# Patient Record
Sex: FEMALE | Race: OTHER | Employment: FULL TIME | URBAN - METROPOLITAN AREA
[De-identification: names, ages, dates, MRNs, and addresses within clinical notes are randomized per-mention and may not be internally consistent; named-entity substitution may affect disease eponyms.]

---

## 2017-01-10 ENCOUNTER — OFFICE VISIT (OUTPATIENT)
Dept: OBGYN CLINIC | Facility: CLINIC | Age: 42
End: 2017-01-10

## 2017-01-10 VITALS — WEIGHT: 146 LBS | DIASTOLIC BLOOD PRESSURE: 72 MMHG | BODY MASS INDEX: 25 KG/M2 | SYSTOLIC BLOOD PRESSURE: 126 MMHG

## 2017-01-10 DIAGNOSIS — R10.2 PELVIC PAIN IN FEMALE: Primary | ICD-10-CM

## 2017-01-10 DIAGNOSIS — N93.8 DUB (DYSFUNCTIONAL UTERINE BLEEDING): ICD-10-CM

## 2017-01-10 PROCEDURE — 99213 OFFICE O/P EST LOW 20 MIN: CPT | Performed by: OBSTETRICS & GYNECOLOGY

## 2017-01-10 NOTE — PROGRESS NOTES
HPI:    Patient ID: Jasbir Box is a 42year old female. HPI  Patient reports having two menses a month since October. Also left pelvic pain. H/O ovarian cysts. S/P Bilateral ovarian cystectomies in 2014.   Patient is allergic to narcotics and ex and thought content normal.   Nursing note and vitals reviewed. Vagina:  No lesions. Cervix:  No CMT. No lesions. Adnexa:  No adnexal masses.   NT.           ASSESSMENT/PLAN:   Pelvic pain in female  (primary encounter diagnosis)  Dub (dysfunctional

## 2017-01-13 ENCOUNTER — TELEPHONE (OUTPATIENT)
Dept: GASTROENTEROLOGY | Facility: CLINIC | Age: 42
End: 2017-01-13

## 2017-01-13 NOTE — TELEPHONE ENCOUNTER
Pt is made aware that she can submit stool specimen for H pylori any time now since she confirmed completion of triple therapy 2 months ago; she is agreeable with plan.

## 2017-01-22 ENCOUNTER — APPOINTMENT (OUTPATIENT)
Dept: LAB | Facility: HOSPITAL | Age: 42
End: 2017-01-22
Attending: INTERNAL MEDICINE
Payer: COMMERCIAL

## 2017-01-22 DIAGNOSIS — Z86.19 HISTORY OF HELICOBACTER PYLORI INFECTION: ICD-10-CM

## 2017-01-22 PROCEDURE — 87338 HPYLORI STOOL AG IA: CPT

## 2017-01-25 LAB — HELICOBACTER PYLORI AG, FECAL: NEGATIVE

## 2017-01-30 ENCOUNTER — TELEPHONE (OUTPATIENT)
Dept: OBGYN CLINIC | Facility: CLINIC | Age: 42
End: 2017-01-30

## 2017-01-30 ENCOUNTER — TELEPHONE (OUTPATIENT)
Dept: GASTROENTEROLOGY | Facility: CLINIC | Age: 42
End: 2017-01-30

## 2017-01-30 NOTE — TELEPHONE ENCOUNTER
GI RNs -  Very good news that the H pylori follow up is negative. These throat symptoms were discussed in August 2016 and then evaluated as below 9/15/2016. She has had recent thyroid US.   The only thing I can offer for her symptoms is PPI medication (pa

## 2017-01-30 NOTE — TELEPHONE ENCOUNTER
Dr. Oscar Canas- please see below communication from pt re: ongoing symptoms and advise; EGD was done on 9/15/16; thanks!

## 2017-01-30 NOTE — TELEPHONE ENCOUNTER
Pt is contacted and she is made aware of (-) stool for H pylori result; pt verbalized understanding of this; she mentioned having \"throat issues\" for past month as if something is stuck there; she was going to book appt with PCP re: this; she is made noemí

## 2017-01-30 NOTE — TELEPHONE ENCOUNTER
Dr. Karime Jameson- please see below communication with pt re: new rx for PPI to be sent to [facility] [place]; thanks!

## 2017-01-30 NOTE — TELEPHONE ENCOUNTER
Pt is contacted and she is made aware of below communication from Dr. Yoseph Michaels; pt verbalized understanding of this and is agreeable with trying PPI; pharmacy is confirmed as [facility] in [place]; if this does not relieve symptoms, then she will seek ENT r

## 2017-01-31 RX ORDER — PANTOPRAZOLE SODIUM 40 MG/1
40 TABLET, DELAYED RELEASE ORAL
Qty: 60 TABLET | Refills: 11 | Status: SHIPPED | OUTPATIENT
Start: 2017-01-31 | End: 2017-03-02

## 2017-03-29 ENCOUNTER — TELEPHONE (OUTPATIENT)
Dept: FAMILY MEDICINE CLINIC | Facility: CLINIC | Age: 42
End: 2017-03-29

## 2017-03-29 NOTE — TELEPHONE ENCOUNTER
Dr. Kyle Baeza for Dr. [facility], please see message below. Pt will be going out of town tomorrow 3/30/17 and is requesting refill on Rizatriptan. Rx not on pts current medication list. Please review. Thank you.     Refill Protocol Appointment Criteria  · Appointme

## 2017-03-29 NOTE — TELEPHONE ENCOUNTER
Pt will be going out of town tomorrow and was told by pharmacy that office still hasn't replied to the refill request. Pt will need a refill on rx rizatriptan.  (Not listed below)    Current Outpatient Prescriptions:  Verapamil HCl  MG Oral Capsule SR

## 2017-03-29 NOTE — TELEPHONE ENCOUNTER
when I spoke to pt she stated to me that she has been calling since yesterday for her rizatriptan 10 mg she is leaving out of town tomorrow. I advised her that [address] [place] [address] has not been prescribing this medication for her.  She then stated that she was so u

## 2017-03-30 NOTE — TELEPHONE ENCOUNTER
I called pharmacy and was inform that it was already  today given refill by her neurologist and has 11 refills. Thanks

## 2017-05-31 ENCOUNTER — TELEPHONE (OUTPATIENT)
Dept: OBGYN CLINIC | Facility: CLINIC | Age: 42
End: 2017-05-31

## 2017-05-31 NOTE — TELEPHONE ENCOUNTER
Would like to spkw  Nurse about a gyne prob pt is having she is having ingrown hair .  pls call her @ [phone]

## 2017-05-31 NOTE — TELEPHONE ENCOUNTER
Pt states that she shaved her vagina and not sure if she having ingrown hair. Pt also states that yesterday she had stabbing, right breast pain that was a 11 on a scale of 10. States right breast pain today is a 7 out of 10 on pain scale.  States she had br

## 2017-06-01 ENCOUNTER — OFFICE VISIT (OUTPATIENT)
Dept: OBGYN CLINIC | Facility: CLINIC | Age: 42
End: 2017-06-01

## 2017-06-01 VITALS
HEART RATE: 93 BPM | DIASTOLIC BLOOD PRESSURE: 113 MMHG | WEIGHT: 145 LBS | BODY MASS INDEX: 25 KG/M2 | SYSTOLIC BLOOD PRESSURE: 164 MMHG

## 2017-06-01 DIAGNOSIS — Z01.419 WELL WOMAN EXAM WITH ROUTINE GYNECOLOGICAL EXAM: Primary | ICD-10-CM

## 2017-06-01 DIAGNOSIS — Z12.31 ENCOUNTER FOR SCREENING MAMMOGRAM FOR BREAST CANCER: ICD-10-CM

## 2017-06-01 DIAGNOSIS — L73.9 FOLLICULITIS: ICD-10-CM

## 2017-06-01 DIAGNOSIS — N64.4 MASTALGIA IN FEMALE: ICD-10-CM

## 2017-06-01 PROCEDURE — 99396 PREV VISIT EST AGE 40-64: CPT | Performed by: OBSTETRICS & GYNECOLOGY

## 2017-06-01 NOTE — PROGRESS NOTES
HPI:   Danita Calvert is a 42year old female who presents for an annual/pap and for eval of bump, possible ingrown hair.  Pt also with      Wt Readings from Last 6 Encounters:  06/01/17 : 145 lb (65.772 kg)  01/10/17 : 146 lb (66.225 kg)  12/28/16 : 142 biopsies    REMOVAL OF OVARIAN CYST(S) Left     Comment laparoscopic    [address]    COLONOSCOPY  2012      2008      2010    Comment APGAR: 9 (1min) 9 (5 min)  DR:  Keiko Mccullough    REMOVAL OF back pain  NEURO: denies headaches  PSYCHE: denies depression or anxiety  HEMATOLOGIC: denies hx of anemia  ENDOCRINE: denies thyroid history  ALL/ASTHMA: denies hx of allergy or asthma    EXAM:   /103 mmHg  Pulse 93  Wt 145 lb (65.772 kg)  LMP 05/10

## 2017-06-01 NOTE — PROGRESS NOTES
Patient had a high blood pressure of 168/103 at beginning of visit. I rechecked her BP at the end of visit and it was 164/113.  I notified patient per Dr. Jeremy Fregoso patient had to go to urgent care or PCP right away, patient decline and stated she had to go

## 2017-06-02 ENCOUNTER — APPOINTMENT (OUTPATIENT)
Dept: LAB | Facility: HOSPITAL | Age: 42
End: 2017-06-02
Attending: OBSTETRICS & GYNECOLOGY
Payer: COMMERCIAL

## 2017-06-02 DIAGNOSIS — Z01.419 WELL WOMAN EXAM WITH ROUTINE GYNECOLOGICAL EXAM: ICD-10-CM

## 2017-06-02 PROCEDURE — 36415 COLL VENOUS BLD VENIPUNCTURE: CPT

## 2017-06-02 PROCEDURE — 84702 CHORIONIC GONADOTROPIN TEST: CPT

## 2017-06-02 PROCEDURE — 84443 ASSAY THYROID STIM HORMONE: CPT

## 2017-06-10 ENCOUNTER — TELEPHONE (OUTPATIENT)
Dept: OBGYN CLINIC | Facility: CLINIC | Age: 42
End: 2017-06-10

## 2017-06-10 NOTE — TELEPHONE ENCOUNTER
Pt appraised of normal pap, positive high risk hpv, neg high risk hpv 16/18/45,  Counseled, pt agreed to make appt for pap 12 months from jun 2017

## 2017-08-08 ENCOUNTER — TELEPHONE (OUTPATIENT)
Dept: SURGERY | Facility: CLINIC | Age: 42
End: 2017-08-08

## 2017-08-08 NOTE — TELEPHONE ENCOUNTER
The patient called this afternoon and expressed frustration. She requested that Dr. Carrol Bamberger be paged to speak with her urgently. I explained that he is in clinic seeing patients today and asked if I can be of assistance to.  She stated that it was regarding

## 2017-08-09 ENCOUNTER — TELEPHONE (OUTPATIENT)
Dept: SURGERY | Facility: CLINIC | Age: 42
End: 2017-08-09

## 2017-08-09 NOTE — TELEPHONE ENCOUNTER
Spoke to patient. She relates that she has been having right breast pain think she may have pulled a muscle. I instructed the patient to call for an appointment to be seen. She realizes understanding.

## 2017-08-21 ENCOUNTER — HOSPITAL ENCOUNTER (OUTPATIENT)
Dept: ULTRASOUND IMAGING | Facility: HOSPITAL | Age: 42
Discharge: HOME | End: 2017-08-21
Attending: OBSTETRICS & GYNECOLOGY
Payer: COMMERCIAL

## 2017-08-21 ENCOUNTER — HOSPITAL ENCOUNTER (OUTPATIENT)
Dept: MAMMOGRAPHY | Facility: HOSPITAL | Age: 42
Discharge: HOME | End: 2017-08-21
Attending: OBSTETRICS & GYNECOLOGY
Payer: COMMERCIAL

## 2017-08-21 DIAGNOSIS — N64.4 MASTALGIA IN FEMALE: ICD-10-CM

## 2017-08-21 PROCEDURE — 76642 ULTRASOUND BREAST LIMITED: CPT | Performed by: OBSTETRICS & GYNECOLOGY

## 2017-10-27 ENCOUNTER — TELEPHONE (OUTPATIENT)
Dept: OBGYN CLINIC | Facility: CLINIC | Age: 42
End: 2017-10-27

## 2017-10-27 NOTE — TELEPHONE ENCOUNTER
Per the pt she had a positive pregnancy test, and she would like to set up an appt. The pt states that her LMP was on 9/25/17. Please advise.

## 2017-10-30 ENCOUNTER — OFFICE VISIT (OUTPATIENT)
Dept: OBGYN CLINIC | Facility: CLINIC | Age: 42
End: 2017-10-30

## 2017-10-30 VITALS
HEART RATE: 98 BPM | WEIGHT: 143.38 LBS | HEIGHT: 63 IN | DIASTOLIC BLOOD PRESSURE: 86 MMHG | BODY MASS INDEX: 25.41 KG/M2 | SYSTOLIC BLOOD PRESSURE: 151 MMHG

## 2017-10-30 DIAGNOSIS — Z34.90 UNPLANNED PREGNANCY: ICD-10-CM

## 2017-10-30 DIAGNOSIS — Z85.3 HISTORY OF BREAST CANCER: ICD-10-CM

## 2017-10-30 DIAGNOSIS — N92.6 MISSED MENSES: Primary | ICD-10-CM

## 2017-10-30 PROCEDURE — 81025 URINE PREGNANCY TEST: CPT | Performed by: ADVANCED PRACTICE MIDWIFE

## 2017-10-30 PROCEDURE — 99213 OFFICE O/P EST LOW 20 MIN: CPT | Performed by: ADVANCED PRACTICE MIDWIFE

## 2017-10-30 RX ORDER — RIZATRIPTAN BENZOATE 10 MG/1
TABLET ORAL
Refills: 6 | COMMUNITY
Start: 2017-09-23 | End: 2017-12-18 | Stop reason: ALTCHOICE

## 2017-10-30 NOTE — PROGRESS NOTES
HPI:    Patient ID: Braulio Mccall is a 42year old female. LMP 9/25/17. Was on tamoxifen but stopped. Has been having regular periods. Was treated for breast CA 2 years ago and has been cancer free since.   States she was told by her MD that she co

## 2017-12-18 ENCOUNTER — OFFICE VISIT (OUTPATIENT)
Dept: FAMILY MEDICINE CLINIC | Facility: CLINIC | Age: 42
End: 2017-12-18

## 2017-12-18 VITALS
SYSTOLIC BLOOD PRESSURE: 138 MMHG | BODY MASS INDEX: 26 KG/M2 | TEMPERATURE: 98 F | WEIGHT: 149 LBS | DIASTOLIC BLOOD PRESSURE: 94 MMHG

## 2017-12-18 DIAGNOSIS — I10 ESSENTIAL HYPERTENSION: Primary | ICD-10-CM

## 2017-12-18 DIAGNOSIS — F41.1 GENERALIZED ANXIETY DISORDER: ICD-10-CM

## 2017-12-18 DIAGNOSIS — K21.9 GASTROESOPHAGEAL REFLUX DISEASE, ESOPHAGITIS PRESENCE NOT SPECIFIED: ICD-10-CM

## 2017-12-18 PROCEDURE — 99213 OFFICE O/P EST LOW 20 MIN: CPT | Performed by: PHYSICIAN ASSISTANT

## 2017-12-18 PROCEDURE — 99212 OFFICE O/P EST SF 10 MIN: CPT | Performed by: PHYSICIAN ASSISTANT

## 2017-12-18 RX ORDER — CITALOPRAM 20 MG/1
20 TABLET ORAL DAILY
Qty: 30 TABLET | Refills: 2 | Status: SHIPPED | OUTPATIENT
Start: 2017-12-18 | End: 2018-04-02 | Stop reason: ALTCHOICE

## 2017-12-18 RX ORDER — PANTOPRAZOLE SODIUM 40 MG/1
40 TABLET, DELAYED RELEASE ORAL
Qty: 30 TABLET | Refills: 2 | Status: SHIPPED | OUTPATIENT
Start: 2017-12-18 | End: 2018-01-04

## 2017-12-18 RX ORDER — VERAPAMIL HYDROCHLORIDE 180 MG/1
180 CAPSULE, EXTENDED RELEASE ORAL NIGHTLY
Qty: 90 CAPSULE | Refills: 3 | Status: SHIPPED | OUTPATIENT
Start: 2017-12-18 | End: 2018-04-02 | Stop reason: ALTCHOICE

## 2017-12-18 NOTE — PROGRESS NOTES
HPI:    Patient ID: Enzo Isaac is a 36year old female. Patient presents for heartburn and acid reflux worsening over past month. She has tried omeprazole and Zantac over the counter without any improvement.   She states has to sleep sitting up at breakfast. Disp: 30 tablet Rfl: 2   Citalopram Hydrobromide 20 MG Oral Tab Take 1 tablet (20 mg total) by mouth daily. Disp: 30 tablet Rfl: 2   Verapamil HCl  MG Oral Capsule SR 24 Hr Take 1 capsule (180 mg total) by mouth nightly.  Disp: 90 capsule R in this encounter.       Meds This Visit:  Signed Prescriptions Disp Refills    Pantoprazole Sodium 40 MG Oral Tab EC 30 tablet 2      Sig: Take 1 tablet (40 mg total) by mouth every morning before breakfast.      Citalopram Hydrobromide 20 MG Oral Tab 30 t

## 2017-12-22 ENCOUNTER — TELEPHONE (OUTPATIENT)
Dept: GASTROENTEROLOGY | Facility: CLINIC | Age: 42
End: 2017-12-22

## 2017-12-22 NOTE — TELEPHONE ENCOUNTER
Future Appointments  Date Time Provider Jacki Rodegrs   12/29/2017 9:30 AM NASEEM Leonardo     Tried to call the pt to ask her symptoms. Her phone went to busy.  I will try to call again later

## 2017-12-22 NOTE — TELEPHONE ENCOUNTER
Please do! I would be more than happy to speak with her over the phone today. Please call me or page me when she is available to speak, if you are on the phone with her and I have completed office.

## 2018-01-02 ENCOUNTER — NURSE TRIAGE (OUTPATIENT)
Dept: OTHER | Age: 43
End: 2018-01-02

## 2018-01-02 NOTE — TELEPHONE ENCOUNTER
Spoke with patient who refused the appointment for today, and requested to schedule the appointment for tomorrow. This nurse spoke with the phone room who will schedule the patient for tomorrow at 8:30am. Patient voiced understanding.

## 2018-01-02 NOTE — TELEPHONE ENCOUNTER
Action Requested: Summary for Provider     []  Critical Lab, Recommendations Needed  [x] Need Additional Advice  []   FYI    []   Need Orders  [x] Need Medications Sent to Pharmacy  []  Other     SUMMARY: Pt is requesting antibiotic to pharmacy.      States

## 2018-01-02 NOTE — PROGRESS NOTES
[facility] Follow-up Visit    Cora of CP or SOB.      Last OV with Dr. Santhosh Adamson:  - Since the surgery and radiation therapy, she states that she has been suffering neck and throat pain.  She points to the area of the thyroid gland and cartilage.  She describes pain and the sensation of a lump Pylori    History, Medications, Allergies, ROS:      Past Medical History:   Diagnosis Date   • Breast CA ([facility] [address]    • Breast pain in female    • Chronic pain    • DCIS (ductal carcinoma in situ) of breast 2013    Right breast. radiotherapy and mastectomy • Cancer Other 49     breast cancer and leukemia   • Dementia Other      Alzheimers, Grandmother [SIDE NOT STATED]   • Cancer Cousin 27     ovarian cancer / maternal cousin   • Diabetes Paternal Grandmother    • Cancer Paternal Aunt      leukemia   • Can swelling  BEHAVIOR/PSYCH:  negative for depressed mood    PHYSICAL EXAM:   Blood pressure 142/85, pulse 96, height 5' 3\" (1.6 m), weight 145 lb 3.2 oz (65.9 kg), last menstrual period 12/11/2017, not currently breastfeeding.     Gen: patient appears comfor recent upper respiratory complaints, or true acid reflux. I have advised her re: PPI/Carafate and will likely see the patient promptly after 1 month. We discussed the low yield of repeat upper endoscopy, will reserve this for next visit if necessary.  Anti-

## 2018-01-03 ENCOUNTER — HOSPITAL ENCOUNTER (OUTPATIENT)
Dept: GENERAL RADIOLOGY | Age: 43
Discharge: HOME | End: 2018-01-03
Attending: FAMILY MEDICINE
Payer: COMMERCIAL

## 2018-01-03 ENCOUNTER — OFFICE VISIT (OUTPATIENT)
Dept: FAMILY MEDICINE CLINIC | Facility: CLINIC | Age: 43
End: 2018-01-03

## 2018-01-03 VITALS
DIASTOLIC BLOOD PRESSURE: 82 MMHG | TEMPERATURE: 99 F | HEART RATE: 93 BPM | BODY MASS INDEX: 25.52 KG/M2 | WEIGHT: 144 LBS | SYSTOLIC BLOOD PRESSURE: 150 MMHG | HEIGHT: 63 IN

## 2018-01-03 DIAGNOSIS — R05.9 COUGH: ICD-10-CM

## 2018-01-03 DIAGNOSIS — R05.9 COUGH: Primary | ICD-10-CM

## 2018-01-03 PROCEDURE — 99214 OFFICE O/P EST MOD 30 MIN: CPT | Performed by: FAMILY MEDICINE

## 2018-01-03 PROCEDURE — 99212 OFFICE O/P EST SF 10 MIN: CPT | Performed by: FAMILY MEDICINE

## 2018-01-03 PROCEDURE — 71046 X-RAY EXAM CHEST 2 VIEWS: CPT | Performed by: FAMILY MEDICINE

## 2018-01-03 RX ORDER — RIZATRIPTAN BENZOATE 10 MG/1
TABLET ORAL
Refills: 3 | COMMUNITY
Start: 2017-12-29 | End: 2018-04-02

## 2018-01-03 RX ORDER — BENZONATATE 100 MG/1
100 CAPSULE ORAL 3 TIMES DAILY PRN
Qty: 45 CAPSULE | Refills: 0 | Status: SHIPPED | OUTPATIENT
Start: 2018-01-03 | End: 2018-04-02 | Stop reason: ALTCHOICE

## 2018-01-03 NOTE — PROGRESS NOTES
Blood pressure 150/82, pulse 93, temperature 98.8 °F (37.1 °C), temperature source Oral, height 5' 3\" (1.6 m), weight 144 lb (65.3 kg), last menstrual period 12/11/2017, not currently breastfeeding.     Patient presents today complaining of a two-week hist

## 2018-01-04 ENCOUNTER — OFFICE VISIT (OUTPATIENT)
Dept: GASTROENTEROLOGY | Facility: CLINIC | Age: 43
End: 2018-01-04

## 2018-01-04 VITALS
HEART RATE: 96 BPM | WEIGHT: 145.19 LBS | SYSTOLIC BLOOD PRESSURE: 142 MMHG | BODY MASS INDEX: 25.73 KG/M2 | DIASTOLIC BLOOD PRESSURE: 85 MMHG | HEIGHT: 63 IN

## 2018-01-04 DIAGNOSIS — K21.9 GASTROESOPHAGEAL REFLUX DISEASE, ESOPHAGITIS PRESENCE NOT SPECIFIED: Primary | ICD-10-CM

## 2018-01-04 DIAGNOSIS — R49.0 HOARSENESS: ICD-10-CM

## 2018-01-04 DIAGNOSIS — R10.31 RLQ ABDOMINAL PAIN: ICD-10-CM

## 2018-01-04 DIAGNOSIS — R10.13 EPIGASTRIC ABDOMINAL PAIN: ICD-10-CM

## 2018-01-04 DIAGNOSIS — Z86.19 HISTORY OF HELICOBACTER PYLORI INFECTION: ICD-10-CM

## 2018-01-04 PROCEDURE — 99213 OFFICE O/P EST LOW 20 MIN: CPT | Performed by: PHYSICIAN ASSISTANT

## 2018-01-04 PROCEDURE — 99212 OFFICE O/P EST SF 10 MIN: CPT | Performed by: PHYSICIAN ASSISTANT

## 2018-01-04 RX ORDER — OMEPRAZOLE 40 MG/1
40 CAPSULE, DELAYED RELEASE ORAL DAILY
Qty: 90 CAPSULE | Refills: 0 | Status: SHIPPED | OUTPATIENT
Start: 2018-01-04 | End: 2018-03-31

## 2018-01-04 RX ORDER — SUCRALFATE 1 G/1
1 TABLET ORAL
Qty: 120 TABLET | Refills: 0 | Status: SHIPPED | OUTPATIENT
Start: 2018-01-04 | End: 2019-04-22

## 2018-01-04 NOTE — PATIENT INSTRUCTIONS
1. Start on Omeprazole 40 mg each day - thirty minutes before breakfast.    2. Start Carafate - this medication is to be taken 4 times a day as prescribed.     3. Call me in 1 week - if you are still not doing better, we may consider repeating upper endosco

## 2018-01-05 ENCOUNTER — TELEPHONE (OUTPATIENT)
Dept: GASTROENTEROLOGY | Facility: CLINIC | Age: 43
End: 2018-01-05

## 2018-01-06 NOTE — TELEPHONE ENCOUNTER
Recommendations after d/w Dr. Teo Padilla:    1. Agree with aggressive PPI use for now. 2. In 1 month, could transition to ranitidine.  Sometimes patients with these atypical symptoms do better on H2 blocker than the PPI medications.    3. CT scan reasonable

## 2018-01-16 NOTE — TELEPHONE ENCOUNTER
Transferred from phone room. Forwarded to [facility] PA--I reviewed all below with pt. She does not want CT scan, and I explained about the egd. States she had been doing much better, then ate a hot dog and fries yesterday and \"feels terrible\".  Wanted to kno

## 2018-01-31 ENCOUNTER — PATIENT MESSAGE (OUTPATIENT)
Dept: GASTROENTEROLOGY | Facility: CLINIC | Age: 43
End: 2018-01-31

## 2018-01-31 DIAGNOSIS — R12 HEARTBURN: ICD-10-CM

## 2018-01-31 DIAGNOSIS — R63.0 LOSS OF APPETITE: ICD-10-CM

## 2018-01-31 DIAGNOSIS — R63.4 WEIGHT LOSS: Primary | ICD-10-CM

## 2018-01-31 DIAGNOSIS — Z85.3 HISTORY OF BREAST CANCER: ICD-10-CM

## 2018-02-05 NOTE — TELEPHONE ENCOUNTER
Connie Diss,     Having the flu can definitely irritate your symptoms. Are you taking Ibuprofen/Motrin/Aleve/Asprin products? Are you having black stools? Discontinue the Omeprazole medication and Carafate I prescribed in office.  Instead you can take Ranit

## 2018-02-05 NOTE — TELEPHONE ENCOUNTER
From: Nhan Seymour  To: Juancarlos [place]  Sent: 1/31/2018 1:54 PM CST  Subject: Prescription Question    HI MS. YAMILA VERA BEEN TAKING THE MEDICATION YOU PRESCRIBED ME FOR ABOUT 3 WEEKS NOW AND ITS HONESTLY NOT WORKING.  I HAVE NO APPETITE, CHUY LOST 10LB

## 2018-02-07 ENCOUNTER — TELEPHONE (OUTPATIENT)
Dept: GASTROENTEROLOGY | Facility: CLINIC | Age: 43
End: 2018-02-07

## 2018-02-07 NOTE — TELEPHONE ENCOUNTER
Kathryn--pt contacted and has her cell and can be reached any time on it. Forwarded also to GI schedulers. Thanks.

## 2018-02-07 NOTE — TELEPHONE ENCOUNTER
Spoke with pt. Has been taking sucralfate and omeprazole as prescribed. Symptoms described below are constant and unimproved with medications. Decreased appetite due to symptoms. Denies fever, chills, body aches, nausea, vomiting or diarrhea. PB please advise.

## 2018-02-07 NOTE — TELEPHONE ENCOUNTER
Pt states that medication prescribed (she did not know name of med) is not working at all for symptoms. Pt still has lump in throat and stomach pain and also acid taste in mouth. Please call.

## 2018-02-08 ENCOUNTER — TELEPHONE (OUTPATIENT)
Dept: GASTROENTEROLOGY | Facility: CLINIC | Age: 43
End: 2018-02-08

## 2018-02-08 NOTE — TELEPHONE ENCOUNTER
Sometimes medicine works, sometimes does not. She has not scheduled CT scan, EGD nor changed medication regimen. Wondering if she should be strict about her diet which I did recommend to her. She states yesterday was told I would call her back, and was awaiting call. States was told by staff not to schedule CT, et Nena Bodily. Reviewed all notes and recommendations with her. She will read [employer] message, await call to schedule, and schedule CT herself. Understands to call back or [employer] message once she has read the 1/31 message and then we will continue to make recommendations. Patient at work - was appreciative of call. GI RNs, let us make sure we get this to  today if possible.  This was mostly an Dominic Paul

## 2018-02-08 NOTE — TELEPHONE ENCOUNTER
Scheduled for:  EGD 92959  Provider Name: Dr Davide Brambila  Date:  2/22/18  Location:  [facility]  Sedation:  MAC  Time:  1503 (pt is aware that [address] will call the day before to confirm arrival time)   Prep:  NPO after midnight, sent via [employer]  Meds/Allergies Rec

## 2018-02-13 NOTE — TELEPHONE ENCOUNTER
I called patient. She is very frustrated that she started [facility] and put my name on. I told her I was unaware she was even trying to file FMLA, especially given she has not responded to my calls re: changing her PPI to H2 and CT.  She wanted me to run FMLA by

## 2018-02-13 NOTE — TELEPHONE ENCOUNTER
Please let patient know:    I received her [employer] message. She needs to schedule her CT of the Abdomen. PCP's office covers FMLA.

## 2018-02-15 ENCOUNTER — TELEPHONE (OUTPATIENT)
Dept: ADMINISTRATIVE | Age: 43
End: 2018-02-15

## 2018-02-17 ENCOUNTER — HOSPITAL ENCOUNTER (OUTPATIENT)
Dept: CT IMAGING | Facility: HOSPITAL | Age: 43
Discharge: HOME | End: 2018-02-17
Attending: PHYSICIAN ASSISTANT
Payer: COMMERCIAL

## 2018-02-17 DIAGNOSIS — Z85.3 HISTORY OF BREAST CANCER: ICD-10-CM

## 2018-02-17 DIAGNOSIS — R63.0 LOSS OF APPETITE: ICD-10-CM

## 2018-02-17 DIAGNOSIS — R12 HEARTBURN: ICD-10-CM

## 2018-02-17 DIAGNOSIS — R63.4 WEIGHT LOSS: ICD-10-CM

## 2018-02-17 LAB — CREAT BLD-MCNC: 0.5 MG/DL (ref 0.5–1.5)

## 2018-02-17 PROCEDURE — 82565 ASSAY OF CREATININE: CPT

## 2018-02-17 PROCEDURE — 74177 CT ABD & PELVIS W/CONTRAST: CPT | Performed by: PHYSICIAN ASSISTANT

## 2018-02-20 ENCOUNTER — TELEPHONE (OUTPATIENT)
Dept: PEDIATRICS CLINIC | Facility: CLINIC | Age: 43
End: 2018-02-20

## 2018-02-20 ENCOUNTER — TELEPHONE (OUTPATIENT)
Dept: GASTROENTEROLOGY | Facility: CLINIC | Age: 43
End: 2018-02-20

## 2018-02-20 NOTE — TELEPHONE ENCOUNTER
Kathryn GUSMAN, pt requesting CT Scan of Abdomen, noted your note on CT result (copied and pasted below). Please call pt when appropriate. Thank you.       Notes Recorded by Giovanni Hawkins PA-C on 2/19/2018 at 11:35 AM CST  Dr. Satya Montejo, please see:    \"GI/SUDHIRENTE

## 2018-02-20 NOTE — TELEPHONE ENCOUNTER
Pt states she may have either a cyst or endometriosis per CT Scan of abdomen that she recently had. Would like to schedule appt to see what steps are next.

## 2018-02-20 NOTE — TELEPHONE ENCOUNTER
Discussed with Dr. Satya Montejo in office. Possible congenital versus ?endometriosis etiology of the small loops of bowel in the LUQ.     Discussed with patient over the phone - she is very frustrated stating \"I almost hope something is wrong so that we can f

## 2018-02-20 NOTE — TELEPHONE ENCOUNTER
Pls note pt message below. Apt made for next Monday with [employer] per pt request. Pt asked if [facility] can review the CT scan prior to apt.

## 2018-02-20 NOTE — TELEPHONE ENCOUNTER
Noted.  CT scan discussed today with Tran Whitt PA-C. No findings on CT scan to explain abdominal pain. Incidental finding of \"clumping\" of small bowel in the left upper quadrant discussed. I do not think that is relevant to this patient's symptoms.

## 2018-02-21 NOTE — TELEPHONE ENCOUNTER
Dr. Mandy Jimenez pending in [facility]. Pt is requesting intermittent time off of 1-4 days per month, 1-24 hrs per episode starting from 1/4/18 for the year due to GERD. She also has an EGD scheduled for rukhsana. Do you approve? Please advise.     Thank you,  Min

## 2018-02-22 ENCOUNTER — LAB REQUISITION (OUTPATIENT)
Dept: LAB | Facility: HOSPITAL | Age: 43
End: 2018-02-22
Payer: COMMERCIAL

## 2018-02-22 DIAGNOSIS — Z01.89 ENCOUNTER FOR OTHER SPECIFIED SPECIAL EXAMINATIONS: ICD-10-CM

## 2018-02-22 PROCEDURE — 88312 SPECIAL STAINS GROUP 1: CPT | Performed by: INTERNAL MEDICINE

## 2018-02-22 PROCEDURE — 88305 TISSUE EXAM BY PATHOLOGIST: CPT | Performed by: INTERNAL MEDICINE

## 2018-02-23 NOTE — TELEPHONE ENCOUNTER
Dr. Yuliya Arguelles,    Please sign off on form:  -Highlight the patient and hit \"Chart\" button. -In Chart Review, w/in the Encounter tab - click 1 time on the Telephone call encounter for 2/15/18.  Scroll down the telephone encounter.  -Click \"scan on\" blue

## 2018-02-26 ENCOUNTER — TELEPHONE (OUTPATIENT)
Dept: GASTROENTEROLOGY | Facility: CLINIC | Age: 43
End: 2018-02-26

## 2018-02-26 NOTE — TELEPHONE ENCOUNTER
Copy of result note attached to her biopsies of 2/22/2018:    \"GI RNs-  Please call Isabella Minaya to advise that the EGD examination of 2/22/2018 showed a healthy appearing esophagus. I took biopsies of the lining of the esophagus to evaluate her symptoms.   I

## 2018-02-26 NOTE — TELEPHONE ENCOUNTER
Pt paid over the phone. Sent Email auth on 2/22/18. Faxed FMLA to [employer]. Will mail copy and cc receipt with blank HIPAA to pt. Pt aware.

## 2018-02-27 NOTE — TELEPHONE ENCOUNTER
Janet Roberts, this is great. Thank you so much for speaking to Consuelo Meyers and clearly explaining all of this to her. In the end, I think it is only diet and lifestyle that will make a difference for her.

## 2018-02-27 NOTE — TELEPHONE ENCOUNTER
Dr Cathrine Cranker,    I went over your recommendations and f/u instructions with [facility]. She verbalizes understanding but is concerned because she is still experiencing reflux symptoms. She is wondering if she needs a medication adjustment.     I asked he

## 2018-03-20 ENCOUNTER — TELEPHONE (OUTPATIENT)
Dept: FAMILY MEDICINE CLINIC | Facility: CLINIC | Age: 43
End: 2018-03-20

## 2018-03-20 NOTE — TELEPHONE ENCOUNTER
Pt requesting to come in on Monday anytime   Pt states she keeps getting bloody noses all day and that her nose feels clogged   Schedule is on hold

## 2018-03-31 ENCOUNTER — PATIENT MESSAGE (OUTPATIENT)
Dept: FAMILY MEDICINE CLINIC | Facility: CLINIC | Age: 43
End: 2018-03-31

## 2018-04-02 ENCOUNTER — OFFICE VISIT (OUTPATIENT)
Dept: FAMILY MEDICINE CLINIC | Facility: CLINIC | Age: 43
End: 2018-04-02

## 2018-04-02 VITALS
DIASTOLIC BLOOD PRESSURE: 88 MMHG | SYSTOLIC BLOOD PRESSURE: 144 MMHG | HEART RATE: 120 BPM | TEMPERATURE: 98 F | WEIGHT: 133 LBS | BODY MASS INDEX: 24 KG/M2

## 2018-04-02 DIAGNOSIS — J04.0 ACUTE LARYNGITIS: ICD-10-CM

## 2018-04-02 DIAGNOSIS — J01.00 ACUTE NON-RECURRENT MAXILLARY SINUSITIS: Primary | ICD-10-CM

## 2018-04-02 PROCEDURE — 99212 OFFICE O/P EST SF 10 MIN: CPT | Performed by: PHYSICIAN ASSISTANT

## 2018-04-02 PROCEDURE — 99213 OFFICE O/P EST LOW 20 MIN: CPT | Performed by: PHYSICIAN ASSISTANT

## 2018-04-02 RX ORDER — RIZATRIPTAN BENZOATE 10 MG/1
TABLET ORAL
Qty: 6 TABLET | Refills: 3 | Status: SHIPPED | OUTPATIENT
Start: 2018-04-02 | End: 2018-11-09

## 2018-04-02 RX ORDER — OMEPRAZOLE 40 MG/1
40 CAPSULE, DELAYED RELEASE ORAL DAILY
Qty: 90 CAPSULE | Refills: 0 | Status: SHIPPED
Start: 2018-04-02 | End: 2018-04-02

## 2018-04-02 RX ORDER — AMOXICILLIN AND CLAVULANATE POTASSIUM 875; 125 MG/1; MG/1
1 TABLET, FILM COATED ORAL 2 TIMES DAILY
Qty: 20 TABLET | Refills: 0 | Status: SHIPPED | OUTPATIENT
Start: 2018-04-02 | End: 2018-04-12

## 2018-04-02 RX ORDER — OMEPRAZOLE 40 MG/1
40 CAPSULE, DELAYED RELEASE ORAL DAILY
Qty: 90 CAPSULE | Refills: 1 | Status: SHIPPED | OUTPATIENT
Start: 2018-04-02 | End: 2018-11-20

## 2018-04-02 RX ORDER — METHYLPREDNISOLONE 4 MG/1
TABLET ORAL
Qty: 1 KIT | Refills: 0 | Status: SHIPPED | OUTPATIENT
Start: 2018-04-02 | End: 2018-06-15

## 2018-04-02 NOTE — TELEPHONE ENCOUNTER
Pending Prescriptions Disp Refills    Omeprazole 40 MG Oral Capsule Delayed Release 90 capsule 0     Sig: Take 1 capsule (40 mg total) by mouth daily. Take 1 capsule by mouth daily before breakfast.         See refill request  Patient last seen 1-4-18.

## 2018-04-02 NOTE — TELEPHONE ENCOUNTER
From: Mamie Lopez  Sent: 3/31/2018 9:52 AM CDT  Subject: Medication Renewal Request    Sis Mann.  Nikole Worthington would like a refill of the following medications:     Omeprazole 40 MG Oral Capsule Delayed Release Huang Dennis PA-C]    Preferred pharmacy: [facility]

## 2018-04-02 NOTE — PROGRESS NOTES
HPI:    Patient ID: Dimple Smith is a 36year old female. Patient presents for follow up from immediate care visit 3/31/18.   She developed severe sore throat and pain with swallowing, hoarse voice, cough, sinus pain and congestion and bilateral ear LATEX  Norco [Hydrocodone-*    Hives, Nausea and vomiting  Tramadol                Hives, Nausea only  Hydrocodone             Dizziness    Comment:NAUSEA   PHYSICAL EXAM:   Physical Exam   Constitutional: She is oriented to person, place, and time.  She ap and rest.  Patient instructed to follow up if symptoms persist or worsen. No orders of the defined types were placed in this encounter.       Meds This Visit:  Signed Prescriptions Disp Refills    Amoxicillin-Pot Clavulanate 875-125 MG Oral Tab 20 tablet

## 2018-04-09 ENCOUNTER — NURSE TRIAGE (OUTPATIENT)
Dept: OTHER | Age: 43
End: 2018-04-09

## 2018-04-09 NOTE — TELEPHONE ENCOUNTER
Patient should schedule an appointment for evaluation. If pain becomes severe then she should go to ER.

## 2018-04-09 NOTE — TELEPHONE ENCOUNTER
Called pt, verified name and . Pt scheduled appt at 9am on 18. Adised her to go to ER if pain becomes more severe. She voices understanding and agrees with plan.

## 2018-04-09 NOTE — TELEPHONE ENCOUNTER
Action Requested: Summary for Provider     []  Critical Lab, Recommendations Needed  [x] Need Additional Advice  []   FYI    []   Need Orders  [] Need Medications Sent to Pharmacy  []  Other     SUMMARY: pt called reports she was ill with flu and did a lot

## 2018-06-15 ENCOUNTER — OFFICE VISIT (OUTPATIENT)
Dept: FAMILY MEDICINE CLINIC | Facility: CLINIC | Age: 43
End: 2018-06-15

## 2018-06-15 VITALS
SYSTOLIC BLOOD PRESSURE: 170 MMHG | RESPIRATION RATE: 18 BRPM | BODY MASS INDEX: 23.57 KG/M2 | HEART RATE: 101 BPM | TEMPERATURE: 98 F | WEIGHT: 133 LBS | DIASTOLIC BLOOD PRESSURE: 97 MMHG | HEIGHT: 63 IN

## 2018-06-15 DIAGNOSIS — L72.0 EPIDERMAL CYST OF FACE: ICD-10-CM

## 2018-06-15 DIAGNOSIS — F41.1 GENERALIZED ANXIETY DISORDER: ICD-10-CM

## 2018-06-15 DIAGNOSIS — I10 ESSENTIAL HYPERTENSION: Primary | ICD-10-CM

## 2018-06-15 PROCEDURE — 99212 OFFICE O/P EST SF 10 MIN: CPT | Performed by: FAMILY MEDICINE

## 2018-06-15 PROCEDURE — 99213 OFFICE O/P EST LOW 20 MIN: CPT | Performed by: FAMILY MEDICINE

## 2018-06-15 RX ORDER — AMOXICILLIN 875 MG/1
875 TABLET, COATED ORAL 2 TIMES DAILY
Qty: 20 TABLET | Refills: 0 | Status: SHIPPED | OUTPATIENT
Start: 2018-06-15 | End: 2018-08-07 | Stop reason: ALTCHOICE

## 2018-06-15 RX ORDER — TRANDOLAPRIL AND VERAPAMIL HYDROCHLORIDE 1; 240 MG/1; MG/1
1 TABLET, FILM COATED, EXTENDED RELEASE ORAL DAILY
Qty: 90 TABLET | Refills: 3 | Status: SHIPPED | OUTPATIENT
Start: 2018-06-15 | End: 2018-08-22

## 2018-06-15 RX ORDER — PREDNISONE 20 MG/1
20 TABLET ORAL 2 TIMES DAILY
Qty: 10 TABLET | Refills: 0 | Status: SHIPPED | OUTPATIENT
Start: 2018-06-15 | End: 2018-06-15

## 2018-06-15 RX ORDER — AMOXICILLIN 875 MG/1
875 TABLET, COATED ORAL 2 TIMES DAILY
Qty: 20 TABLET | Refills: 0 | Status: SHIPPED | OUTPATIENT
Start: 2018-06-15 | End: 2018-06-15

## 2018-06-15 RX ORDER — PREDNISONE 20 MG/1
20 TABLET ORAL 2 TIMES DAILY
Qty: 10 TABLET | Refills: 0 | Status: SHIPPED | OUTPATIENT
Start: 2018-06-15 | End: 2018-06-20

## 2018-06-15 NOTE — PROGRESS NOTES
Small cyst on forehead  Movable not tender. No pus or drainage. bp elevated  No headaches no problems with the medication. Patient's past medical surgical family social history was reviewed.     Review of Systems  Allergic: no environmental allergies

## 2018-08-06 ENCOUNTER — OFFICE VISIT (OUTPATIENT)
Dept: DERMATOLOGY CLINIC | Facility: CLINIC | Age: 43
End: 2018-08-06
Payer: COMMERCIAL

## 2018-08-06 DIAGNOSIS — Z85.3 HISTORY OF BREAST CANCER: ICD-10-CM

## 2018-08-06 DIAGNOSIS — D36.9 DERMOID CYST: Primary | ICD-10-CM

## 2018-08-06 PROCEDURE — 99212 OFFICE O/P EST SF 10 MIN: CPT | Performed by: DERMATOLOGY

## 2018-08-06 PROCEDURE — 99201 OFFICE/OUTPT VISIT,NEW,LEVL I: CPT | Performed by: DERMATOLOGY

## 2018-08-06 RX ORDER — PREDNISONE 20 MG/1
TABLET ORAL
Refills: 0 | COMMUNITY
Start: 2018-06-15 | End: 2018-08-07 | Stop reason: ALTCHOICE

## 2018-08-06 NOTE — PROGRESS NOTES
Yash Butcher is a 36year old female. Patient presents with:  Lesion: New pt presenting with cyst-like lesion to forehead. Previously took amoxicillin and prednisone. Latex;  Norco [Hydrocodone-Acetaminophen]; Tramadol; Hydrocodone    C tablet Rfl: 0   Rizatriptan Benzoate 10 MG Oral Tab TK 1 T PO QD PRF HA Disp: 6 tablet Rfl: 3   Omeprazole 40 MG Oral Capsule Delayed Release Take 1 capsule (40 mg total) by mouth daily.  Take 1 capsule by mouth daily before breakfast. Disp: 90 capsule Rfl: lymph node biopsy   09/30/2015: MASTECTOMY RIGHT  2005: REMOVAL OF OVARIAN CYST(S) Left      Comment: laparoscopic  1/2014: REMOVAL OF OVARIAN CYST(S) Bilateral      Comment: laparotomy  7/22/2016: SKIN SURGERY      Comment: bilateral areaola tattoing   19 medications, allergies as noted. Physical examination: Patient  well-developed well-nourished, alert oriented in no acute distress.   Exam of involved, appropriate areas of skin performed, including scalp, head, neck, face,nails, hair, external eyes, inc return as noted in follow-up /as noted above

## 2018-08-07 ENCOUNTER — OFFICE VISIT (OUTPATIENT)
Dept: OTOLARYNGOLOGY | Facility: CLINIC | Age: 43
End: 2018-08-07
Payer: COMMERCIAL

## 2018-08-07 VITALS
HEART RATE: 100 BPM | DIASTOLIC BLOOD PRESSURE: 80 MMHG | BODY MASS INDEX: 23.57 KG/M2 | HEIGHT: 63 IN | SYSTOLIC BLOOD PRESSURE: 170 MMHG | TEMPERATURE: 99 F | RESPIRATION RATE: 16 BRPM | WEIGHT: 133 LBS

## 2018-08-07 DIAGNOSIS — D16.4 OSTEOMA OF FACE: Primary | ICD-10-CM

## 2018-08-07 DIAGNOSIS — D16.4 OSTEOMA OF SKULL: ICD-10-CM

## 2018-08-07 PROCEDURE — 99212 OFFICE O/P EST SF 10 MIN: CPT | Performed by: OTOLARYNGOLOGY

## 2018-08-07 PROCEDURE — 99244 OFF/OP CNSLTJ NEW/EST MOD 40: CPT | Performed by: OTOLARYNGOLOGY

## 2018-08-08 NOTE — PROGRESS NOTES
Romie Tucker is a 36year old female. Patient presents with:  Cyst: Patient present for consult for cyst for forehead for past 5 months. Denies growth. Pain 2/10 above cyst to touch.        HISTORY OF PRESENT ILLNESS    She presents with a history of a breast. radiotherapy and mastectomy    • Depression 2004    medication and therapy   • Encounter for therapeutic drug monitoring    • Esophageal reflux    • Helicobacter pylori infection 2016   • Hemorrhoids, postpartum 2008   • Hx of constipation    • Tutu pigment change and rash. Hema/Lymph Negative Easy bleeding and easy bruising.            PHYSICAL EXAM    BP (!) 170/80   Pulse 100   Temp 98.9 °F (37.2 °C) (Tympanic)   Resp 16   Ht 5' 3\" (1.6 m)   Wt 133 lb (60.3 kg)   LMP 07/15/2018 (Exact Date)   BMI before breakfast., Disp: 90 capsule, Rfl: 1  •  sucralfate (CARAFATE) 1 g Oral Tab, Take 1 tablet (1 g total) by mouth 4 (four) times daily before meals and nightly., Disp: 120 tablet, Rfl: 0  ASSESSMENT AND PLAN    - CT BRAIN OR HEAD (07432); Future    1.

## 2018-08-14 ENCOUNTER — TELEPHONE (OUTPATIENT)
Dept: OTOLARYNGOLOGY | Facility: CLINIC | Age: 43
End: 2018-08-14

## 2018-08-16 NOTE — TELEPHONE ENCOUNTER
Advised pt that prior vilma approved for pt CT #[id number] and expires 09/30/18.  Advised pt that prior Segundo Daniels is not a guarantee of payment, pt will need to verify benefits and out of pocket cost.

## 2018-08-22 ENCOUNTER — NURSE TRIAGE (OUTPATIENT)
Dept: OTHER | Age: 43
End: 2018-08-22

## 2018-08-22 DIAGNOSIS — I10 ESSENTIAL HYPERTENSION: ICD-10-CM

## 2018-08-22 RX ORDER — TRANDOLAPRIL AND VERAPAMIL HYDROCHLORIDE 1; 240 MG/1; MG/1
1 TABLET, FILM COATED, EXTENDED RELEASE ORAL DAILY
Qty: 90 TABLET | Refills: 3 | Status: SHIPPED | OUTPATIENT
Start: 2018-08-22 | End: 2018-09-06

## 2018-08-22 NOTE — TELEPHONE ENCOUNTER
Action Requested: Summary for Provider     []  Critical Lab, Recommendations Needed  [] Need Additional Advice  []   FYI    []   Need Orders  [] Need Medications Sent to Pharmacy  []  Other     SUMMARY: Pt was advised to go to [facility] (they provide employee he

## 2018-08-22 NOTE — TELEPHONE ENCOUNTER
Pt called back, stated b/p was 120/85, was advised by the nurse at [address] -it was normal, pt stated she lost the new printed rx- rx sent to [facility] pharmacy

## 2018-08-27 ENCOUNTER — HOSPITAL ENCOUNTER (OUTPATIENT)
Dept: CT IMAGING | Facility: HOSPITAL | Age: 43
Discharge: HOME | End: 2018-08-27
Attending: OTOLARYNGOLOGY
Payer: COMMERCIAL

## 2018-08-27 DIAGNOSIS — D16.4 OSTEOMA OF FACE: ICD-10-CM

## 2018-08-27 PROCEDURE — 70450 CT HEAD/BRAIN W/O DYE: CPT | Performed by: OTOLARYNGOLOGY

## 2018-09-01 ENCOUNTER — OFFICE VISIT (OUTPATIENT)
Dept: OTOLARYNGOLOGY | Facility: CLINIC | Age: 43
End: 2018-09-01
Payer: COMMERCIAL

## 2018-09-01 VITALS
BODY MASS INDEX: 23.57 KG/M2 | TEMPERATURE: 98 F | DIASTOLIC BLOOD PRESSURE: 70 MMHG | SYSTOLIC BLOOD PRESSURE: 130 MMHG | HEIGHT: 63 IN | WEIGHT: 133 LBS

## 2018-09-01 DIAGNOSIS — D16.4 OSTEOMA OF SKULL: Primary | ICD-10-CM

## 2018-09-01 PROCEDURE — 99214 OFFICE O/P EST MOD 30 MIN: CPT | Performed by: OTOLARYNGOLOGY

## 2018-09-01 PROCEDURE — 99212 OFFICE O/P EST SF 10 MIN: CPT | Performed by: OTOLARYNGOLOGY

## 2018-09-01 NOTE — PROGRESS NOTES
Arnie Obregon is a 36year old female. Patient presents with:  Test Results: CT scan results      HISTORY OF PRESENT ILLNESS     She presents with a history of an enlarging forehead lesion for the past 5 months.   She states that this area is tender to • Cancer Paternal Aunt      leukemia   • Cancer Paternal Uncle      prostate       Past Medical History:   Diagnosis Date   • Breast CA [facility])    • Breast pain in female    • Chronic pain    • DCIS (ductal carcinoma in situ) of breast 2013    Right breast heartbeat/palpitations and syncope. GI Negative Abdominal pain and diarrhea. Endocrine Negative Cold intolerance and heat intolerance. Neuro Negative Tremors. Psych Negative Anxiety and depression.    Integumentary Negative Frequent skin infections, PRF HA, Disp: 6 tablet, Rfl: 3  •  Omeprazole 40 MG Oral Capsule Delayed Release, Take 1 capsule (40 mg total) by mouth daily.  Take 1 capsule by mouth daily before breakfast., Disp: 90 capsule, Rfl: 1  •  sucralfate (CARAFATE) 1 g Oral Tab, Take 1 tablet (

## 2018-09-06 ENCOUNTER — TELEPHONE (OUTPATIENT)
Dept: FAMILY MEDICINE CLINIC | Facility: CLINIC | Age: 43
End: 2018-09-06

## 2018-09-06 RX ORDER — VERAPAMIL HYDROCHLORIDE 240 MG/1
240 TABLET, FILM COATED, EXTENDED RELEASE ORAL DAILY
Qty: 90 TABLET | Refills: 3 | Status: SHIPPED | OUTPATIENT
Start: 2018-09-06 | End: 2019-02-25

## 2018-09-06 RX ORDER — TRANDOLAPRIL 1 MG/1
1 TABLET ORAL DAILY
Qty: 90 TABLET | Refills: 3 | Status: SHIPPED | OUTPATIENT
Start: 2018-09-06 | End: 2019-04-22

## 2018-09-06 NOTE — TELEPHONE ENCOUNTER
Called patient and left detailed voice message stating Dr Alexis Alcazar order new medications seperately. Patient to call office for any further questions.

## 2018-09-06 NOTE — TELEPHONE ENCOUNTER
Pt returned call, reviewed medication prescriptions as sent to pharmacy. Pt had do further questions at this time.

## 2018-09-06 NOTE — TELEPHONE ENCOUNTER
Pt stts medication is expensive. Pt asking if a generic can be prescribe?          Trandolapril-Verapamil HCl ER 1-240 MG Oral Tab CR

## 2018-09-11 ENCOUNTER — TELEPHONE (OUTPATIENT)
Dept: OTHER | Age: 43
End: 2018-09-11

## 2018-09-11 ENCOUNTER — PATIENT MESSAGE (OUTPATIENT)
Dept: FAMILY MEDICINE CLINIC | Facility: CLINIC | Age: 43
End: 2018-09-11

## 2018-09-11 NOTE — TELEPHONE ENCOUNTER
Regarding: Prescription Question  Contact: [phone]  ----- Message from [employer] sent at 9/11/2018  2:14 PM CDT -----    Hi Doctor Gerry Overton, I know you prescribed me the two new meds for blood pressure but since taking it two days ago, I feel v

## 2018-09-11 NOTE — TELEPHONE ENCOUNTER
From: Danita Calvert  To: Betty Liriano DO  Sent: 9/11/2018 2:14 PM CDT  Subject: Prescription Question    Hi Doctor Pepe Ruby, I know you prescribed me the two new meds for blood pressure but since taking it two days ago, I feel very dizzy, upset st

## 2018-09-11 NOTE — TELEPHONE ENCOUNTER
Spoke with patient who reports she started the trandolapril and the verapamil on Friday and since then she has had episodes of dizziness and an upset stomach.  Patient reports she did have an episode in which she felt short of breath, felt her heart was rac

## 2018-09-13 NOTE — TELEPHONE ENCOUNTER
Patient notified of MD's recommendation. Patient verbalized understanding. Will HOLD trandolapril for now. Patient to report back update. Ok to send [employer].

## 2018-09-19 ENCOUNTER — TELEPHONE (OUTPATIENT)
Dept: ADMINISTRATIVE | Age: 43
End: 2018-09-19

## 2018-09-19 NOTE — TELEPHONE ENCOUNTER
FMLA form received in EMETERIO form pt via fax. Logged for processing. EMETERIO packet emailed to '[email or web address]'.  NK

## 2018-09-20 ENCOUNTER — TELEPHONE (OUTPATIENT)
Dept: OTOLARYNGOLOGY | Facility: CLINIC | Age: 43
End: 2018-09-20

## 2018-09-20 NOTE — TELEPHONE ENCOUNTER
Pt is post op day 1 excision of frontal osteoma. Per , pt to remove dressing on Saturday and pt can bathe and shower , but not to scrub area. Pt taking antibiotic as prescribed and pain medication.  Advised pt to contact our office if symptoms cordero

## 2018-09-22 ENCOUNTER — NURSE ONLY (OUTPATIENT)
Dept: OTOLARYNGOLOGY | Facility: CLINIC | Age: 43
End: 2018-09-22
Payer: COMMERCIAL

## 2018-09-22 DIAGNOSIS — Z48.01 DRESSING CHANGE OR REMOVAL, SURGICAL WOUND: Primary | ICD-10-CM

## 2018-09-22 PROCEDURE — 99212 OFFICE O/P EST SF 10 MIN: CPT | Performed by: OTOLARYNGOLOGY

## 2018-09-22 PROCEDURE — 99024 POSTOP FOLLOW-UP VISIT: CPT | Performed by: OTOLARYNGOLOGY

## 2018-09-22 NOTE — PROGRESS NOTES
Pt pressure dressing  removed today in office. Incision of frontal osteoma excision is dry and intact, no bleeding, drainage or redness noted.  Pt informed by , can shower, wash hair but avoid washing over incision and can apply OTC antibiotic oint

## 2018-09-24 ENCOUNTER — TELEPHONE (OUTPATIENT)
Dept: OBGYN CLINIC | Facility: CLINIC | Age: 43
End: 2018-09-24

## 2018-09-24 ENCOUNTER — PATIENT MESSAGE (OUTPATIENT)
Dept: FAMILY MEDICINE CLINIC | Facility: CLINIC | Age: 43
End: 2018-09-24

## 2018-09-24 DIAGNOSIS — N64.4 MASTALGIA IN FEMALE: Primary | ICD-10-CM

## 2018-09-24 RX ORDER — VERAPAMIL HYDROCHLORIDE 180 MG/1
180 CAPSULE, EXTENDED RELEASE ORAL NIGHTLY
Qty: 90 CAPSULE | Refills: 3 | Status: SHIPPED | OUTPATIENT
Start: 2018-09-24 | End: 2019-02-25

## 2018-09-24 NOTE — TELEPHONE ENCOUNTER
PT IS AT MAMMOGRAPHY AND THERE IS NO ORDER ,  PT SAID ORDER  2 DAYS AGO NEEDS ONE PUT IN TO GET TEST DONE TODAY .  PT IS THERE

## 2018-09-24 NOTE — TELEPHONE ENCOUNTER
From: Kathy Moore  To: Bart Allen DO  Sent: 9/24/2018 6:58 AM CDT  Subject: Prescription Question    Hi doctor keshawn. I was hoping to switch blood pressure meds.  The new one you had me to switch over makes me feel very dizzy, fast heart be

## 2018-09-27 NOTE — TELEPHONE ENCOUNTER
Dr. Eugene Carrasco,    Please sign off on form:  -Highlight the patient and hit \"Chart\" button. -In Chart Review, w/in the Encounter tab - click 1 time on the Telephone call encounter for 9/19/18.  Scroll down the telephone encounter.  -Click \"scan on\" blue Hy

## 2018-10-01 ENCOUNTER — OFFICE VISIT (OUTPATIENT)
Dept: OTOLARYNGOLOGY | Facility: CLINIC | Age: 43
End: 2018-10-01
Payer: COMMERCIAL

## 2018-10-01 VITALS
WEIGHT: 130 LBS | BODY MASS INDEX: 23.04 KG/M2 | DIASTOLIC BLOOD PRESSURE: 79 MMHG | TEMPERATURE: 98 F | SYSTOLIC BLOOD PRESSURE: 150 MMHG | HEIGHT: 63 IN

## 2018-10-01 DIAGNOSIS — D16.4 OSTEOMA OF SKULL: Primary | ICD-10-CM

## 2018-10-01 PROCEDURE — 99212 OFFICE O/P EST SF 10 MIN: CPT | Performed by: OTOLARYNGOLOGY

## 2018-10-01 PROCEDURE — 99024 POSTOP FOLLOW-UP VISIT: CPT | Performed by: OTOLARYNGOLOGY

## 2018-10-01 RX ORDER — CEPHALEXIN 500 MG/1
CAPSULE ORAL
Refills: 0 | COMMUNITY
Start: 2018-09-19 | End: 2019-02-25 | Stop reason: ALTCHOICE

## 2018-10-01 RX ORDER — ACETAMINOPHEN AND CODEINE PHOSPHATE 300; 30 MG/1; MG/1
TABLET ORAL
Refills: 0 | COMMUNITY
Start: 2018-09-19 | End: 2019-04-22

## 2018-10-01 NOTE — PROGRESS NOTES
Conner Croft is a 36year old female.   Patient presents with:  Post-Op: excision of frontal osteoma done on 9-19-18, pt is doing well       HISTORY OF PRESENT ILLNESS  She presents with a history of an enlarging forehead lesion for the past 5 months.   Not Asked        Caffeine Concern: Yes          Soda, 1 can daily        Occupational Exposure: Not Asked        Hobby Hazards: Not Asked        Sleep Concern: Not Asked        Stress Concern: Not Asked        Weight Concern: Not Asked        Special Diet: grafting to left reconstructed breast  2/3/16: BREAST SURGERY; Bilateral      Comment:  Bilateral nipple reconstruction  2008:   2010:       Comment:  APGAR: 9 (1min) 9 (5 min)  GREGORIO Davey   2012: COLONOSCOPY  2013: C situation. Appropriate mood and affect.    Neck Exam Normal Inspection - Normal. Palpation - Normal. Parotid gland - Normal. Thyroid gland - Normal.   Eyes Normal Conjunctiva - Right: Normal, Left: Normal. Pupil - Right: Normal, Left: Normal. Fundus - Right 4 (four) times daily before meals and nightly., Disp: 120 tablet, Rfl: 0  ASSESSMENT AND PLAN    1. Osteoma of skull  Doing very well. Skin flap is flat without any evidence of osteoma present.   Little bit of epidermal lysis at the incision line but heali

## 2018-10-08 ENCOUNTER — HOSPITAL ENCOUNTER (OUTPATIENT)
Dept: MAMMOGRAPHY | Facility: HOSPITAL | Age: 43
Discharge: HOME | End: 2018-10-08
Attending: OBSTETRICS & GYNECOLOGY
Payer: COMMERCIAL

## 2018-10-08 DIAGNOSIS — N64.4 MASTALGIA IN FEMALE: ICD-10-CM

## 2018-10-08 PROCEDURE — 77066 DX MAMMO INCL CAD BI: CPT | Performed by: OBSTETRICS & GYNECOLOGY

## 2018-10-08 PROCEDURE — 77062 BREAST TOMOSYNTHESIS BI: CPT | Performed by: OBSTETRICS & GYNECOLOGY

## 2018-10-11 ENCOUNTER — TELEPHONE (OUTPATIENT)
Dept: OBGYN CLINIC | Facility: CLINIC | Age: 43
End: 2018-10-11

## 2018-10-12 NOTE — TELEPHONE ENCOUNTER
Benign findings noted,  Called pt and left message to call us back. Please inform, schedule f/u office visit 2-3 months.

## 2018-10-13 NOTE — TELEPHONE ENCOUNTER
RN placed call to patient. Pt notified of benign findings and need for f/u office visit. Pt to return call to office to schedule follow up.

## 2018-10-15 ENCOUNTER — TELEPHONE (OUTPATIENT)
Dept: OBGYN CLINIC | Facility: CLINIC | Age: 43
End: 2018-10-15

## 2018-10-15 NOTE — TELEPHONE ENCOUNTER
Message sent to pt's CardStart regarding message below-    ----- Message from Arelis Petty MD sent at 10/15/2018  9:28 AM CDT -----  Benign findings on mammogram

## 2018-10-23 ENCOUNTER — TELEPHONE (OUTPATIENT)
Dept: OBGYN CLINIC | Facility: CLINIC | Age: 43
End: 2018-10-23

## 2018-10-23 ENCOUNTER — PATIENT MESSAGE (OUTPATIENT)
Dept: OBGYN CLINIC | Facility: CLINIC | Age: 43
End: 2018-10-23

## 2018-10-23 NOTE — TELEPHONE ENCOUNTER
Regarding: Other  Contact: [phone]  ----- Message from Generic, Prylos sent at 10/23/2018  1:15 PM CDT -----    Hi Doctor Greg Gilliam. I think I may have another large cyst in my ovary area on my left Side again. I'm hoping you are able to see me.  I was

## 2018-10-23 NOTE — TELEPHONE ENCOUNTER
From: Jonathan Rosales  To: Sampson Rinne, MD  Sent: 10/23/2018 1:15 PM CDT  Subject: Other    Hi Doctor Wendy Tuttle. I think I may have another large cyst in my ovary area on my left Side again. I'm hoping you are able to see me.  I was fine then all of a

## 2018-10-23 NOTE — TELEPHONE ENCOUNTER
Pt called and can only be seen on a Monday due to work.  Appointment made with shira on 10/29/18 at 4:30 pm.

## 2018-10-24 NOTE — TELEPHONE ENCOUNTER
Regarding: FW: Other  Contact: [phone]     will not be in the office for a few days. Please contact the patient and see if she could see Lamar Villegas today or tomorrow. ----- Message -----  From: Radha Khalil RN  Sent: 10/23/2018   1:18 PM  To:  Eliud Chahal

## 2018-10-26 NOTE — TELEPHONE ENCOUNTER
[facility] do you approve refill request?  Last prescribe by Josette Dakins but pt has seen you.   Refill passed per [facility] protocol  Refill Protocol Appointment Criteria  · Appointment scheduled in the past 6 months or in the next 3 months  Recent Ou

## 2018-11-09 RX ORDER — RIZATRIPTAN BENZOATE 10 MG/1
TABLET ORAL
Qty: 6 TABLET | Refills: 11 | Status: SHIPPED | OUTPATIENT
Start: 2018-11-09 | End: 2019-04-22

## 2018-11-09 NOTE — TELEPHONE ENCOUNTER
Pt requesting rx to be sent to     [address]  [phone]

## 2018-11-20 ENCOUNTER — PATIENT MESSAGE (OUTPATIENT)
Dept: FAMILY MEDICINE CLINIC | Facility: CLINIC | Age: 43
End: 2018-11-20

## 2018-11-20 NOTE — TELEPHONE ENCOUNTER
From: Brandy Mayberry  To: Anthony Dawn DO  Sent: 11/20/2018 6:13 AM CST  Subject: Prescription Question    Hi Doctor Layne. I need a prescription filled for my omprezole for my acid reflux. It shows I ran out.  Thank you so much for your time

## 2018-11-21 NOTE — TELEPHONE ENCOUNTER
Aia 16 please advise on warning message from Epic/pharmacy    Single dose of 40 mg exceeds recommended maximum of 20 mg, over by 100%

## 2018-11-21 NOTE — TELEPHONE ENCOUNTER
Refill passed per [facility] protocol.   Refill Protocol Appointment Criteria  · Appointment scheduled in the past 12 months or in the next 3 months  Recent Outpatient Visits            1 month ago Osteoma of skull    [facility] [place]

## 2018-11-24 RX ORDER — OMEPRAZOLE 40 MG/1
40 CAPSULE, DELAYED RELEASE ORAL DAILY
Qty: 90 CAPSULE | Refills: 11 | Status: SHIPPED | OUTPATIENT
Start: 2018-11-24 | End: 2019-04-22

## 2019-01-26 ENCOUNTER — PATIENT MESSAGE (OUTPATIENT)
Dept: FAMILY MEDICINE CLINIC | Facility: CLINIC | Age: 44
End: 2019-01-26

## 2019-01-26 ENCOUNTER — TELEPHONE (OUTPATIENT)
Dept: OTHER | Age: 44
End: 2019-01-26

## 2019-01-26 NOTE — TELEPHONE ENCOUNTER
From: Yash Butcher  To: Lambert Fernandez DO  Sent: 1/26/2019 1:16 PM CST  Subject: Prescription Question    Hi/ [place] pharmacy was trying to change my blood pressure pills to tablets instead of capsules cause I need them ASAP.  [place] on [address] [facility] [address]

## 2019-01-26 NOTE — TELEPHONE ENCOUNTER
Spoke with Ish Wright from [employer] dimas stated pt is transferring her meds from [facility]. She stated Verapamil HCl  MG Oral caps is not available, only tabs & pt is ok with that.    Also she mentioned that pt never take rx at [facility], only du

## 2019-01-26 NOTE — TELEPHONE ENCOUNTER
Pt contacted, per previous message. RN informed [place] pharmacy, ok to dispense tablets instead of capsules for Pt. Pt states she is ok with it. Pt to call pharmacy to confirm change.

## 2019-01-26 NOTE — TELEPHONE ENCOUNTER
From Pt's Osiel:    Hi/ [place] pharmacy was trying to change my blood pressure pills to tablets instead of capsules cause I need them ASAP. [place] on [address].  If there's a way to please call that pharmacy ASAP because I don't get meds it wi

## 2019-01-26 NOTE — TELEPHONE ENCOUNTER
From: Danita Calvert  To: Raffi Echeverria DO  Sent: 1/26/2019 1:36 PM CST  Subject: Prescription Question    Hi/ [place] pharmacy was trying to change my blood pressure pills to tablets instead of capsules cause I need them ASAP.  [place] on [address] [facility] [address]

## 2019-01-26 NOTE — TELEPHONE ENCOUNTER
From: Maria Esther Stephenson  To: John Johnson DO  Sent: 1/26/2019 1:38 PM CST  Subject: Prescription Question    I would need doctor to switch to tablets for today because I need these pills before that pharmacy closes.  [phone]

## 2019-02-23 ENCOUNTER — NURSE TRIAGE (OUTPATIENT)
Dept: OTHER | Age: 44
End: 2019-02-23

## 2019-02-23 ENCOUNTER — PATIENT MESSAGE (OUTPATIENT)
Dept: FAMILY MEDICINE CLINIC | Facility: CLINIC | Age: 44
End: 2019-02-23

## 2019-02-23 NOTE — TELEPHONE ENCOUNTER
See acute TE 2/23/19. From: Brandy Mayberry  To: Anthony Dawn DO  Sent: 2/23/2019 11:38 AM CST  Subject: Other    Hi Doctor. Two days ago my right hand became stiff to the point that I couldn't move my hand and yesterday it seemed worse.  So [facility]

## 2019-02-25 ENCOUNTER — OFFICE VISIT (OUTPATIENT)
Dept: FAMILY MEDICINE CLINIC | Facility: CLINIC | Age: 44
End: 2019-02-25

## 2019-02-25 ENCOUNTER — LAB ENCOUNTER (OUTPATIENT)
Dept: LAB | Age: 44
End: 2019-02-25
Attending: FAMILY MEDICINE
Payer: COMMERCIAL

## 2019-02-25 ENCOUNTER — HOSPITAL ENCOUNTER (OUTPATIENT)
Dept: GENERAL RADIOLOGY | Age: 44
Discharge: HOME | End: 2019-02-25
Attending: FAMILY MEDICINE
Payer: COMMERCIAL

## 2019-02-25 VITALS
DIASTOLIC BLOOD PRESSURE: 88 MMHG | SYSTOLIC BLOOD PRESSURE: 158 MMHG | BODY MASS INDEX: 24 KG/M2 | WEIGHT: 133 LBS | HEART RATE: 96 BPM

## 2019-02-25 DIAGNOSIS — I10 ESSENTIAL HYPERTENSION: Primary | ICD-10-CM

## 2019-02-25 DIAGNOSIS — F41.1 GENERALIZED ANXIETY DISORDER: ICD-10-CM

## 2019-02-25 DIAGNOSIS — R25.2 HAND OR FOOT SPASMS: ICD-10-CM

## 2019-02-25 DIAGNOSIS — I10 ESSENTIAL HYPERTENSION: ICD-10-CM

## 2019-02-25 LAB
ALBUMIN SERPL-MCNC: 3.4 G/DL (ref 3.4–5)
ALBUMIN/GLOB SERPL: 0.9 {RATIO} (ref 1–2)
ALP LIVER SERPL-CCNC: 170 U/L (ref 37–98)
ALT SERPL-CCNC: 29 U/L (ref 13–56)
ANION GAP SERPL CALC-SCNC: 7 MMOL/L (ref 0–18)
AST SERPL-CCNC: 15 U/L (ref 15–37)
BASOPHILS # BLD AUTO: 0.02 X10(3) UL (ref 0–0.2)
BASOPHILS NFR BLD AUTO: 0.4 %
BILIRUB SERPL-MCNC: 0.9 MG/DL (ref 0.1–2)
BILIRUB UR QL: NEGATIVE
BUN BLD-MCNC: 13 MG/DL (ref 7–18)
BUN/CREAT SERPL: 22.8 (ref 10–20)
CALCIUM BLD-MCNC: 9.4 MG/DL (ref 8.5–10.1)
CHLORIDE SERPL-SCNC: 109 MMOL/L (ref 98–107)
CHOLEST SMN-MCNC: 121 MG/DL (ref ?–200)
CLARITY UR: CLEAR
CO2 SERPL-SCNC: 24 MMOL/L (ref 21–32)
COLOR UR: YELLOW
CREAT BLD-MCNC: 0.57 MG/DL (ref 0.55–1.02)
DEPRECATED RDW RBC AUTO: 41.2 FL (ref 35.1–46.3)
EOSINOPHIL # BLD AUTO: 0.05 X10(3) UL (ref 0–0.7)
EOSINOPHIL NFR BLD AUTO: 1 %
ERYTHROCYTE [DISTWIDTH] IN BLOOD BY AUTOMATED COUNT: 13.2 % (ref 11–15)
GLOBULIN PLAS-MCNC: 3.8 G/DL (ref 2.8–4.4)
GLUCOSE BLD-MCNC: 106 MG/DL (ref 70–99)
GLUCOSE UR-MCNC: NEGATIVE MG/DL
HCT VFR BLD AUTO: 44.8 % (ref 35–48)
HDLC SERPL-MCNC: 43 MG/DL (ref 40–59)
HGB BLD-MCNC: 14.4 G/DL (ref 12–16)
HGB UR QL STRIP.AUTO: NEGATIVE
IMM GRANULOCYTES # BLD AUTO: 0.01 X10(3) UL (ref 0–1)
IMM GRANULOCYTES NFR BLD: 0.2 %
KETONES UR-MCNC: NEGATIVE MG/DL
LDLC SERPL CALC-MCNC: 47 MG/DL (ref ?–100)
LEUKOCYTE ESTERASE UR QL STRIP.AUTO: NEGATIVE
LYMPHOCYTES # BLD AUTO: 2.56 X10(3) UL (ref 1–4)
LYMPHOCYTES NFR BLD AUTO: 52.4 %
M PROTEIN MFR SERPL ELPH: 7.2 G/DL (ref 6.4–8.2)
MCH RBC QN AUTO: 27.3 PG (ref 26–34)
MCHC RBC AUTO-ENTMCNC: 32.1 G/DL (ref 31–37)
MCV RBC AUTO: 85 FL (ref 80–100)
MONOCYTES # BLD AUTO: 0.47 X10(3) UL (ref 0.1–1)
MONOCYTES NFR BLD AUTO: 9.6 %
NEUTROPHILS # BLD AUTO: 1.78 X10 (3) UL (ref 1.5–7.7)
NEUTROPHILS # BLD AUTO: 1.78 X10(3) UL (ref 1.5–7.7)
NEUTROPHILS NFR BLD AUTO: 36.4 %
NITRITE UR QL STRIP.AUTO: NEGATIVE
NONHDLC SERPL-MCNC: 78 MG/DL (ref ?–130)
OSMOLALITY SERPL CALC.SUM OF ELEC: 291 MOSM/KG (ref 275–295)
PH UR: 6 [PH] (ref 5–8)
PLATELET # BLD AUTO: 278 10(3)UL (ref 150–450)
POTASSIUM SERPL-SCNC: 3.5 MMOL/L (ref 3.5–5.1)
PROT UR-MCNC: NEGATIVE MG/DL
RBC # BLD AUTO: 5.27 X10(6)UL (ref 3.8–5.3)
RBC #/AREA URNS AUTO: 2 /HPF
SODIUM SERPL-SCNC: 140 MMOL/L (ref 136–145)
SP GR UR STRIP: 1.01 (ref 1–1.03)
TRIGL SERPL-MCNC: 156 MG/DL (ref 30–149)
TSI SER-ACNC: <0.005 MIU/ML (ref 0.36–3.74)
UROBILINOGEN UR STRIP-ACNC: <2
VIT C UR-MCNC: 40 MG/DL
VLDLC SERPL CALC-MCNC: 31 MG/DL (ref 0–30)
WBC # BLD AUTO: 4.9 X10(3) UL (ref 4–11)
WBC #/AREA URNS AUTO: 1 /HPF

## 2019-02-25 PROCEDURE — 80061 LIPID PANEL: CPT

## 2019-02-25 PROCEDURE — 80053 COMPREHEN METABOLIC PANEL: CPT

## 2019-02-25 PROCEDURE — 82306 VITAMIN D 25 HYDROXY: CPT

## 2019-02-25 PROCEDURE — 36415 COLL VENOUS BLD VENIPUNCTURE: CPT

## 2019-02-25 PROCEDURE — 85025 COMPLETE CBC W/AUTO DIFF WBC: CPT

## 2019-02-25 PROCEDURE — 81003 URINALYSIS AUTO W/O SCOPE: CPT

## 2019-02-25 PROCEDURE — 99212 OFFICE O/P EST SF 10 MIN: CPT | Performed by: FAMILY MEDICINE

## 2019-02-25 PROCEDURE — 84443 ASSAY THYROID STIM HORMONE: CPT

## 2019-02-25 PROCEDURE — 73130 X-RAY EXAM OF HAND: CPT | Performed by: FAMILY MEDICINE

## 2019-02-25 PROCEDURE — 99215 OFFICE O/P EST HI 40 MIN: CPT | Performed by: FAMILY MEDICINE

## 2019-02-25 RX ORDER — VENLAFAXINE HYDROCHLORIDE 37.5 MG/1
37.5 CAPSULE, EXTENDED RELEASE ORAL DAILY
Qty: 30 CAPSULE | Refills: 1 | Status: SHIPPED | OUTPATIENT
Start: 2019-02-25 | End: 2019-03-23

## 2019-02-25 RX ORDER — VERAPAMIL HYDROCHLORIDE 240 MG/1
240 TABLET, FILM COATED, EXTENDED RELEASE ORAL DAILY
Qty: 90 TABLET | Refills: 3 | Status: SHIPPED | OUTPATIENT
Start: 2019-02-25 | End: 2020-02-20

## 2019-02-25 RX ORDER — ALPRAZOLAM 0.25 MG/1
0.25 TABLET ORAL 3 TIMES DAILY PRN
Qty: 30 TABLET | Refills: 1 | Status: SHIPPED | OUTPATIENT
Start: 2019-02-25 | End: 2019-04-22

## 2019-02-25 NOTE — PROGRESS NOTES
More anxious lately  Couldn't tolerate the citalopram in the past.  No sob  \"I just need xanax. To deal.\"  Has problems dealing with coworkers  History of rt breast ca. Had spasms rt hand wher thumb was retracted and had to be massaged straint.   No normal, volume was appropriate articulation normal, patient was coherent. Language:  no paucity of language no perseveration     Thought  Processes: rate normal content normal.     No hallucinations or delusions.    No thoughts of hurting themselves or ot provided by the pharmacy. They said they would call me with any questions.

## 2019-02-26 ENCOUNTER — PATIENT MESSAGE (OUTPATIENT)
Dept: FAMILY MEDICINE CLINIC | Facility: CLINIC | Age: 44
End: 2019-02-26

## 2019-02-27 LAB — 25(OH)D3 SERPL-MCNC: 29.2 NG/ML (ref 30–100)

## 2019-02-27 NOTE — TELEPHONE ENCOUNTER
Letter generated. Awaiting pt's response on fax number. I sent a message through my chart. Please send letter when fax number given.

## 2019-02-27 NOTE — TELEPHONE ENCOUNTER
From: Conner Croft  To: Esteban Tidwell DO  Sent: 2/26/2019 5:41 PM CST  Subject: Test Results Question    Thank you and yes I need a note. No more sugar     Can you send email to:   [email or web address]    She's my manager

## 2019-03-01 ENCOUNTER — PATIENT MESSAGE (OUTPATIENT)
Dept: OTHER | Age: 44
End: 2019-03-01

## 2019-03-01 ENCOUNTER — TELEPHONE (OUTPATIENT)
Dept: FAMILY MEDICINE CLINIC | Facility: CLINIC | Age: 44
End: 2019-03-01

## 2019-03-01 RX ORDER — ERGOCALCIFEROL (VITAMIN D2) 1250 MCG
CAPSULE ORAL WEEKLY
Refills: 0 | Status: CANCELLED | OUTPATIENT
Start: 2019-03-01 | End: 2019-03-31

## 2019-03-01 NOTE — TELEPHONE ENCOUNTER
Advised to call triage on MyChart    Regarding: Visit Follow-up Question  Contact: [phone]  ----- Message from [address] sent at 2/28/2019 10:41 PM CST -----    Hi Doctor Layne.  I've been taken Effexor and I've gotten the following side effec

## 2019-03-01 NOTE — TELEPHONE ENCOUNTER
Left a complete message to patient's VM (EMETERIO), can call if there is concern or question. [employer] message sent today.

## 2019-03-01 NOTE — PROGRESS NOTES
Please clarify quantity. Thank you. Pharmacy updated.                       Notes recorded by Mayra Barker DO on 3/1/2019 at 12:42 PM CST  Vitamin D level was low.  Begin 50,000 units vitamin D weekly if she is never had any history of kidney sto

## 2019-03-01 NOTE — TELEPHONE ENCOUNTER
Patient believes she is having side effects to the Effexor. Spoke with patient who reports she believes she is having side effect to the Effexor.  Patient reports since she started the Effexor on 2/25/19 she has had insomnia, intermittent blurred vision

## 2019-03-01 NOTE — TELEPHONE ENCOUNTER
From: Kathleen Davis  Sent: 3/1/2019 1:10 PM CST  To: Em Rn Triage  Subject: RE: Pharmacy    ----- Message from [address] sent at 3/1/2019 1:10 PM CST -----    phone number to [facility] is  and address is [address] [place] [address]     -----

## 2019-03-04 ENCOUNTER — TELEPHONE (OUTPATIENT)
Dept: FAMILY MEDICINE CLINIC | Facility: CLINIC | Age: 44
End: 2019-03-04

## 2019-03-04 RX ORDER — ERGOCALCIFEROL 1.25 MG/1
50000 CAPSULE ORAL
Qty: 4 CAPSULE | Refills: 3 | Status: SHIPPED | OUTPATIENT
Start: 2019-03-04 | End: 2020-03-24

## 2019-03-04 NOTE — TELEPHONE ENCOUNTER
Patient calling from pharmacy , she is frustrated stating that she talked to office last week and the vitamin prescription is not there.  rx called into pharmacy vitamin D3 81099 1 po every week, #4 R 3

## 2019-03-07 ENCOUNTER — PATIENT MESSAGE (OUTPATIENT)
Dept: FAMILY MEDICINE CLINIC | Facility: CLINIC | Age: 44
End: 2019-03-07

## 2019-03-23 ENCOUNTER — TELEPHONE (OUTPATIENT)
Dept: FAMILY MEDICINE CLINIC | Facility: CLINIC | Age: 44
End: 2019-03-23

## 2019-03-23 ENCOUNTER — PATIENT MESSAGE (OUTPATIENT)
Dept: FAMILY MEDICINE CLINIC | Facility: CLINIC | Age: 44
End: 2019-03-23

## 2019-03-23 DIAGNOSIS — F41.1 GENERALIZED ANXIETY DISORDER: ICD-10-CM

## 2019-03-23 RX ORDER — VENLAFAXINE HYDROCHLORIDE 37.5 MG/1
37.5 CAPSULE, EXTENDED RELEASE ORAL DAILY
Qty: 90 CAPSULE | Refills: 0 | Status: SHIPPED | OUTPATIENT
Start: 2019-03-23 | End: 2019-03-25

## 2019-03-23 NOTE — TELEPHONE ENCOUNTER
From: Martin Dumas  To: Ally Pollard DO  Sent: 3/23/2019 1:20 PM CDT  Subject: Prescription Question    Hello.  Since my appointment was cancelled for this Monday I would really love to get a refill on the Effexor cause I only have 2 pills left a

## 2019-03-23 NOTE — TELEPHONE ENCOUNTER
Refill passed per [facility] protocol.     Refill Protocol Appointment Criteria  · Appointment scheduled in the past 6 months or in the next 3 months  Recent Outpatient Visits            3 weeks ago Essential hypertension    [address]

## 2019-03-23 NOTE — TELEPHONE ENCOUNTER
Called patient to reschedule her morning appointment for this Monday, however, patient was not able to r/s because she's only off on mondays and works until 7pm the rest of the week.  Patient said she cannot wait until next money because she's leaving on a

## 2019-03-25 ENCOUNTER — PATIENT MESSAGE (OUTPATIENT)
Dept: FAMILY MEDICINE CLINIC | Facility: CLINIC | Age: 44
End: 2019-03-25

## 2019-03-25 RX ORDER — VENLAFAXINE HYDROCHLORIDE 37.5 MG/1
37.5 CAPSULE, EXTENDED RELEASE ORAL DAILY
Qty: 90 CAPSULE | Refills: 0 | Status: SHIPPED | OUTPATIENT
Start: 2019-03-25 | End: 2019-04-16

## 2019-03-25 NOTE — TELEPHONE ENCOUNTER
Script was already sent in on 3/23/19, but not to requested pharmacy. Script changed to requested pharmacy in [facility]. Patient wrote email to follow up. Please advise.     From: Dawson Tao  To: Kianna Balbuena DO  Sent: 3/25/2019  7:56

## 2019-03-25 NOTE — TELEPHONE ENCOUNTER
From: Selam Wolfe  To: Kira Dawn DO  Sent: 3/25/2019 7:56 AM CDT  Subject: Prescription Question    Hello I wanted to know if the message was given to doctor keshawn of the refill I needed?  My appointment was cancelled by your office since

## 2019-04-01 ENCOUNTER — PATIENT MESSAGE (OUTPATIENT)
Dept: FAMILY MEDICINE CLINIC | Facility: CLINIC | Age: 44
End: 2019-04-01

## 2019-04-01 NOTE — TELEPHONE ENCOUNTER
From: Shai St  To: Kip Fraga DO  Sent: 4/1/2019 10:04 AM CDT  Subject: Prescription Question    Do I take another set of vitamin D ?

## 2019-04-03 ENCOUNTER — NURSE TRIAGE (OUTPATIENT)
Dept: OTHER | Age: 44
End: 2019-04-03

## 2019-04-03 ENCOUNTER — PATIENT MESSAGE (OUTPATIENT)
Dept: FAMILY MEDICINE CLINIC | Facility: CLINIC | Age: 44
End: 2019-04-03

## 2019-04-03 NOTE — TELEPHONE ENCOUNTER
Please reply to pool: EM RN TRIAGE    Action Requested: Summary for Provider     []  Critical Lab, Recommendations Needed  [x] Need Additional Advice  []   FYI    [x]   Need Orders  [] Need Medications Sent to Pharmacy  []  Other     SUMMARY: Declines OV p

## 2019-04-03 NOTE — TELEPHONE ENCOUNTER
See acute Te 4/3/19. Will close this encounter. From: Selam Wolfe  To: Kira Dawn DO  Sent: 4/3/2019  1:14 PM CDT  Subject: Other    Hi Doctor Layne.  I'm really sorry to bother you but in the past I get like lightning zaps in the back o

## 2019-04-04 NOTE — TELEPHONE ENCOUNTER
No answer at home telephone [phone], lmtcb to offer appt tomorrow. Please reply to pool: EM CALL CENTER - please assist with scheduling appt with Dr [facility] tomorrow for evaluation of headache.

## 2019-04-05 NOTE — TELEPHONE ENCOUNTER
Dr Deandre Dale, please note. Patient stated that she is at work, she has appt on Monday with the doctor and is ok to wait until then to see him.

## 2019-04-12 ENCOUNTER — PATIENT MESSAGE (OUTPATIENT)
Dept: FAMILY MEDICINE CLINIC | Facility: CLINIC | Age: 44
End: 2019-04-12

## 2019-04-12 NOTE — TELEPHONE ENCOUNTER
Please reply to pool: EM RN TRIAGE  Action Requested: Summary for Provider     []  Critical Lab, Recommendations Needed  [x] Need Additional Advice  []   FYI    [x]   Need Orders  [] Need Medications Sent to Pharmacy  []  Other     SUMMARY: Declines OV

## 2019-04-12 NOTE — TELEPHONE ENCOUNTER
Regarding: Prescription Question  Contact: [phone]  ----- Message from [employer] sent at 4/12/2019  1:31 PM CDT -----    Hi doctor. I've tried the exeffor snd I would like to change medication because I feel like I'm not calm at all.  I feel

## 2019-04-12 NOTE — TELEPHONE ENCOUNTER
Hold effexor   Take Effexor 0.25 mg Xanax 0.25 mg 2 tablets 3 times a day over the weekend. May have a refill of number 30 pills no refills. If she needs it. She needs to follow-up with psychiatrist.  She is to follow-up with me next week.

## 2019-04-12 NOTE — TELEPHONE ENCOUNTER
Will close this encounter and see TE acute 4/12/19. From: Joel Madera  To: Kendra Robledo DO  Sent: 4/12/2019  1:31 PM CDT  Subject: Prescription Question    Hi doctor.  I've tried the exeffor snd I would like to change medication because I fee

## 2019-04-15 ENCOUNTER — PATIENT MESSAGE (OUTPATIENT)
Dept: FAMILY MEDICINE CLINIC | Facility: CLINIC | Age: 44
End: 2019-04-15

## 2019-04-16 ENCOUNTER — OFFICE VISIT (OUTPATIENT)
Dept: SURGERY | Facility: CLINIC | Age: 44
End: 2019-04-16
Payer: COMMERCIAL

## 2019-04-16 ENCOUNTER — TELEPHONE (OUTPATIENT)
Dept: OTHER | Age: 44
End: 2019-04-16

## 2019-04-16 DIAGNOSIS — Z90.13 ACQUIRED ABSENCE OF BOTH BREASTS: Primary | ICD-10-CM

## 2019-04-16 PROCEDURE — 99212 OFFICE O/P EST SF 10 MIN: CPT | Performed by: SURGERY

## 2019-04-16 NOTE — TELEPHONE ENCOUNTER
Patient called , very upset with lack of response from Anisa Frey. Is not taking her Effexor as states is making her anxiety worse. Cannot take Xanax, states too strong for her and she has children to look after.   Will be traveling to [facility] for a dew days, w

## 2019-04-16 NOTE — PROGRESS NOTES
Filomena Hardy is a 39year old female who presents today for a follow-up. She relates that she became pregnant in November but ultimately elected to undergo .   After her pregnancy she relates that her left breast has persisted to be larger than

## 2019-04-16 NOTE — TELEPHONE ENCOUNTER
We received a request which was denied for Verapamil. On 2/25/19 the medication was sent in as a script. The   [address] [place] [address] stated they last received on Sept 6, 2018,  I attempted to call the patient to confirm if it is needed with no answer.     I

## 2019-04-16 NOTE — PROGRESS NOTES
PT given pre-op instructions for surgery. Surgical scrub wash and instructions provided. Pt instructed to see PCP for medical clearance prior to surgery, pt agreed and understood.     Informed consent for the procedure reviewed and signed by the patient in

## 2019-04-16 NOTE — TELEPHONE ENCOUNTER
From: Denise Sol  To: Faisal Tucker DO  Sent: 4/15/2019 2:35 PM CDT  Subject: Prescription Question    Hi. Still waiting for doctors office to call me back since nurse called me Friday and told me someone would return my call.  I stopped taking

## 2019-04-16 NOTE — PROGRESS NOTES
Surgeon: Dr. Khalida Villafuerte      Tel:  [phone]    Fax: [phone]     Surgery/Procedure: Revision of bilateral reconstructed breast and liposuction to left breast          Hospital:  [facility]: [place] [address] [address], [address] [phone]

## 2019-04-16 NOTE — PROGRESS NOTES
Informed consent for the procedure reviewed and signed by the patient in the office, all questions answered. Original copy witnessed and signed by this CMA and sent to be scanned into media. Consent Signed:   Botulina Toxins - Botox, Dysport, Xeomin Enrique

## 2019-04-16 NOTE — TELEPHONE ENCOUNTER
See 4/12 triage encounter--message sent to [facility] and appt scheduled

## 2019-04-16 NOTE — PROCEDURES
Patient was static vertical glabellar frown lines worse with animation. We discussed Botox treatment to the glabella. The risk, benefits, and alternatives were reviewed with the patient. She expressed understanding wished to proceed.   The glabellar guerline

## 2019-04-17 NOTE — TELEPHONE ENCOUNTER
Noted pt has seen SMARTt message below and has a appt to see Nancy Liang on Monday.     Roseanne Lee received a refill request for Verapamil.   Did you receive a paper copy on 2/25/19? Madelyn Quiles our office so we may address at  option 1 than

## 2019-04-19 ENCOUNTER — TELEPHONE (OUTPATIENT)
Dept: SURGERY | Facility: CLINIC | Age: 44
End: 2019-04-19

## 2019-04-19 NOTE — TELEPHONE ENCOUNTER
Patient returning my call to schedule surgery. Scheduling surgery at [facility] on 5/30. Patient has questions about the procedure. Routing message to [employer].

## 2019-04-22 ENCOUNTER — OFFICE VISIT (OUTPATIENT)
Dept: FAMILY MEDICINE CLINIC | Facility: CLINIC | Age: 44
End: 2019-04-22
Payer: COMMERCIAL

## 2019-04-22 VITALS
HEIGHT: 63 IN | SYSTOLIC BLOOD PRESSURE: 145 MMHG | HEART RATE: 97 BPM | BODY MASS INDEX: 24.42 KG/M2 | RESPIRATION RATE: 16 BRPM | DIASTOLIC BLOOD PRESSURE: 72 MMHG | WEIGHT: 137.81 LBS

## 2019-04-22 DIAGNOSIS — Z90.13 ABSENCE OF BREAST, ACQUIRED, BILATERAL: ICD-10-CM

## 2019-04-22 DIAGNOSIS — I10 ESSENTIAL HYPERTENSION: Primary | ICD-10-CM

## 2019-04-22 DIAGNOSIS — F41.9 ANXIETY: ICD-10-CM

## 2019-04-22 PROCEDURE — 99212 OFFICE O/P EST SF 10 MIN: CPT | Performed by: FAMILY MEDICINE

## 2019-04-22 PROCEDURE — 99215 OFFICE O/P EST HI 40 MIN: CPT | Performed by: FAMILY MEDICINE

## 2019-04-22 RX ORDER — HYDROCHLOROTHIAZIDE 12.5 MG/1
12.5 TABLET ORAL DAILY
Qty: 30 TABLET | Refills: 3 | Status: SHIPPED | OUTPATIENT
Start: 2019-04-22 | End: 2019-05-07 | Stop reason: DRUGHIGH

## 2019-04-22 RX ORDER — VENLAFAXINE 37.5 MG/1
37.5 TABLET ORAL DAILY
COMMUNITY
End: 2019-04-22

## 2019-04-22 RX ORDER — HYDROCHLOROTHIAZIDE 12.5 MG/1
12.5 TABLET ORAL DAILY
Qty: 30 TABLET | Refills: 3 | Status: SHIPPED | OUTPATIENT
Start: 2019-04-22 | End: 2019-04-22

## 2019-04-22 RX ORDER — CLONAZEPAM 0.25 MG/1
0.25 TABLET, ORALLY DISINTEGRATING ORAL 2 TIMES DAILY PRN
Qty: 30 TABLET | Refills: 0 | Status: SHIPPED | OUTPATIENT
Start: 2019-04-22 | End: 2019-05-03

## 2019-04-22 RX ORDER — RIZATRIPTAN BENZOATE 10 MG/1
TABLET ORAL
Qty: 6 TABLET | Refills: 11 | Status: SHIPPED | OUTPATIENT
Start: 2019-04-22 | End: 2020-04-23

## 2019-04-22 NOTE — PROGRESS NOTES
The new medication makes me feel angry and aggressive. Like I have too much energy and I had to leave work about a week ago. Feels like I had more anxiety. In the beginning I was feeling fine with it. Was sleeping ok.   All the sudden I was really agg Future  - XR CHEST PA + LAT CHEST (CPT=71046); Future  - MRSA CULTURE ONLY; Future  - HCG, BETA SUBUNIT, QUAL;  Future      Discussed counseling  Discussed medications  Major side effects and benefits of the medication were discussed in detail including katja

## 2019-04-26 DIAGNOSIS — Z90.13 ACQUIRED ABSENCE OF BOTH BREASTS: Primary | ICD-10-CM

## 2019-04-26 RX ORDER — RIZATRIPTAN BENZOATE 10 MG/1
TABLET ORAL
Qty: 6 TABLET | Refills: 0 | OUTPATIENT
Start: 2019-04-26

## 2019-04-29 ENCOUNTER — LAB ENCOUNTER (OUTPATIENT)
Dept: LAB | Facility: HOSPITAL | Age: 44
End: 2019-04-29
Attending: FAMILY MEDICINE
Payer: COMMERCIAL

## 2019-04-29 ENCOUNTER — HOSPITAL ENCOUNTER (OUTPATIENT)
Dept: GENERAL RADIOLOGY | Facility: HOSPITAL | Age: 44
Discharge: HOME | End: 2019-04-29
Attending: FAMILY MEDICINE
Payer: COMMERCIAL

## 2019-04-29 DIAGNOSIS — I10 HYPERTENSION: Primary | ICD-10-CM

## 2019-04-29 DIAGNOSIS — F41.9 ANXIETY: ICD-10-CM

## 2019-04-29 DIAGNOSIS — I10 ESSENTIAL HYPERTENSION: ICD-10-CM

## 2019-04-29 DIAGNOSIS — Z90.13 ABSENCE OF BREAST, ACQUIRED, BILATERAL: ICD-10-CM

## 2019-04-29 PROCEDURE — 93005 ELECTROCARDIOGRAM TRACING: CPT

## 2019-04-29 PROCEDURE — 80053 COMPREHEN METABOLIC PANEL: CPT

## 2019-04-29 PROCEDURE — 93010 ELECTROCARDIOGRAM REPORT: CPT | Performed by: FAMILY MEDICINE

## 2019-04-29 PROCEDURE — 85025 COMPLETE CBC W/AUTO DIFF WBC: CPT

## 2019-04-29 PROCEDURE — 87081 CULTURE SCREEN ONLY: CPT

## 2019-04-29 PROCEDURE — 71046 X-RAY EXAM CHEST 2 VIEWS: CPT | Performed by: FAMILY MEDICINE

## 2019-04-29 PROCEDURE — 80061 LIPID PANEL: CPT

## 2019-04-29 PROCEDURE — 36415 COLL VENOUS BLD VENIPUNCTURE: CPT

## 2019-04-29 PROCEDURE — 84703 CHORIONIC GONADOTROPIN ASSAY: CPT

## 2019-04-30 ENCOUNTER — SOCIAL WORK SERVICES (OUTPATIENT)
Dept: HEMATOLOGY/ONCOLOGY | Facility: HOSPITAL | Age: 44
End: 2019-04-30

## 2019-04-30 DIAGNOSIS — E87.6 HYPOKALEMIA: Primary | ICD-10-CM

## 2019-04-30 DIAGNOSIS — D72.9 NEUTROPHILIA: ICD-10-CM

## 2019-04-30 RX ORDER — POTASSIUM CHLORIDE 750 MG/1
10 TABLET, EXTENDED RELEASE ORAL 2 TIMES DAILY
Qty: 60 TABLET | Refills: 3 | Status: SHIPPED | OUTPATIENT
Start: 2019-04-30 | End: 2019-05-06 | Stop reason: ALTCHOICE

## 2019-04-30 NOTE — PROGRESS NOTES
ORLIN was unable to fax FMLA to the number on the form. Called [employer] Coordinator Elzbieta Crawford who provided another fax number, which was successful - [phone].

## 2019-04-30 NOTE — TELEPHONE ENCOUNTER
----- Message from Isael Andino DO sent at 4/29/2019  8:10 PM CDT -----  Potassium is low. Begin K. Dur 10 mEq 1 p.o. twice daily #60 with 3 refills recheck BMP 2 weeks diagnosis hypokalemia. Blood sugar is mildly elevated.   Cholesterol is normal.

## 2019-04-30 NOTE — TELEPHONE ENCOUNTER
Spoke with patient informed test results. Please verify medication was ordered correctly and sign off? Lab orders generated. Pt also inquiring if she is cleared for her procedure on 05/30/19?

## 2019-05-01 ENCOUNTER — TELEPHONE (OUTPATIENT)
Dept: SURGERY | Facility: CLINIC | Age: 44
End: 2019-05-01

## 2019-05-01 ENCOUNTER — NURSE TRIAGE (OUTPATIENT)
Dept: OTHER | Age: 44
End: 2019-05-01

## 2019-05-01 DIAGNOSIS — R30.0 DYSURIA: Primary | ICD-10-CM

## 2019-05-01 NOTE — TELEPHONE ENCOUNTER
Please drink large amount of cranberry juice. Order at lab patient to come to submit urine specimen today.

## 2019-05-01 NOTE — TELEPHONE ENCOUNTER
----- Message from Ebony. Ernesto Kris sent at 5/1/2019  7:14 AM CDT -----  Regarding: Other  Contact: [phone]  Hi Doctor Layne. I think from all the meds I've been prescribed I've developed a bladder infection.  Can I take a over the counter medicine

## 2019-05-01 NOTE — TELEPHONE ENCOUNTER
LM informing patient that 5/3 is not available for surgery. She is scheduled on 5/30. Offered to look at other dates if needed.

## 2019-05-01 NOTE — TELEPHONE ENCOUNTER
Action Requested: Summary for Provider     []  Critical Lab, Recommendations Needed  [] Need Additional Advice  []   FYI    []   Need Orders  [] Need Medications Sent to Pharmacy  []  Other     SUMMARY: Pt seeking recommendations for urinary symptoms    Sp

## 2019-05-01 NOTE — TELEPHONE ENCOUNTER
Spoke with patient ( verified) and relayed [facility] [place] message below--patient verbalizes understanding and agreement. \"I went to the store and bought AZO--it helped--I feel better, but it turned my urine orange. I will go to the lab today. \"

## 2019-05-03 ENCOUNTER — LAB ENCOUNTER (OUTPATIENT)
Dept: LAB | Age: 44
End: 2019-05-03
Attending: PHYSICIAN ASSISTANT
Payer: COMMERCIAL

## 2019-05-03 ENCOUNTER — OFFICE VISIT (OUTPATIENT)
Dept: FAMILY MEDICINE CLINIC | Facility: CLINIC | Age: 44
End: 2019-05-03
Payer: COMMERCIAL

## 2019-05-03 ENCOUNTER — OFFICE VISIT (OUTPATIENT)
Dept: SURGERY | Facility: CLINIC | Age: 44
End: 2019-05-03
Payer: COMMERCIAL

## 2019-05-03 VITALS
HEIGHT: 64 IN | BODY MASS INDEX: 23.39 KG/M2 | TEMPERATURE: 98 F | DIASTOLIC BLOOD PRESSURE: 82 MMHG | SYSTOLIC BLOOD PRESSURE: 136 MMHG | HEART RATE: 92 BPM | WEIGHT: 137 LBS | RESPIRATION RATE: 24 BRPM

## 2019-05-03 DIAGNOSIS — Z01.818 PRE-OP EXAMINATION: ICD-10-CM

## 2019-05-03 DIAGNOSIS — N30.00 ACUTE CYSTITIS WITHOUT HEMATURIA: ICD-10-CM

## 2019-05-03 DIAGNOSIS — D05.10 DUCTAL CARCINOMA IN SITU (DCIS) OF BREAST, UNSPECIFIED LATERALITY: Primary | ICD-10-CM

## 2019-05-03 DIAGNOSIS — Z90.13 ACQUIRED ABSENCE OF BOTH BREASTS: ICD-10-CM

## 2019-05-03 DIAGNOSIS — H65.192 OTHER ACUTE NONSUPPURATIVE OTITIS MEDIA OF LEFT EAR, RECURRENCE NOT SPECIFIED: Primary | ICD-10-CM

## 2019-05-03 DIAGNOSIS — R30.0 DYSURIA: ICD-10-CM

## 2019-05-03 DIAGNOSIS — N61.0 CELLULITIS OF FEMALE BREAST: ICD-10-CM

## 2019-05-03 DIAGNOSIS — Z85.3 HISTORY OF BREAST CANCER: ICD-10-CM

## 2019-05-03 DIAGNOSIS — D72.9 NEUTROPHILIA: ICD-10-CM

## 2019-05-03 DIAGNOSIS — E87.6 HYPOKALEMIA: ICD-10-CM

## 2019-05-03 PROBLEM — H66.90 ACUTE OTITIS MEDIA: Status: ACTIVE | Noted: 2019-05-03

## 2019-05-03 PROCEDURE — 36415 COLL VENOUS BLD VENIPUNCTURE: CPT

## 2019-05-03 PROCEDURE — 85025 COMPLETE CBC W/AUTO DIFF WBC: CPT

## 2019-05-03 PROCEDURE — 87077 CULTURE AEROBIC IDENTIFY: CPT

## 2019-05-03 PROCEDURE — 80048 BASIC METABOLIC PNL TOTAL CA: CPT

## 2019-05-03 PROCEDURE — 81001 URINALYSIS AUTO W/SCOPE: CPT

## 2019-05-03 PROCEDURE — 99212 OFFICE O/P EST SF 10 MIN: CPT | Performed by: PHYSICIAN ASSISTANT

## 2019-05-03 PROCEDURE — 87186 SC STD MICRODIL/AGAR DIL: CPT

## 2019-05-03 PROCEDURE — 99212 OFFICE O/P EST SF 10 MIN: CPT | Performed by: SURGERY

## 2019-05-03 PROCEDURE — 99213 OFFICE O/P EST LOW 20 MIN: CPT | Performed by: PHYSICIAN ASSISTANT

## 2019-05-03 PROCEDURE — 87086 URINE CULTURE/COLONY COUNT: CPT

## 2019-05-03 RX ORDER — AMOXICILLIN AND CLAVULANATE POTASSIUM 875; 125 MG/1; MG/1
1 TABLET, FILM COATED ORAL 2 TIMES DAILY
Qty: 20 TABLET | Refills: 0 | Status: SHIPPED | OUTPATIENT
Start: 2019-05-03 | End: 2019-05-06

## 2019-05-03 RX ORDER — VENLAFAXINE HYDROCHLORIDE 37.5 MG/1
CAPSULE, EXTENDED RELEASE ORAL
COMMUNITY
Start: 2019-04-20 | End: 2019-05-03

## 2019-05-03 RX ORDER — CLONAZEPAM 0.25 MG/1
0.25 TABLET, ORALLY DISINTEGRATING ORAL 2 TIMES DAILY PRN
COMMUNITY
End: 2019-05-06 | Stop reason: ALTCHOICE

## 2019-05-03 NOTE — PROGRESS NOTES
Enzo Isaac is a 39year old female who presents today for a follow-up. She has further questions regarding her upcoming revision surgery.   Specifically, she relates that she would like the left breast to be reduced to the size of the right breast.

## 2019-05-03 NOTE — PROGRESS NOTES
HPI:     HPI  39year-old female is here in the office complaining of left ear pain, frequency, dysuria and burning sensation for 4 days. Patient is currently taking Azo and unable to check her urine.  Patient denies of chest pain, SOB, N/V/C/D, fever, dizz 2013    Right breast. radiotherapy and mastectomy    • Depression 2004    medication and therapy   • Encounter for therapeutic drug monitoring    • Esophageal reflux    • Helicobacter pylori infection 2016   • Hemorrhoids, postpartum 2008   • Hx of constip 26        ovarian cancer / maternal cousin   • Diabetes Paternal Grandmother    • Cancer Paternal Aunt         leukemia   • Cancer Paternal Uncle         prostate   • Breast Cancer Self 27       Social History:   Social History    Socioeconomic History Special Diet: Not Asked        Back Care: Not Asked        Exercise: Not Asked        Bike Helmet: Not Asked        Seat Belt: Not Asked        Self-Exams: Not Asked        Grew up on a farm: Not Asked        History of tanning: Not Asked        Kim Hernandez breath sounds normal. She has no wheezes. She has no rales. She exhibits no tenderness. Abdominal: Soft. Bowel sounds are normal. She exhibits no distension. There is no tenderness.  There is no rigidity, no guarding, no CVA tenderness, no tenderness at [facility]

## 2019-05-03 NOTE — ASSESSMENT & PLAN NOTE
Abx ordered, advised to take full course. Increase fluids and rest. Drink cranberry juice may also help prevent urinary tract infection. Don't hold urine and wipe from front to back. Urine culture done today. Unable to perform urine dip.

## 2019-05-06 ENCOUNTER — TELEPHONE (OUTPATIENT)
Dept: FAMILY MEDICINE CLINIC | Facility: CLINIC | Age: 44
End: 2019-05-06

## 2019-05-06 ENCOUNTER — PATIENT MESSAGE (OUTPATIENT)
Dept: FAMILY MEDICINE CLINIC | Facility: CLINIC | Age: 44
End: 2019-05-06

## 2019-05-06 RX ORDER — CEFDINIR 300 MG/1
300 CAPSULE ORAL 2 TIMES DAILY
Qty: 10 CAPSULE | Refills: 0 | Status: SHIPPED | OUTPATIENT
Start: 2019-05-06 | End: 2019-05-20

## 2019-05-06 NOTE — TELEPHONE ENCOUNTER
From: Denise Sol  To: Faisal Tucker DO  Sent: 5/6/2019 12:23 PM CDT  Subject: Test Results Question    I have a question about URINE CULTURE, ROUTINE resulted on 5/5/19, 6:59 AM.    What is echixera coli?

## 2019-05-06 NOTE — TELEPHONE ENCOUNTER
Contacted pt regarding urine culture results as per [facility]'s result note copied below. Please verify that based on results pt is being switched from Augmentin prescribed on 5/3/19 by FRANCY DUCKWORTH to the Cephalexin?  Pt mentions would like to be switched as has had l

## 2019-05-07 ENCOUNTER — PATIENT MESSAGE (OUTPATIENT)
Dept: FAMILY MEDICINE CLINIC | Facility: CLINIC | Age: 44
End: 2019-05-07

## 2019-05-07 RX ORDER — HYDROCHLOROTHIAZIDE 25 MG/1
25 TABLET ORAL DAILY
Qty: 90 TABLET | Refills: 3 | Status: SHIPPED | OUTPATIENT
Start: 2019-05-07 | End: 2019-05-07

## 2019-05-07 RX ORDER — HYDROCHLOROTHIAZIDE 25 MG/1
25 TABLET ORAL DAILY
Qty: 90 TABLET | Refills: 3 | Status: SHIPPED | OUTPATIENT
Start: 2019-05-07 | End: 2019-11-11

## 2019-05-07 NOTE — TELEPHONE ENCOUNTER
From: Yash Butcher  To: Lambert Fernandez DO  Sent: 5/7/2019 1:03 PM CDT  Subject: Prescription Question    Hi it's me again. Hopefully that prescription was sent to the [facility] in [facility], with the previous pill you sent.  So is my blood pressur

## 2019-05-07 NOTE — TELEPHONE ENCOUNTER
From: Martin uDmas  To: Ally Pollard DO  Sent: 5/7/2019 2:50 PM CDT  Subject: Prescription Question    Can you please call me ?  In reference to the medication you just prescribed

## 2019-05-07 NOTE — TELEPHONE ENCOUNTER
From: Braulio Mccall  To: Kasi Berger DO  Sent: 5/7/2019 5:02 PM CDT  Subject: Prescription Question    So the question is: did doctor increase dosage of water pills cause my blood pressure is higher?

## 2019-05-07 NOTE — TELEPHONE ENCOUNTER
Email response sent to patient.  Re-sent her hydrochlorothiazide 25 MG Oral Tab to the pharmacy in [facility]

## 2019-05-07 NOTE — TELEPHONE ENCOUNTER
Spoke with patient and relayed [facility] message below--patient verbalizes understanding and agreement and will  Cefdinir today after 9 a.m. When [place] opens.     Patient states she is still having diarrhea--relayed to patient it is a side effect of antibio

## 2019-05-10 ENCOUNTER — PATIENT MESSAGE (OUTPATIENT)
Dept: FAMILY MEDICINE CLINIC | Facility: CLINIC | Age: 44
End: 2019-05-10

## 2019-05-10 NOTE — TELEPHONE ENCOUNTER
From: Jasbir Box  To: Alonzo Ulrich, DO  Sent: 5/10/2019 11:29 AM CDT  Subject: Other    Hello. I would like to know if I can fix a tooth that I'm having discomfort with or do I have to do it after surgery?  I think I need a filling replaced

## 2019-05-14 ENCOUNTER — TELEPHONE (OUTPATIENT)
Dept: OTHER | Age: 44
End: 2019-05-14

## 2019-05-14 ENCOUNTER — PATIENT MESSAGE (OUTPATIENT)
Dept: FAMILY MEDICINE CLINIC | Facility: CLINIC | Age: 44
End: 2019-05-14

## 2019-05-14 NOTE — TELEPHONE ENCOUNTER
Spoke with pt regarding my chart message below. Per pt still has 4-5 episodes of diarrhea since oral antibiotics have started.   Pt denies dark/tarry stools, hugo blood in stool, nausea    Pt also states saw PA 5/3/19 for left ear discomfort and that sy

## 2019-05-14 NOTE — TELEPHONE ENCOUNTER
From: Tisha Sanchez  To: Chase Dumont DO  Sent: 5/14/2019 1:31 PM CDT  Subject: Prescription Question    The last antibiotic you gave me I'm still having diarrhea. Not sure if that's a side effect as well?  Can you also send a prescription if poss

## 2019-05-15 ENCOUNTER — PATIENT MESSAGE (OUTPATIENT)
Dept: FAMILY MEDICINE CLINIC | Facility: CLINIC | Age: 44
End: 2019-05-15

## 2019-05-15 NOTE — TELEPHONE ENCOUNTER
Advised patient of Dr Shira Burrows  note. Patient verbalized understanding and appointment made for Monday 4/20/19 10:30 am in [place].

## 2019-05-16 NOTE — TELEPHONE ENCOUNTER
Patient informed of [facility] message, patient has appt booked for 05/20/2019 with [facility] for ear check.

## 2019-05-20 ENCOUNTER — OFFICE VISIT (OUTPATIENT)
Dept: FAMILY MEDICINE CLINIC | Facility: CLINIC | Age: 44
End: 2019-05-20

## 2019-05-20 VITALS
SYSTOLIC BLOOD PRESSURE: 140 MMHG | HEART RATE: 65 BPM | DIASTOLIC BLOOD PRESSURE: 82 MMHG | HEIGHT: 64 IN | BODY MASS INDEX: 23.56 KG/M2 | WEIGHT: 138 LBS

## 2019-05-20 DIAGNOSIS — F41.1 GENERALIZED ANXIETY DISORDER: ICD-10-CM

## 2019-05-20 DIAGNOSIS — Z90.13 ACQUIRED ABSENCE OF BOTH BREASTS: ICD-10-CM

## 2019-05-20 DIAGNOSIS — I10 ESSENTIAL HYPERTENSION: Primary | ICD-10-CM

## 2019-05-20 PROCEDURE — 99242 OFF/OP CONSLTJ NEW/EST SF 20: CPT | Performed by: FAMILY MEDICINE

## 2019-05-20 PROCEDURE — 99212 OFFICE O/P EST SF 10 MIN: CPT | Performed by: FAMILY MEDICINE

## 2019-05-20 NOTE — PROGRESS NOTES
Didn't take her blood pressure medication this am.    Has Dr. Filomena Hutchinson 5/30/2019    Preop for lipo breast redu and possible redu abdominoplasty. Has swelling lateral edges of her abdominoplasty. Breast ca 2014      No loose teeth.     Hx of htn and gen anxi disorder  Recommended referral to psychiatry. Upon leaving she took her bp med and returned for bp check  bp was controlled on medication.

## 2019-05-23 ENCOUNTER — TELEPHONE (OUTPATIENT)
Dept: FAMILY MEDICINE CLINIC | Facility: CLINIC | Age: 44
End: 2019-05-23

## 2019-05-23 NOTE — TELEPHONE ENCOUNTER
Spoke to pt and she stated that she needs a letter stating that she missed 4/30/19, 5/1/19, 5/3/19/ & 5/4/19 due to appts and labs. Once completed, I will fax out.     Thank you,  [facility]

## 2019-05-23 NOTE — TELEPHONE ENCOUNTER
Letter generated, signed by [facility] and given to [facility].

## 2019-05-23 NOTE — TELEPHONE ENCOUNTER
Faxed letter to [facility] [phone] with Case #[id number]. Sent [employer] message to pt.

## 2019-05-28 ENCOUNTER — PATIENT MESSAGE (OUTPATIENT)
Dept: FAMILY MEDICINE CLINIC | Facility: CLINIC | Age: 44
End: 2019-05-28

## 2019-05-29 ENCOUNTER — SOCIAL WORK SERVICES (OUTPATIENT)
Dept: HEMATOLOGY/ONCOLOGY | Facility: HOSPITAL | Age: 44
End: 2019-05-29

## 2019-05-29 NOTE — TELEPHONE ENCOUNTER
From: Maria Esther Stephenson  To: John Johnson DO  Sent: 5/28/2019 7:01 PM CDT  Subject: Other    Hi! So sorry to bother you but Moon Ruelas said if you still have the original Medical certification forms filled out in the intermittent section.  Moon Ruelas said

## 2019-05-29 NOTE — PROGRESS NOTES
Violetta received call from Lyubov Smith, [phone] with request for updated FMLA forms with start date of 4/30/19. SW completed paperwork, spoke with patient and faxed to [phone].

## 2019-05-30 ENCOUNTER — TELEPHONE (OUTPATIENT)
Dept: ADMINISTRATIVE | Age: 44
End: 2019-05-30

## 2019-05-30 ENCOUNTER — ANESTHESIA EVENT (OUTPATIENT)
Dept: SURGERY | Facility: HOSPITAL | Age: 44
End: 2019-05-30
Payer: COMMERCIAL

## 2019-05-30 ENCOUNTER — ANESTHESIA (OUTPATIENT)
Dept: SURGERY | Facility: HOSPITAL | Age: 44
End: 2019-05-30
Payer: COMMERCIAL

## 2019-05-30 ENCOUNTER — HOSPITAL ENCOUNTER (OUTPATIENT)
Facility: HOSPITAL | Age: 44
Setting detail: HOSPITAL OUTPATIENT SURGERY
Discharge: HOME | End: 2019-05-30
Attending: SURGERY | Admitting: SURGERY
Payer: COMMERCIAL

## 2019-05-30 VITALS
OXYGEN SATURATION: 97 % | TEMPERATURE: 98 F | RESPIRATION RATE: 18 BRPM | HEART RATE: 93 BPM | HEIGHT: 64 IN | BODY MASS INDEX: 23.39 KG/M2 | WEIGHT: 137 LBS | SYSTOLIC BLOOD PRESSURE: 138 MMHG | DIASTOLIC BLOOD PRESSURE: 66 MMHG

## 2019-05-30 DIAGNOSIS — Z90.13 ACQUIRED ABSENCE OF BOTH BREASTS: ICD-10-CM

## 2019-05-30 PROCEDURE — 0HRV37Z REPLACEMENT OF BILATERAL BREAST WITH AUTOLOGOUS TISSUE SUBSTITUTE, PERCUTANEOUS APPROACH: ICD-10-PCS | Performed by: SURGERY

## 2019-05-30 PROCEDURE — 81025 URINE PREGNANCY TEST: CPT | Performed by: SURGERY

## 2019-05-30 PROCEDURE — 88305 TISSUE EXAM BY PATHOLOGIST: CPT | Performed by: SURGERY

## 2019-05-30 RX ORDER — DEXAMETHASONE SODIUM PHOSPHATE 4 MG/ML
4 VIAL (ML) INJECTION AS NEEDED
Status: ACTIVE | OUTPATIENT
Start: 2019-05-30 | End: 2019-05-30

## 2019-05-30 RX ORDER — ONDANSETRON 2 MG/ML
4 INJECTION INTRAMUSCULAR; INTRAVENOUS AS NEEDED
Status: DISPENSED | OUTPATIENT
Start: 2019-05-30 | End: 2019-05-30

## 2019-05-30 RX ORDER — LIDOCAINE HYDROCHLORIDE AND EPINEPHRINE 10; 10 MG/ML; UG/ML
INJECTION, SOLUTION INFILTRATION; PERINEURAL AS NEEDED
Status: DISCONTINUED | OUTPATIENT
Start: 2019-05-30 | End: 2019-05-30 | Stop reason: HOSPADM

## 2019-05-30 RX ORDER — ONDANSETRON 2 MG/ML
4 INJECTION INTRAMUSCULAR; INTRAVENOUS AS NEEDED
Status: DISCONTINUED | OUTPATIENT
Start: 2019-05-30 | End: 2019-05-30

## 2019-05-30 RX ORDER — ACETAMINOPHEN 500 MG
1000 TABLET ORAL ONCE
COMMUNITY
End: 2019-06-10 | Stop reason: ALTCHOICE

## 2019-05-30 RX ORDER — SODIUM CHLORIDE, SODIUM LACTATE, POTASSIUM CHLORIDE, CALCIUM CHLORIDE 600; 310; 30; 20 MG/100ML; MG/100ML; MG/100ML; MG/100ML
INJECTION, SOLUTION INTRAVENOUS CONTINUOUS
Status: DISCONTINUED | OUTPATIENT
Start: 2019-05-30 | End: 2019-05-30

## 2019-05-30 RX ORDER — NALOXONE HYDROCHLORIDE 0.4 MG/ML
80 INJECTION, SOLUTION INTRAMUSCULAR; INTRAVENOUS; SUBCUTANEOUS AS NEEDED
Status: DISCONTINUED | OUTPATIENT
Start: 2019-05-30 | End: 2019-05-30

## 2019-05-30 RX ORDER — ACETAMINOPHEN AND CODEINE PHOSPHATE 300; 30 MG/1; MG/1
2 TABLET ORAL EVERY 4 HOURS PRN
Status: DISCONTINUED | OUTPATIENT
Start: 2019-05-30 | End: 2019-05-30

## 2019-05-30 RX ORDER — ACETAMINOPHEN AND CODEINE PHOSPHATE 300; 30 MG/1; MG/1
1 TABLET ORAL EVERY 4 HOURS PRN
Status: DISCONTINUED | OUTPATIENT
Start: 2019-05-30 | End: 2019-05-30

## 2019-05-30 RX ORDER — DOCUSATE SODIUM 100 MG/1
100 CAPSULE, LIQUID FILLED ORAL 2 TIMES DAILY
Qty: 60 CAPSULE | Refills: 0 | Status: SHIPPED | OUTPATIENT
Start: 2019-05-30 | End: 2019-06-10 | Stop reason: ALTCHOICE

## 2019-05-30 RX ORDER — ACETAMINOPHEN AND CODEINE PHOSPHATE 300; 30 MG/1; MG/1
TABLET ORAL
Status: DISCONTINUED
Start: 2019-05-30 | End: 2019-05-30

## 2019-05-30 RX ORDER — MIDAZOLAM HYDROCHLORIDE 1 MG/ML
1 INJECTION INTRAMUSCULAR; INTRAVENOUS EVERY 5 MIN PRN
Status: DISCONTINUED | OUTPATIENT
Start: 2019-05-30 | End: 2019-05-30

## 2019-05-30 RX ORDER — MEPERIDINE HYDROCHLORIDE 25 MG/ML
12.5 INJECTION INTRAMUSCULAR; INTRAVENOUS; SUBCUTANEOUS AS NEEDED
Status: DISCONTINUED | OUTPATIENT
Start: 2019-05-30 | End: 2019-05-30

## 2019-05-30 RX ORDER — LABETALOL HYDROCHLORIDE 5 MG/ML
5 INJECTION, SOLUTION INTRAVENOUS EVERY 5 MIN PRN
Status: DISCONTINUED | OUTPATIENT
Start: 2019-05-30 | End: 2019-05-30

## 2019-05-30 RX ORDER — NALOXONE HYDROCHLORIDE 0.4 MG/ML
80 INJECTION, SOLUTION INTRAMUSCULAR; INTRAVENOUS; SUBCUTANEOUS AS NEEDED
Status: ACTIVE | OUTPATIENT
Start: 2019-05-30 | End: 2019-05-30

## 2019-05-30 RX ORDER — HYDROMORPHONE HYDROCHLORIDE 1 MG/ML
0.4 INJECTION, SOLUTION INTRAMUSCULAR; INTRAVENOUS; SUBCUTANEOUS EVERY 5 MIN PRN
Status: DISCONTINUED | OUTPATIENT
Start: 2019-05-30 | End: 2019-05-30

## 2019-05-30 RX ORDER — METOCLOPRAMIDE HYDROCHLORIDE 5 MG/ML
10 INJECTION INTRAMUSCULAR; INTRAVENOUS AS NEEDED
Status: ACTIVE | OUTPATIENT
Start: 2019-05-30 | End: 2019-05-30

## 2019-05-30 RX ORDER — ACETAMINOPHEN 500 MG
1000 TABLET ORAL ONCE
Status: DISCONTINUED | OUTPATIENT
Start: 2019-05-30 | End: 2019-05-30 | Stop reason: HOSPADM

## 2019-05-30 RX ORDER — HYDROMORPHONE HYDROCHLORIDE 1 MG/ML
0.4 INJECTION, SOLUTION INTRAMUSCULAR; INTRAVENOUS; SUBCUTANEOUS EVERY 5 MIN PRN
Status: ACTIVE | OUTPATIENT
Start: 2019-05-30 | End: 2019-05-30

## 2019-05-30 RX ORDER — ONDANSETRON 4 MG/1
4 TABLET, FILM COATED ORAL EVERY 8 HOURS PRN
Qty: 20 TABLET | Refills: 0 | Status: SHIPPED | OUTPATIENT
Start: 2019-05-30 | End: 2019-06-25

## 2019-05-30 RX ORDER — CEFAZOLIN SODIUM/WATER 2 G/20 ML
2 SYRINGE (ML) INTRAVENOUS ONCE
Status: COMPLETED | OUTPATIENT
Start: 2019-05-30 | End: 2019-05-30

## 2019-05-30 RX ORDER — ACETAMINOPHEN 500 MG
1000 TABLET ORAL ONCE AS NEEDED
Status: DISCONTINUED | OUTPATIENT
Start: 2019-05-30 | End: 2019-05-30

## 2019-05-30 RX ORDER — CEFAZOLIN SODIUM/WATER 2 G/20 ML
SYRINGE (ML) INTRAVENOUS
Status: DISCONTINUED
Start: 2019-05-30 | End: 2019-05-30

## 2019-05-30 RX ORDER — METOCLOPRAMIDE HYDROCHLORIDE 5 MG/ML
10 INJECTION INTRAMUSCULAR; INTRAVENOUS AS NEEDED
Status: DISCONTINUED | OUTPATIENT
Start: 2019-05-30 | End: 2019-05-30

## 2019-05-30 RX ORDER — ACETAMINOPHEN AND CODEINE PHOSPHATE 300; 30 MG/1; MG/1
1-2 TABLET ORAL EVERY 4 HOURS PRN
Qty: 40 TABLET | Refills: 0 | Status: SHIPPED | OUTPATIENT
Start: 2019-05-30 | End: 2019-11-11 | Stop reason: ALTCHOICE

## 2019-05-30 NOTE — TELEPHONE ENCOUNTER
Sent Mychart message to pt to have new FMLA forms faxed to the Forms Dept. A New HIPAA release and fee will be needed.

## 2019-05-30 NOTE — ANESTHESIA PREPROCEDURE EVALUATION
PRE-OP EVALUATION    Patient Name: Dimple Smith    Pre-op Diagnosis: Acquired absence of both breasts [Z90.13]    Procedure(s):  Revision of bilateral reconstructed breasts and liposuction to left breast and fat grafting to right breast      Surgeon(s) History:   Procedure Laterality Date   • APPENDECTOMY  1979   • BREAST SURGERY  12/9/2015    Revision of bilateral reconstructed breasts   • BREAST SURGERY  12/9/2015    Fat grafting to left reconstructed breast   • BREAST SURGERY Bilateral 2/3/16    Bilat ROM: full Cardiovascular    Cardiovascular exam normal.         Dental    No notable dental history.          Pulmonary    Pulmonary exam normal.                 Other findings            ASA: 2   Plan: general  NPO status verified and     Post-procedure pa

## 2019-05-30 NOTE — H&P
Dr. Antionette Sosa H&P/surgical clearance from 5/20/19 reviewed. No interval changes. Informed consent obtained. Patient wishes to proceed as planned.

## 2019-05-30 NOTE — ANESTHESIA POSTPROCEDURE EVALUATION
[facility] Patient Status:  Hospital Outpatient Surgery   Age/Gender 39year old female MRN [id number]   Location [address] [facility] Attending Peter Alicea MD   Hosp Day # 0 PCP Love Lu.  DO Layne

## 2019-05-31 NOTE — TELEPHONE ENCOUNTER
Disability form for Dr. Eloise Gonzalez received in [facility]. Logged for processing.  NK

## 2019-05-31 NOTE — TELEPHONE ENCOUNTER
Spoke with pt and wrong type of forms were sent. She will have [employer] forms for intermittent leave.

## 2019-05-31 NOTE — OPERATIVE REPORT
[facility]    PATIENT'S NAME: Candy Rizvi S   ATTENDING PHYSICIAN: Hawk Mcelroy M.D. OPERATING PHYSICIAN: Hawk Mcelroy M.D.    PATIENT ACCOUNT#:   [de-identified]    LOCATION:  [address] [place] [address] EDW 10  MEDICAL RECORD #:   [id number] padded arm boards and loosely secured with Kerlix. The chest and abdomen were prepped and draped sterilely. The lateral aspects of the abdominal scar were infiltrated with 1% lidocaine with epinephrine.   Stab incisions were then created and approximately tailor tacked and marked. The excised tissue was all sent for permanent pathologic analysis as the left breast tissue. The patient was then placed in the upright position. Shape and symmetry appeared adequate. I viewed the existing incisions.   There we

## 2019-06-04 NOTE — TELEPHONE ENCOUNTER
Pt paid over the phone and emailed Tatianna Gomez today. Faxed LA to [place] - [phone]. Mailed copy to pt.

## 2019-06-04 NOTE — TELEPHONE ENCOUNTER
Dr. Moulton Plan,    Intermittent FMLA just from 4/30/19 - 5/31/19 - 1-4 days per month due to breast pain. Pt knows to ask her surgeon if she needs additional coverage after surgery/post -op.     Please sign off on form if you approve:  -Highlight the patient

## 2019-06-10 ENCOUNTER — OFFICE VISIT (OUTPATIENT)
Dept: SURGERY | Facility: CLINIC | Age: 44
End: 2019-06-10
Payer: COMMERCIAL

## 2019-06-10 ENCOUNTER — SOCIAL WORK SERVICES (OUTPATIENT)
Dept: HEMATOLOGY/ONCOLOGY | Facility: HOSPITAL | Age: 44
End: 2019-06-10

## 2019-06-10 VITALS — WEIGHT: 138 LBS | BODY MASS INDEX: 23.56 KG/M2 | HEIGHT: 64 IN

## 2019-06-10 DIAGNOSIS — Z90.13 ACQUIRED ABSENCE OF BOTH BREASTS: Primary | ICD-10-CM

## 2019-06-10 PROCEDURE — 99024 POSTOP FOLLOW-UP VISIT: CPT | Performed by: PHYSICIAN ASSISTANT

## 2019-06-10 RX ORDER — TRAMADOL HYDROCHLORIDE 50 MG/1
TABLET ORAL EVERY 4 HOURS PRN
Qty: 30 TABLET | Refills: 0 | Status: SHIPPED | OUTPATIENT
Start: 2019-06-10 | End: 2019-06-25

## 2019-06-10 NOTE — PROGRESS NOTES
This is a 79-year-old female that is 11 days status post a revision of bilateral reconstructed breast with autologous fat grafting.   10 cc fat transfer to the left breast upper pole and 30 cc fat transfer to the right breast.  She denies any fevers or sign to be off work due to continued pain and inability to do normal daily activities because of pain with certain motions such as bending forward.   If she still does not feel comfortable with these activities by the end of the week I recommended that she call

## 2019-06-10 NOTE — PROGRESS NOTES
SW received call from patient requesting that [facility] paperwork be amended to a back to work date of 7/11/19. Corrections were made and emailed to patient and [employer].

## 2019-06-13 ENCOUNTER — SOCIAL WORK SERVICES (OUTPATIENT)
Dept: HEMATOLOGY/ONCOLOGY | Facility: HOSPITAL | Age: 44
End: 2019-06-13

## 2019-06-13 NOTE — PROGRESS NOTES
Sw received a request from patient to update paperwork. Updates completed and emailed to [address] [place] [address] and patient.

## 2019-06-17 ENCOUNTER — TELEPHONE (OUTPATIENT)
Dept: SURGERY | Facility: CLINIC | Age: 44
End: 2019-06-17

## 2019-06-25 ENCOUNTER — OFFICE VISIT (OUTPATIENT)
Dept: SURGERY | Facility: CLINIC | Age: 44
End: 2019-06-25
Payer: COMMERCIAL

## 2019-06-25 DIAGNOSIS — Z90.13 ACQUIRED ABSENCE OF BOTH BREASTS: Primary | ICD-10-CM

## 2019-06-25 PROCEDURE — 99024 POSTOP FOLLOW-UP VISIT: CPT | Performed by: SURGERY

## 2019-06-25 NOTE — PROGRESS NOTES
Shai St is a 39year old female who presents today for a follow-up. He came in complains of discomfort in her liposuction sites as well as her breast which is worse and bending over.   She reports that she is having difficulty cleaning her feet d

## 2019-07-01 ENCOUNTER — SOCIAL WORK SERVICES (OUTPATIENT)
Dept: HEMATOLOGY/ONCOLOGY | Facility: HOSPITAL | Age: 44
End: 2019-07-01

## 2019-07-01 NOTE — PROGRESS NOTES
SW left voice message for patient requesting a return call to discuss FMLA paperwork.  Sw received a return call and patient reported that her employer was requesting the medical certification and recent Dr. Kay Chowdhury which was emailed to patient and [facility]

## 2019-07-11 ENCOUNTER — SOCIAL WORK SERVICES (OUTPATIENT)
Dept: HEMATOLOGY/ONCOLOGY | Facility: HOSPITAL | Age: 44
End: 2019-07-11

## 2019-07-11 NOTE — PROGRESS NOTES
Sw completed and faxed and emailed updated disability paperwork to [facility] along with supporting documentation.

## 2019-07-19 ENCOUNTER — OFFICE VISIT (OUTPATIENT)
Dept: SURGERY | Facility: CLINIC | Age: 44
End: 2019-07-19
Payer: COMMERCIAL

## 2019-07-19 ENCOUNTER — PATIENT MESSAGE (OUTPATIENT)
Dept: FAMILY MEDICINE CLINIC | Facility: CLINIC | Age: 44
End: 2019-07-19

## 2019-07-19 DIAGNOSIS — Z90.13 ACQUIRED ABSENCE OF BOTH BREASTS: Primary | ICD-10-CM

## 2019-07-19 DIAGNOSIS — Z92.3 STATUS POST RADIATION THERAPY: Primary | ICD-10-CM

## 2019-07-19 DIAGNOSIS — R07.89 OTHER CHEST PAIN: ICD-10-CM

## 2019-07-19 PROCEDURE — 99024 POSTOP FOLLOW-UP VISIT: CPT | Performed by: SURGERY

## 2019-07-19 NOTE — PROGRESS NOTES
Conner Croft is a 39year old female who presents today for a follow-up. She denies fevers or chills. She complains of intermittent bilateral breast discomfort as well as radiation of the pain to her left arm.       Physical Examination:  Breasts: Antonio

## 2019-07-19 NOTE — PROGRESS NOTES
Here for post op c/o left breast sharp pain that radiates down left arm; intermittent, lasts only a few seconds. States she has been lifting heavy groceries >20lbs. States pain started approximately 1 week after surgery.

## 2019-07-19 NOTE — TELEPHONE ENCOUNTER
From: Danita Calvert  To: Raffi Echeverria DO  Sent: 7/19/2019 4:10 PM CDT  Subject: Referral Request    Can you give me a referral to see pain specialist? Doctor Ross Rodriguez thinks that would help me pain I'm experiencing with nerve damage I had from radi

## 2019-07-19 NOTE — PROGRESS NOTES
Completed paperwork faxed back to employer [phone]; confirmation received.   Paperwork given to Theodore Méndez

## 2019-07-20 ENCOUNTER — TELEPHONE (OUTPATIENT)
Dept: OTHER | Age: 44
End: 2019-07-20

## 2019-07-20 NOTE — TELEPHONE ENCOUNTER
----- Message from Greene. Lieutenant Segal sent at 7/19/2019 10:46 PM CDT -----  Regarding: Other  Contact: [phone]  I was hoping to see doctor Layne this week. Online it shows he has no appts available until after August 19th.  Ever since I got breast re

## 2019-07-20 NOTE — TELEPHONE ENCOUNTER
Called number on file and received busy tone. [employer] message sent yesterday informing patient to call our office regarding her symptoms and patient has read message.      Lukasz Shin,     Thank you for reaching out to our office regarding your symptoms

## 2019-07-22 ENCOUNTER — TELEPHONE (OUTPATIENT)
Dept: OTHER | Age: 44
End: 2019-07-22

## 2019-07-22 NOTE — TELEPHONE ENCOUNTER
Called patient's number--busy signal.    Sent [employer] message to patient to return call to triage

## 2019-07-22 NOTE — TELEPHONE ENCOUNTER
Eloy for Dr. Johanna Wolfe. Please see her mychart message below. Pt called us back and stated that she is requesting a referral for the pain specialist.   Pt stated that she is experiencing numbness and pain on her whole left side of her body.  Pt explained that she

## 2019-07-22 NOTE — TELEPHONE ENCOUNTER
Called the patient. Informed her referral is in place. Pt states she already has contact information for pain clinic. She will call to make an appt.

## 2019-08-12 ENCOUNTER — OFFICE VISIT (OUTPATIENT)
Dept: PAIN CLINIC | Facility: HOSPITAL | Age: 44
End: 2019-08-12
Attending: PHYSICIAN ASSISTANT
Payer: COMMERCIAL

## 2019-08-12 VITALS
DIASTOLIC BLOOD PRESSURE: 76 MMHG | SYSTOLIC BLOOD PRESSURE: 154 MMHG | RESPIRATION RATE: 18 BRPM | HEIGHT: 64 IN | HEART RATE: 83 BPM | BODY MASS INDEX: 23.56 KG/M2 | WEIGHT: 138 LBS

## 2019-08-12 DIAGNOSIS — M48.02 SPINAL STENOSIS OF CERVICAL REGION: Chronic | ICD-10-CM

## 2019-08-12 DIAGNOSIS — M50.30 DEGENERATIVE DISC DISEASE, CERVICAL: Primary | Chronic | ICD-10-CM

## 2019-08-12 DIAGNOSIS — G89.3 CHRONIC PAIN DUE TO NEOPLASM: ICD-10-CM

## 2019-08-12 PROCEDURE — 99201 HC OUTPT EVAL AND MGNT NEW PT LEVEL 1: CPT

## 2019-08-12 NOTE — CHRONIC PAIN
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    • Ovarian cyst 2005   • Personal history of antineoplastic chemotherapy    • Visual impairment     glasses     SURGICAL HISTORY:  Past Surgical History:   Procedure Laterality Date   • APPENDECTOMY  1 breast cancer and leukemia   • Dementia Other         Alzheimers, Grandmother [SIDE NOT STATED]   • Cancer Cousin 30        ovarian cancer / maternal cousin   • Diabetes Paternal Grandmother    • Cancer Paternal Aunt         leukemia   • Cancer Estle Hodgkins Neuro: no headache or LOC   Psych: no change in personality, affect, or depression   PHYSICAL EXAMINATION:  /76   Pulse 83   Resp 18   Ht 5' 4\" (1.626 m)   Wt 138 lb (62.6 kg)   BMI 23.69 kg/m²   General: Alert and oriented x3, NAD, appears stated but no significant central stenosis. No significant facet         arthropathy or foraminal narrowing. C7-T1: No significant disc/facet abnormality, spinal stenosis, or foraminal  stenosis.      CONCLUSION:     Multilevel degenerative disc disease with

## 2019-08-16 ENCOUNTER — DOCUMENTATION ONLY (OUTPATIENT)
Dept: PAIN CLINIC | Facility: HOSPITAL | Age: 44
End: 2019-08-16

## 2019-08-16 NOTE — PROGRESS NOTES
Procedure code --77207-- PENDING [address] [facility] @ # [phone] @ 11:19am    Spoke with Kristi Lee, case was sent to review by clinical staff. Once a determination was been made they will reach out to us via phone at [phone].      Pending auth # A

## 2019-09-05 ENCOUNTER — TELEPHONE (OUTPATIENT)
Dept: SURGERY | Facility: CLINIC | Age: 44
End: 2019-09-05

## 2019-09-05 NOTE — TELEPHONE ENCOUNTER
I contacted the patient to follow up on her [employer] message regarding pain in her right breast.   She reports that yesterday she had pretty constant stabbing sharp in by her nipple right breast. Today there is no pain.    She did not take any OTC medicine,

## 2019-09-10 DIAGNOSIS — I10 ESSENTIAL HYPERTENSION: ICD-10-CM

## 2019-09-10 RX ORDER — VERAPAMIL HYDROCHLORIDE 240 MG/1
240 TABLET, FILM COATED, EXTENDED RELEASE ORAL DAILY
Qty: 90 TABLET | Refills: 1 | OUTPATIENT
Start: 2019-09-10 | End: 2020-09-04

## 2019-09-10 RX ORDER — RIZATRIPTAN BENZOATE 10 MG/1
TABLET ORAL
Qty: 6 TABLET | Refills: 11 | OUTPATIENT
Start: 2019-09-10

## 2019-09-10 RX ORDER — HYDROCHLOROTHIAZIDE 25 MG/1
25 TABLET ORAL DAILY
Qty: 90 TABLET | Refills: 3 | OUTPATIENT
Start: 2019-09-10

## 2019-09-13 ENCOUNTER — PATIENT MESSAGE (OUTPATIENT)
Dept: OBGYN CLINIC | Facility: CLINIC | Age: 44
End: 2019-09-13

## 2019-09-13 DIAGNOSIS — N92.0 MENORRHAGIA WITH REGULAR CYCLE: Primary | ICD-10-CM

## 2019-09-13 DIAGNOSIS — R30.0 DYSURIA: ICD-10-CM

## 2019-09-13 RX ORDER — NITROFURANTOIN 25; 75 MG/1; MG/1
100 CAPSULE ORAL 2 TIMES DAILY
Qty: 10 CAPSULE | Refills: 0 | Status: SHIPPED | OUTPATIENT
Start: 2019-09-13 | End: 2019-09-18

## 2019-09-13 NOTE — PROGRESS NOTES
RN returned call to patient. Pt reports burning with urination, urinary urgency and frequency x two days. Pt denies fever or low back pain. Per patient has trialed Azo with no relief is requesting Rx. RN advises will route to MD for consideration.  Pt ind

## 2019-09-13 NOTE — PROGRESS NOTES
RN returned call to patient to advise of recommendations as noted below by [facility]. Pt verbalizes understanding and agrees with plan.

## 2019-09-30 ENCOUNTER — TELEPHONE (OUTPATIENT)
Dept: OBGYN CLINIC | Facility: CLINIC | Age: 44
End: 2019-09-30

## 2019-09-30 ENCOUNTER — HOSPITAL ENCOUNTER (OUTPATIENT)
Dept: ULTRASOUND IMAGING | Facility: HOSPITAL | Age: 44
Discharge: HOME | End: 2019-09-30
Attending: OBSTETRICS & GYNECOLOGY
Payer: COMMERCIAL

## 2019-09-30 DIAGNOSIS — N92.0 MENORRHAGIA WITH REGULAR CYCLE: ICD-10-CM

## 2019-09-30 PROCEDURE — 76830 TRANSVAGINAL US NON-OB: CPT | Performed by: OBSTETRICS & GYNECOLOGY

## 2019-09-30 PROCEDURE — 76856 US EXAM PELVIC COMPLETE: CPT | Performed by: OBSTETRICS & GYNECOLOGY

## 2019-10-09 ENCOUNTER — TELEPHONE (OUTPATIENT)
Dept: FAMILY MEDICINE CLINIC | Facility: CLINIC | Age: 44
End: 2019-10-09

## 2019-10-09 DIAGNOSIS — Z12.39 BREAST CANCER SCREENING: Primary | ICD-10-CM

## 2019-10-09 NOTE — TELEPHONE ENCOUNTER
Dr. Abimbola Black, patient is requesting a mammogram order. Last mammogram was completed 10/08/2018.       CONCLUSION:       BI-RADS CATEGORY:     DIAGNOSTIC CATEGORY 2--BENIGN FINDING:       RECOMMENDATIONS:   ROUTINE SCREENING MAMMOGRAM AND CLINICAL EVALUATION

## 2019-10-18 ENCOUNTER — DOCUMENTATION ONLY (OUTPATIENT)
Dept: PAIN CLINIC | Facility: HOSPITAL | Age: 44
End: 2019-10-18

## 2019-10-18 NOTE — PROGRESS NOTES
Procedure code 62321---APPROVED    Authorization changed to reflect the correct provider doing the procedure    America Sacks # [id number]    Valid from 10/21/19--10/21/19      Order form faxed to the surgery center, confirmation rcv'd

## 2019-10-30 ENCOUNTER — TELEPHONE (OUTPATIENT)
Dept: PAIN CLINIC | Facility: HOSPITAL | Age: 44
End: 2019-10-30

## 2019-10-30 NOTE — TELEPHONE ENCOUNTER
Patient called stating that she noticed a rash 2 days ago that begins under her left breast and goes under her left arm. There is pain when she raises her arm up.   She has hx of neck pain radiating to the left arm and she had an epidural steroid injection

## 2019-11-11 ENCOUNTER — LAB ENCOUNTER (OUTPATIENT)
Dept: LAB | Age: 44
End: 2019-11-11
Attending: FAMILY MEDICINE
Payer: COMMERCIAL

## 2019-11-11 ENCOUNTER — OFFICE VISIT (OUTPATIENT)
Dept: FAMILY MEDICINE CLINIC | Facility: CLINIC | Age: 44
End: 2019-11-11

## 2019-11-11 ENCOUNTER — APPOINTMENT (OUTPATIENT)
Dept: LAB | Age: 44
End: 2019-11-11
Attending: FAMILY MEDICINE
Payer: COMMERCIAL

## 2019-11-11 ENCOUNTER — TELEPHONE (OUTPATIENT)
Dept: INTERNAL MEDICINE CLINIC | Facility: CLINIC | Age: 44
End: 2019-11-11

## 2019-11-11 VITALS
DIASTOLIC BLOOD PRESSURE: 82 MMHG | RESPIRATION RATE: 20 BRPM | SYSTOLIC BLOOD PRESSURE: 150 MMHG | HEART RATE: 94 BPM | WEIGHT: 127 LBS | HEIGHT: 64 IN | BODY MASS INDEX: 21.68 KG/M2

## 2019-11-11 DIAGNOSIS — I10 ESSENTIAL HYPERTENSION: ICD-10-CM

## 2019-11-11 DIAGNOSIS — G89.29 CHRONIC NECK PAIN: ICD-10-CM

## 2019-11-11 DIAGNOSIS — M54.2 CHRONIC NECK PAIN: ICD-10-CM

## 2019-11-11 DIAGNOSIS — E87.6 HYPOKALEMIA: Primary | ICD-10-CM

## 2019-11-11 DIAGNOSIS — E05.90 HYPERTHYROIDISM: Primary | ICD-10-CM

## 2019-11-11 DIAGNOSIS — E05.90 HYPERTHYROIDISM: ICD-10-CM

## 2019-11-11 DIAGNOSIS — N95.1 PERIMENOPAUSAL: Primary | ICD-10-CM

## 2019-11-11 DIAGNOSIS — I10 ESSENTIAL HYPERTENSION, MALIGNANT: Primary | ICD-10-CM

## 2019-11-11 PROCEDURE — 86376 MICROSOMAL ANTIBODY EACH: CPT | Performed by: FAMILY MEDICINE

## 2019-11-11 PROCEDURE — 99214 OFFICE O/P EST MOD 30 MIN: CPT | Performed by: FAMILY MEDICINE

## 2019-11-11 PROCEDURE — 82306 VITAMIN D 25 HYDROXY: CPT | Performed by: FAMILY MEDICINE

## 2019-11-11 PROCEDURE — 84439 ASSAY OF FREE THYROXINE: CPT | Performed by: FAMILY MEDICINE

## 2019-11-11 PROCEDURE — 80053 COMPREHEN METABOLIC PANEL: CPT | Performed by: FAMILY MEDICINE

## 2019-11-11 PROCEDURE — 36415 COLL VENOUS BLD VENIPUNCTURE: CPT | Performed by: FAMILY MEDICINE

## 2019-11-11 PROCEDURE — 84443 ASSAY THYROID STIM HORMONE: CPT | Performed by: FAMILY MEDICINE

## 2019-11-11 PROCEDURE — 93005 ELECTROCARDIOGRAM TRACING: CPT

## 2019-11-11 PROCEDURE — 93010 ELECTROCARDIOGRAM REPORT: CPT | Performed by: FAMILY MEDICINE

## 2019-11-11 PROCEDURE — G0463 HOSPITAL OUTPT CLINIC VISIT: HCPCS | Performed by: FAMILY MEDICINE

## 2019-11-11 PROCEDURE — 85025 COMPLETE CBC W/AUTO DIFF WBC: CPT | Performed by: FAMILY MEDICINE

## 2019-11-11 PROCEDURE — 80061 LIPID PANEL: CPT | Performed by: FAMILY MEDICINE

## 2019-11-11 PROCEDURE — 84480 ASSAY TRIIODOTHYRONINE (T3): CPT | Performed by: FAMILY MEDICINE

## 2019-11-11 RX ORDER — METOPROLOL TARTRATE AND HYDROCHLOROTHIAZIDE 50; 25 MG/1; MG/1
1 TABLET ORAL DAILY
Qty: 90 TABLET | Refills: 3 | Status: SHIPPED | OUTPATIENT
Start: 2019-11-11 | End: 2019-11-30

## 2019-11-11 RX ORDER — POTASSIUM CHLORIDE 20 MEQ/1
20 TABLET, EXTENDED RELEASE ORAL 2 TIMES DAILY
Qty: 60 TABLET | Refills: 0 | Status: SHIPPED | OUTPATIENT
Start: 2019-11-11 | End: 2019-12-07

## 2019-11-11 NOTE — TELEPHONE ENCOUNTER
K-Dur 20 mEq 1 tablet po bid #60, 0 refills ordered per MD advice. Repeat BMP in one week. Patient made aware.

## 2019-11-11 NOTE — PROGRESS NOTES
Hot flasshes  Pos hyerpthyoid testing  Fast heart rate  Neck still hurts after injeciton  Pain aonlg a number of fibromyalgia points    exa  Ht rrr no m  Lungs clear  Ext favian edma  Thyroid not enlarged. A/p  1.  Perimenopausal  Or syngs of hyerperthyroid

## 2019-11-12 ENCOUNTER — TELEPHONE (OUTPATIENT)
Dept: ENDOCRINOLOGY CLINIC | Facility: CLINIC | Age: 44
End: 2019-11-12

## 2019-11-12 ENCOUNTER — PATIENT MESSAGE (OUTPATIENT)
Dept: FAMILY MEDICINE CLINIC | Facility: CLINIC | Age: 44
End: 2019-11-12

## 2019-11-12 ENCOUNTER — TELEPHONE (OUTPATIENT)
Dept: OTHER | Age: 44
End: 2019-11-12

## 2019-11-12 NOTE — TELEPHONE ENCOUNTER
Pt asking for sooner than first available consult.      Component      Latest Ref Rng & Units 11/11/2019   TSH      0.358 - 3.740 mIU/mL <0.005 (L)   T3 TOTAL      60 - 181 ng/dL 402 (H)   T4,Free (Direct)      0.8 - 1.7 ng/dL 4.2 (H)   Thyroxine (T4)

## 2019-11-12 NOTE — TELEPHONE ENCOUNTER
Patient requesting to be seen sooner than next available for Consult regarding Hyperthyroidism. Please call. Thank you.

## 2019-11-12 NOTE — PROGRESS NOTES
Patient returning the call, verified name and , advised Dr Uday Byrnes notes and verbalized understanding. Requesting to send Specialist Endocrinologist information through  [phone]       Notes recorded by Mayra Barker DO on 2019 at 5:50 PM CS

## 2019-11-13 NOTE — TELEPHONE ENCOUNTER
From: Joel Madera  To: Henrik Tillman DO  Sent: 11/12/2019 11:43 AM CST  Subject: Test Results Question    Doctor Jesus Baum there are no available appointments for endocrinology department till after January 2020.  Can you call their office to say

## 2019-11-14 ENCOUNTER — TELEPHONE (OUTPATIENT)
Dept: FAMILY MEDICINE CLINIC | Facility: CLINIC | Age: 44
End: 2019-11-14

## 2019-11-14 RX ORDER — ERGOCALCIFEROL 1.25 MG/1
50000 CAPSULE ORAL WEEKLY
Qty: 12 CAPSULE | Refills: 3 | Status: SHIPPED | OUTPATIENT
Start: 2019-11-14 | End: 2019-11-14

## 2019-11-14 RX ORDER — ERGOCALCIFEROL 1.25 MG/1
50000 CAPSULE ORAL WEEKLY
Qty: 12 CAPSULE | Refills: 3 | Status: SHIPPED | OUTPATIENT
Start: 2019-11-14 | End: 2020-01-31

## 2019-11-14 NOTE — TELEPHONE ENCOUNTER
Spoke with patient. Informed patient of test results regarding Vitamin D level per Dr. Natty Crane and recommendations for medication. Patient verbalizes understanding and is requesting medication be sent to [facility] in [place].   Per written

## 2019-11-14 NOTE — TELEPHONE ENCOUNTER
----- Message from Santo Bradshaw DO sent at 11/14/2019  8:54 AM CST -----  Vitamin D level remains low continue on 50,000 units vitamin D 1 weekly #12 with 3 refills

## 2019-11-15 ENCOUNTER — OFFICE VISIT (OUTPATIENT)
Dept: ENDOCRINOLOGY CLINIC | Facility: CLINIC | Age: 44
End: 2019-11-15
Payer: COMMERCIAL

## 2019-11-15 VITALS
BODY MASS INDEX: 22 KG/M2 | WEIGHT: 130.81 LBS | DIASTOLIC BLOOD PRESSURE: 78 MMHG | SYSTOLIC BLOOD PRESSURE: 131 MMHG | HEART RATE: 105 BPM

## 2019-11-15 DIAGNOSIS — E05.90 HYPERTHYROIDISM: Primary | ICD-10-CM

## 2019-11-15 PROCEDURE — 99203 OFFICE O/P NEW LOW 30 MIN: CPT | Performed by: INTERNAL MEDICINE

## 2019-11-15 NOTE — H&P
New Patient Evaluation - History and Physical    CONSULT - Reason for Visit: Hyperthyroidism  Requesting Physician: Dr. Paz Lorenzana:  Patient presents with:  Consult: Hyperthyroidism, referred by Dr. 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      CONSTITUTIONAL:  awake, alert, cooperative  PSYCH: no

## 2019-11-19 ENCOUNTER — TELEPHONE (OUTPATIENT)
Dept: ENDOCRINOLOGY CLINIC | Facility: CLINIC | Age: 44
End: 2019-11-19

## 2019-11-19 NOTE — TELEPHONE ENCOUNTER
Nery/Insurance verification states PA is needed for THYROID UPTAKE. CPT# [id number],  Diagnosis code: E05.90,  OHT#[id number] [facility] [phone]

## 2019-11-20 NOTE — TELEPHONE ENCOUNTER
Pt procedure/Testing: CPT 98812  Pt insurance/number to contact: [facility], see below  Insurance ID# and group: see Epic  Dx: E05.90  CPT: 19176  Indication for test: Hyperthyroidism  Procedure scheduled date: 11/21/19  Procedure scheduled where: Rosamaria Avilez

## 2019-11-21 ENCOUNTER — HOSPITAL ENCOUNTER (OUTPATIENT)
Dept: NUCLEAR MEDICINE | Facility: HOSPITAL | Age: 44
Discharge: HOME | End: 2019-11-21
Attending: INTERNAL MEDICINE
Payer: COMMERCIAL

## 2019-11-21 ENCOUNTER — APPOINTMENT (OUTPATIENT)
Dept: NUCLEAR MEDICINE | Facility: HOSPITAL | Age: 44
End: 2019-11-21
Attending: INTERNAL MEDICINE
Payer: COMMERCIAL

## 2019-11-21 DIAGNOSIS — E05.90 HYPERTHYROIDISM: ICD-10-CM

## 2019-11-26 ENCOUNTER — HOSPITAL ENCOUNTER (OUTPATIENT)
Dept: NUCLEAR MEDICINE | Facility: HOSPITAL | Age: 44
Discharge: HOME | End: 2019-11-26
Attending: INTERNAL MEDICINE
Payer: COMMERCIAL

## 2019-11-26 PROCEDURE — 78014 THYROID IMAGING W/BLOOD FLOW: CPT | Performed by: INTERNAL MEDICINE

## 2019-11-30 ENCOUNTER — APPOINTMENT (OUTPATIENT)
Dept: GENERAL RADIOLOGY | Facility: HOSPITAL | Age: 44
End: 2019-11-30
Attending: EMERGENCY MEDICINE
Payer: COMMERCIAL

## 2019-11-30 ENCOUNTER — HOSPITAL ENCOUNTER (EMERGENCY)
Facility: HOSPITAL | Age: 44
Discharge: HOME | End: 2019-11-30
Attending: EMERGENCY MEDICINE
Payer: COMMERCIAL

## 2019-11-30 ENCOUNTER — APPOINTMENT (OUTPATIENT)
Dept: CT IMAGING | Facility: HOSPITAL | Age: 44
End: 2019-11-30
Attending: EMERGENCY MEDICINE
Payer: COMMERCIAL

## 2019-11-30 VITALS
OXYGEN SATURATION: 98 % | BODY MASS INDEX: 24 KG/M2 | RESPIRATION RATE: 33 BRPM | HEART RATE: 98 BPM | DIASTOLIC BLOOD PRESSURE: 57 MMHG | TEMPERATURE: 98 F | WEIGHT: 137 LBS | SYSTOLIC BLOOD PRESSURE: 143 MMHG

## 2019-11-30 DIAGNOSIS — R00.0 TACHYCARDIA: ICD-10-CM

## 2019-11-30 DIAGNOSIS — R07.9 CHEST PAIN OF UNCERTAIN ETIOLOGY: Primary | ICD-10-CM

## 2019-11-30 DIAGNOSIS — E05.90 HYPERTHYROIDISM: ICD-10-CM

## 2019-11-30 PROCEDURE — 93010 ELECTROCARDIOGRAM REPORT: CPT | Performed by: EMERGENCY MEDICINE

## 2019-11-30 PROCEDURE — 84439 ASSAY OF FREE THYROXINE: CPT | Performed by: EMERGENCY MEDICINE

## 2019-11-30 PROCEDURE — 96375 TX/PRO/DX INJ NEW DRUG ADDON: CPT

## 2019-11-30 PROCEDURE — 71045 X-RAY EXAM CHEST 1 VIEW: CPT | Performed by: EMERGENCY MEDICINE

## 2019-11-30 PROCEDURE — 84484 ASSAY OF TROPONIN QUANT: CPT

## 2019-11-30 PROCEDURE — 84481 FREE ASSAY (FT-3): CPT | Performed by: EMERGENCY MEDICINE

## 2019-11-30 PROCEDURE — 84484 ASSAY OF TROPONIN QUANT: CPT | Performed by: EMERGENCY MEDICINE

## 2019-11-30 PROCEDURE — 99285 EMERGENCY DEPT VISIT HI MDM: CPT

## 2019-11-30 PROCEDURE — 84443 ASSAY THYROID STIM HORMONE: CPT | Performed by: EMERGENCY MEDICINE

## 2019-11-30 PROCEDURE — 80048 BASIC METABOLIC PNL TOTAL CA: CPT | Performed by: EMERGENCY MEDICINE

## 2019-11-30 PROCEDURE — 80048 BASIC METABOLIC PNL TOTAL CA: CPT

## 2019-11-30 PROCEDURE — 96374 THER/PROPH/DIAG INJ IV PUSH: CPT

## 2019-11-30 PROCEDURE — 71260 CT THORAX DX C+: CPT | Performed by: EMERGENCY MEDICINE

## 2019-11-30 PROCEDURE — 93005 ELECTROCARDIOGRAM TRACING: CPT

## 2019-11-30 PROCEDURE — 96376 TX/PRO/DX INJ SAME DRUG ADON: CPT

## 2019-11-30 PROCEDURE — 85025 COMPLETE CBC W/AUTO DIFF WBC: CPT

## 2019-11-30 PROCEDURE — 85025 COMPLETE CBC W/AUTO DIFF WBC: CPT | Performed by: EMERGENCY MEDICINE

## 2019-11-30 PROCEDURE — 81025 URINE PREGNANCY TEST: CPT

## 2019-11-30 PROCEDURE — 96361 HYDRATE IV INFUSION ADD-ON: CPT

## 2019-11-30 RX ORDER — KETOROLAC TROMETHAMINE 15 MG/ML
15 INJECTION, SOLUTION INTRAMUSCULAR; INTRAVENOUS ONCE
Status: COMPLETED | OUTPATIENT
Start: 2019-11-30 | End: 2019-11-30

## 2019-11-30 RX ORDER — METOPROLOL TARTRATE AND HYDROCHLOROTHIAZIDE 50; 25 MG/1; MG/1
1 TABLET ORAL 2 TIMES DAILY
Qty: 60 TABLET | Refills: 0 | Status: SHIPPED | OUTPATIENT
Start: 2019-11-30 | End: 2020-03-20

## 2019-11-30 RX ORDER — LORAZEPAM 2 MG/ML
0.5 INJECTION INTRAMUSCULAR ONCE
Status: COMPLETED | OUTPATIENT
Start: 2019-11-30 | End: 2019-11-30

## 2019-11-30 RX ORDER — ONDANSETRON 2 MG/ML
4 INJECTION INTRAMUSCULAR; INTRAVENOUS ONCE
Status: COMPLETED | OUTPATIENT
Start: 2019-11-30 | End: 2019-11-30

## 2019-11-30 RX ORDER — ONDANSETRON 4 MG/1
4 TABLET, ORALLY DISINTEGRATING ORAL ONCE
Status: DISCONTINUED | OUTPATIENT
Start: 2019-11-30 | End: 2019-11-30

## 2019-11-30 RX ORDER — METOPROLOL TARTRATE 5 MG/5ML
5 INJECTION INTRAVENOUS ONCE
Status: COMPLETED | OUTPATIENT
Start: 2019-11-30 | End: 2019-11-30

## 2019-11-30 NOTE — ED NOTES
Patient has been to and from ct via stretcher. Patient resting comfortably. Chest pain improved  brp offered  No additional needs at this time  Boyfriend at bedside  Call light within reach.  Will continue to monitor

## 2019-11-30 NOTE — ED NOTES
Patient aox3 arrived to ed via private vehicle co of throat pain radiating to midsternal area +sob. Patient hx of graves disease and CA states will be starting radiation therapy  On Monday. Patient states feels extremely anxious. Sx started x yesterday.  st

## 2019-11-30 NOTE — ED NOTES
ROUNDING COMPLETED    CHEST PRESSURE  BRP OFFERED  NO ADDITIONAL NEEDS  AWAITING LAB RESULTS. CALL LIGHT WITHIN REACH. BOYFRIEND AT BEDSIDE.  WILL UPDATE PATIENT WITH NEW INFORMATION

## 2019-12-01 ENCOUNTER — PATIENT MESSAGE (OUTPATIENT)
Dept: ENDOCRINOLOGY CLINIC | Facility: CLINIC | Age: 44
End: 2019-12-01

## 2019-12-01 ENCOUNTER — TELEPHONE (OUTPATIENT)
Dept: ENDOCRINOLOGY CLINIC | Facility: CLINIC | Age: 44
End: 2019-12-01

## 2019-12-01 DIAGNOSIS — E05.90 HYPERTHYROIDISM: Primary | ICD-10-CM

## 2019-12-01 NOTE — ED PROVIDER NOTES
Patient Seen in: [facility] AND [facility] Emergency Department      History   Patient presents with:  Chest Pain Angina (cardiovascular)    Stated Complaint: chest pain since yesterday    HPI    38year old female with multiple medical problems including breast • BREAST SURGERY Bilateral 2/3/16    Bilateral nipple reconstruction   •   2008   •   2010    APGAR: 9 (1min) 9 (5 min)  DR:  Reda Rinne   • CHEMOTHERAPY     • COLONOSCOPY  2012   • CORE BIOPSY Right 2013    Right breast: Appearance: She is well-developed. She is not toxic-appearing or diaphoretic. HENT:      Head: Normocephalic and atraumatic. Eyes:      Conjunctiva/sclera: Conjunctivae normal.      Pupils: Pupils are equal, round, and reactive to light.    Neck: CBC WITH DIFFERENTIAL WITH PLATELET    Narrative: The following orders were created for panel order CBC WITH DIFFERENTIAL WITH PLATELET.   Procedure                               Abnormality         Status                     --------- Critical Care:  I spent a total of 30 minutes of critical care time in obtaining history, performing a physical exam, bedside monitoring of interventions, collecting and interpreting tests and discussion with consultants but not including time spent perform

## 2019-12-02 ENCOUNTER — PATIENT MESSAGE (OUTPATIENT)
Dept: ENDOCRINOLOGY CLINIC | Facility: CLINIC | Age: 44
End: 2019-12-02

## 2019-12-02 ENCOUNTER — TELEPHONE (OUTPATIENT)
Dept: ENDOCRINOLOGY CLINIC | Facility: CLINIC | Age: 44
End: 2019-12-02

## 2019-12-02 ENCOUNTER — HOSPITAL ENCOUNTER (OUTPATIENT)
Dept: NUCLEAR MEDICINE | Facility: HOSPITAL | Age: 44
Discharge: HOME | End: 2019-12-02
Attending: INTERNAL MEDICINE
Payer: COMMERCIAL

## 2019-12-02 RX ORDER — METHIMAZOLE 10 MG/1
40 TABLET ORAL DAILY
Qty: 120 TABLET | Refills: 2 | Status: SHIPPED | OUTPATIENT
Start: 2019-12-02 | End: 2019-12-10

## 2019-12-02 NOTE — TELEPHONE ENCOUNTER
From: Kathleen Davis  To: Mary Herrera MD  Sent: 12/1/2019 5:42 PM CST  Subject: Other    Hi/ I went to ER last night because I felt like my throat was closing and my chest was so tight and felt like I was having a heart attack.  So ER doctor suggeste

## 2019-12-02 NOTE — TELEPHONE ENCOUNTER
Pt requesting to speak with RN. No further details given. Pt states \"it's a long story\" and would rather wait to speak with a RN.  Please call [phone]

## 2019-12-02 NOTE — TELEPHONE ENCOUNTER
Patient has hyperthyroidism. She was to get URIAS  However, she was in the ER and got a CT with contrast  Hence, she can not imani URIAS till Jan 6th since contrast will interfere with URIAS.   Patient is very symptomatic and was also in the ER for the same  Hen

## 2019-12-02 NOTE — TELEPHONE ENCOUNTER
From: Selam Wolfe  To: Bev Davis MD  Sent: 12/2/2019 10:28 AM CST  Subject: Other    Hi.  Still waiting on nurse or doctor to call me please

## 2019-12-02 NOTE — TELEPHONE ENCOUNTER
Spoke with Adelene Feeling. She is very frustrated that getting the CT scan has set her back 6 weeks. Says she \"would have said no had I known it meant delaying my Graves treatment. \" Discussed that in the ED the CT scan was appropriate to rule out acute cardio/

## 2019-12-02 NOTE — TELEPHONE ENCOUNTER
----- Message from Jaquelin Staley MD sent at 11/30/2019  2:55 PM CST -----  Hi,    This is the patient we spoke about in the ER so you can f/u with her. Thanks!     Sandra

## 2019-12-02 NOTE — TELEPHONE ENCOUNTER
Called in methimazole prescription to [facility]. Pharmacist states they are having issues with e-prescribe today.

## 2019-12-02 NOTE — TELEPHONE ENCOUNTER
From: Karen Palacio  To: Destiny Sánchez MD  Sent: 12/2/2019 12:56 PM CST  Subject: Other    Hi. Pharmacy is saying they never received prescription.  Can you please make sure it's sent to correct pharmacy please

## 2019-12-07 ENCOUNTER — PATIENT MESSAGE (OUTPATIENT)
Dept: ENDOCRINOLOGY CLINIC | Facility: CLINIC | Age: 44
End: 2019-12-07

## 2019-12-07 DIAGNOSIS — E05.90 HYPERTHYROIDISM: Primary | ICD-10-CM

## 2019-12-07 RX ORDER — POTASSIUM CHLORIDE 20 MEQ/1
TABLET, EXTENDED RELEASE ORAL
Qty: 60 TABLET | Refills: 0 | Status: SHIPPED | OUTPATIENT
Start: 2019-12-07 | End: 2019-12-18

## 2019-12-07 NOTE — TELEPHONE ENCOUNTER
Review pended refill request as it does not fall under a protocol.     Last Rx: 11/11/19  LOV: 11/11/19      Last K- was 3.4 was 11/30/19

## 2019-12-09 RX ORDER — POTASSIUM CHLORIDE 20 MEQ/1
TABLET, EXTENDED RELEASE ORAL
Qty: 60 TABLET | Refills: 0 | OUTPATIENT
Start: 2019-12-09

## 2019-12-09 NOTE — TELEPHONE ENCOUNTER
From: Dawson Tao  To: Sylvia Jordan MD  Sent: 12/7/2019 1:53 PM CST  Subject: Prescription Question    Hi: rash is all gone so what's the next step?

## 2019-12-09 NOTE — TELEPHONE ENCOUNTER
Discussed message from AM with Joao Reyes. She does not want to take PTU due to risk of side effects. Asking what happens if none of these options will wok for her? RN advised that we'll likely monitor her blood work.  Also asked patient to monitor DC daily a

## 2019-12-09 NOTE — TELEPHONE ENCOUNTER
Please call patient and discuss message from 12/5    These are the options to treat hyperthyroidism:   Surgery: not an option unless labs are normal  URIAS: can not do till Jan since she got the CT with contrast  MMI: can not do due to SE     Two remaining o

## 2019-12-10 RX ORDER — PROPYLTHIOURACIL 50 MG/1
50 TABLET ORAL 3 TIMES DAILY
Qty: 90 TABLET | Refills: 2 | Status: SHIPPED | OUTPATIENT
Start: 2019-12-10 | End: 2020-05-08 | Stop reason: ALTCHOICE

## 2019-12-10 NOTE — TELEPHONE ENCOUNTER
I strongly recommend taking PTU or at least steroids  Her thyroid hormone levels are elevated and not taking medication can be very dangerous and can cause thyroid storm that can be life threatening  I am happy to see her tmrw ( Tuesday) to discuss.  Can ju

## 2019-12-10 NOTE — TELEPHONE ENCOUNTER
Spoke with Sabi Bailey. She is unable to come in today due to her work schedule. Discussed options again - she would like to try PTU 50 mg PO TID. Discussed side effects. Does not want to take prednisone. She will repeat labs in 1 week.     Medication and labs

## 2019-12-16 ENCOUNTER — PATIENT MESSAGE (OUTPATIENT)
Dept: ENDOCRINOLOGY CLINIC | Facility: CLINIC | Age: 44
End: 2019-12-16

## 2019-12-16 NOTE — TELEPHONE ENCOUNTER
Please talk to the patient  It could be that the over active thyroid is causing diarrhea  I recommend that she atleast take the medication for another 3-4 days and repeat thyroid labs.    Her thyroid labs are very high and hence it is important we do some m

## 2019-12-16 NOTE — TELEPHONE ENCOUNTER
Otis Pappas understanding of message below. She is going out of town on Friday so RN advised she do blood work on Thursday. Also asked that she call us sooner if symptoms worsen.

## 2019-12-16 NOTE — TELEPHONE ENCOUNTER
From: Dimple Smith  To: Fidel Smith MD  Sent: 12/16/2019 2:09 PM CST  Subject: Prescription Question    Hi doctor.  I've been taking that latest medication you gave me and it seems that I have had like really bad diarrhea and backed up and my stoma

## 2019-12-19 RX ORDER — POTASSIUM CHLORIDE 20 MEQ/1
20 TABLET, EXTENDED RELEASE ORAL 2 TIMES DAILY
Qty: 60 TABLET | Refills: 0 | Status: SHIPPED | OUTPATIENT
Start: 2019-12-19 | End: 2020-01-15

## 2019-12-19 NOTE — TELEPHONE ENCOUNTER
Review pended refill request as it does not fall under a protocol.     Last Rx: 12/6/19 #60#0 should have enough until 1/5/20, please advise on further refills or lab recheck, noted labs placed by endocrinology, unclear when pt was advised to complete    Re

## 2019-12-28 ENCOUNTER — APPOINTMENT (OUTPATIENT)
Dept: LAB | Facility: HOSPITAL | Age: 44
End: 2019-12-28
Attending: INTERNAL MEDICINE
Payer: COMMERCIAL

## 2019-12-28 DIAGNOSIS — E05.90 HYPERTHYROIDISM: ICD-10-CM

## 2019-12-28 LAB
ALBUMIN SERPL-MCNC: 3.3 G/DL (ref 3.4–5)
ALBUMIN/GLOB SERPL: 0.9 {RATIO} (ref 1–2)
ALP LIVER SERPL-CCNC: 134 U/L (ref 37–98)
ALT SERPL-CCNC: 26 U/L (ref 13–56)
ANION GAP SERPL CALC-SCNC: 6 MMOL/L (ref 0–18)
AST SERPL-CCNC: 20 U/L (ref 15–37)
BILIRUB SERPL-MCNC: 1 MG/DL (ref 0.1–2)
BUN BLD-MCNC: 17 MG/DL (ref 7–18)
BUN/CREAT SERPL: 41.5 (ref 10–20)
CALCIUM BLD-MCNC: 9.5 MG/DL (ref 8.5–10.1)
CHLORIDE SERPL-SCNC: 108 MMOL/L (ref 98–112)
CO2 SERPL-SCNC: 28 MMOL/L (ref 21–32)
CREAT BLD-MCNC: 0.41 MG/DL (ref 0.55–1.02)
GLOBULIN PLAS-MCNC: 3.8 G/DL (ref 2.8–4.4)
GLUCOSE BLD-MCNC: 108 MG/DL (ref 70–99)
M PROTEIN MFR SERPL ELPH: 7.1 G/DL (ref 6.4–8.2)
OSMOLALITY SERPL CALC.SUM OF ELEC: 296 MOSM/KG (ref 275–295)
PATIENT FASTING Y/N/NP: YES
POTASSIUM SERPL-SCNC: 3.7 MMOL/L (ref 3.5–5.1)
SODIUM SERPL-SCNC: 142 MMOL/L (ref 136–145)

## 2019-12-28 PROCEDURE — 84443 ASSAY THYROID STIM HORMONE: CPT | Performed by: INTERNAL MEDICINE

## 2019-12-28 PROCEDURE — 36415 COLL VENOUS BLD VENIPUNCTURE: CPT

## 2019-12-28 PROCEDURE — 84481 FREE ASSAY (FT-3): CPT | Performed by: INTERNAL MEDICINE

## 2019-12-28 PROCEDURE — 84439 ASSAY OF FREE THYROXINE: CPT | Performed by: INTERNAL MEDICINE

## 2019-12-28 PROCEDURE — 80053 COMPREHEN METABOLIC PANEL: CPT

## 2019-12-29 ENCOUNTER — TELEPHONE (OUTPATIENT)
Dept: ENDOCRINOLOGY CLINIC | Facility: CLINIC | Age: 44
End: 2019-12-29

## 2019-12-30 ENCOUNTER — PATIENT MESSAGE (OUTPATIENT)
Dept: ENDOCRINOLOGY CLINIC | Facility: CLINIC | Age: 44
End: 2019-12-30

## 2019-12-30 NOTE — TELEPHONE ENCOUNTER
From: Brian Diaz  To: Jordana Rivera MD  Sent: 12/30/2019 2:39 PM CST  Subject: Test Results Question    Any results yet from blood test?

## 2020-01-01 ENCOUNTER — PATIENT MESSAGE (OUTPATIENT)
Dept: ENDOCRINOLOGY CLINIC | Facility: CLINIC | Age: 45
End: 2020-01-01

## 2020-01-02 NOTE — TELEPHONE ENCOUNTER
Please direct the patient to Dr. Aide Franks, usually he recommends the number of days off needed  Thanks

## 2020-01-02 NOTE — TELEPHONE ENCOUNTER
From: Dawson Tao  To: Sylvia Jordan MD  Sent: 1/1/2020 10:24 AM CST  Subject: Other    Hi. After I take radioactive iodine pill how many days off do I need to be off from work?

## 2020-01-03 DIAGNOSIS — E05.90 HYPERTHYROIDISM: ICD-10-CM

## 2020-01-03 DIAGNOSIS — Z01.812 PRE-PROCEDURE LAB EXAM: Primary | ICD-10-CM

## 2020-01-06 ENCOUNTER — HOSPITAL ENCOUNTER (OUTPATIENT)
Dept: NUCLEAR MEDICINE | Facility: HOSPITAL | Age: 45
Discharge: HOME | End: 2020-01-06
Attending: INTERNAL MEDICINE
Payer: COMMERCIAL

## 2020-01-06 DIAGNOSIS — E05.90 HYPERTHYROIDISM: ICD-10-CM

## 2020-01-06 PROCEDURE — 79005 NUCLEAR RX ORAL ADMIN: CPT | Performed by: INTERNAL MEDICINE

## 2020-01-06 RX ORDER — ONDANSETRON HYDROCHLORIDE 8 MG/1
8 TABLET, FILM COATED ORAL EVERY 8 HOURS PRN
Qty: 8 TABLET | Refills: 1 | Status: SHIPPED | OUTPATIENT
Start: 2020-01-06 | End: 2020-05-08 | Stop reason: ALTCHOICE

## 2020-01-07 ENCOUNTER — TELEPHONE (OUTPATIENT)
Dept: ENDOCRINOLOGY CLINIC | Facility: CLINIC | Age: 45
End: 2020-01-07

## 2020-01-07 DIAGNOSIS — E05.90 HYPERTHYROIDISM: Primary | ICD-10-CM

## 2020-01-07 DIAGNOSIS — E89.0 POSTABLATIVE HYPOTHYROIDISM: ICD-10-CM

## 2020-01-07 NOTE — TELEPHONE ENCOUNTER
S/p URIAS  TSH and Free T4 in 4 weeks  Do not resume PTU  If she on a beta blocker, c/w it x one week, then stop  Monitor [place] , should be between   Call if over 100/ under 60  Thanks

## 2020-01-07 NOTE — TELEPHONE ENCOUNTER
Spoke with Arie Hart. She verbalizes understanding of message from AM. She states she currently takes metropolol for blood pressure. She is not currently monitoring a daily MN. RN discussed that she should check MN daily and call if <60 or >100.  FRANCISCO Olson

## 2020-01-09 ENCOUNTER — PATIENT MESSAGE (OUTPATIENT)
Dept: ENDOCRINOLOGY CLINIC | Facility: CLINIC | Age: 45
End: 2020-01-09

## 2020-01-10 ENCOUNTER — TELEPHONE (OUTPATIENT)
Dept: OTHER | Age: 45
End: 2020-01-10

## 2020-01-10 NOTE — TELEPHONE ENCOUNTER
Patient states she feels somewhat better today as she is currently at work and unable to talk long. States she'd like to see \"only Dr. Althea Olmos. \"  Scheduled appointment for Monday 1/13 at 9:30 am.

## 2020-01-10 NOTE — TELEPHONE ENCOUNTER
Please call  I am sorry that she is not feeling well  These are not typical symptoms following the pill  She has a h/o breast cancer, if she is having throbbing pain in her leg Right> left, please call PCP ASAP.   Thanks

## 2020-01-10 NOTE — TELEPHONE ENCOUNTER
----- Message from [place]. Sharyle Bruns sent at 1/10/2020  9:33 AM CST -----  Regarding: Other  Contact: [phone]  Hi Doctor Layne.  Yesterday I left work an hour early cause both of my legs I felt such a burning sensation especially my right leg that i

## 2020-01-10 NOTE — TELEPHONE ENCOUNTER
From: Tiki Henry  To: Gilma Howell MD  Sent: 1/9/2020 7:25 PM CST  Subject: Other    Hi. I took Radioactive iodine pill Monday and I felt great until today. My legs have both been throbbing and I left work early cause I couldn't stand.  Not sure i

## 2020-01-10 NOTE — TELEPHONE ENCOUNTER
Pt states she is feeling much better today after resting yesterday. She denies swelling, warmth, or redness of legs, just some burning. Advised pt she should contact PCP so he is aware of her symptoms. Pt states she will contact his office now.

## 2020-01-15 NOTE — TELEPHONE ENCOUNTER
Review pended refill request as it does not fall under a protocol.   Requested Prescriptions     Pending Prescriptions Disp Refills   • POTASSIUM CHLORIDE ER 20 MEQ Oral Tab CR [Pharmacy Med Name: Potassium Chloride Er 20 Meq Tab Ups] 60 tablet 0     Sig:

## 2020-01-16 RX ORDER — POTASSIUM CHLORIDE 20 MEQ/1
TABLET, EXTENDED RELEASE ORAL
Qty: 60 TABLET | Refills: 1 | Status: SHIPPED | OUTPATIENT
Start: 2020-01-16 | End: 2020-06-18

## 2020-02-20 ENCOUNTER — PATIENT MESSAGE (OUTPATIENT)
Dept: ENDOCRINOLOGY CLINIC | Facility: CLINIC | Age: 45
End: 2020-02-20

## 2020-02-21 ENCOUNTER — TELEPHONE (OUTPATIENT)
Dept: FAMILY MEDICINE CLINIC | Facility: CLINIC | Age: 45
End: 2020-02-21

## 2020-02-21 ENCOUNTER — TELEPHONE (OUTPATIENT)
Dept: ENDOCRINOLOGY CLINIC | Facility: CLINIC | Age: 45
End: 2020-02-21

## 2020-02-21 NOTE — TELEPHONE ENCOUNTER
pt. requesting to be seen sooner then 1st avail for f/up to complete forms for regarding past surgeries.

## 2020-02-21 NOTE — TELEPHONE ENCOUNTER
Spoke with [facility]. She verbalizes understanding of message from Dr. Nica Vásquez. Also discussed that all [facility] paperwork is processed through Forms Department. Sent message to patient with phone and fax of Forms Dept. [facility] will repeat labs in Monday.  Maria Rojas

## 2020-02-21 NOTE — TELEPHONE ENCOUNTER
I can write about the dates since afterI saw her in the clinic  Prior to that will have to go with Dr. Grant Heredia since he was taking care of her before that for her thyroid  Also, she needs repeat labs  She is s/p URIAS and might become hypothyroid.    Hence, labs ar

## 2020-02-24 ENCOUNTER — APPOINTMENT (OUTPATIENT)
Dept: LAB | Age: 45
End: 2020-02-24
Attending: FAMILY MEDICINE
Payer: COMMERCIAL

## 2020-02-24 ENCOUNTER — TELEPHONE (OUTPATIENT)
Dept: ENDOCRINOLOGY CLINIC | Facility: CLINIC | Age: 45
End: 2020-02-24

## 2020-02-24 ENCOUNTER — HOSPITAL ENCOUNTER (OUTPATIENT)
Dept: MAMMOGRAPHY | Age: 45
Discharge: HOME | End: 2020-02-24
Attending: FAMILY MEDICINE
Payer: COMMERCIAL

## 2020-02-24 DIAGNOSIS — Z12.39 BREAST CANCER SCREENING: ICD-10-CM

## 2020-02-24 DIAGNOSIS — E89.0 POSTABLATIVE HYPOTHYROIDISM: ICD-10-CM

## 2020-02-24 DIAGNOSIS — E87.6 HYPOKALEMIA: Primary | ICD-10-CM

## 2020-02-24 DIAGNOSIS — E89.0 POSTABLATIVE HYPOTHYROIDISM: Primary | ICD-10-CM

## 2020-02-24 LAB
ANION GAP SERPL CALC-SCNC: 5 MMOL/L (ref 0–18)
BUN BLD-MCNC: 16 MG/DL (ref 7–18)
BUN/CREAT SERPL: 21.6 (ref 10–20)
CALCIUM BLD-MCNC: 9.4 MG/DL (ref 8.5–10.1)
CHLORIDE SERPL-SCNC: 105 MMOL/L (ref 98–112)
CO2 SERPL-SCNC: 30 MMOL/L (ref 21–32)
CREAT BLD-MCNC: 0.74 MG/DL (ref 0.55–1.02)
GLUCOSE BLD-MCNC: 101 MG/DL (ref 70–99)
OSMOLALITY SERPL CALC.SUM OF ELEC: 291 MOSM/KG (ref 275–295)
PATIENT FASTING Y/N/NP: YES
POTASSIUM SERPL-SCNC: 3.6 MMOL/L (ref 3.5–5.1)
SODIUM SERPL-SCNC: 140 MMOL/L (ref 136–145)
T4 FREE SERPL-MCNC: 0.3 NG/DL (ref 0.8–1.7)
TSI SER-ACNC: 0.03 MIU/ML (ref 0.36–3.74)

## 2020-02-24 PROCEDURE — 77063 BREAST TOMOSYNTHESIS BI: CPT | Performed by: FAMILY MEDICINE

## 2020-02-24 PROCEDURE — 36415 COLL VENOUS BLD VENIPUNCTURE: CPT | Performed by: FAMILY MEDICINE

## 2020-02-24 PROCEDURE — 84443 ASSAY THYROID STIM HORMONE: CPT

## 2020-02-24 PROCEDURE — 84439 ASSAY OF FREE THYROXINE: CPT

## 2020-02-24 PROCEDURE — 77067 SCR MAMMO BI INCL CAD: CPT | Performed by: FAMILY MEDICINE

## 2020-02-24 PROCEDURE — 80048 BASIC METABOLIC PNL TOTAL CA: CPT | Performed by: FAMILY MEDICINE

## 2020-02-24 NOTE — TELEPHONE ENCOUNTER
Thyroid labs indicate need for start Lt 4  Please start LT 4 75 mcg daily  TSH and Free T4 before next apt  Please review administration and symptoms of over replacement  Thanks

## 2020-02-25 ENCOUNTER — TELEPHONE (OUTPATIENT)
Dept: SURGERY | Facility: CLINIC | Age: 45
End: 2020-02-25

## 2020-02-25 ENCOUNTER — PATIENT MESSAGE (OUTPATIENT)
Dept: ENDOCRINOLOGY CLINIC | Facility: CLINIC | Age: 45
End: 2020-02-25

## 2020-02-25 RX ORDER — LEVOTHYROXINE SODIUM 0.07 MG/1
TABLET ORAL
Qty: 30 TABLET | Refills: 2 | Status: SHIPPED | OUTPATIENT
Start: 2020-02-25 | End: 2020-03-12

## 2020-02-25 NOTE — TELEPHONE ENCOUNTER
From: Karen Palacio  To: Destiny Sánchez MD  Sent: 2/25/2020 10:05 AM CST  Subject: Test Results Question    Do I have hypokalemia? And if I do should be worried?

## 2020-02-25 NOTE — TELEPHONE ENCOUNTER
Mendoza Cassette left a message regarding paperwork that needs to be completed and submitted to our , Suraj Ruby-  Message routed to Dr. Fritz Crespo RN for call back.

## 2020-02-25 NOTE — TELEPHONE ENCOUNTER
K is normal on recent BMP  However, that blood test was ordered by Dr. Dmitriy Guardado hence she should please contact his ofice thanks    Please see my message about her thyrud labs and contact her    Thyroid labs indicate need for start Lt 4  Please start LT 4 75 mcg

## 2020-02-25 NOTE — TELEPHONE ENCOUNTER
Patient called requesting I write her letters excusing her from work for dates in May 2019 she was not seen in office. The only date she was seen by Dr. Josh Dean was 5/3/19, and they had given her a note for work.  Patient stated her [facility] paperwork instructed

## 2020-02-25 NOTE — TELEPHONE ENCOUNTER
Pt was contacted and discussed results and recommendations as outlined below.     RN discussed the need for the hormone replacement post URIAS and the side effects of over replacement such as tachycardia, irritability, weight loss, diarrhea, tremors and to ca

## 2020-02-26 ENCOUNTER — NURSE TRIAGE (OUTPATIENT)
Dept: OTHER | Age: 45
End: 2020-02-26

## 2020-02-27 ENCOUNTER — TELEPHONE (OUTPATIENT)
Dept: SURGERY | Facility: CLINIC | Age: 45
End: 2020-02-27

## 2020-02-27 NOTE — TELEPHONE ENCOUNTER
Revised FMLA and faxed to [facility] - [phone]. Mailed copy to pt. Pt aware.

## 2020-02-27 NOTE — TELEPHONE ENCOUNTER
Camila Schulz's PCP, called to clarify patients request for FMLA dates prior to her surgery due to the patient contacting him requesting multiple days covered for work.  I informed him that our office cannot write these letters since they are dates

## 2020-03-03 ENCOUNTER — OFFICE VISIT (OUTPATIENT)
Dept: FAMILY MEDICINE CLINIC | Facility: CLINIC | Age: 45
End: 2020-03-03
Payer: COMMERCIAL

## 2020-03-03 VITALS
OXYGEN SATURATION: 97 % | HEIGHT: 64 IN | WEIGHT: 136.38 LBS | BODY MASS INDEX: 23.28 KG/M2 | TEMPERATURE: 100 F | HEART RATE: 80 BPM | DIASTOLIC BLOOD PRESSURE: 90 MMHG | SYSTOLIC BLOOD PRESSURE: 152 MMHG

## 2020-03-03 DIAGNOSIS — J01.00 ACUTE NON-RECURRENT MAXILLARY SINUSITIS: Primary | ICD-10-CM

## 2020-03-03 PROCEDURE — 99212 OFFICE O/P EST SF 10 MIN: CPT | Performed by: PHYSICIAN ASSISTANT

## 2020-03-03 PROCEDURE — 99213 OFFICE O/P EST LOW 20 MIN: CPT | Performed by: PHYSICIAN ASSISTANT

## 2020-03-03 RX ORDER — AZITHROMYCIN 250 MG/1
TABLET, FILM COATED ORAL
Qty: 6 TABLET | Refills: 0 | Status: SHIPPED | OUTPATIENT
Start: 2020-03-03 | End: 2020-05-08 | Stop reason: ALTCHOICE

## 2020-03-03 NOTE — TELEPHONE ENCOUNTER
I can only approve from 11/15/2019 since I had not seen her before that   Patient was informed of this  Please reach out to see if Dr. Loco Haq can approve for the prior period since he was seeing her at that time  Thanks

## 2020-03-03 NOTE — TELEPHONE ENCOUNTER
Can not give FMLA for 12 months  She is now treated for hyperthyroidism  Only from Nov 15, 2019 to mid of Jan 2020

## 2020-03-03 NOTE — TELEPHONE ENCOUNTER
Late entry due to some confusion on FMLA - On 2/21/20, pt dropped off FMLA form for Dr. Graham Chairez to fill out. 3/3/20 - Spoke to pt and she stated that she needs dates covered starting from 8/2019.  Told pt we don't generally back date but will send mes

## 2020-03-03 NOTE — TELEPHONE ENCOUNTER
Dr. Sullivan Friend,    Please see my original request to Dr. Valeria Rogers and then Dr. Kiran Alford response. I don't see any appts that pt had with you around 8/22/19 for Graves' disease?  Pt is asking to be covered for days she's missed starting from 8/22/2019 and

## 2020-03-03 NOTE — PROGRESS NOTES
HPI:    Patient ID: Selam Wolfe is a 38year old female. Pt presents with cold symptoms for the past 3 days. Pt has had cough. No sore throat. Some ear pain on right side. No appetite. Has fatigued. Has headaches as well. Has fevers.  Pt has tried o total) by mouth every 7 days.  4 capsule 3     Allergies:  Latex                   RASH    Comment:Other reaction(s): LATEX  Norco [Hydrocodone-*    HIVES, NAUSEA AND VOMITING  Tramadol                HIVES, NAUSEA ONLY  Hydrocodone             DIZZINESS discussed.   -To call if worse or not better; Follow up in one week if not resolved or as needed if worse. -Pt was agreeable to plan and will comply with discussion above. No orders of the defined types were placed in this encounter.       Meds Th

## 2020-03-03 NOTE — TELEPHONE ENCOUNTER
Dr. Dinah Leong,    [facility] intermittent leave pending. Pt states due to Graves' disease(?), she has missed a few days each month starting from 8/22/2019. She is requesting intermittent leave of 1-4 days per month starting from 8/22/2019 for 12 months.  I told

## 2020-03-04 NOTE — TELEPHONE ENCOUNTER
Dr. Burgess Frost,    [facility] intermittent leave only from 11/15/19 - 1/17/20 - 1-4 days per month. Please sign off on form:  -Highlight the patient and hit \"Chart\" button.   -In Chart Review, w/in the Encounter tab - click 1 time on the Telephone call encoun

## 2020-03-04 NOTE — TELEPHONE ENCOUNTER
Faxed LA to Joann Goodell - [phone]. Mailed copy to pt. Notified via [address] [place] [address] [address].

## 2020-03-06 ENCOUNTER — TELEPHONE (OUTPATIENT)
Dept: ENDOCRINOLOGY CLINIC | Facility: CLINIC | Age: 45
End: 2020-03-06

## 2020-03-06 ENCOUNTER — PATIENT MESSAGE (OUTPATIENT)
Dept: ENDOCRINOLOGY CLINIC | Facility: CLINIC | Age: 45
End: 2020-03-06

## 2020-03-06 NOTE — TELEPHONE ENCOUNTER
Left message to call back with the patient. Also emailed treatment plan. Will await response prior to prescribing.

## 2020-03-06 NOTE — TELEPHONE ENCOUNTER
Ok to start Levoxyl 25mcg PO daily for one week but if she tolerates then please start taking two tablets daily. Thanks.

## 2020-03-06 NOTE — TELEPHONE ENCOUNTER
Emailed [facility] the treatment option. Will await response  If she agrees, then will send Rx as written below for brand Levoxyl.

## 2020-03-06 NOTE — TELEPHONE ENCOUNTER
From: Jonathan Rosales  To: Jose Mackenzie MD  Sent: 3/6/2020 9:56 AM CST  Subject: Prescription Question    Good morning.  I stopped taking the hormone medication this morning cause I realize this medication is what's giving me a massive migraine for ove

## 2020-03-06 NOTE — TELEPHONE ENCOUNTER
Patient experiencing daily migraines, insomnia, and decreased appetite since starting levothyroxine 2 weeks ago. She is refusing to take it anymore, requesting alternative prescription.      Patient is s/p URIAS ablation treatment for graves disease on 1/6/20

## 2020-03-06 NOTE — TELEPHONE ENCOUNTER
FMLA Clarification form Faxed back to [employer] Giles - [phone] stating that Dr will only cover the orig dates from 11/15/19  - 1/17/20. Notified pt via [facility] [address].

## 2020-03-10 ENCOUNTER — PATIENT MESSAGE (OUTPATIENT)
Dept: ENDOCRINOLOGY CLINIC | Facility: CLINIC | Age: 45
End: 2020-03-10

## 2020-03-11 RX ORDER — LEVOTHYROXINE SODIUM 25 UG/1
TABLET ORAL
Qty: 30 TABLET | Refills: 0 | Status: SHIPPED | OUTPATIENT
Start: 2020-03-11 | End: 2020-03-20

## 2020-03-17 ENCOUNTER — TELEPHONE (OUTPATIENT)
Dept: FAMILY MEDICINE CLINIC | Facility: CLINIC | Age: 45
End: 2020-03-17

## 2020-03-17 NOTE — TELEPHONE ENCOUNTER
Called pt and let her know that she does not qualify for paid leave or FMLA for next 14 days w/o symptoms for COVID.

## 2020-03-20 RX ORDER — LEVOTHYROXINE SODIUM 25 UG/1
TABLET ORAL
Qty: 60 TABLET | Refills: 0 | Status: SHIPPED | OUTPATIENT
Start: 2020-03-20 | End: 2020-04-01

## 2020-03-24 ENCOUNTER — MOBILE ENCOUNTER (OUTPATIENT)
Dept: FAMILY MEDICINE CLINIC | Facility: CLINIC | Age: 45
End: 2020-03-24

## 2020-03-24 DIAGNOSIS — R53.82 CHRONIC FATIGUE: ICD-10-CM

## 2020-03-24 DIAGNOSIS — R63.5 WEIGHT GAIN: ICD-10-CM

## 2020-03-24 DIAGNOSIS — I10 ESSENTIAL HYPERTENSION: Primary | ICD-10-CM

## 2020-03-24 DIAGNOSIS — E89.0 HYPOTHYROIDISM FOLLOWING RADIOIODINE THERAPY: ICD-10-CM

## 2020-03-24 DIAGNOSIS — Z86.79 HISTORY OF CARDIAC ARRHYTHMIA: ICD-10-CM

## 2020-03-24 DIAGNOSIS — E55.9 VITAMIN D DEFICIENCY: ICD-10-CM

## 2020-03-24 PROCEDURE — 99213 OFFICE O/P EST LOW 20 MIN: CPT | Performed by: FAMILY MEDICINE

## 2020-03-24 RX ORDER — ERGOCALCIFEROL 1.25 MG/1
50000 CAPSULE ORAL WEEKLY
Qty: 12 CAPSULE | Refills: 3 | Status: SHIPPED | OUTPATIENT
Start: 2020-03-24 | End: 2020-04-23

## 2020-03-24 NOTE — PROGRESS NOTES
8 40  In effort to keep you safe and out of the office but still have access to medical care from home, your physician can do a phone visit with you.  We will bill your insurance but there is a chance you might have a co-pay, as you would if you came into t

## 2020-03-30 ENCOUNTER — VIRTUAL PHONE E/M (OUTPATIENT)
Dept: ENDOCRINOLOGY CLINIC | Facility: CLINIC | Age: 45
End: 2020-03-30
Payer: COMMERCIAL

## 2020-03-30 ENCOUNTER — TELEPHONE (OUTPATIENT)
Dept: ENDOCRINOLOGY CLINIC | Facility: CLINIC | Age: 45
End: 2020-03-30

## 2020-03-30 DIAGNOSIS — E89.0 POSTABLATIVE HYPOTHYROIDISM: Primary | ICD-10-CM

## 2020-03-30 PROCEDURE — 99212 OFFICE O/P EST SF 10 MIN: CPT | Performed by: INTERNAL MEDICINE

## 2020-03-30 NOTE — TELEPHONE ENCOUNTER
Virtual/Telephone Check-In    Brandy Mayberry verbally consents to a Virtual/Telephone Check-In service on 03/30/20. Patient understands and accepts financial responsibility for any deductible, co-insurance and/or co-pays associated with this service.

## 2020-03-30 NOTE — TELEPHONE ENCOUNTER
Patient requires time off from 3/3 to 3/5   She is s/p radioactive iodine therapy for hyperthyroidism on January 6th 2020 and is now hypothyroid.    During that time, she had been having some symptoms that could be related to fluctuations in her thyroid hor

## 2020-03-30 NOTE — PROGRESS NOTES
Virtual/Telephone Check-In     Brian Emily verbally consents to a Virtual/Telephone Check-In service on 03/30/20. Patient understands and accepts financial responsibility for any deductible, co-insurance and/or co-pays associated with this service.

## 2020-03-31 NOTE — TELEPHONE ENCOUNTER
Current Outpatient Medications   Medication Sig Dispense Refill   • LEVOXYL 25 MCG Oral Tab Take 1 tablet daily before breakfast for 1 week. If tolerating well, please start taking 2 tablets daily. 60 tablet 0     Per pharmacy patient says sig is now TID.

## 2020-04-01 ENCOUNTER — PATIENT MESSAGE (OUTPATIENT)
Dept: ENDOCRINOLOGY CLINIC | Facility: CLINIC | Age: 45
End: 2020-04-01

## 2020-04-01 RX ORDER — LEVOTHYROXINE SODIUM 75 UG/1
75 TABLET ORAL
Qty: 30 TABLET | Refills: 1 | Status: SHIPPED | OUTPATIENT
Start: 2020-04-01 | End: 2020-05-26

## 2020-04-01 NOTE — TELEPHONE ENCOUNTER
Hello. Not sure if Levoxyl is causing me to have acid reflux. I feel a discomfort in my throat. My throat doesn't hurt at all. I feel like I'm having a sour stomach, I'm constipated and now experiencing uncomfortable acid reflux.  Are these side effects of

## 2020-04-01 NOTE — TELEPHONE ENCOUNTER
Per telehealth visit 03/30/20, dose was increased to 75mcg daily.   Pended script for levoxyl 75 mcg daily

## 2020-04-07 ENCOUNTER — PATIENT MESSAGE (OUTPATIENT)
Dept: FAMILY MEDICINE CLINIC | Facility: CLINIC | Age: 45
End: 2020-04-07

## 2020-04-08 NOTE — TELEPHONE ENCOUNTER
From: Jasbir Box  To: Nicci Tillman DO  Sent: 4/7/2020 11:38 PM CDT  Subject: Prescription Question    What I wrote to endocrinologist:      Brittney Frey.  I've been on 3 levoxyl pills for almost a week now and I'm still feeling very emotional, on edge

## 2020-04-08 NOTE — TELEPHONE ENCOUNTER
For some reason after patient sent this MyChart message, her mychart status is now \"pending\" so she will need to be called with the response. Pls advise.

## 2020-04-09 ENCOUNTER — TELEPHONE (OUTPATIENT)
Dept: FAMILY MEDICINE CLINIC | Facility: CLINIC | Age: 45
End: 2020-04-09

## 2020-04-09 NOTE — PROGRESS NOTES
Virtual Telephone Check-In    Joel Madera verbally consents to a Virtual/Telephone Check-In visit on 04/09/20. Patient understands and accepts financial responsibility for any deductible, co-insurance and/or co-pays associated with this service.

## 2020-04-09 NOTE — TELEPHONE ENCOUNTER
Please give the patient a phone call and go through referrals. If unable to get appt with psychiatrist /psychologist within next two weeks have her call me for another mobile visit. Thanks.

## 2020-04-09 NOTE — TELEPHONE ENCOUNTER
Spoke to patient and informed of message below. [facility] has mychart and will look on there. Informed her status is pending. [facility] will sign up and look at referrals. Text link for mychart sent to patient.

## 2020-04-10 ENCOUNTER — PATIENT MESSAGE (OUTPATIENT)
Dept: FAMILY MEDICINE CLINIC | Facility: CLINIC | Age: 45
End: 2020-04-10

## 2020-04-11 NOTE — TELEPHONE ENCOUNTER
From: Chichi Shelton  To: Jhonatan Tillman DO  Sent: 4/10/2020 6:08 PM CDT  Subject: Prescription Question    Hi. I realized I had taken citalpram in the past for depression and didn't like it. I was up all night, wide eyed and feel very hyper.  Is the

## 2020-04-13 ENCOUNTER — PATIENT MESSAGE (OUTPATIENT)
Dept: FAMILY MEDICINE CLINIC | Facility: CLINIC | Age: 45
End: 2020-04-13

## 2020-04-13 ENCOUNTER — TELEPHONE (OUTPATIENT)
Dept: FAMILY MEDICINE CLINIC | Facility: CLINIC | Age: 45
End: 2020-04-13

## 2020-04-13 RX ORDER — PAROXETINE 10 MG/1
10 TABLET, FILM COATED ORAL EVERY MORNING
Qty: 30 TABLET | Refills: 0 | Status: SHIPPED | OUTPATIENT
Start: 2020-04-13 | End: 2020-04-13

## 2020-04-13 RX ORDER — DESVENLAFAXINE 25 MG/1
25 TABLET, EXTENDED RELEASE ORAL DAILY
Qty: 30 TABLET | Refills: 0 | Status: SHIPPED | OUTPATIENT
Start: 2020-04-13 | End: 2020-05-08 | Stop reason: ALTCHOICE

## 2020-04-13 NOTE — TELEPHONE ENCOUNTER
Unfortunately cant fill paxil   Switched to pristiq. No serious interactions and she hasn't used before. Should work nicely.

## 2020-04-13 NOTE — TELEPHONE ENCOUNTER
Pharmacy called stated that they received a Rx for Paroxetine but its coming up as a drug interaction with medication Metoprolol HCT and with citalopram which she just p/u. Dr SEPULVEDA please advice.    Please call back pharmacy [phone]

## 2020-04-13 NOTE — TELEPHONE ENCOUNTER
Pt has since read [employer] message sent by Catheryn Husbands today informing her of medication prescribed (as copied here from other [address] encounter from today):    Arnaldo Ram, KELVIN   to Maame Hinton            4/13/20 10:26 Khadar Press,     Dr. Sara Richey

## 2020-04-13 NOTE — TELEPHONE ENCOUNTER
Pt has since read [employer] message sent by [facility] today informing her of medication prescribed (as copied here from other [address] encounter from today):    Terrell Rod CMA   to Meggan Abrams            4/13/20 10:26 Steve Nelson,     Dr. Leatha Worthington

## 2020-04-17 ENCOUNTER — NURSE TRIAGE (OUTPATIENT)
Dept: FAMILY MEDICINE CLINIC | Facility: CLINIC | Age: 45
End: 2020-04-17

## 2020-04-17 ENCOUNTER — PATIENT MESSAGE (OUTPATIENT)
Dept: ENDOCRINOLOGY CLINIC | Facility: CLINIC | Age: 45
End: 2020-04-17

## 2020-04-17 DIAGNOSIS — K21.9 GERD WITHOUT ESOPHAGITIS: Primary | ICD-10-CM

## 2020-04-17 PROCEDURE — 99213 OFFICE O/P EST LOW 20 MIN: CPT | Performed by: PHYSICIAN ASSISTANT

## 2020-04-17 RX ORDER — OMEPRAZOLE 20 MG/1
20 CAPSULE, DELAYED RELEASE ORAL
Qty: 90 CAPSULE | Refills: 1 | Status: SHIPPED | OUTPATIENT
Start: 2020-04-17 | End: 2020-05-08 | Stop reason: ALTCHOICE

## 2020-04-17 NOTE — TELEPHONE ENCOUNTER
Virtual Telephone Check-In    Shai St verbally consents to a Virtual/Telephone Check-In visit on 04/17/20. Patient understands and accepts financial responsibility for any deductible, co-insurance and/or co-pays associated with this service.

## 2020-04-17 NOTE — TELEPHONE ENCOUNTER
Action Requested: Summary for Provider     []  Critical Lab, Recommendations Needed  [] Need Additional Advice  []   FYI    []   Need Orders  [] Need Medications Sent to Pharmacy  []  Other     SUMMARY: Eloy ALLEN For Irina Donald:   Please review.    Malik

## 2020-04-17 NOTE — TELEPHONE ENCOUNTER
Contact patient via [employer], relayed MD recommendations about following up with Dr. Saige Hartman as well as addressing her symptoms.

## 2020-04-17 NOTE — TELEPHONE ENCOUNTER
From: Yash Butcher  To: Sandy Hawkins MD  Sent: 4/17/2020 12:03 PM CDT  Subject: Other    I just need assurance that maybe it's just acid reflux

## 2020-04-17 NOTE — TELEPHONE ENCOUNTER
From: Juana Ly  To: Jesusita Homans, MD  Sent: 4/17/2020 1:22 PM CDT  Subject: Other    Doctor Jaqui Pina is no longer on list for me to contact.

## 2020-04-17 NOTE — TELEPHONE ENCOUNTER
It is hard to say, acid reflux could cause this, but it is a non specific symptom  This does not seem to be related to her thyroid though  I recommend that she please call Dr. Rupesh Barfield for the same  Thanks

## 2020-04-17 NOTE — TELEPHONE ENCOUNTER
----- Message from [place]. Marie Angeles sent at 4/17/2020  1:26 PM CDT -----  Regarding: Other  Contact: [phone]  Hi doctor. I woke up this morning with a feeling like something is stuck in my throat. I don't feel sick at all. Maybe I irritated throat.

## 2020-04-23 RX ORDER — RIZATRIPTAN BENZOATE 10 MG/1
TABLET ORAL
Qty: 6 TABLET | Refills: 0 | Status: SHIPPED | OUTPATIENT
Start: 2020-04-23 | End: 2020-05-07

## 2020-04-23 NOTE — TELEPHONE ENCOUNTER
Pt calling [facility] pharmacy told her she needed to contact her provider for refill below. Informed Rx was just sent by Dr Jaqui Pina; denies further questions/concers at this time.      Rizatriptan Benzoate 10 MG Oral Tab 6 tablet 0 4/23/2020     Sig: TAKE

## 2020-04-28 ENCOUNTER — PATIENT MESSAGE (OUTPATIENT)
Dept: ENDOCRINOLOGY CLINIC | Facility: CLINIC | Age: 45
End: 2020-04-28

## 2020-04-29 NOTE — TELEPHONE ENCOUNTER
Patient was recently seen/TeleHealth on 3/30/20. Has another appt scheduled on 5/6/20. Keep appt or reschedule? Also see [facility] msg below.

## 2020-04-29 NOTE — TELEPHONE ENCOUNTER
I am sorry that she is not feeling good, but it can take a few weeks before symptoms imani better  Can she repeat het labs to see if there has been an improvement  TSH and Free t4  Thanks

## 2020-04-29 NOTE — TELEPHONE ENCOUNTER
From: Brigid Lopez  To: Torres Medley MD  Sent: 4/28/2020 5:27 PM CDT  Subject: Prescription Question    Hi. Been taking lexoyl for weeks now and don't see any improvements and my mood swings are the same and sometimes worse.  I've NEVER EVER been th

## 2020-05-03 ENCOUNTER — TELEPHONE (OUTPATIENT)
Dept: FAMILY MEDICINE CLINIC | Facility: CLINIC | Age: 45
End: 2020-05-03

## 2020-05-03 NOTE — TELEPHONE ENCOUNTER
Per [facility]:   I was able to connect with your patient today.  I scheduled a psychiatry appointment for her with [facility] with Danish Valenzuela for 5-7-20, 11am.

## 2020-05-07 RX ORDER — RIZATRIPTAN BENZOATE 10 MG/1
TABLET ORAL
Qty: 6 TABLET | Refills: 0 | Status: SHIPPED | OUTPATIENT
Start: 2020-05-07 | End: 2020-05-08

## 2020-05-07 NOTE — TELEPHONE ENCOUNTER
Patient at [facility] said they have sent refill request on this medication and not heard from office.   Patient out of medication and with headache asking please to refill at [facility]   Thank you

## 2020-05-08 RX ORDER — RIZATRIPTAN BENZOATE 10 MG/1
TABLET ORAL
Qty: 9 TABLET | Refills: 3 | Status: SHIPPED | OUTPATIENT
Start: 2020-05-08 | End: 2020-08-03

## 2020-05-13 ENCOUNTER — PATIENT MESSAGE (OUTPATIENT)
Dept: ENDOCRINOLOGY CLINIC | Facility: CLINIC | Age: 45
End: 2020-05-13

## 2020-05-14 ENCOUNTER — LAB ENCOUNTER (OUTPATIENT)
Dept: LAB | Facility: HOSPITAL | Age: 45
End: 2020-05-14
Attending: FAMILY MEDICINE
Payer: COMMERCIAL

## 2020-05-14 DIAGNOSIS — I10 ESSENTIAL HYPERTENSION: ICD-10-CM

## 2020-05-14 DIAGNOSIS — E89.0 HYPOTHYROIDISM FOLLOWING RADIOIODINE THERAPY: ICD-10-CM

## 2020-05-14 DIAGNOSIS — I10 HTN (HYPERTENSION): Primary | ICD-10-CM

## 2020-05-14 DIAGNOSIS — R63.5 WEIGHT GAIN: ICD-10-CM

## 2020-05-14 DIAGNOSIS — Z86.79 PERSONAL HISTORY OF UNSPECIFIED CIRCULATORY DISEASE: ICD-10-CM

## 2020-05-14 DIAGNOSIS — R53.82 CHRONIC FATIGUE: ICD-10-CM

## 2020-05-14 DIAGNOSIS — Z86.79 HISTORY OF CARDIAC ARRHYTHMIA: ICD-10-CM

## 2020-05-14 LAB
ALBUMIN SERPL-MCNC: 3.8 G/DL
ALBUMIN/GLOB SERPL: 0.9 {RATIO}
ALP SERPL-CCNC: 134 U/L
ALT SERPL-CCNC: 22 U/L
ANION GAP SERPL CALC-SCNC: 5 MMOL/L
AST SERPL-CCNC: 17 U/L
BILIRUB SERPL-MCNC: 1.1 MG/DL
BUN SERPL-MCNC: 16 MG/DL
BUN/CREAT SERPL: 20.3
CALCIUM SERPL-MCNC: 9.7 MG/DL
CHLORIDE SERPL-SCNC: 99 MMOL/L
CHOLEST SERPL-MCNC: 218 MG/DL
CO2 SERPL-SCNC: 32 MMOL/L
CREAT SERPL-MCNC: 0.79 MG/DL
GLOBULIN SER-MCNC: 4.3 G/DL
GLUCOSE SERPL-MCNC: 84 MG/DL
HCT VFR BLD CALC: 39.5 %
HDLC SERPL-MCNC: 45 MG/DL
HGB BLD-MCNC: 12.9 G/DL
LDLC SERPL CALC-MCNC: 139 MG/DL
NONHDLC SERPL-MCNC: 173 MG/DL
PLATELET # BLD: 315 10*3/UL
POTASSIUM SERPL-SCNC: 3.1 MMOL/L
PROT SERPL-MCNC: 8.1 G/DL
RBC # BLD: 4.55 10*6/UL
SODIUM SERPL-SCNC: 136 MMOL/L
TRIGL SERPL-MCNC: 170 MG/DL
TSH SERPL-ACNC: 12.6 M[IU]/L
VLDLC SERPL CALC-MCNC: 34 MG/DL
WBC # BLD: 3.3 10*3/UL

## 2020-05-14 PROCEDURE — 82306 VITAMIN D 25 HYDROXY: CPT | Performed by: FAMILY MEDICINE

## 2020-05-14 PROCEDURE — 84443 ASSAY THYROID STIM HORMONE: CPT | Performed by: INTERNAL MEDICINE

## 2020-05-14 PROCEDURE — 93010 ELECTROCARDIOGRAM REPORT: CPT | Performed by: FAMILY MEDICINE

## 2020-05-14 PROCEDURE — 80053 COMPREHEN METABOLIC PANEL: CPT | Performed by: INTERNAL MEDICINE

## 2020-05-14 PROCEDURE — 85025 COMPLETE CBC W/AUTO DIFF WBC: CPT | Performed by: FAMILY MEDICINE

## 2020-05-14 PROCEDURE — 36415 COLL VENOUS BLD VENIPUNCTURE: CPT | Performed by: FAMILY MEDICINE

## 2020-05-14 PROCEDURE — 80061 LIPID PANEL: CPT | Performed by: INTERNAL MEDICINE

## 2020-05-14 PROCEDURE — 93005 ELECTROCARDIOGRAM TRACING: CPT

## 2020-05-14 PROCEDURE — 84439 ASSAY OF FREE THYROXINE: CPT | Performed by: INTERNAL MEDICINE

## 2020-05-15 ENCOUNTER — TELEPHONE (OUTPATIENT)
Dept: ENDOCRINOLOGY CLINIC | Facility: CLINIC | Age: 45
End: 2020-05-15

## 2020-05-15 DIAGNOSIS — E89.0 POSTABLATIVE HYPOTHYROIDISM: Primary | ICD-10-CM

## 2020-05-15 RX ORDER — LEVOTHYROXINE SODIUM 88 UG/1
88 TABLET ORAL
Qty: 30 TABLET | Refills: 1 | Status: SHIPPED | OUTPATIENT
Start: 2020-05-15 | End: 2020-05-26

## 2020-05-15 NOTE — TELEPHONE ENCOUNTER
Prescription sent per MD request.     Pt called regarding increased dosage. Pt agreeable to getting labs drawn in 6 weeks. Labs ordered. LOV 3/30/20.

## 2020-05-15 NOTE — TELEPHONE ENCOUNTER
TSH is 12.6 still high  This could be causing her symptoms  She will need a dose increase  Please confirm what does she is on at this time and how long she has been taking this dose  Please let me know and I can suggest changes  Thanks

## 2020-05-15 NOTE — TELEPHONE ENCOUNTER
Called pt, she is currently taking Levoxyl 75mcg daily. She is still experiencing hair loss, although states new symptom. Pt completed menstruation 3 days ago, although states she is experiencing menstruation again.  No spotting, she is having same amou

## 2020-05-24 ENCOUNTER — PATIENT MESSAGE (OUTPATIENT)
Dept: ENDOCRINOLOGY CLINIC | Facility: CLINIC | Age: 45
End: 2020-05-24

## 2020-05-24 ENCOUNTER — PATIENT MESSAGE (OUTPATIENT)
Dept: GASTROENTEROLOGY | Facility: CLINIC | Age: 45
End: 2020-05-24

## 2020-05-25 ENCOUNTER — TELEPHONE (OUTPATIENT)
Dept: FAMILY MEDICINE CLINIC | Facility: CLINIC | Age: 45
End: 2020-05-25

## 2020-05-26 ENCOUNTER — TELEPHONE (OUTPATIENT)
Dept: FAMILY MEDICINE CLINIC | Facility: CLINIC | Age: 45
End: 2020-05-26

## 2020-05-26 ENCOUNTER — TELEMEDICINE (OUTPATIENT)
Dept: FAMILY MEDICINE CLINIC | Facility: CLINIC | Age: 45
End: 2020-05-26
Payer: COMMERCIAL

## 2020-05-26 DIAGNOSIS — R94.31 ABNORMAL EKG: ICD-10-CM

## 2020-05-26 DIAGNOSIS — E03.9 HYPOTHYROIDISM, UNSPECIFIED TYPE: ICD-10-CM

## 2020-05-26 DIAGNOSIS — L65.9 HAIR LOSS: Primary | ICD-10-CM

## 2020-05-26 PROCEDURE — 99213 OFFICE O/P EST LOW 20 MIN: CPT | Performed by: PHYSICIAN ASSISTANT

## 2020-05-26 RX ORDER — LEVOTHYROXINE SODIUM 88 MCG
88 TABLET ORAL
Qty: 90 TABLET | Refills: 0 | Status: SHIPPED | OUTPATIENT
Start: 2020-05-26 | End: 2020-07-07

## 2020-05-26 NOTE — TELEPHONE ENCOUNTER
From: Kathleen Davis  To: Mary Herrera MD  Sent: 5/24/2020 11:00 PM CDT  Subject: Prescription Question    Hi Doctor. I'm now EXTREMELY worried cause my hair is falling off even more. Why is this happening?!!!!? Is the medication too strong?  Is the m

## 2020-05-26 NOTE — TELEPHONE ENCOUNTER
From: Gary Bullard  To: Liz Daniel PA-C  Sent: 5/24/2020  9:27 AM CDT  Subject: Prescription Question    Hi doctor.  I've been given Levothyroxine sodium 88mcg tab for my thyroid and my question is: does this pill contribute to the hair loss I'm

## 2020-05-26 NOTE — TELEPHONE ENCOUNTER
Patient calling and wants to get the EKG results that was done on 5/14/20. Patient requesting to talk to a provider. Dr Heidy Edwards is no longer working with us, appointment offered to different provider and agrees.   Patient states that she started atiya

## 2020-05-26 NOTE — PROGRESS NOTES
HPI: HPI    I spoke Tiki Elaine by telephone , verified date of birth, and discussed current concerns. Patient would like to discuss EKG result and complaints of hair loss.  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    • Ovarian cyst 2005   • Personal history of antineoplastic chemotherapy    • Th Cancer Paternal Aunt         leukemia   • Cancer Paternal Uncle         prostate   • Breast Cancer Self 27       Social History:   Social History    Socioeconomic History      Marital status:       Spouse name: Not on file      Number of children: Exercise: Not Asked        Bike Helmet: Not Asked        Seat Belt: Not Asked        Self-Exams: Not Asked        Grew up on a farm: Not Asked        History of tanning: Not Asked        Outdoor occupation: Not Asked        Breast feeding: Not Asked face-to-face examination, which may limit and/or reduce diagnostic accuracy. Ozzie Delong was informed and agrees that this telemedicine visit will charged.  Ozzie Delong expresses understanding, accepts these limitations, and agrees to phone Uvaldo Quijano

## 2020-05-26 NOTE — TELEPHONE ENCOUNTER
From: Montserrat Hall  To: Ronald Dean PA-C  Sent: 5/24/2020 11:02 PM CDT  Subject: Prescription Question    Hi Doctor. Im emailing you to get a 2nd opinion. I emailed my endocrinologist    I'm now EXTREMELY worried cause my hair is falling off even more.

## 2020-05-28 NOTE — TELEPHONE ENCOUNTER
Edictive message sent to patient to call to schedule appt for week of March 23.
From: Selam Wolfe  To: Bev Davis MD  Sent: 3/10/2020 1:10 PM CDT  Subject: Other    Hello. It seems that the forms department filled out [facility] medical certification form but didn't do on-going intermittent time off for my Graves' disease.  I know
No forms attached in Media - see [employer] message 3/4/2020.
Patient did not call us when she was sick, typically that has to be filled by the doctor who attended to her sickness  Agree that post URIAS low thyroid hormone levels can cause symptoms, but typically we do not give a year long intermittent FMLA for hypothy
Spoke with patient and booked appt with Dr. Rubi Spencer 3/30. Patient was upset that she's been feeling so badly and has felt that our office isn't willing to help. Reassured patient that we want to help her.   Patient stopped taking levothyroxine Sunday or M
Yes...

## 2020-05-29 RX ORDER — RIZATRIPTAN BENZOATE 10 MG/1
TABLET ORAL
COMMUNITY
Start: 2020-05-08

## 2020-05-29 RX ORDER — LEVOTHYROXINE SODIUM 88 UG/1
88 TABLET ORAL DAILY
COMMUNITY
Start: 2020-05-26

## 2020-05-29 RX ORDER — POTASSIUM CHLORIDE 20 MEQ/1
1 TABLET, EXTENDED RELEASE ORAL 2 TIMES DAILY
COMMUNITY
Start: 2020-01-16

## 2020-06-04 ENCOUNTER — OFFICE VISIT (OUTPATIENT)
Dept: CARDIOLOGY | Age: 45
End: 2020-06-04

## 2020-06-04 ENCOUNTER — TELEPHONE (OUTPATIENT)
Dept: CARDIOLOGY | Age: 45
End: 2020-06-04

## 2020-06-04 VITALS
OXYGEN SATURATION: 94 % | HEART RATE: 86 BPM | WEIGHT: 136 LBS | HEIGHT: 63 IN | SYSTOLIC BLOOD PRESSURE: 138 MMHG | BODY MASS INDEX: 24.1 KG/M2 | DIASTOLIC BLOOD PRESSURE: 90 MMHG

## 2020-06-04 DIAGNOSIS — E03.8 OTHER SPECIFIED HYPOTHYROIDISM: ICD-10-CM

## 2020-06-04 DIAGNOSIS — R07.9 CHEST PAIN, UNSPECIFIED TYPE: ICD-10-CM

## 2020-06-04 DIAGNOSIS — I10 ESSENTIAL HYPERTENSION: Primary | ICD-10-CM

## 2020-06-04 PROCEDURE — 99203 OFFICE O/P NEW LOW 30 MIN: CPT | Performed by: INTERNAL MEDICINE

## 2020-06-04 PROCEDURE — 93000 ELECTROCARDIOGRAM COMPLETE: CPT | Performed by: INTERNAL MEDICINE

## 2020-06-04 PROCEDURE — 3075F SYST BP GE 130 - 139MM HG: CPT | Performed by: INTERNAL MEDICINE

## 2020-06-04 ASSESSMENT — PATIENT HEALTH QUESTIONNAIRE - PHQ9
1. LITTLE INTEREST OR PLEASURE IN DOING THINGS: NOT AT ALL
SUM OF ALL RESPONSES TO PHQ9 QUESTIONS 1 AND 2: 0
CLINICAL INTERPRETATION OF PHQ9 SCORE: NO FURTHER SCREENING NEEDED
2. FEELING DOWN, DEPRESSED OR HOPELESS: NOT AT ALL
SUM OF ALL RESPONSES TO PHQ9 QUESTIONS 1 AND 2: 0
CLINICAL INTERPRETATION OF PHQ2 SCORE: NO FURTHER SCREENING NEEDED

## 2020-06-05 ENCOUNTER — ORDER TRANSCRIPTION (OUTPATIENT)
Dept: ADMINISTRATIVE | Facility: HOSPITAL | Age: 45
End: 2020-06-05

## 2020-06-05 DIAGNOSIS — R07.9 CHEST PAIN, UNSPECIFIED: Primary | ICD-10-CM

## 2020-06-18 ENCOUNTER — TELEPHONE (OUTPATIENT)
Dept: FAMILY MEDICINE CLINIC | Facility: CLINIC | Age: 45
End: 2020-06-18

## 2020-06-18 DIAGNOSIS — E87.6 HYPOKALEMIA: Primary | ICD-10-CM

## 2020-06-18 RX ORDER — POTASSIUM CHLORIDE 20 MEQ/1
20 TABLET, EXTENDED RELEASE ORAL 2 TIMES DAILY
Qty: 60 TABLET | Refills: 1 | Status: SHIPPED | OUTPATIENT
Start: 2020-06-18 | End: 2020-07-07

## 2020-06-18 NOTE — TELEPHONE ENCOUNTER
Ref req KCL   Last K 3.1 on 5/15/2020  Refill sent to pharmacy-needs labs for BMP to check K levels    Order was placed for [facility] and printed-please fax to [facility] and notifiy pt that lab needs to be done.

## 2020-06-19 NOTE — TELEPHONE ENCOUNTER
Informed patient of note below.  Patient states she will complete labs within [place]. Please change order for [place] lab

## 2020-06-25 ENCOUNTER — ORDER TRANSCRIPTION (OUTPATIENT)
Dept: ADMINISTRATIVE | Facility: HOSPITAL | Age: 45
End: 2020-06-25

## 2020-06-25 DIAGNOSIS — R07.9 CHEST PAIN: Primary | ICD-10-CM

## 2020-06-27 ENCOUNTER — PATIENT MESSAGE (OUTPATIENT)
Dept: ENDOCRINOLOGY CLINIC | Facility: CLINIC | Age: 45
End: 2020-06-27

## 2020-06-29 NOTE — TELEPHONE ENCOUNTER
Please call her  I will still need to see her labs to be able to adjust further, to know how much to adjust.   I really do want to help her feel better, but will need labs to adjust the dose.    Also, I am happy to talk to Dr. Agnes Berry to see if she can get t

## 2020-06-29 NOTE — TELEPHONE ENCOUNTER
Patient was contacted to relay below message from Dr. Bety Shelton.   Patient states she apologizes for interrupting and started to say that she's \"tired of hearing up the dose up the dose\" and did not mean to be rude but she's tired emotionally, physically

## 2020-06-29 NOTE — TELEPHONE ENCOUNTER
From: Ozzie Pipes  To: Vernadine MD Shabbir  Sent: 6/27/2020 11:07 PM CDT  Subject: Prescription Question    Tonight I washed my hair and it literally felt like my whole head of hair was going to fall off.  Is there another thyroid pill I can try becrobyn

## 2020-07-01 ENCOUNTER — TELEPHONE (OUTPATIENT)
Dept: CARDIOLOGY | Age: 45
End: 2020-07-01

## 2020-07-02 ENCOUNTER — HOSPITAL ENCOUNTER (OUTPATIENT)
Dept: CT IMAGING | Facility: HOSPITAL | Age: 45
Discharge: HOME | End: 2020-07-02
Attending: INTERNAL MEDICINE
Payer: COMMERCIAL

## 2020-07-02 VITALS
DIASTOLIC BLOOD PRESSURE: 74 MMHG | HEART RATE: 51 BPM | BODY MASS INDEX: 23.92 KG/M2 | SYSTOLIC BLOOD PRESSURE: 128 MMHG | WEIGHT: 135 LBS | HEIGHT: 63 IN

## 2020-07-02 DIAGNOSIS — R07.9 CHEST PAIN: ICD-10-CM

## 2020-07-02 DIAGNOSIS — R07.9 CHEST PAIN, UNSPECIFIED: ICD-10-CM

## 2020-07-02 LAB — CREAT BLD-MCNC: 0.7 MG/DL (ref 0.55–1.02)

## 2020-07-02 PROCEDURE — 75574 CT ANGIO HRT W/3D IMAGE: CPT | Performed by: INTERNAL MEDICINE

## 2020-07-02 PROCEDURE — 82565 ASSAY OF CREATININE: CPT

## 2020-07-02 RX ORDER — NITROGLYCERIN 0.4 MG/1
0.4 TABLET SUBLINGUAL ONCE
Status: COMPLETED | OUTPATIENT
Start: 2020-07-02 | End: 2020-07-02

## 2020-07-02 RX ORDER — DILTIAZEM HYDROCHLORIDE 5 MG/ML
5 INJECTION INTRAVENOUS SEE ADMIN INSTRUCTIONS
Status: DISCONTINUED | OUTPATIENT
Start: 2020-07-02 | End: 2020-07-04

## 2020-07-02 RX ORDER — NITROGLYCERIN 0.4 MG/1
TABLET SUBLINGUAL
Status: DISCONTINUED
Start: 2020-07-02 | End: 2020-07-02

## 2020-07-02 RX ORDER — METOPROLOL TARTRATE 5 MG/5ML
INJECTION INTRAVENOUS
Status: DISCONTINUED
Start: 2020-07-02 | End: 2020-07-02 | Stop reason: WASHOUT

## 2020-07-02 RX ORDER — METOPROLOL TARTRATE 5 MG/5ML
5 INJECTION INTRAVENOUS SEE ADMIN INSTRUCTIONS
Status: DISCONTINUED | OUTPATIENT
Start: 2020-07-02 | End: 2020-07-04

## 2020-07-02 RX ADMIN — Medication 50 MG: at 10:32:00

## 2020-07-02 RX ADMIN — NITROGLYCERIN 0.4 MG: 0.4 TABLET SUBLINGUAL at 12:01:00

## 2020-07-02 NOTE — IMAGING NOTE
1011:  Mrs. Brody Solis brought to [facility]. Identified with spelling of name and date of birth. Medications and allergies reviewed.    Reported the following daily medications:  Metoprolol 50mg and levothyroxine  Known allergies as follows:   LatexRAS Cheri Noel brought back to the [facility] area   VS:  Heart rate 55,  Blood pressure 118/72 Q 15 MIN X2 see flow sheet. Instructed to drink plenty of fluid X 24 hours. MsMarilyn Cheri Noel verbalized understanding and agreement.    Water provided and adriano madrid

## 2020-07-06 ENCOUNTER — TELEPHONE (OUTPATIENT)
Dept: CARDIOLOGY | Age: 45
End: 2020-07-06

## 2020-07-07 ENCOUNTER — OFFICE VISIT (OUTPATIENT)
Dept: INTEGRATIVE MEDICINE | Facility: CLINIC | Age: 45
End: 2020-07-07
Payer: COMMERCIAL

## 2020-07-07 VITALS
OXYGEN SATURATION: 98 % | WEIGHT: 140 LBS | BODY MASS INDEX: 23.9 KG/M2 | SYSTOLIC BLOOD PRESSURE: 114 MMHG | DIASTOLIC BLOOD PRESSURE: 92 MMHG | HEART RATE: 78 BPM | HEIGHT: 64 IN

## 2020-07-07 DIAGNOSIS — G47.00 INSOMNIA, UNSPECIFIED TYPE: ICD-10-CM

## 2020-07-07 DIAGNOSIS — F41.1 GENERALIZED ANXIETY DISORDER: ICD-10-CM

## 2020-07-07 DIAGNOSIS — N64.4 MASTODYNIA: Primary | ICD-10-CM

## 2020-07-07 DIAGNOSIS — R53.82 CHRONIC FATIGUE: ICD-10-CM

## 2020-07-07 DIAGNOSIS — E89.0 HYPOTHYROIDISM FOLLOWING RADIOIODINE THERAPY: ICD-10-CM

## 2020-07-07 PROBLEM — Z34.90 UNPLANNED PREGNANCY: Status: RESOLVED | Noted: 2017-10-30 | Resolved: 2020-07-07

## 2020-07-07 PROBLEM — N30.00 ACUTE CYSTITIS WITHOUT HEMATURIA: Status: RESOLVED | Noted: 2019-05-03 | Resolved: 2020-07-07

## 2020-07-07 PROBLEM — H66.90 ACUTE OTITIS MEDIA: Status: RESOLVED | Noted: 2019-05-03 | Resolved: 2020-07-07

## 2020-07-07 PROBLEM — K21.9 GASTROESOPHAGEAL REFLUX DISEASE: Status: RESOLVED | Noted: 2017-12-18 | Resolved: 2020-07-07

## 2020-07-07 PROBLEM — Z34.90 UNPLANNED PREGNANCY (HCC): Status: RESOLVED | Noted: 2017-10-30 | Resolved: 2020-07-07

## 2020-07-07 PROCEDURE — 99204 OFFICE O/P NEW MOD 45 MIN: CPT | Performed by: FAMILY MEDICINE

## 2020-07-07 RX ORDER — METOPROLOL TARTRATE AND HYDROCHLOROTHIAZIDE 50; 25 MG/1; MG/1
1 TABLET ORAL 2 TIMES DAILY
Qty: 60 TABLET | Refills: 1 | COMMUNITY
Start: 2020-07-07 | End: 2020-08-03

## 2020-07-07 RX ORDER — LEVOTHYROXINE SODIUM 88 UG/1
88 TABLET ORAL
COMMUNITY
End: 2020-08-03

## 2020-07-07 RX ORDER — LEVOTHYROXINE AND LIOTHYRONINE 38; 9 UG/1; UG/1
60 TABLET ORAL DAILY
Qty: 30 TABLET | Refills: 1 | Status: SHIPPED | OUTPATIENT
Start: 2020-07-07 | End: 2020-08-12

## 2020-07-07 NOTE — PROGRESS NOTES
Kathy Moore is a 38year old female. Patient presents with: Integrative Medicine Consult: Referred by Dr. Meliton Castillo - Thyroid      HPI:     Feeling unhappy with Dr. Meliton Castillo. Took chemo and radiation for breast cancer 7 yrs.    Within the past y • Breast pain in female    • Chronic pain    • DCIS (ductal carcinoma in situ) of breast 2013    Right breast. radiotherapy and mastectomy    • Depression 2004    medication and therapy   • Encounter for therapeutic drug monitoring    • Esophageal reflux Physical activity:        Days per week: Not on file        Minutes per session: Not on file      Stress: Not on file    Relationships      Social connections:        Talks on phone: Not on file        Gets together: Not on file        Attends [employer] • Explor front sinus,transorbit,uni  09/19/2018   • Forearm/wrist surgery unlisted Left 2009    DeQuervain's release   • Lumpectomy right Right 6/28/2013    Right segmental mastectomy with wire localization, advancement   • Mastectomy left     • Mastectomy Evaluate for micronutrient deficiency and marker for dysbiosis. Pregnenolone level ordered to evaluate adrenal function. Evaluate DHEA sulfate as a marker for adrenal gland function  Ferritin level ordered.  Low ferritin can impact energy levels and over Directions: 1 capsule before bedtime  Where: [facility] [employer] or [employer]    Karyn Guerrero Rescue Sleep        An all natural homeopathic blend to help you get to sleep and stay asleep. Safe to use with any medications or supplements.    Can be found at [facility]

## 2020-07-07 NOTE — PATIENT INSTRUCTIONS
I have complete rebel in the body's ability to heal and transform. The products and items listed below (the “Products”)  and their claims have not been evaluated by the Food and Drug Administration.  Dietary products are not intended to treat, prevent, m

## 2020-07-10 ENCOUNTER — OFFICE VISIT (OUTPATIENT)
Dept: SURGERY | Facility: CLINIC | Age: 45
End: 2020-07-10
Payer: COMMERCIAL

## 2020-07-10 DIAGNOSIS — Z90.13 ACQUIRED ABSENCE OF BOTH BREASTS: Primary | ICD-10-CM

## 2020-07-10 PROCEDURE — 99212 OFFICE O/P EST SF 10 MIN: CPT | Performed by: SURGERY

## 2020-07-10 NOTE — PROGRESS NOTES
Kinga Spaulding is a 38year old female who presents today for a follow-up. She complains of discomfort of her left reconstructed breast.  Specifically, she reports a sensation of weight pulling on her pectoralis muscle.   She also complains of breast asy

## 2020-07-12 NOTE — TELEPHONE ENCOUNTER
Patient contacted and states to disregard this message. Patient thought Elsi Cheek was an endocrinologist.  No GI issues. Will call her endocrinologist to get a second opinion. Alert/Awake/Cooperative

## 2020-07-18 ENCOUNTER — APPOINTMENT (OUTPATIENT)
Dept: LAB | Facility: HOSPITAL | Age: 45
End: 2020-07-18
Attending: FAMILY MEDICINE
Payer: COMMERCIAL

## 2020-07-18 DIAGNOSIS — G47.00 INSOMNIA, UNSPECIFIED TYPE: ICD-10-CM

## 2020-07-18 DIAGNOSIS — R53.82 CHRONIC FATIGUE: ICD-10-CM

## 2020-07-18 DIAGNOSIS — F41.1 GENERALIZED ANXIETY DISORDER: ICD-10-CM

## 2020-07-18 LAB
DEPRECATED HBV CORE AB SER IA-ACNC: 22.9 NG/ML (ref 12–240)
DHEA-S SERPL-MCNC: 193.6 UG/DL
VIT B12 SERPL-MCNC: 1254 PG/ML (ref 193–986)

## 2020-07-18 PROCEDURE — 84140 ASSAY OF PREGNENOLONE: CPT

## 2020-07-18 PROCEDURE — 82627 DEHYDROEPIANDROSTERONE: CPT

## 2020-07-18 PROCEDURE — 36415 COLL VENOUS BLD VENIPUNCTURE: CPT

## 2020-07-18 PROCEDURE — 86628 CANDIDA ANTIBODY: CPT

## 2020-07-18 PROCEDURE — 82607 VITAMIN B-12: CPT

## 2020-07-18 PROCEDURE — 82306 VITAMIN D 25 HYDROXY: CPT

## 2020-07-18 PROCEDURE — 82728 ASSAY OF FERRITIN: CPT

## 2020-07-18 PROCEDURE — 84207 ASSAY OF VITAMIN B-6: CPT

## 2020-07-20 ENCOUNTER — PATIENT MESSAGE (OUTPATIENT)
Dept: FAMILY MEDICINE CLINIC | Facility: CLINIC | Age: 45
End: 2020-07-20

## 2020-07-20 NOTE — TELEPHONE ENCOUNTER
From: Destinee Marin  To: Addison Price PA-C  Sent: 7/20/2020 2:41 PM CDT  Subject: Other    Waiting for video chat with doctor Varsha Less at 200 for my daughter Deandra Quinonez.  Don't see link

## 2020-07-21 LAB — 25(OH)D3 SERPL-MCNC: 84.8 NG/ML (ref 30–100)

## 2020-07-22 ENCOUNTER — TELEPHONE (OUTPATIENT)
Dept: INTEGRATIVE MEDICINE | Facility: CLINIC | Age: 45
End: 2020-07-22

## 2020-07-22 DIAGNOSIS — E89.0 HYPOTHYROIDISM FOLLOWING RADIOIODINE THERAPY: Primary | ICD-10-CM

## 2020-07-22 LAB
CANDIDA ANTIBODY IGA: 0.8 EV
CANDIDA ANTIBODY IGG: 1.15 EV
CANDIDA ANTIBODY IGM: 0.99 EV

## 2020-07-23 LAB — PREGNENOLONE BY MS/MS, SERUM: 106 NG/DL

## 2020-07-24 LAB — VITAMIN B6: 485.9 NMOL/L

## 2020-07-27 ENCOUNTER — PATIENT MESSAGE (OUTPATIENT)
Dept: INTEGRATIVE MEDICINE | Facility: CLINIC | Age: 45
End: 2020-07-27

## 2020-07-27 DIAGNOSIS — E89.0 HYPOTHYROIDISM FOLLOWING RADIOIODINE THERAPY: ICD-10-CM

## 2020-07-27 PROCEDURE — 99421 OL DIG E/M SVC 5-10 MIN: CPT | Performed by: FAMILY MEDICINE

## 2020-08-03 ENCOUNTER — OFFICE VISIT (OUTPATIENT)
Dept: INTEGRATIVE MEDICINE | Facility: CLINIC | Age: 45
End: 2020-08-03
Payer: COMMERCIAL

## 2020-08-03 VITALS
BODY MASS INDEX: 24.69 KG/M2 | DIASTOLIC BLOOD PRESSURE: 98 MMHG | OXYGEN SATURATION: 97 % | SYSTOLIC BLOOD PRESSURE: 142 MMHG | WEIGHT: 144.63 LBS | HEIGHT: 64 IN | HEART RATE: 76 BPM

## 2020-08-03 DIAGNOSIS — F41.1 GENERALIZED ANXIETY DISORDER: ICD-10-CM

## 2020-08-03 DIAGNOSIS — B37.9 CANDIDIASIS: ICD-10-CM

## 2020-08-03 DIAGNOSIS — I10 ESSENTIAL HYPERTENSION: Primary | ICD-10-CM

## 2020-08-03 DIAGNOSIS — E03.9 HYPOTHYROIDISM, UNSPECIFIED TYPE: ICD-10-CM

## 2020-08-03 PROCEDURE — 99214 OFFICE O/P EST MOD 30 MIN: CPT | Performed by: PHYSICIAN ASSISTANT

## 2020-08-03 PROCEDURE — 3077F SYST BP >= 140 MM HG: CPT | Performed by: PHYSICIAN ASSISTANT

## 2020-08-03 PROCEDURE — 3080F DIAST BP >= 90 MM HG: CPT | Performed by: PHYSICIAN ASSISTANT

## 2020-08-03 PROCEDURE — 3008F BODY MASS INDEX DOCD: CPT | Performed by: PHYSICIAN ASSISTANT

## 2020-08-03 RX ORDER — METOPROLOL TARTRATE AND HYDROCHLOROTHIAZIDE 50; 25 MG/1; MG/1
1 TABLET ORAL 2 TIMES DAILY
Qty: 180 TABLET | Refills: 0 | Status: SHIPPED | OUTPATIENT
Start: 2020-08-03 | End: 2020-10-26 | Stop reason: ALTCHOICE

## 2020-08-03 RX ORDER — METOPROLOL TARTRATE AND HYDROCHLOROTHIAZIDE 50; 25 MG/1; MG/1
1 TABLET ORAL 2 TIMES DAILY
Qty: 60 TABLET | Refills: 0 | OUTPATIENT
Start: 2020-08-03

## 2020-08-03 NOTE — PATIENT INSTRUCTIONS
Marva Bolton    Directions: 1 capsule before bedtime  Where: [facility] [employer] or [employer]    Brain Heading Rescue Sleep        An all natural homeopathic blend to help you get to sleep and stay asleep. Safe to use with any medications or supplements. Swordfish  Tuna  SOME DAIRY PRODUCTS  Cheese  Milk  Cream  Whey isolate  MOLDY NUTS & SEEDS  Peanuts  Cashews  Pistachios  CONDIMENTS  Barbecue sauce  Horseradish  Ketchup  Mayonnaise  Soy sauce  White vinegar  REFINED/PROCESSED FATS & OILS  Canola oil  Fa

## 2020-08-03 NOTE — TELEPHONE ENCOUNTER
From: Brigid Lopez  To: Nicola Smallwood DO  Sent: 7/27/2020 12:13 PM CDT  Subject: Test Results Question    Now that you want me to start taking these supplements here are a list of what I take now: can I take hers and yours combined.  Don't want to overd

## 2020-08-03 NOTE — PROGRESS NOTES
Montserrat Hall is a 38year old female. Patient presents with: Follow - Up: f/u on thyroid concens   Menstrual Problem: period lasted 9 days       HPI:   Patients presents for follow up on labs and HTN. Needs refill on her HTN medication.  She has not • Breast CA [facility])    • Breast pain in female    • Chronic pain    • DCIS (ductal carcinoma in situ) of breast 2013    Right breast. radiotherapy and mastectomy    • Depression 2004    medication and therapy   • Encounter for therapeutic drug monitoring Gets together: Not on file        Attends [employer]ism service: Not on file        Active member of club or organization: Not on file        Attends meetings of clubs or organizations: Not on file        Relationship status: Not on file      Intimate pa • Mastectomy left     • Mastectomy right     • Radiation left  2013   • Radiation right  2013   • Removal of ovarian cyst(s) Left 2005    laparoscopic   • Removal of ovarian cyst(s) Bilateral 1/2014    laparotomy   • Skin surgery  7/22/2016    bilateral ar I spent 30 minutes with this patient with 75% of the time spend in consultation on diet and lifestyle 25% of the time spent on review of labs and supplements. Given further recommendations as below.     Orders Placed This Visit:  No orders of the define Venison  NUTS  Nut milks  Nut butters  Walnuts  Pecans  FERMENTED FOODS  Kombucha  Kvass  DRINKS  Decaf coffee  Green tea  Vegetable juice  Vegetables HIGH SUGAR FRUITS  Bananas  Dates  Fruit juices  Grapes  Remi  Raisins  GLUTINOUS GRAINS  Barley  Pl[place]itas  S

## 2020-08-07 ENCOUNTER — OFFICE VISIT (OUTPATIENT)
Dept: SURGERY | Facility: CLINIC | Age: 45
End: 2020-08-07
Payer: COMMERCIAL

## 2020-08-07 DIAGNOSIS — Z90.13 ACQUIRED ABSENCE OF BOTH BREASTS: Primary | ICD-10-CM

## 2020-08-07 PROCEDURE — 99212 OFFICE O/P EST SF 10 MIN: CPT | Performed by: SURGERY

## 2020-08-07 NOTE — PATIENT INSTRUCTIONS
Surgeon: Dr. Olamide De Souza      Tel:  [phone]    Fax: [phone]     Surgery/Procedure:  Revision of bilateral reconstructed breasts.  3 hours, general anesthesia, outpatient      Hospital:  [facility]: Kirstin [address], [address]

## 2020-08-08 ENCOUNTER — APPOINTMENT (OUTPATIENT)
Dept: LAB | Facility: HOSPITAL | Age: 45
End: 2020-08-08
Attending: FAMILY MEDICINE
Payer: COMMERCIAL

## 2020-08-08 DIAGNOSIS — E89.0 HYPOTHYROIDISM FOLLOWING RADIOIODINE THERAPY: ICD-10-CM

## 2020-08-08 LAB
T3FREE SERPL-MCNC: 2.79 PG/ML (ref 2.4–4.2)
T4 FREE SERPL-MCNC: 0.6 NG/DL (ref 0.8–1.7)
TSI SER-ACNC: 27 MIU/ML (ref 0.36–3.74)

## 2020-08-08 PROCEDURE — 84443 ASSAY THYROID STIM HORMONE: CPT

## 2020-08-08 PROCEDURE — 84481 FREE ASSAY (FT-3): CPT

## 2020-08-08 PROCEDURE — 36415 COLL VENOUS BLD VENIPUNCTURE: CPT

## 2020-08-08 PROCEDURE — 84439 ASSAY OF FREE THYROXINE: CPT

## 2020-08-12 ENCOUNTER — PATIENT MESSAGE (OUTPATIENT)
Dept: INTEGRATIVE MEDICINE | Facility: CLINIC | Age: 45
End: 2020-08-12

## 2020-08-12 RX ORDER — LEVOTHYROXINE AND LIOTHYRONINE 57; 13.5 UG/1; UG/1
90 TABLET ORAL DAILY
Qty: 60 TABLET | Refills: 0 | Status: SHIPPED | OUTPATIENT
Start: 2020-08-12 | End: 2020-10-14

## 2020-08-12 NOTE — TELEPHONE ENCOUNTER
From: Martin Dumas  To: Ulices Sprague DO  Sent: 8/12/2020 8:04 AM CDT  Subject: Prescription Question    Good morning.  You said you were going to increase my thyroid medication to 90mg snd send prescription to my pharmacy but they haven't received pres

## 2020-08-27 ENCOUNTER — TELEPHONE (OUTPATIENT)
Dept: SURGERY | Facility: CLINIC | Age: 45
End: 2020-08-27

## 2020-08-27 DIAGNOSIS — Z90.13 ACQUIRED ABSENCE OF BOTH BREASTS: Primary | ICD-10-CM

## 2020-08-27 NOTE — TELEPHONE ENCOUNTER
I called the patient and scheduled her procedure with Dr Harinder Mcelroy on 10- at [facility]. Confirmed location. Patient understands case could be moved for an emergent case.

## 2020-08-31 RX ORDER — LEVOTHYROXINE, LIOTHYRONINE 38; 9 UG/1; UG/1
TABLET ORAL
Qty: 90 TABLET | Refills: 0 | Status: SHIPPED | OUTPATIENT
Start: 2020-08-31 | End: 2020-10-11

## 2020-09-01 ENCOUNTER — TELEPHONE (OUTPATIENT)
Dept: SURGERY | Facility: CLINIC | Age: 45
End: 2020-09-01

## 2020-09-01 DIAGNOSIS — Z90.13 ACQUIRED ABSENCE OF BOTH BREASTS: Primary | ICD-10-CM

## 2020-09-01 NOTE — TELEPHONE ENCOUNTER
rec'd a message patient needed to push procedure date later. I called the patient and we rescheduled her procedure with Dr Kemal Lockhart to 11/12/2020 at [place] [address] in agreement with date change.

## 2020-09-03 ENCOUNTER — TELEPHONE (OUTPATIENT)
Dept: SURGERY | Facility: CLINIC | Age: 45
End: 2020-09-03

## 2020-09-04 ENCOUNTER — TELEPHONE (OUTPATIENT)
Dept: FAMILY MEDICINE CLINIC | Facility: CLINIC | Age: 45
End: 2020-09-04

## 2020-09-04 NOTE — TELEPHONE ENCOUNTER
Received a medical clearance request from Dr. Sofía Carlson  Patient needs appt for pre op visit with Angy Kumari PA-C    Called patient, scheduled for 9/10/2020 at 2:30

## 2020-09-10 ENCOUNTER — TELEPHONE (OUTPATIENT)
Dept: FAMILY MEDICINE CLINIC | Facility: CLINIC | Age: 45
End: 2020-09-10

## 2020-09-12 ENCOUNTER — PATIENT MESSAGE (OUTPATIENT)
Dept: FAMILY MEDICINE CLINIC | Facility: CLINIC | Age: 45
End: 2020-09-12

## 2020-09-13 NOTE — TELEPHONE ENCOUNTER
From: Selam Wolfe  To: Ligia Owens PA-C  Sent: 9/12/2020 4:43 PM CDT  Subject: Prescription Question    Hi doctor. I just called [facility] for my rizatriptan migraine medication and they told me I have no refills.  Is there anyway to send

## 2020-09-14 RX ORDER — RIZATRIPTAN BENZOATE 10 MG/1
TABLET ORAL
Qty: 9 TABLET | Refills: 3 | Status: SHIPPED | OUTPATIENT
Start: 2020-09-14 | End: 2020-12-15

## 2020-09-21 ENCOUNTER — OFFICE VISIT (OUTPATIENT)
Dept: FAMILY MEDICINE CLINIC | Facility: CLINIC | Age: 45
End: 2020-09-21
Payer: COMMERCIAL

## 2020-09-21 VITALS
DIASTOLIC BLOOD PRESSURE: 86 MMHG | TEMPERATURE: 99 F | RESPIRATION RATE: 16 BRPM | OXYGEN SATURATION: 98 % | HEART RATE: 73 BPM | HEIGHT: 64 IN | WEIGHT: 144 LBS | BODY MASS INDEX: 24.59 KG/M2 | SYSTOLIC BLOOD PRESSURE: 138 MMHG

## 2020-09-21 DIAGNOSIS — T63.301A SPIDER BITE WOUND, ACCIDENTAL OR UNINTENTIONAL, INITIAL ENCOUNTER: Primary | ICD-10-CM

## 2020-09-21 PROCEDURE — 99212 OFFICE O/P EST SF 10 MIN: CPT | Performed by: PHYSICIAN ASSISTANT

## 2020-09-21 PROCEDURE — 3075F SYST BP GE 130 - 139MM HG: CPT | Performed by: PHYSICIAN ASSISTANT

## 2020-09-21 PROCEDURE — 3008F BODY MASS INDEX DOCD: CPT | Performed by: PHYSICIAN ASSISTANT

## 2020-09-21 PROCEDURE — 99213 OFFICE O/P EST LOW 20 MIN: CPT | Performed by: PHYSICIAN ASSISTANT

## 2020-09-21 PROCEDURE — 3079F DIAST BP 80-89 MM HG: CPT | Performed by: PHYSICIAN ASSISTANT

## 2020-09-21 RX ORDER — ALPRAZOLAM 0.25 MG/1
0.25 TABLET ORAL 3 TIMES DAILY PRN
COMMUNITY
Start: 2020-08-31 | End: 2021-08-20

## 2020-09-21 RX ORDER — DOXYCYCLINE HYCLATE 100 MG
100 TABLET ORAL 2 TIMES DAILY
Qty: 14 TABLET | Refills: 0 | Status: SHIPPED | OUTPATIENT
Start: 2020-09-21 | End: 2020-09-28

## 2020-09-21 NOTE — PROGRESS NOTES
HPI:    Patient ID: Denise Sol is a 38year old female. Patient presents for spider bite on her left flank for the past days. No fever/chills. No draining. No pain. No erythema noted. Has not tried anything for the spider bite.  Hx of allergies to Cardiovascular: Normal rate, regular rhythm and normal heart sounds. Pulmonary/Chest: Effort normal and breath sounds normal. She has no wheezes. She has no rales. Neurological: She is alert and oriented to person, place, and time.    Skin: Skin is war

## 2020-09-22 ENCOUNTER — TELEMEDICINE (OUTPATIENT)
Dept: INTEGRATIVE MEDICINE | Facility: CLINIC | Age: 45
End: 2020-09-22

## 2020-09-22 DIAGNOSIS — Z85.3 HISTORY OF BREAST CANCER: ICD-10-CM

## 2020-09-22 DIAGNOSIS — E03.9 HYPOTHYROIDISM, UNSPECIFIED TYPE: Primary | ICD-10-CM

## 2020-09-22 DIAGNOSIS — G47.00 INSOMNIA, UNSPECIFIED TYPE: ICD-10-CM

## 2020-09-22 DIAGNOSIS — I10 ESSENTIAL HYPERTENSION: ICD-10-CM

## 2020-09-22 DIAGNOSIS — E89.0 HYPOTHYROIDISM FOLLOWING RADIOIODINE THERAPY: ICD-10-CM

## 2020-09-22 DIAGNOSIS — R53.82 CHRONIC FATIGUE: ICD-10-CM

## 2020-09-22 DIAGNOSIS — B37.9 CANDIDIASIS: ICD-10-CM

## 2020-09-22 DIAGNOSIS — F41.1 GENERALIZED ANXIETY DISORDER: ICD-10-CM

## 2020-09-22 PROCEDURE — 99214 OFFICE O/P EST MOD 30 MIN: CPT | Performed by: FAMILY MEDICINE

## 2020-09-22 NOTE — PROGRESS NOTES
Denise Sol is a 38year old female. Patient presents with: Follow - Up      HPI:     Still very tired. Only sleeping with xanax. Ashwagandha didn't work. Hasn't tried the rescue sleep. Hardly sleeping, unable to shut her brain off.    With NP jaylan Alzheimers, Grandmother [SIDE NOT STATED]   • Cancer Cousin 27        ovarian cancer / maternal cousin   • Diabetes Paternal Grandmother    • Cancer Paternal Aunt         leukemia   • Cancer Paternal Uncle         prostate   • Breast Cancer Self 27 Occupation:         Employer: [employer] [address] [place] [address] resource strain: Not on file      Food insecurity        Worry: Not on file        Inability: Not on file      Transportation needs        Medical: No Past Surgical History:   Procedure Laterality Date   • Appendectomy  1979   • Breast surgery  12/9/2015    Revision of bilateral reconstructed breasts   • Breast surgery  12/9/2015    Fat grafting to left reconstructed breast   • Breast surgery Bilateral 2 - MEDICAL NUTRITIONAL THERAPY ([place]) - INTERNAL REFERRAL    4. Insomnia, unspecified type  - MEDICAL NUTRITIONAL THERAPY ([place]) - INTERNAL REFERRAL    5. Chronic fatigue    6. Essential hypertension    7. Generalized anxiety disorder    8.  History Recommendations: Your thyroid labs are ordered. To schedule your testing please call Central Scheduling at [phone].     To help sleep (start one at a time; can use all together if needed):      UltraDream by Gilberto    Directions: 1 ml before bedtime

## 2020-09-26 ENCOUNTER — PATIENT MESSAGE (OUTPATIENT)
Dept: FAMILY MEDICINE CLINIC | Facility: CLINIC | Age: 45
End: 2020-09-26

## 2020-09-26 ENCOUNTER — NURSE TRIAGE (OUTPATIENT)
Dept: FAMILY MEDICINE CLINIC | Facility: CLINIC | Age: 45
End: 2020-09-26

## 2020-09-26 NOTE — TELEPHONE ENCOUNTER
Veda Yates PA-C 15 minutes ago (9:07 AM)        Good morning. I need your advice because yesterday I went to bathroom and my poop came out and felt like it scrapped as it came out and started bleeding.  This morning was the same t

## 2020-09-26 NOTE — TELEPHONE ENCOUNTER
Scheduled for Carmen Payne 10/1/2020 at 3:30 pm [place]. Advised at-door screening, wear mask, come alone. Advised if symptoms worse, rectal bleeding to turn toilet water red, clots passed to go to ER.  Patient offered appt sooner than 10/1/2020 but no si

## 2020-09-26 NOTE — TELEPHONE ENCOUNTER
From: Filomena Hardy  To: Sarah Ty PA-C  Sent: 9/26/2020 9:07 AM CDT  Subject: Other    Good morning. I need your advice because yesterday I went to bathroom and my poop came out and felt like it scrapped as it came out and started bleeding.  Tawanda Arce

## 2020-09-28 ENCOUNTER — TELEPHONE (OUTPATIENT)
Dept: FAMILY MEDICINE CLINIC | Facility: CLINIC | Age: 45
End: 2020-09-28

## 2020-09-28 ENCOUNTER — PATIENT MESSAGE (OUTPATIENT)
Dept: FAMILY MEDICINE CLINIC | Facility: CLINIC | Age: 45
End: 2020-09-28

## 2020-09-28 ENCOUNTER — TELEMEDICINE (OUTPATIENT)
Dept: FAMILY MEDICINE CLINIC | Facility: CLINIC | Age: 45
End: 2020-09-28
Payer: COMMERCIAL

## 2020-09-28 DIAGNOSIS — K62.5 RECTAL BLEEDING: Primary | ICD-10-CM

## 2020-09-28 PROCEDURE — 99213 OFFICE O/P EST LOW 20 MIN: CPT | Performed by: PHYSICIAN ASSISTANT

## 2020-09-28 NOTE — PROGRESS NOTES
Please note that the following visit was completed using two-way, real-time interactive audio and/or video communication.   This has been done in good rebel to provide continuity of care in the best interest of the provider-patient relationship, due to the resolved; do not repeat more than 2 tabs in 24 hours 9 tablet 3   • thyroid 90 MG Oral Tab Take 1 tablet (90 mg total) by mouth daily. 60 tablet 0   • Metoprolol-hydroCHLOROthiazide 50-25 MG Oral Tab Take 1 tablet by mouth 2 (two) times daily.  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

## 2020-09-28 NOTE — TELEPHONE ENCOUNTER
From: Jhonatan Rosen  To: Minor Garibay PA-C  Sent: 9/28/2020 8:55 AM CDT  Subject: Other    Hi doctor. It seems like I'm having a bad month. First I got bit by a spider and now I am bleeding from my rectum.  About 5 days ago I passed a stool and it was s

## 2020-09-28 NOTE — TELEPHONE ENCOUNTER
Spoke with patient ( verified) RE: MyChart message below:    Patient denies fever, nausea, vomiting, diarrhea, chest pain or shortness of breath at this time--denies rectal bleeding independent of bowel movements.      \"I am not passing clots, but the t

## 2020-09-28 NOTE — TELEPHONE ENCOUNTER
Msg sent to call office. ----- Message from [place]. Estephanie Keene sent at 9/28/2020  8:55 AM CDT -----  Regarding: Other  Contact: [phone]  Hi doctor. It seems like I'm having a bad month.  First I got bit by a spider and now I am bleeding from my rectu

## 2020-09-30 NOTE — TELEPHONE ENCOUNTER
HOLD OFF ON REFILL - AWAIT RESPONSE FROM PATIENT. Replied to patient via [employer] if she is taking 2 tablets daily of Levoxyl.     LOV 11/15/2019  F/U 3/30/2020  F/U 5/6/2020
Pt responded via [facility] - RN filled per AM protocol 50 mcg daily per TE dtd 3/6/20.
0

## 2020-10-09 ENCOUNTER — LAB ENCOUNTER (OUTPATIENT)
Dept: LAB | Facility: HOSPITAL | Age: 45
End: 2020-10-09
Attending: FAMILY MEDICINE
Payer: COMMERCIAL

## 2020-10-09 DIAGNOSIS — E03.9 HYPOTHYROIDISM, UNSPECIFIED TYPE: ICD-10-CM

## 2020-10-09 PROCEDURE — 84443 ASSAY THYROID STIM HORMONE: CPT

## 2020-10-09 PROCEDURE — 84481 FREE ASSAY (FT-3): CPT

## 2020-10-09 PROCEDURE — 84439 ASSAY OF FREE THYROXINE: CPT

## 2020-10-09 PROCEDURE — 36415 COLL VENOUS BLD VENIPUNCTURE: CPT

## 2020-10-11 RX ORDER — LEVOTHYROXINE AND LIOTHYRONINE 9.5; 2.25 UG/1; UG/1
15 TABLET ORAL DAILY
Qty: 60 TABLET | Refills: 0 | Status: SHIPPED | OUTPATIENT
Start: 2020-10-11 | End: 2020-10-14

## 2020-10-12 RX ORDER — LEVOTHYROXINE, LIOTHYRONINE 57; 13.5 UG/1; UG/1
TABLET ORAL
Qty: 60 TABLET | Refills: 0 | OUTPATIENT
Start: 2020-10-12

## 2020-10-14 ENCOUNTER — TELEPHONE (OUTPATIENT)
Dept: INTEGRATIVE MEDICINE | Facility: CLINIC | Age: 45
End: 2020-10-14

## 2020-10-14 DIAGNOSIS — E03.9 HYPOTHYROIDISM, UNSPECIFIED TYPE: Primary | ICD-10-CM

## 2020-10-14 DIAGNOSIS — E03.9 HYPOTHYROIDISM, UNSPECIFIED TYPE: ICD-10-CM

## 2020-10-14 RX ORDER — LEVOTHYROXINE AND LIOTHYRONINE 9.5; 2.25 UG/1; UG/1
15 TABLET ORAL DAILY
Qty: 60 TABLET | Refills: 0 | Status: SHIPPED | OUTPATIENT
Start: 2020-10-14 | End: 2020-10-14

## 2020-10-14 RX ORDER — LEVOTHYROXINE AND LIOTHYRONINE 57; 13.5 UG/1; UG/1
90 TABLET ORAL DAILY
Qty: 60 TABLET | Refills: 0 | Status: SHIPPED | OUTPATIENT
Start: 2020-10-14 | End: 2020-10-14

## 2020-10-14 RX ORDER — LEVOTHYROXINE AND LIOTHYRONINE 57; 13.5 UG/1; UG/1
90 TABLET ORAL DAILY
Qty: 60 TABLET | Refills: 0 | Status: SHIPPED | OUTPATIENT
Start: 2020-10-14 | End: 2021-02-10

## 2020-10-14 RX ORDER — LEVOTHYROXINE AND LIOTHYRONINE 9.5; 2.25 UG/1; UG/1
15 TABLET ORAL DAILY
Qty: 60 TABLET | Refills: 0 | Status: SHIPPED | OUTPATIENT
Start: 2020-10-14 | End: 2021-04-07

## 2020-10-14 NOTE — TELEPHONE ENCOUNTER
A refill request was received for:  Requested Prescriptions     Pending Prescriptions Disp Refills   • thyroid 90 MG Oral Tab 60 tablet 0     Sig: Take 1 tablet (90 mg total) by mouth daily.      Last refill date: 8.12.20  Qty: 60 tabs  Last office visit: 9

## 2020-10-14 NOTE — TELEPHONE ENCOUNTER
Pt called and stated that pharmacy would not fill the thyroid RX, Rn has re ordered . Pt is to take both a 90 mg as well as 15 mg QD of NP thyroid. RN has re sent scripts and authorized to fill.

## 2020-10-14 NOTE — TELEPHONE ENCOUNTER
Ray with [facility] called to verify 90 mg thyroid rx. The rx I listed as NP thyroid, but in the notes states armour thyroid. Pharmacy wanted to verify rx before filling.

## 2020-10-15 ENCOUNTER — APPOINTMENT (OUTPATIENT)
Dept: LAB | Age: 45
End: 2020-10-15
Attending: PHYSICIAN ASSISTANT
Payer: COMMERCIAL

## 2020-10-15 ENCOUNTER — OFFICE VISIT (OUTPATIENT)
Dept: FAMILY MEDICINE CLINIC | Facility: CLINIC | Age: 45
End: 2020-10-15
Payer: COMMERCIAL

## 2020-10-15 ENCOUNTER — TELEPHONE (OUTPATIENT)
Dept: FAMILY MEDICINE CLINIC | Facility: CLINIC | Age: 45
End: 2020-10-15

## 2020-10-15 ENCOUNTER — LAB ENCOUNTER (OUTPATIENT)
Dept: LAB | Age: 45
End: 2020-10-15
Attending: PHYSICIAN ASSISTANT
Payer: COMMERCIAL

## 2020-10-15 ENCOUNTER — HOSPITAL ENCOUNTER (EMERGENCY)
Facility: HOSPITAL | Age: 45
Discharge: HOME | End: 2020-10-16
Attending: EMERGENCY MEDICINE
Payer: COMMERCIAL

## 2020-10-15 VITALS
SYSTOLIC BLOOD PRESSURE: 154 MMHG | BODY MASS INDEX: 24.41 KG/M2 | HEIGHT: 64 IN | DIASTOLIC BLOOD PRESSURE: 87 MMHG | HEART RATE: 71 BPM | WEIGHT: 143 LBS

## 2020-10-15 DIAGNOSIS — Z01.818 PRE-OP EXAMINATION: ICD-10-CM

## 2020-10-15 DIAGNOSIS — Z01.818 PRE-OP EXAMINATION: Primary | ICD-10-CM

## 2020-10-15 DIAGNOSIS — E87.6 HYPOKALEMIA: Primary | ICD-10-CM

## 2020-10-15 PROCEDURE — 3008F BODY MASS INDEX DOCD: CPT | Performed by: PHYSICIAN ASSISTANT

## 2020-10-15 PROCEDURE — 93010 ELECTROCARDIOGRAM REPORT: CPT | Performed by: EMERGENCY MEDICINE

## 2020-10-15 PROCEDURE — 99284 EMERGENCY DEPT VISIT MOD MDM: CPT

## 2020-10-15 PROCEDURE — 93005 ELECTROCARDIOGRAM TRACING: CPT

## 2020-10-15 PROCEDURE — 93010 ELECTROCARDIOGRAM REPORT: CPT | Performed by: PHYSICIAN ASSISTANT

## 2020-10-15 PROCEDURE — 85025 COMPLETE CBC W/AUTO DIFF WBC: CPT | Performed by: EMERGENCY MEDICINE

## 2020-10-15 PROCEDURE — 99212 OFFICE O/P EST SF 10 MIN: CPT | Performed by: PHYSICIAN ASSISTANT

## 2020-10-15 PROCEDURE — 36415 COLL VENOUS BLD VENIPUNCTURE: CPT | Performed by: PHYSICIAN ASSISTANT

## 2020-10-15 PROCEDURE — 96366 THER/PROPH/DIAG IV INF ADDON: CPT

## 2020-10-15 PROCEDURE — 96365 THER/PROPH/DIAG IV INF INIT: CPT

## 2020-10-15 PROCEDURE — 80053 COMPREHEN METABOLIC PANEL: CPT | Performed by: PHYSICIAN ASSISTANT

## 2020-10-15 PROCEDURE — 3077F SYST BP >= 140 MM HG: CPT | Performed by: PHYSICIAN ASSISTANT

## 2020-10-15 PROCEDURE — 99213 OFFICE O/P EST LOW 20 MIN: CPT | Performed by: PHYSICIAN ASSISTANT

## 2020-10-15 PROCEDURE — 85025 COMPLETE CBC W/AUTO DIFF WBC: CPT | Performed by: PHYSICIAN ASSISTANT

## 2020-10-15 PROCEDURE — 3079F DIAST BP 80-89 MM HG: CPT | Performed by: PHYSICIAN ASSISTANT

## 2020-10-15 RX ORDER — POTASSIUM CHLORIDE 20 MEQ/1
20 TABLET, EXTENDED RELEASE ORAL 2 TIMES DAILY
Qty: 10 TABLET | Refills: 0 | Status: SHIPPED | OUTPATIENT
Start: 2020-10-15 | End: 2020-10-20

## 2020-10-15 RX ORDER — HYDROCHLOROTHIAZIDE 25 MG/1
25 TABLET ORAL ONCE
Status: COMPLETED | OUTPATIENT
Start: 2020-10-15 | End: 2020-10-15

## 2020-10-15 RX ORDER — METOPROLOL TARTRATE 50 MG/1
50 TABLET, FILM COATED ORAL ONCE
Status: COMPLETED | OUTPATIENT
Start: 2020-10-15 | End: 2020-10-15

## 2020-10-15 RX ORDER — POTASSIUM CHLORIDE 14.9 MG/ML
20 INJECTION INTRAVENOUS ONCE
Status: COMPLETED | OUTPATIENT
Start: 2020-10-15 | End: 2020-10-16

## 2020-10-15 NOTE — TELEPHONE ENCOUNTER
Patient scheduled a pre-op appointment on 10/15 with PA Min due to having surgery on 11/12 with Dr Brendan Estes at [place] or [facility] .

## 2020-10-15 NOTE — TELEPHONE ENCOUNTER
The 15 mg dose should be [place] b/c the NP thyroid 15 mg was recalled. The other dose can be NP thyroid.

## 2020-10-15 NOTE — PROGRESS NOTES
HPI:     HPI  38year-old female is here in the office for Pre-Op. Patient will have Revision of bilateral reconstructed breasts by Dr Constance Mead on 11/12/2020. Patient is doing fine at this time.  Patient denies of chest pain, SOB, N/V/C/D, fever, dizziness, breast. radiotherapy and mastectomy    • Depression 2004    medication and therapy   • Encounter for therapeutic drug monitoring    • Esophageal reflux    • Exposure to medical diagnostic radiation    • Helicobacter pylori infection 2016   • Hemorrhoids, p breast cancer and leukemia   • Dementia Other         Alzheimers, Grandmother [SIDE NOT STATED]   • Cancer Cousin 30        ovarian cancer / maternal cousin   • Diabetes Paternal Grandmother    • Cancer Paternal Aunt         leukemia   • Cancer Paternal Hobby Hazards: Not Asked        Sleep Concern: Not Asked        Stress Concern: Not Asked        Weight Concern: Not Asked        Special Diet: Not Asked        Back Care: Not Asked        Exercise: Not Asked        Bike Helmet: Not Asked        Seat Belt: normal and normal heart sounds. No murmur heard. Pulmonary/Chest: Effort normal and breath sounds normal. She has no wheezes. She has no rales. She exhibits no tenderness. Abdominal: Soft. Bowel sounds are normal. She exhibits no distension.  There is

## 2020-10-15 NOTE — TELEPHONE ENCOUNTER
Spoke w/pharmacist - she stated that the Pt's 15 mg NP thyroid Rx was not recalled and has already been picked up.

## 2020-10-16 ENCOUNTER — APPOINTMENT (OUTPATIENT)
Dept: LAB | Age: 45
End: 2020-10-16
Attending: PHYSICIAN ASSISTANT
Payer: COMMERCIAL

## 2020-10-16 VITALS
BODY MASS INDEX: 25 KG/M2 | HEART RATE: 85 BPM | OXYGEN SATURATION: 100 % | TEMPERATURE: 99 F | SYSTOLIC BLOOD PRESSURE: 155 MMHG | RESPIRATION RATE: 18 BRPM | WEIGHT: 144 LBS | DIASTOLIC BLOOD PRESSURE: 119 MMHG

## 2020-10-16 PROBLEM — Z01.818 PRE-OP EXAMINATION: Status: ACTIVE | Noted: 2020-10-16

## 2020-10-16 PROCEDURE — 81001 URINALYSIS AUTO W/SCOPE: CPT | Performed by: PHYSICIAN ASSISTANT

## 2020-10-16 PROCEDURE — 87086 URINE CULTURE/COLONY COUNT: CPT | Performed by: PHYSICIAN ASSISTANT

## 2020-10-16 PROCEDURE — 87186 SC STD MICRODIL/AGAR DIL: CPT | Performed by: PHYSICIAN ASSISTANT

## 2020-10-16 PROCEDURE — 87088 URINE BACTERIA CULTURE: CPT | Performed by: PHYSICIAN ASSISTANT

## 2020-10-16 NOTE — ASSESSMENT & PLAN NOTE
Patient will have Revision of bilateral reconstructed breasts by Dr Ehsan Ty on 11/12/2020. Lab work up and EKG ordered.

## 2020-10-16 NOTE — ED PROVIDER NOTES
Patient Seen in: [facility] AND [facility] Emergency Department      History   Patient presents with:  Abnormal Result  Chest Pain Angina    Stated Complaint: abnormal results    HPI    54-year-old female with history of hypertension, here with complaints of abn APGAR: 9 (1min) 9 (5 min)  DR:  Noreen Zurita   • CHEMOTHERAPY     • COLONOSCOPY  2012   • CORE BIOPSY Right 2013    Right breast:  MRI core biopsies   • EGD     • EXPLOR FRONT SINUS,TRANSORBIT,UNI  2018   • FAT GRAFTING Bilatera °F (37.1 °C)   Temp src 10/15/20 2055 Oral   SpO2 10/15/20 2055 99 %   O2 Device 10/15/20 2055 None (Room air)       Current:BP (!) 155/105   Pulse 91   Temp 98.7 °F (37.1 °C) (Oral)   Resp 20   Wt 65.3 kg   LMP 10/13/2020   SpO2 99%   BMI 24.72 kg/m² 21.0 - 32.0 mmol/L    Anion Gap 4 0 - 18 mmol/L    BUN 14 7 - 18 mg/dL    Creatinine 0.85 0.55 - 1.02 mg/dL    BUN/CREA Ratio 16.5 10.0 - 20.0    Calcium, Total 10.0 8.5 - 10.1 mg/dL    Calculated Osmolality 287 275 - 295 mOsm/kg    GFR, Non-African Americ Collection Time: 10/15/20  9:30 PM   Result Value Ref Range    Glucose 143 (H) 70 - 99 mg/dL    Sodium 138 136 - 145 mmol/L    Potassium 2.3 (LL) 3.5 - 5.1 mmol/L    Chloride 99 98 - 112 mmol/L    CO2 32.0 21.0 - 32.0 mmol/L    Anion Gap 7 0 - 18 mmol/L the Emergency Department. They were informed of the dangers of undiagnosed and untreated hypertension.   Education regarding lifestyle modifications and the need for appropriate follow-up with their PCP to have their blood pressure re-checked within 1 week

## 2020-10-16 NOTE — ED INITIAL ASSESSMENT (HPI)
Pt had labs drawn for presurgical testing and her PMD called her and told her potassium was 2.6. Pt reports having left sided chest pain today, denies sob. Pt states shes had left sided body weakness for a week.

## 2020-10-16 NOTE — ED NOTES
Potassium infusion decreased to 35mL/hr d/t 'burning' at IV site. IV patent and flushed with 0.9 % NaCl without incidence of pain.

## 2020-10-16 NOTE — ED NOTES
Pt states she can tolerate the infusion without it burning when she puts pressure on a specific area with her thumb. Upper bicep was wrapped with an ace wrap.   Potassium infusion increased to 40 ml/hr as patient couldn't tolerate the full 50 mL/hr with th

## 2020-10-16 NOTE — TELEPHONE ENCOUNTER
On call physician received page re: critical potassium level. Pt seen earlier today for pre-op exam, scheduled for revision of b/l reconstruction of breasts with Dr. Zita Yi on 11/12/2020. Potassium level 2.5. Called pt at 2015, left VM.  Pt was inst

## 2020-10-17 ENCOUNTER — NURSE TRIAGE (OUTPATIENT)
Dept: FAMILY MEDICINE CLINIC | Facility: CLINIC | Age: 45
End: 2020-10-17

## 2020-10-17 RX ORDER — CIPROFLOXACIN 500 MG/1
500 TABLET, FILM COATED ORAL 2 TIMES DAILY
Qty: 10 TABLET | Refills: 0 | Status: SHIPPED | OUTPATIENT
Start: 2020-10-17 | End: 2020-10-26 | Stop reason: ALTCHOICE

## 2020-10-17 NOTE — TELEPHONE ENCOUNTER
Action Requested: Summary for Provider     []  Critical Lab, Recommendations Needed  [x] Need Additional Advice  []   FYI    []   Need Orders  [x] Need Medications Sent to Pharmacy  []  Other     SUMMARY: Dr. Mahsa Jeager: patient asked if possible can you

## 2020-10-17 NOTE — TELEPHONE ENCOUNTER
----- Message from [place]. Jackie Pennington sent at 10/17/2020  8:34 AM CDT -----  Regarding: Other  Contact: [phone]  Sorry to bother you but this UTI is really bothering me and wondered if I do have it? Since I did Urine test yesterday?   Just need antibiot

## 2020-10-21 ENCOUNTER — OFFICE VISIT (OUTPATIENT)
Dept: INTEGRATIVE MEDICINE | Facility: CLINIC | Age: 45
End: 2020-10-21

## 2020-10-21 DIAGNOSIS — Z71.3 NUTRITIONAL COUNSELING: ICD-10-CM

## 2020-10-21 DIAGNOSIS — B37.9 CANDIDIASIS: ICD-10-CM

## 2020-10-21 DIAGNOSIS — Z85.3 HISTORY OF BREAST CANCER: ICD-10-CM

## 2020-10-21 DIAGNOSIS — F41.1 GENERALIZED ANXIETY DISORDER: ICD-10-CM

## 2020-10-21 DIAGNOSIS — E89.0 HYPOTHYROIDISM FOLLOWING RADIOIODINE THERAPY: ICD-10-CM

## 2020-10-21 PROCEDURE — 97802 MEDICAL NUTRITION INDIV IN: CPT | Performed by: NUTRITIONIST

## 2020-10-21 NOTE — PROGRESS NOTES
Patient referred by Dr. Orquidea Inman  Did patient complete Nutritional Therapy Questionnaire? Email: [email or web address]    Tiffanie Franco is a 38year old female presenting for medical nutrition therapy in regards to optimizing overall health.   Describes and benefits and consented to medical nutrition therapy. See patient instructions    Future considerations:      Follow up in 3 weeks    Time spent with patient: 60 minutes    Dietary Supplements:   none    SOSA Narvaez  10/21/2020  2:53 PM

## 2020-10-26 ENCOUNTER — LAB ENCOUNTER (OUTPATIENT)
Dept: LAB | Age: 45
End: 2020-10-26
Attending: PHYSICIAN ASSISTANT
Payer: COMMERCIAL

## 2020-10-26 ENCOUNTER — HOSPITAL ENCOUNTER (OUTPATIENT)
Dept: GENERAL RADIOLOGY | Age: 45
Discharge: HOME | End: 2020-10-26
Attending: PHYSICIAN ASSISTANT
Payer: COMMERCIAL

## 2020-10-26 ENCOUNTER — OFFICE VISIT (OUTPATIENT)
Dept: FAMILY MEDICINE CLINIC | Facility: CLINIC | Age: 45
End: 2020-10-26
Payer: COMMERCIAL

## 2020-10-26 VITALS
WEIGHT: 145 LBS | DIASTOLIC BLOOD PRESSURE: 86 MMHG | SYSTOLIC BLOOD PRESSURE: 132 MMHG | BODY MASS INDEX: 24.75 KG/M2 | HEIGHT: 64 IN | HEART RATE: 88 BPM

## 2020-10-26 DIAGNOSIS — M25.532 LEFT WRIST PAIN: ICD-10-CM

## 2020-10-26 DIAGNOSIS — E87.6 HYPOKALEMIA: ICD-10-CM

## 2020-10-26 DIAGNOSIS — I10 ESSENTIAL HYPERTENSION: Primary | ICD-10-CM

## 2020-10-26 DIAGNOSIS — R94.31 ABNORMAL EKG: ICD-10-CM

## 2020-10-26 PROCEDURE — 80048 BASIC METABOLIC PNL TOTAL CA: CPT

## 2020-10-26 PROCEDURE — 73110 X-RAY EXAM OF WRIST: CPT | Performed by: PHYSICIAN ASSISTANT

## 2020-10-26 PROCEDURE — 3075F SYST BP GE 130 - 139MM HG: CPT | Performed by: PHYSICIAN ASSISTANT

## 2020-10-26 PROCEDURE — 3079F DIAST BP 80-89 MM HG: CPT | Performed by: PHYSICIAN ASSISTANT

## 2020-10-26 PROCEDURE — 3008F BODY MASS INDEX DOCD: CPT | Performed by: PHYSICIAN ASSISTANT

## 2020-10-26 PROCEDURE — 99212 OFFICE O/P EST SF 10 MIN: CPT | Performed by: PHYSICIAN ASSISTANT

## 2020-10-26 PROCEDURE — 99213 OFFICE O/P EST LOW 20 MIN: CPT | Performed by: PHYSICIAN ASSISTANT

## 2020-10-26 PROCEDURE — 36415 COLL VENOUS BLD VENIPUNCTURE: CPT

## 2020-10-26 RX ORDER — METOPROLOL SUCCINATE 50 MG/1
50 TABLET, EXTENDED RELEASE ORAL 2 TIMES DAILY
Qty: 180 TABLET | Refills: 1 | Status: SHIPPED | OUTPATIENT
Start: 2020-10-26 | End: 2020-10-26

## 2020-10-26 RX ORDER — LISINOPRIL 20 MG/1
20 TABLET ORAL DAILY
Qty: 30 TABLET | Refills: 2 | Status: SHIPPED | OUTPATIENT
Start: 2020-10-26 | End: 2020-11-25

## 2020-10-26 RX ORDER — METOPROLOL TARTRATE 50 MG/1
50 TABLET, FILM COATED ORAL 2 TIMES DAILY
Qty: 180 TABLET | Refills: 3 | Status: SHIPPED | OUTPATIENT
Start: 2020-10-26 | End: 2021-01-24

## 2020-10-26 NOTE — PROGRESS NOTES
HPI:     HPI   38year-old female is here in the office for follow up post ER due to hypokalemia. Patient stopped taking KCL for the past 2 days. Patient complaints of left wrist pain for the past week.    Patient denies of chest pain, SOB, N/V/C/D, fever, breast. radiotherapy and mastectomy    • Depression 2004    medication and therapy   • Encounter for therapeutic drug monitoring    • Esophageal reflux    • Exposure to medical diagnostic radiation    • Helicobacter pylori infection 2016   • Hemorrhoids, p breast cancer and leukemia   • Dementia Other         Alzheimers, Grandmother [SIDE NOT STATED]   • Cancer Cousin 30        ovarian cancer / maternal cousin   • Diabetes Paternal Grandmother    • Cancer Paternal Aunt         leukemia   • Cancer Paternal Hobby Hazards: Not Asked        Sleep Concern: Not Asked        Stress Concern: Not Asked        Weight Concern: Not Asked        Special Diet: Not Asked        Back Care: Not Asked        Exercise: Not Asked        Bike Helmet: Not Asked        Seat Belt: breath sounds normal. She has no wheezes. She has no rales. She exhibits no tenderness. Lymphadenopathy:     She has no cervical adenopathy. Neurological: She is alert and oriented to person, place, and time. She has normal reflexes.    Skin: No rash no

## 2020-10-27 NOTE — ASSESSMENT & PLAN NOTE
Discontinued Metoprolol-HCT 50 -25 mg PO BID. Change to Metoprolol tartrate 50 mg PO BID and Lisinopril 20 mg PO QAM. Advise patient to monitor BP and limit salt intake.

## 2020-10-30 ENCOUNTER — APPOINTMENT (OUTPATIENT)
Dept: LAB | Age: 45
End: 2020-10-30
Attending: PHYSICIAN ASSISTANT
Payer: COMMERCIAL

## 2020-10-30 PROCEDURE — 36415 COLL VENOUS BLD VENIPUNCTURE: CPT | Performed by: PHYSICIAN ASSISTANT

## 2020-10-30 PROCEDURE — 80048 BASIC METABOLIC PNL TOTAL CA: CPT | Performed by: PHYSICIAN ASSISTANT

## 2020-11-06 ENCOUNTER — TELEPHONE (OUTPATIENT)
Dept: FAMILY MEDICINE CLINIC | Facility: CLINIC | Age: 45
End: 2020-11-06

## 2020-11-06 NOTE — TELEPHONE ENCOUNTER
All paperwork required for clearance faxed to Oncology group at [facility] at number provided on form.

## 2020-11-09 ENCOUNTER — TELEPHONE (OUTPATIENT)
Dept: SURGERY | Facility: CLINIC | Age: 45
End: 2020-11-09

## 2020-11-09 ENCOUNTER — LAB ENCOUNTER (OUTPATIENT)
Dept: LAB | Age: 45
End: 2020-11-09
Attending: PHYSICIAN ASSISTANT
Payer: COMMERCIAL

## 2020-11-09 ENCOUNTER — APPOINTMENT (OUTPATIENT)
Dept: LAB | Age: 45
End: 2020-11-09
Attending: SURGERY
Payer: COMMERCIAL

## 2020-11-09 DIAGNOSIS — Z01.818 PREOPERATIVE TESTING: ICD-10-CM

## 2020-11-09 DIAGNOSIS — Z01.818 PRE-OP EXAMINATION: ICD-10-CM

## 2020-11-09 PROCEDURE — 36415 COLL VENOUS BLD VENIPUNCTURE: CPT | Performed by: PHYSICIAN ASSISTANT

## 2020-11-09 PROCEDURE — 80048 BASIC METABOLIC PNL TOTAL CA: CPT | Performed by: PHYSICIAN ASSISTANT

## 2020-11-09 PROCEDURE — 81001 URINALYSIS AUTO W/SCOPE: CPT | Performed by: PHYSICIAN ASSISTANT

## 2020-11-09 NOTE — TELEPHONE ENCOUNTER
I called patient to see if she seen the Cardiologist. Patient did see her cardiologist. Medical clearance received and scan to media

## 2020-11-12 ENCOUNTER — ANESTHESIA EVENT (OUTPATIENT)
Dept: SURGERY | Facility: HOSPITAL | Age: 45
End: 2020-11-12
Payer: COMMERCIAL

## 2020-11-12 ENCOUNTER — HOSPITAL ENCOUNTER (OUTPATIENT)
Facility: HOSPITAL | Age: 45
Setting detail: HOSPITAL OUTPATIENT SURGERY
Discharge: HOME | End: 2020-11-12
Attending: SURGERY | Admitting: SURGERY
Payer: COMMERCIAL

## 2020-11-12 ENCOUNTER — ANESTHESIA (OUTPATIENT)
Dept: SURGERY | Facility: HOSPITAL | Age: 45
End: 2020-11-12
Payer: COMMERCIAL

## 2020-11-12 VITALS
HEIGHT: 64 IN | HEART RATE: 72 BPM | WEIGHT: 138.38 LBS | TEMPERATURE: 98 F | OXYGEN SATURATION: 95 % | SYSTOLIC BLOOD PRESSURE: 109 MMHG | BODY MASS INDEX: 23.63 KG/M2 | DIASTOLIC BLOOD PRESSURE: 52 MMHG | RESPIRATION RATE: 18 BRPM

## 2020-11-12 DIAGNOSIS — Z90.13 ACQUIRED ABSENCE OF BOTH BREASTS: ICD-10-CM

## 2020-11-12 DIAGNOSIS — Z01.818 PREOPERATIVE TESTING: Primary | ICD-10-CM

## 2020-11-12 PROCEDURE — 81025 URINE PREGNANCY TEST: CPT

## 2020-11-12 PROCEDURE — 0HBV0ZZ EXCISION OF BILATERAL BREAST, OPEN APPROACH: ICD-10-PCS | Performed by: SURGERY

## 2020-11-12 PROCEDURE — 88305 TISSUE EXAM BY PATHOLOGIST: CPT | Performed by: SURGERY

## 2020-11-12 RX ORDER — ACETAMINOPHEN AND CODEINE PHOSPHATE 300; 30 MG/1; MG/1
1-2 TABLET ORAL EVERY 4 HOURS PRN
Qty: 40 TABLET | Refills: 0 | Status: SHIPPED | OUTPATIENT
Start: 2020-11-12 | End: 2020-12-04 | Stop reason: ALTCHOICE

## 2020-11-12 RX ORDER — METOCLOPRAMIDE 10 MG/1
10 TABLET ORAL ONCE
Status: DISCONTINUED | OUTPATIENT
Start: 2020-11-12 | End: 2020-11-12 | Stop reason: HOSPADM

## 2020-11-12 RX ORDER — FAMOTIDINE 20 MG/1
20 TABLET ORAL ONCE
Status: DISCONTINUED | OUTPATIENT
Start: 2020-11-12 | End: 2020-11-12 | Stop reason: HOSPADM

## 2020-11-12 RX ORDER — ACETAMINOPHEN 500 MG
1000 TABLET ORAL ONCE
Status: COMPLETED | OUTPATIENT
Start: 2020-11-12 | End: 2020-11-12

## 2020-11-12 RX ORDER — HYDROMORPHONE HYDROCHLORIDE 1 MG/ML
0.6 INJECTION, SOLUTION INTRAMUSCULAR; INTRAVENOUS; SUBCUTANEOUS EVERY 5 MIN PRN
Status: DISCONTINUED | OUTPATIENT
Start: 2020-11-12 | End: 2020-11-12

## 2020-11-12 RX ORDER — ONDANSETRON 4 MG/1
4 TABLET, FILM COATED ORAL EVERY 8 HOURS PRN
Qty: 20 TABLET | Refills: 0 | Status: SHIPPED | OUTPATIENT
Start: 2020-11-12 | End: 2020-12-04 | Stop reason: ALTCHOICE

## 2020-11-12 RX ORDER — SODIUM CHLORIDE, SODIUM LACTATE, POTASSIUM CHLORIDE, CALCIUM CHLORIDE 600; 310; 30; 20 MG/100ML; MG/100ML; MG/100ML; MG/100ML
INJECTION, SOLUTION INTRAVENOUS CONTINUOUS
Status: DISCONTINUED | OUTPATIENT
Start: 2020-11-12 | End: 2020-11-12

## 2020-11-12 RX ORDER — EPHEDRINE SULFATE 50 MG/ML
INJECTION, SOLUTION INTRAVENOUS AS NEEDED
Status: DISCONTINUED | OUTPATIENT
Start: 2020-11-12 | End: 2020-11-12 | Stop reason: SURG

## 2020-11-12 RX ORDER — HYDROMORPHONE HYDROCHLORIDE 1 MG/ML
0.2 INJECTION, SOLUTION INTRAMUSCULAR; INTRAVENOUS; SUBCUTANEOUS EVERY 5 MIN PRN
Status: DISCONTINUED | OUTPATIENT
Start: 2020-11-12 | End: 2020-11-12

## 2020-11-12 RX ORDER — CEFAZOLIN SODIUM/WATER 2 G/20 ML
2 SYRINGE (ML) INTRAVENOUS ONCE
Status: COMPLETED | OUTPATIENT
Start: 2020-11-12 | End: 2020-11-12

## 2020-11-12 RX ORDER — MORPHINE SULFATE 10 MG/ML
6 INJECTION, SOLUTION INTRAMUSCULAR; INTRAVENOUS EVERY 10 MIN PRN
Status: DISCONTINUED | OUTPATIENT
Start: 2020-11-12 | End: 2020-11-12

## 2020-11-12 RX ORDER — HYDROMORPHONE HYDROCHLORIDE 1 MG/ML
0.4 INJECTION, SOLUTION INTRAMUSCULAR; INTRAVENOUS; SUBCUTANEOUS EVERY 5 MIN PRN
Status: DISCONTINUED | OUTPATIENT
Start: 2020-11-12 | End: 2020-11-12

## 2020-11-12 RX ORDER — HYDROCODONE BITARTRATE AND ACETAMINOPHEN 5; 325 MG/1; MG/1
2 TABLET ORAL AS NEEDED
Status: DISCONTINUED | OUTPATIENT
Start: 2020-11-12 | End: 2020-11-12

## 2020-11-12 RX ORDER — HALOPERIDOL 5 MG/ML
0.25 INJECTION INTRAMUSCULAR ONCE AS NEEDED
Status: DISCONTINUED | OUTPATIENT
Start: 2020-11-12 | End: 2020-11-12

## 2020-11-12 RX ORDER — ONDANSETRON 2 MG/ML
INJECTION INTRAMUSCULAR; INTRAVENOUS AS NEEDED
Status: DISCONTINUED | OUTPATIENT
Start: 2020-11-12 | End: 2020-11-12 | Stop reason: SURG

## 2020-11-12 RX ORDER — MORPHINE SULFATE 4 MG/ML
4 INJECTION, SOLUTION INTRAMUSCULAR; INTRAVENOUS EVERY 10 MIN PRN
Status: DISCONTINUED | OUTPATIENT
Start: 2020-11-12 | End: 2020-11-12

## 2020-11-12 RX ORDER — NALOXONE HYDROCHLORIDE 0.4 MG/ML
80 INJECTION, SOLUTION INTRAMUSCULAR; INTRAVENOUS; SUBCUTANEOUS AS NEEDED
Status: DISCONTINUED | OUTPATIENT
Start: 2020-11-12 | End: 2020-11-12

## 2020-11-12 RX ORDER — MORPHINE SULFATE 4 MG/ML
2 INJECTION, SOLUTION INTRAMUSCULAR; INTRAVENOUS EVERY 10 MIN PRN
Status: DISCONTINUED | OUTPATIENT
Start: 2020-11-12 | End: 2020-11-12

## 2020-11-12 RX ORDER — ONDANSETRON 2 MG/ML
4 INJECTION INTRAMUSCULAR; INTRAVENOUS ONCE AS NEEDED
Status: COMPLETED | OUTPATIENT
Start: 2020-11-12 | End: 2020-11-12

## 2020-11-12 RX ORDER — PROCHLORPERAZINE EDISYLATE 5 MG/ML
5 INJECTION INTRAMUSCULAR; INTRAVENOUS ONCE AS NEEDED
Status: DISCONTINUED | OUTPATIENT
Start: 2020-11-12 | End: 2020-11-12

## 2020-11-12 RX ORDER — ROCURONIUM BROMIDE 10 MG/ML
INJECTION, SOLUTION INTRAVENOUS AS NEEDED
Status: DISCONTINUED | OUTPATIENT
Start: 2020-11-12 | End: 2020-11-12 | Stop reason: SURG

## 2020-11-12 RX ORDER — LIDOCAINE HYDROCHLORIDE 10 MG/ML
INJECTION, SOLUTION EPIDURAL; INFILTRATION; INTRACAUDAL; PERINEURAL AS NEEDED
Status: DISCONTINUED | OUTPATIENT
Start: 2020-11-12 | End: 2020-11-12 | Stop reason: SURG

## 2020-11-12 RX ORDER — METOPROLOL TARTRATE 5 MG/5ML
2.5 INJECTION INTRAVENOUS ONCE
Status: DISCONTINUED | OUTPATIENT
Start: 2020-11-12 | End: 2020-11-12

## 2020-11-12 RX ORDER — HYDROCODONE BITARTRATE AND ACETAMINOPHEN 5; 325 MG/1; MG/1
1 TABLET ORAL AS NEEDED
Status: DISCONTINUED | OUTPATIENT
Start: 2020-11-12 | End: 2020-11-12

## 2020-11-12 RX ORDER — DEXAMETHASONE SODIUM PHOSPHATE 4 MG/ML
VIAL (ML) INJECTION AS NEEDED
Status: DISCONTINUED | OUTPATIENT
Start: 2020-11-12 | End: 2020-11-12 | Stop reason: SURG

## 2020-11-12 RX ORDER — DOCUSATE SODIUM 100 MG/1
100 CAPSULE, LIQUID FILLED ORAL 2 TIMES DAILY
Qty: 40 CAPSULE | Refills: 0 | Status: SHIPPED | OUTPATIENT
Start: 2020-11-12 | End: 2020-12-04 | Stop reason: ALTCHOICE

## 2020-11-12 RX ADMIN — CEFAZOLIN SODIUM/WATER 2 G: 2 G/20 ML SYRINGE (ML) INTRAVENOUS at 10:35:00

## 2020-11-12 RX ADMIN — DEXAMETHASONE SODIUM PHOSPHATE 4 MG: 4 MG/ML VIAL (ML) INJECTION at 10:38:00

## 2020-11-12 RX ADMIN — LIDOCAINE HYDROCHLORIDE 50 MG: 10 INJECTION, SOLUTION EPIDURAL; INFILTRATION; INTRACAUDAL; PERINEURAL at 10:27:00

## 2020-11-12 RX ADMIN — EPHEDRINE SULFATE 10 MG: 50 INJECTION, SOLUTION INTRAVENOUS at 11:26:00

## 2020-11-12 RX ADMIN — ROCURONIUM BROMIDE 20 MG: 10 INJECTION, SOLUTION INTRAVENOUS at 11:26:00

## 2020-11-12 RX ADMIN — ROCURONIUM BROMIDE 30 MG: 10 INJECTION, SOLUTION INTRAVENOUS at 11:38:00

## 2020-11-12 RX ADMIN — ONDANSETRON 4 MG: 2 INJECTION INTRAMUSCULAR; INTRAVENOUS at 10:38:00

## 2020-11-12 RX ADMIN — ROCURONIUM BROMIDE 50 MG: 10 INJECTION, SOLUTION INTRAVENOUS at 10:32:00

## 2020-11-12 NOTE — ANESTHESIA PREPROCEDURE EVALUATION
Anesthesia PreOp Note    HPI:     Joel Madera is a 38year old female who presents for preoperative consultation requested by: Rome Hankins MD    Date of Surgery: 11/12/2020    Procedure(s):  BREAST RECONSTRUCTION SECOND STAGE/ REVISION  Indication: breast. radiotherapy and mastectomy    • Depression 2004    medication and therapy   • Disorder of thyroid    • Encounter for therapeutic drug monitoring    • Esophageal reflux    • Exposure to medical diagnostic radiation    • Helicobacter pylori infectio UPPER GI ENDOSCOPY,EXAM           •  lisinopril 20 MG Oral Tab, Take 1 tablet (20 mg total) by mouth daily. , Disp: 30 tablet, Rfl: 2, 11/11/2020 at 1000    •  Metoprolol Tartrate 50 MG Oral Tab, Take 1 tablet (50 mg total) by mouth 2 (two) times daily. Hedy Davidson LATEX  Norco [Hydrocodone-*    HIVES, NAUSEA AND VOMITING  Other                   HIVES    Comment:tegaderm  Tramadol                HIVES, NAUSEA ONLY  Hydrocodone             DIZZINESS    Comment:NAUSEA  Augmentin [Amoxicil*    DIARRHEA  Cephalexin connections        Talks on phone: Not on file        Gets together: Not on file        Attends Uats[employer] service: Not on file        Active member of club or organization: Not on file        Attends meetings of clubs or organizations: Not on file        R and her pulse is 71. Her respiration is 16 and oxygen saturation is 99%.     11/10/20  1522 11/12/20  0853   BP:  132/76   Pulse:  71   Resp:  16   Temp:  97.8 °F (36.6 °C)   TempSrc:  Oral   SpO2:  99%   Weight: 63.5 kg (140 lb) 62.8 kg (138 lb 6.4 oz)   H

## 2020-11-12 NOTE — H&P
Dr. Cristin An (cardiology) surgical clearance H&P reviewed from 11/5/20. No interval changes. Informed consent obtained and she wishes to proceed as planned.

## 2020-11-12 NOTE — BRIEF OP NOTE
Pre-Operative Diagnosis: Acquired absence of both breasts [Z90.13]     Post-Operative Diagnosis: Acquired absence of both breasts [Z90.13]      Procedure Performed:   Procedure(s):   Revision of bilateral reconstructed breasts    Surgeon(s) and Role:     *

## 2020-11-12 NOTE — ANESTHESIA PROCEDURE NOTES
Airway  Date/Time: 11/12/2020 10:39 AM  Urgency: Elective    Airway not difficult    General Information and Staff    Patient location during procedure: OR  Anesthesiologist: Laurie Parada MD  Performed: anesthesiologist     Indications and Patient Condi

## 2020-11-12 NOTE — ANESTHESIA POSTPROCEDURE EVALUATION
Patient: Chichi Shelton    Procedure Summary     Date: 11/12/20 Room / Location: [facility] OR 03 / [facility] OR    Anesthesia Start: 5406 Anesthesia Stop: 9927    Procedure: BREAST RECONSTRUCTION SECOND STAGE/ REVISION (Bilateral ) Diagnosis:       Acquire

## 2020-11-13 NOTE — OPERATIVE REPORT
[facility]    PATIENT'S NAME: Amelie Phoenix S   ATTENDING PHYSICIAN: Hawk Lerma MD   OPERATING PHYSICIAN: Hawk Lerma MD   PATIENT ACCOUNT#:   [id number]    LOCATION:  [facility] 4 [facility] 10  MEDICAL RECORD #:   [id number]       DATE Max  Stab incisions were created in the lower portion of the previous vertical scars. Then, 350 mL of tumescent solution was then infiltrated into each breast.  After waiting for the tumescence to take effect, liposuction of bilateral breasts was performed.   A

## 2020-11-16 ENCOUNTER — TELEPHONE (OUTPATIENT)
Dept: SURGERY | Facility: CLINIC | Age: 45
End: 2020-11-16

## 2020-11-20 ENCOUNTER — OFFICE VISIT (OUTPATIENT)
Dept: SURGERY | Facility: CLINIC | Age: 45
End: 2020-11-20
Payer: COMMERCIAL

## 2020-11-20 DIAGNOSIS — Z90.13 ACQUIRED ABSENCE OF BOTH BREASTS: Primary | ICD-10-CM

## 2020-11-20 PROCEDURE — 99024 POSTOP FOLLOW-UP VISIT: CPT | Performed by: PHYSICIAN ASSISTANT

## 2020-11-20 NOTE — PROGRESS NOTES
Yash Butcher is a 54year old female who presents today for a follow-up after revision of bilateral reconstructed breasts on 11/12/2020. She has a history of bilateral mastectomy and flap based reconstruction. She denies fever and chills.  She denies n

## 2020-11-24 ENCOUNTER — TELEPHONE (OUTPATIENT)
Dept: SURGERY | Facility: CLINIC | Age: 45
End: 2020-11-24

## 2020-11-25 ENCOUNTER — SOCIAL WORK SERVICES (OUTPATIENT)
Dept: HEMATOLOGY/ONCOLOGY | Facility: HOSPITAL | Age: 45
End: 2020-11-25

## 2020-11-25 NOTE — PROGRESS NOTES
SW received paperwork for the pt's LA Leave of Absence from surgery date 11/12. Per Surgical team, period for recovery is 6 weeks, 12/24.  SW verified this date as pt had been hopeful for date in the new year, but this is beyond the recommended recovery t

## 2020-11-29 ENCOUNTER — PATIENT MESSAGE (OUTPATIENT)
Dept: INTEGRATIVE MEDICINE | Facility: CLINIC | Age: 45
End: 2020-11-29

## 2020-11-29 DIAGNOSIS — E89.0 HYPOTHYROIDISM FOLLOWING RADIOIODINE THERAPY: Primary | ICD-10-CM

## 2020-12-04 ENCOUNTER — LAB ENCOUNTER (OUTPATIENT)
Dept: LAB | Facility: HOSPITAL | Age: 45
End: 2020-12-04
Attending: FAMILY MEDICINE
Payer: COMMERCIAL

## 2020-12-04 ENCOUNTER — OFFICE VISIT (OUTPATIENT)
Dept: SURGERY | Facility: CLINIC | Age: 45
End: 2020-12-04
Payer: COMMERCIAL

## 2020-12-04 DIAGNOSIS — Z90.13 ACQUIRED ABSENCE OF BOTH BREASTS: Primary | ICD-10-CM

## 2020-12-04 DIAGNOSIS — E89.0 HYPOTHYROIDISM FOLLOWING RADIOIODINE THERAPY: ICD-10-CM

## 2020-12-04 PROCEDURE — 84439 ASSAY OF FREE THYROXINE: CPT

## 2020-12-04 PROCEDURE — 80048 BASIC METABOLIC PNL TOTAL CA: CPT | Performed by: PHYSICIAN ASSISTANT

## 2020-12-04 PROCEDURE — 36415 COLL VENOUS BLD VENIPUNCTURE: CPT | Performed by: PHYSICIAN ASSISTANT

## 2020-12-04 PROCEDURE — 99024 POSTOP FOLLOW-UP VISIT: CPT | Performed by: SURGERY

## 2020-12-04 PROCEDURE — 84481 FREE ASSAY (FT-3): CPT

## 2020-12-04 PROCEDURE — 84443 ASSAY THYROID STIM HORMONE: CPT

## 2020-12-04 NOTE — PROGRESS NOTES
Selam Wolfe is a 54year old female who presents today for a follow-up. She complains of discomfort of her left breast.  She is pleased with the size.     Physical Examination:  Breasts: Bilateral breast incisions are with intact Steri-Strips which we

## 2020-12-09 ENCOUNTER — PATIENT MESSAGE (OUTPATIENT)
Dept: FAMILY MEDICINE CLINIC | Facility: CLINIC | Age: 45
End: 2020-12-09

## 2020-12-10 NOTE — TELEPHONE ENCOUNTER
From: Tiki Henry  To: Andry Wilson PA-C  Sent: 12/9/2020 9:14 PM CST  Subject: Test Results Question    I've been on potassium pill for over a week and that's why blood work shows my potassium levels are great.  So I never gave it a chance to see

## 2020-12-15 RX ORDER — RIZATRIPTAN BENZOATE 10 MG/1
TABLET ORAL
Qty: 9 TABLET | Refills: 0 | Status: SHIPPED | OUTPATIENT
Start: 2020-12-15 | End: 2021-01-29

## 2020-12-17 ENCOUNTER — TELEMEDICINE (OUTPATIENT)
Dept: INTEGRATIVE MEDICINE | Facility: CLINIC | Age: 45
End: 2020-12-17
Payer: COMMERCIAL

## 2020-12-17 DIAGNOSIS — Z90.13 STATUS POST BILATERAL MASTECTOMY: ICD-10-CM

## 2020-12-17 DIAGNOSIS — E89.0 HYPOTHYROIDISM FOLLOWING RADIOIODINE THERAPY: Primary | ICD-10-CM

## 2020-12-17 DIAGNOSIS — Z85.3 HISTORY OF BREAST CANCER: ICD-10-CM

## 2020-12-17 DIAGNOSIS — G43.909 MIGRAINE WITHOUT STATUS MIGRAINOSUS, NOT INTRACTABLE, UNSPECIFIED MIGRAINE TYPE: ICD-10-CM

## 2020-12-17 PROCEDURE — 99214 OFFICE O/P EST MOD 30 MIN: CPT | Performed by: FAMILY MEDICINE

## 2020-12-17 NOTE — PROGRESS NOTES
Filomena Hardy is a 54year old female. Patient presents with: Follow - Up      HPI:     Got her period on Dec. 2nd, it was heavy, lasted until Dec. 10th. Then had a severe migraine. Then got one day of bleeding 2 days later.    Having more stress, spent for the visit. REVIEW OF SYSTEMS:   Review of Systems   Constitutional: Negative. HENT: Negative. Respiratory: Negative. Cardiovascular: Negative. Gastrointestinal: Negative.     Genitourinary:        Heavy period   Musculoskeletal: Neg Refill   • Rizatriptan Benzoate 10 MG Oral Tab take1 tablet by mouth as needed for headache-may repeat 1 tab in 2 hrs if headache not resolved; do not repeat more than 2 tabs in 24 hours 9 tablet 0   • Metoprolol Tartrate 50 MG Oral Tab Take 1 tablet (50 m abused: Not on file        Forced sexual activity: Not on file    Other Topics      Concerns:         Service: Not Asked        Blood Transfusions: Not Asked        Caffeine Concern: Yes          Soda, 1 can daily        Occupational Exposure: Not PHYSICAL EXAM:       Physical Exam   Constitutional: She is oriented to person, place, and time and well-developed, well-nourished, and in no distress. HENT:   Head: Normocephalic and atraumatic.    Eyes: Pupils are equal, round, and reactive to lig

## 2020-12-18 ENCOUNTER — APPOINTMENT (OUTPATIENT)
Dept: LAB | Age: 45
End: 2020-12-18
Attending: PHYSICIAN ASSISTANT
Payer: COMMERCIAL

## 2020-12-18 ENCOUNTER — LAB ENCOUNTER (OUTPATIENT)
Dept: LAB | Age: 45
End: 2020-12-18
Attending: PHYSICIAN ASSISTANT
Payer: COMMERCIAL

## 2020-12-18 PROCEDURE — 80048 BASIC METABOLIC PNL TOTAL CA: CPT | Performed by: PHYSICIAN ASSISTANT

## 2020-12-18 PROCEDURE — 36415 COLL VENOUS BLD VENIPUNCTURE: CPT | Performed by: PHYSICIAN ASSISTANT

## 2020-12-27 ENCOUNTER — PATIENT MESSAGE (OUTPATIENT)
Dept: FAMILY MEDICINE CLINIC | Facility: CLINIC | Age: 45
End: 2020-12-27

## 2020-12-28 ENCOUNTER — TELEPHONE (OUTPATIENT)
Dept: FAMILY MEDICINE CLINIC | Facility: CLINIC | Age: 45
End: 2020-12-28

## 2020-12-28 RX ORDER — LOSARTAN POTASSIUM 25 MG/1
25 TABLET ORAL DAILY
Qty: 30 TABLET | Refills: 3 | Status: SHIPPED | OUTPATIENT
Start: 2020-12-28 | End: 2021-01-23

## 2020-12-28 NOTE — TELEPHONE ENCOUNTER
----- Message from [place]. Gil Weaver sent at 12/27/2020 11:02 PM CST -----  Regarding: Prescription Question  Contact: [phone]  Hello.  I no longer want to take Lisinopril cause ever since I've been taking it I've developed a large rash on my forehead,

## 2020-12-28 NOTE — TELEPHONE ENCOUNTER
From: Shai St  To: Eugene Hill PA-C  Sent: 12/27/2020 11:05 PM CST  Subject: Prescription Question    Hello.  My family medicine practitioner has me on Lisinopril for over a month now and ever since I've been taking it I've developed a large rash

## 2020-12-28 NOTE — TELEPHONE ENCOUNTER
Please see medication question telephone encounter 12/28    Provider discontinued Lisinopril and started patient on Losartan

## 2020-12-28 NOTE — TELEPHONE ENCOUNTER
Spoke with pt,  verified  Pt was informed of Macel Roll PA  recommendation, pt stated understanding.

## 2021-01-06 ENCOUNTER — PATIENT MESSAGE (OUTPATIENT)
Dept: FAMILY MEDICINE CLINIC | Facility: CLINIC | Age: 46
End: 2021-01-06

## 2021-01-06 DIAGNOSIS — M79.641 RIGHT HAND PAIN: Primary | ICD-10-CM

## 2021-01-06 NOTE — TELEPHONE ENCOUNTER
From: Purvi Reynolds  To: Lawson Siemens, PA-C  Sent: 1/6/2021 10:32 AM CST  Subject: Other    Hello. Is there a way I can be referred to a hand specialist? Or to a doctor that specializes in nerve damage caused by radiation I had years ago?

## 2021-01-14 ENCOUNTER — TELEMEDICINE (OUTPATIENT)
Dept: INTEGRATIVE MEDICINE | Facility: CLINIC | Age: 46
End: 2021-01-14
Payer: COMMERCIAL

## 2021-01-14 DIAGNOSIS — B37.9 CANDIDIASIS: ICD-10-CM

## 2021-01-14 DIAGNOSIS — E89.0 HYPOTHYROIDISM FOLLOWING RADIOIODINE THERAPY: ICD-10-CM

## 2021-01-14 DIAGNOSIS — Z90.13 STATUS POST BILATERAL MASTECTOMY: Primary | ICD-10-CM

## 2021-01-14 DIAGNOSIS — L70.9 ACNE, UNSPECIFIED ACNE TYPE: ICD-10-CM

## 2021-01-14 PROCEDURE — 99214 OFFICE O/P EST MOD 30 MIN: CPT | Performed by: FAMILY MEDICINE

## 2021-01-14 RX ORDER — ADAPALENE 0.1 G/100G
1 CREAM TOPICAL NIGHTLY
Qty: 45 G | Refills: 0 | Status: SHIPPED | OUTPATIENT
Start: 2021-01-14 | End: 2021-03-12 | Stop reason: ALTCHOICE

## 2021-01-14 NOTE — PROGRESS NOTES
Martin Dumas is a 54year old female. Patient presents with: Follow - Up      HPI:     Started with pimples on her forehead 2 weeks ago. Started metoprolol 2.5 mos ago. Got bloating from lisinopril.    Advised to start losartan for kidney protecti Anxiety state    • Breast CA ([facility] [address]    • Breast pain in female    • Chronic pain    • DCIS (ductal carcinoma in situ) of breast 2013    Right breast. radiotherapy and mastectomy    • Depression 2004    medication and therapy   • Disorder of thyroid    • Enc Worry: Not on file        Inability: Not on file      Transportation needs        Medical: Not on file        Non-medical: Not on file    Tobacco Use      Smoking status: Never Smoker      Smokeless tobacco: Never Used    Substance and Sexual Activity 2015    Fat grafting to left reconstructed breast   • Breast surgery Bilateral 2/3/16    Bilateral nipple reconstruction   •   2008   •   2010    APGAR: 9 (1min) 9 (5 min)  DR:  Hayder Robertson   • Chemotherapy     • Colonoscop in this encounter. No orders of the defined types were placed in this encounter. Patient Instructions   I have complete rebel in the body's ability to heal and transform.     The products and items listed below (the “Products”)  and their claims hav

## 2021-01-19 ENCOUNTER — OFFICE VISIT (OUTPATIENT)
Dept: PAIN CLINIC | Facility: HOSPITAL | Age: 46
End: 2021-01-19
Attending: PHYSICIAN ASSISTANT
Payer: COMMERCIAL

## 2021-01-19 VITALS
BODY MASS INDEX: 24.75 KG/M2 | HEIGHT: 64 IN | RESPIRATION RATE: 18 BRPM | HEART RATE: 74 BPM | SYSTOLIC BLOOD PRESSURE: 140 MMHG | WEIGHT: 145 LBS | OXYGEN SATURATION: 99 % | DIASTOLIC BLOOD PRESSURE: 73 MMHG

## 2021-01-19 DIAGNOSIS — M17.0 PRIMARY OSTEOARTHRITIS OF BOTH KNEES: ICD-10-CM

## 2021-01-19 DIAGNOSIS — M50.30 DEGENERATIVE DISC DISEASE, CERVICAL: Primary | ICD-10-CM

## 2021-01-19 DIAGNOSIS — M48.02 SPINAL STENOSIS OF CERVICAL REGION: ICD-10-CM

## 2021-01-19 PROCEDURE — 99211 OFF/OP EST MAY X REQ PHY/QHP: CPT

## 2021-01-19 NOTE — PROGRESS NOTES
01/19/2021-presents ambulatory to [facility] to re establish care; Pt salvatore dominguez seen  8/12/2019; last injection 10/21/2019-ELPIDIO C6/7;  Pt reports that she had relief  1yr;  States now the pain is 10/10; And is interested in injection therapy again;   Examined by LILIA

## 2021-01-19 NOTE — CHRONIC PAIN
Initial Consultation Note  CC: neck pain, left arm radicular pain   HISTORY OF PRESENT ILLNESS:  Karen Palacio is a 54year old old female referred to the pain clinic by Dr. Robyn Herndon for neck pain with left arm radicular pain.  The pain originally started HIVES    Comment:tegaderm  Tramadol                HIVES, NAUSEA ONLY  Hydrocodone             DIZZINESS    Comment:NAUSEA  Augmentin [Amoxicil*    DIARRHEA  Cephalexin              DIARRHEA  Codeine                 NAUSEA ONLY    SURGICAL HISTORY: above  Weakness: as above  Weight Loss: Negative   Fever: Negative   Cardiovascular:  No current chest pain or palpitations   Respiratory:  No current shortness of breath   Gastrointestinal:  No active ulcer  Genitourinary:  Negative  Integumentary :  Lisa Ni hyperlipidemia   • Hypertension Mother    • Cancer Maternal Aunt 48        skin and breast cancer   • Skin cancer Maternal Grandfather    • Cancer Other 48        breast cancer and leukemia   • Dementia Other         Alzheimers, Grandmother [SIDE NOT STATE Not on file    Other Topics      Concerns:         Service: Not Asked        Blood Transfusions: Not Asked        Caffeine Concern: Yes          Soda, 1 can daily        Occupational Exposure: Not Asked        Hobby Hazards: Not Asked        Sleep Triceps 5/5 5/5   Brachioradialis 5/5 5/5   Wrist Flexors 5/5 5/5   Wrist Extensors 5/5 5/5   Intrinsic Hand 5/5 5/5    5/5 5/5     Hoffmans: negative  spurlings: negative  Empty Can: negative  Joint Exam: Normal  TPs:  Absent within cervical paraspi

## 2021-01-19 NOTE — CHRONIC PAIN
01/18/2021-presents ambulatory to [facility] to re establish care; pt las seen 8/12/2019; hx of ELPIDIO 10/21/19, but never followed up after injection; To day c/o  Examined by Dr. Tennille Price; refer to dictation for the POC.

## 2021-01-20 ENCOUNTER — DOCUMENTATION ONLY (OUTPATIENT)
Dept: PAIN CLINIC | Facility: HOSPITAL | Age: 46
End: 2021-01-20

## 2021-01-20 NOTE — PROGRESS NOTES
Procedure code 47915--FNYKYFA APPROVAL     [facility]--PA needed  [address] @ 9:23am at # [phone]  Spoke with Arnaldo Borja   All clinicals need to be faxed   Faxed to # [phone]  confirmation rc'vd   Once approved Dennie Bio will reach out to pt and schedule

## 2021-01-21 ENCOUNTER — TELEPHONE (OUTPATIENT)
Dept: FAMILY MEDICINE CLINIC | Facility: CLINIC | Age: 46
End: 2021-01-21

## 2021-01-21 NOTE — TELEPHONE ENCOUNTER
Patient states since starting on Losartan Potassium 25 mg she's been experiencing acne on her forehead, constipation and drowsiness.     Virtual appt scheduled with Samuel Salinas for 1/22 at 8:45 am.

## 2021-01-22 ENCOUNTER — TELEMEDICINE (OUTPATIENT)
Dept: FAMILY MEDICINE CLINIC | Facility: CLINIC | Age: 46
End: 2021-01-22
Payer: COMMERCIAL

## 2021-01-22 DIAGNOSIS — I10 ESSENTIAL HYPERTENSION: Primary | ICD-10-CM

## 2021-01-22 DIAGNOSIS — T78.40XA ALLERGY, INITIAL ENCOUNTER: ICD-10-CM

## 2021-01-22 DIAGNOSIS — R30.0 DYSURIA: ICD-10-CM

## 2021-01-22 PROCEDURE — 99213 OFFICE O/P EST LOW 20 MIN: CPT | Performed by: PHYSICIAN ASSISTANT

## 2021-01-22 RX ORDER — AMLODIPINE BESYLATE 5 MG/1
5 TABLET ORAL DAILY
Qty: 90 TABLET | Refills: 3 | Status: SHIPPED | OUTPATIENT
Start: 2021-01-22 | End: 2021-03-12 | Stop reason: ALTCHOICE

## 2021-01-22 NOTE — PROGRESS NOTES
HPI: HPI  Telehealth outside of [address] Verbal Consent   I conducted a telehealth visit with Dimple Smith today, 01/22/21, which was completed using two-way, real-time interactive audio and video communication.  This has been done in [place] There are no other concerns today. Medications:     Current Outpatient Medications   Medication Sig Dispense Refill   • amLODIPine Besylate 5 MG Oral Tab Take 1 tablet (5 mg total) by mouth daily.  90 tablet 3   • adapalene 0.1 % External Cream Apply 1 A therapeutic drug monitoring    • Esophageal reflux    • Exposure to medical diagnostic radiation    • Helicobacter pylori infection 2016   • Hemorrhoids, postpartum 2008   • High blood pressure    • Hx of constipation    • Migraines 1984   • Neuropathic pa breast cancer and leukemia   • Dementia Other         Alzheimers, Grandmother [SIDE NOT STATED]   • Cancer Cousin 30        ovarian cancer / maternal cousin   • Diabetes Paternal Grandmother    • Cancer Paternal Aunt         leukemia   • Cancer Paternal Un can daily        Occupational Exposure: Not Asked        Hobby Hazards: Not Asked        Sleep Concern: Not Asked        Stress Concern: Not Asked        Weight Concern: Not Asked        Special Diet: Not Asked        Back Care: Not Asked        Exercise: FOOD ALLERGY PROF    Dysuria        Relevant Orders    URINALYSIS WITH CULTURE REFLEX        Discussed plan of care with pt and pt is in agreement. All questions answered. Pt to call with questions or concerns. Video time: 7 minutes.

## 2021-01-29 RX ORDER — RIZATRIPTAN BENZOATE 10 MG/1
TABLET ORAL
Qty: 9 TABLET | Refills: 3 | Status: SHIPPED | OUTPATIENT
Start: 2021-01-29 | End: 2021-05-26

## 2021-02-04 ENCOUNTER — PATIENT MESSAGE (OUTPATIENT)
Dept: OBGYN CLINIC | Facility: CLINIC | Age: 46
End: 2021-02-04

## 2021-02-04 DIAGNOSIS — N92.6 IRREGULAR MENSES: Primary | ICD-10-CM

## 2021-02-04 NOTE — TELEPHONE ENCOUNTER
It could be normal given her age but we always want to check for abnormal reasons. I recommend that the patient get a pelvic ultrasound. Diagnosis would be Irregular menses. Call patient.

## 2021-02-05 ENCOUNTER — DOCUMENTATION ONLY (OUTPATIENT)
Dept: PAIN CLINIC | Facility: HOSPITAL | Age: 46
End: 2021-02-05

## 2021-02-05 NOTE — PROGRESS NOTES
Procedure code 43014--0606/00/4620    Meet Camargo at 8:56am at number [phone] with Rashida, no PA needed    Ref # [id number]  Order form faxed to the surgery center, confirmation rcv'd

## 2021-02-09 ENCOUNTER — DOCUMENTATION ONLY (OUTPATIENT)
Dept: PAIN CLINIC | Facility: HOSPITAL | Age: 46
End: 2021-02-09

## 2021-02-09 ENCOUNTER — LAB ENCOUNTER (OUTPATIENT)
Dept: LAB | Facility: HOSPITAL | Age: 46
End: 2021-02-09
Attending: PHYSICIAN ASSISTANT
Payer: COMMERCIAL

## 2021-02-09 DIAGNOSIS — T78.40XA ALLERGY, INITIAL ENCOUNTER: ICD-10-CM

## 2021-02-09 LAB
ANION GAP SERPL CALC-SCNC: 7 MMOL/L (ref 0–18)
BILIRUB UR QL: NEGATIVE
BUN BLD-MCNC: 17 MG/DL (ref 7–18)
BUN/CREAT SERPL: 26.2 (ref 10–20)
CALCIUM BLD-MCNC: 10 MG/DL (ref 8.5–10.1)
CHLORIDE SERPL-SCNC: 103 MMOL/L (ref 98–112)
CO2 SERPL-SCNC: 29 MMOL/L (ref 21–32)
COLOR UR: YELLOW
CREAT BLD-MCNC: 0.65 MG/DL
GLUCOSE BLD-MCNC: 87 MG/DL (ref 70–99)
GLUCOSE UR-MCNC: NEGATIVE MG/DL
HGB UR QL STRIP.AUTO: NEGATIVE
KETONES UR-MCNC: NEGATIVE MG/DL
LEUKOCYTE ESTERASE UR QL STRIP.AUTO: NEGATIVE
NITRITE UR QL STRIP.AUTO: NEGATIVE
OSMOLALITY SERPL CALC.SUM OF ELEC: 289 MOSM/KG (ref 275–295)
PATIENT FASTING Y/N/NP: NO
PH UR: 6 [PH] (ref 5–8)
POTASSIUM SERPL-SCNC: 3.4 MMOL/L (ref 3.5–5.1)
PROT UR-MCNC: NEGATIVE MG/DL
SODIUM SERPL-SCNC: 139 MMOL/L (ref 136–145)
SP GR UR STRIP: 1.02 (ref 1–1.03)
UROBILINOGEN UR STRIP-ACNC: <2

## 2021-02-09 PROCEDURE — 82785 ASSAY OF IGE: CPT

## 2021-02-09 PROCEDURE — 80048 BASIC METABOLIC PNL TOTAL CA: CPT | Performed by: PHYSICIAN ASSISTANT

## 2021-02-09 PROCEDURE — 81003 URINALYSIS AUTO W/O SCOPE: CPT | Performed by: PHYSICIAN ASSISTANT

## 2021-02-09 PROCEDURE — 86003 ALLG SPEC IGE CRUDE XTRC EA: CPT

## 2021-02-09 PROCEDURE — 36415 COLL VENOUS BLD VENIPUNCTURE: CPT | Performed by: PHYSICIAN ASSISTANT

## 2021-02-09 NOTE — PROGRESS NOTES
Procedure code 16491---06/9/21    [facility] ([facility]) and case has been approved       # [phone]  [facility] # [id number]  Valid from 02/9/21---05/10/21

## 2021-02-10 DIAGNOSIS — E87.6 HYPOKALEMIA: Primary | ICD-10-CM

## 2021-02-10 DIAGNOSIS — E03.9 HYPOTHYROIDISM, UNSPECIFIED TYPE: ICD-10-CM

## 2021-02-10 RX ORDER — LEVOTHYROXINE, LIOTHYRONINE 57; 13.5 UG/1; UG/1
TABLET ORAL
Qty: 90 TABLET | Refills: 1 | Status: SHIPPED | OUTPATIENT
Start: 2021-02-10 | End: 2021-08-20 | Stop reason: ALTCHOICE

## 2021-02-10 NOTE — TELEPHONE ENCOUNTER
A refill request was received for:  Requested Prescriptions     Pending Prescriptions Disp Refills   • NP THYROID 90 MG Oral Tab [Pharmacy Med Name: Np Thyroid 90 90 Mg Tab Acel] 90 tablet 1     Sig: TAKE ONE TABLET BY MOUTH ONE TIME DAILY     Last refill

## 2021-02-11 LAB
CLAM IGE QN: <0.1 KUA/L (ref ?–0.1)
CODFISH IGE QN: <0.1 KUA/L (ref ?–0.1)
CORN IGE QN: <0.1 KUA/L (ref ?–0.1)
COW MILK IGE QN: 0.22 KUA/L (ref ?–0.1)
EGG WHITE IGE QN: 0.61 KUA/L (ref ?–0.1)
IGE SERPL-ACNC: 194 KU/L (ref 2–214)
PEANUT IGE QN: <0.1 KUA/L (ref ?–0.1)
SCALLOP IGE QN: <0.1 KUA/L (ref ?–0.1)
SESAME SEED IGE QN: <0.1 KUA/L (ref ?–0.1)
SHRIMP IGE QN: <0.1 KUA/L (ref ?–0.1)
SOYBEAN IGE QN: <0.1 KUA/L (ref ?–0.1)
WALNUT IGE QN: <0.1 KUA/L (ref ?–0.1)
WHEAT IGE QN: <0.1 KUA/L (ref ?–0.1)

## 2021-03-08 ENCOUNTER — TELEPHONE (OUTPATIENT)
Dept: INTEGRATIVE MEDICINE | Facility: CLINIC | Age: 46
End: 2021-03-08

## 2021-03-08 NOTE — TELEPHONE ENCOUNTER
I spoke with pt, said x 2 mo has been getting her period 2-3 x per month. Period will last 6-9 days. Horrible migraines - onset with period onset. Pt does have an IUD placed, has not seen obgyne in over a year. Advised I would notify Dr. Marlene Salguero.  I also adv

## 2021-03-08 NOTE — TELEPHONE ENCOUNTER
Patient stated she is having a lot of severe cramping and menstrual cycle x3 month lasting a week or more.  Offered appt with Dr Riana Sloan for end of May, declined at this time / considering appt with FELICIA Golden but is requesting to first speak with

## 2021-03-09 NOTE — TELEPHONE ENCOUNTER
I recommend the following homeopathic supplement to help and I affirm that she should follow up with OB-gyne regarding the IUD.   Hormone Combination by [employer]    Directions: 3 drops under the tongue twice daily  Where to Find: ONLY online at w

## 2021-03-10 ENCOUNTER — NURSE TRIAGE (OUTPATIENT)
Dept: FAMILY MEDICINE CLINIC | Facility: CLINIC | Age: 46
End: 2021-03-10

## 2021-03-10 NOTE — TELEPHONE ENCOUNTER
Action Requested: Summary for Provider     []  Critical Lab, Recommendations Needed  [] Need Additional Advice  []   FYI    []   Need Orders  [] Need Medications Sent to Pharmacy  []  Other     SUMMARY: OV scheduled with PA for eval.    Pt states she has a

## 2021-03-12 ENCOUNTER — OFFICE VISIT (OUTPATIENT)
Dept: FAMILY MEDICINE CLINIC | Facility: CLINIC | Age: 46
End: 2021-03-12
Payer: COMMERCIAL

## 2021-03-12 ENCOUNTER — LAB ENCOUNTER (OUTPATIENT)
Dept: LAB | Age: 46
End: 2021-03-12
Attending: PHYSICIAN ASSISTANT
Payer: COMMERCIAL

## 2021-03-12 VITALS
WEIGHT: 145 LBS | DIASTOLIC BLOOD PRESSURE: 89 MMHG | HEART RATE: 73 BPM | HEIGHT: 64 IN | BODY MASS INDEX: 24.75 KG/M2 | SYSTOLIC BLOOD PRESSURE: 136 MMHG

## 2021-03-12 DIAGNOSIS — H01.002 BLEPHARITIS OF RIGHT LOWER EYELID, UNSPECIFIED TYPE: ICD-10-CM

## 2021-03-12 DIAGNOSIS — Z12.31 ENCOUNTER FOR SCREENING MAMMOGRAM FOR BREAST CANCER: Primary | ICD-10-CM

## 2021-03-12 LAB
ANION GAP SERPL CALC-SCNC: 4 MMOL/L (ref 0–18)
BUN BLD-MCNC: 14 MG/DL (ref 7–18)
BUN/CREAT SERPL: 22.2 (ref 10–20)
CALCIUM BLD-MCNC: 9.4 MG/DL (ref 8.5–10.1)
CHLORIDE SERPL-SCNC: 107 MMOL/L (ref 98–112)
CO2 SERPL-SCNC: 29 MMOL/L (ref 21–32)
CREAT BLD-MCNC: 0.63 MG/DL
GLUCOSE BLD-MCNC: 90 MG/DL (ref 70–99)
OSMOLALITY SERPL CALC.SUM OF ELEC: 290 MOSM/KG (ref 275–295)
PATIENT FASTING Y/N/NP: YES
POTASSIUM SERPL-SCNC: 4 MMOL/L (ref 3.5–5.1)
SODIUM SERPL-SCNC: 140 MMOL/L (ref 136–145)

## 2021-03-12 PROCEDURE — 99213 OFFICE O/P EST LOW 20 MIN: CPT | Performed by: PHYSICIAN ASSISTANT

## 2021-03-12 PROCEDURE — 3075F SYST BP GE 130 - 139MM HG: CPT | Performed by: PHYSICIAN ASSISTANT

## 2021-03-12 PROCEDURE — 80048 BASIC METABOLIC PNL TOTAL CA: CPT | Performed by: PHYSICIAN ASSISTANT

## 2021-03-12 PROCEDURE — 36415 COLL VENOUS BLD VENIPUNCTURE: CPT | Performed by: PHYSICIAN ASSISTANT

## 2021-03-12 PROCEDURE — 3079F DIAST BP 80-89 MM HG: CPT | Performed by: PHYSICIAN ASSISTANT

## 2021-03-12 PROCEDURE — 3008F BODY MASS INDEX DOCD: CPT | Performed by: PHYSICIAN ASSISTANT

## 2021-03-12 RX ORDER — TOBRAMYCIN 3 MG/ML
2 SOLUTION/ DROPS OPHTHALMIC EVERY 6 HOURS
Qty: 1 BOTTLE | Refills: 0 | Status: SHIPPED | OUTPATIENT
Start: 2021-03-12 | End: 2021-03-19

## 2021-03-12 RX ORDER — LOSARTAN POTASSIUM 25 MG/1
TABLET ORAL
COMMUNITY
Start: 2021-03-10 | End: 2021-05-07

## 2021-03-12 RX ORDER — METOPROLOL TARTRATE 50 MG/1
50 TABLET, FILM COATED ORAL 2 TIMES DAILY
COMMUNITY
Start: 2021-01-29 | End: 2022-01-31

## 2021-03-12 NOTE — PROGRESS NOTES
HPI:     HPI   54year-old female is here in the office complaining of bump inside the right lower eyelid for the past 3 days. It's redness and painful. Patient denies of fever, N/V, eye pain, eye discharge, visual changes.     Medications:     Current Outp radiotherapy and mastectomy    • Depression 2004    medication and therapy   • Encounter for therapeutic drug monitoring    • Exposure to medical diagnostic radiation    • Helicobacter pylori infection 2016   • Hemorrhoids, postpartum 2008   • Hx of chanelle Cancer Other 50        breast cancer and leukemia   • Dementia Other         Alzheimers, Grandmother [SIDE NOT STATED]   • Cancer Cousin 27        ovarian cancer / maternal cousin   • Diabetes Paternal Grandmother    • Cancer Paternal Aunt         leukemia Transportation Needs:       Lack of Transportation (Medical):       Lack of Transportation (Non-Medical):   Physical Activity:       Days of Exercise per Week:       Minutes of Exercise per Session:   Stress:       Feeling of Stress :   Social Connection General:         Right eye: No discharge. Left eye: No discharge. Extraocular Movements: Extraocular movements intact. Conjunctiva/sclera: Conjunctivae normal.   Cardiovascular:      Rate and Rhythm: Normal rate and regular rhythm.

## 2021-03-19 ENCOUNTER — HOSPITAL ENCOUNTER (OUTPATIENT)
Dept: MAMMOGRAPHY | Age: 46
Discharge: HOME | End: 2021-03-19
Attending: PHYSICIAN ASSISTANT
Payer: COMMERCIAL

## 2021-03-19 DIAGNOSIS — Z12.31 ENCOUNTER FOR SCREENING MAMMOGRAM FOR BREAST CANCER: ICD-10-CM

## 2021-03-19 PROCEDURE — 77067 SCR MAMMO BI INCL CAD: CPT | Performed by: PHYSICIAN ASSISTANT

## 2021-03-19 PROCEDURE — 77063 BREAST TOMOSYNTHESIS BI: CPT | Performed by: PHYSICIAN ASSISTANT

## 2021-03-22 ENCOUNTER — TELEPHONE (OUTPATIENT)
Dept: FAMILY MEDICINE CLINIC | Facility: CLINIC | Age: 46
End: 2021-03-22

## 2021-03-22 DIAGNOSIS — R92.8 ABNORMAL MAMMOGRAPHY: Primary | ICD-10-CM

## 2021-03-22 NOTE — TELEPHONE ENCOUNTER
Ari, order has been pended. CONCLUSION:  There is a focal asymmetry in the lower central left breast.  Dedicated left breast imaging is recommended for complete evaluation. BI-RADS CATEGORY:     DIAGNOSTIC CATEGORY 0--INCOMPLETE ASSESSMENT: NEED ADDITIONAL IMAGING EVALUATION. RECOMMENDATIONS:   ADDITIONAL MAMMOGRAPHIC VIEWS REQUIRED: LEFT BREAST  --We will call the patient back for additional views and issue an addendum report. ULTRASOUND: LEFT BREAST  --We will call the patient back for an ultrasound and provide an additional report.

## 2021-03-22 NOTE — TELEPHONE ENCOUNTER
Patient requesting to get her mammogram done today, states she has had cancer before and she is worried and stressed. Patient requesting order as soon as possible.

## 2021-03-22 NOTE — TELEPHONE ENCOUNTER
Radiology department requesting order for a Mammogram for additional views and possible ultrasound. Patient has been trying to schedule but no order available to schedule. Please advise.

## 2021-03-23 ENCOUNTER — PATIENT MESSAGE (OUTPATIENT)
Dept: FAMILY MEDICINE CLINIC | Facility: CLINIC | Age: 46
End: 2021-03-23

## 2021-03-23 NOTE — TELEPHONE ENCOUNTER
Spoke to patient, full name & D. O.B verified. Informed regarding mammogram being placed and scheduling department number was provided to patient.

## 2021-03-23 NOTE — TELEPHONE ENCOUNTER
It looks like Lawson Siemens did put an order in the system today for the additional views. Left message to call back-transfer to triage. [place] message sent to patient. Active order for COVID vaccine in the system.

## 2021-03-23 NOTE — TELEPHONE ENCOUNTER
From: Gary Bullard  To: Liz Daniel PA-C  Sent: 3/23/2021 9:29 AM CDT  Subject: Test Results Question    Good morning. Still waiting on your office to send orders for me to do additional mammo/ultrasound.  Radiology is saying they can't do anythin

## 2021-03-26 ENCOUNTER — APPOINTMENT (OUTPATIENT)
Dept: ULTRASOUND IMAGING | Age: 46
End: 2021-03-26
Attending: OBSTETRICS & GYNECOLOGY
Payer: COMMERCIAL

## 2021-03-26 ENCOUNTER — HOSPITAL ENCOUNTER (OUTPATIENT)
Dept: MAMMOGRAPHY | Facility: HOSPITAL | Age: 46
Discharge: HOME | End: 2021-03-26
Attending: PHYSICIAN ASSISTANT
Payer: COMMERCIAL

## 2021-03-26 DIAGNOSIS — R92.8 ABNORMAL MAMMOGRAM: ICD-10-CM

## 2021-03-26 PROCEDURE — 77061 BREAST TOMOSYNTHESIS UNI: CPT | Performed by: PHYSICIAN ASSISTANT

## 2021-03-26 PROCEDURE — 76641 ULTRASOUND BREAST COMPLETE: CPT | Performed by: PHYSICIAN ASSISTANT

## 2021-03-26 PROCEDURE — 77065 DX MAMMO INCL CAD UNI: CPT | Performed by: PHYSICIAN ASSISTANT

## 2021-04-07 DIAGNOSIS — E03.9 HYPOTHYROIDISM, UNSPECIFIED TYPE: ICD-10-CM

## 2021-04-07 RX ORDER — LEVOTHYROXINE AND LIOTHYRONINE 9.5; 2.25 UG/1; UG/1
15 TABLET ORAL DAILY
Qty: 90 TABLET | Refills: 1 | Status: SHIPPED | OUTPATIENT
Start: 2021-04-07 | End: 2021-08-20 | Stop reason: ALTCHOICE

## 2021-04-07 NOTE — TELEPHONE ENCOUNTER
A refill request was received for:  Requested Prescriptions     Pending Prescriptions Disp Refills   • NP THYROID 15 MG Oral Tab [Pharmacy Med Name: Np Thyroid 15 15 Mg Tab Acel] 60 tablet 0     Sig: Take 1 tablet by mouth daily.      Last refill date: 10.1

## 2021-04-09 ENCOUNTER — HOSPITAL ENCOUNTER (OUTPATIENT)
Dept: ULTRASOUND IMAGING | Facility: HOSPITAL | Age: 46
Discharge: HOME | End: 2021-04-09
Attending: OBSTETRICS & GYNECOLOGY
Payer: COMMERCIAL

## 2021-04-09 DIAGNOSIS — N92.6 IRREGULAR MENSES: ICD-10-CM

## 2021-04-09 PROCEDURE — 76856 US EXAM PELVIC COMPLETE: CPT | Performed by: OBSTETRICS & GYNECOLOGY

## 2021-04-09 PROCEDURE — 76830 TRANSVAGINAL US NON-OB: CPT | Performed by: OBSTETRICS & GYNECOLOGY

## 2021-04-12 ENCOUNTER — TELEPHONE (OUTPATIENT)
Dept: OBGYN CLINIC | Facility: CLINIC | Age: 46
End: 2021-04-12

## 2021-04-12 DIAGNOSIS — N92.6 IRREGULAR MENSES: Primary | ICD-10-CM

## 2021-04-12 NOTE — TELEPHONE ENCOUNTER
Patient name and  verified. Patient informed of [facility] ultrasound note. Per patient feels like she is being \"brushed off\" by office. Complaining of having 3 cycles per month that is heavy. Changing 5-6 pads per day.  Patient scheduled with [facility] on

## 2021-04-12 NOTE — TELEPHONE ENCOUNTER
Regarding: FW: Test Results Question  Contact: [phone]      ----- Message -----  From: Leeanne Moralez RN  Sent: 4/10/2021  12:44 PM CDT  To: Juli Ferrer MD  Subject: RE: Test Results Question                        ----- Message from Haily Sidhu

## 2021-04-12 NOTE — TELEPHONE ENCOUNTER
Approved by Provider. Please order TSH and Free T4. If they are off then this could be a reason for her irregular menses.

## 2021-04-12 NOTE — TELEPHONE ENCOUNTER
Pt scheduled to see [facility] on 04/19. Pt feels her thyroid is off and was wondering if [facility]m could order thyroid labs for her to have done before her appointment on the 19th?           Dominic Amaro MD  [facility] Clinical Staff 17 hours ago (4:52 PM)

## 2021-04-12 NOTE — TELEPHONE ENCOUNTER
Pt called and informed of results and recommendations. Pt voices understanding.  Pt has appointment with [facility]m on 04/19.       ----- Message from Roseanna Mosqueda MD sent at 4/9/2021  2:00 PM CDT -----  Pelvic ultrasound shows that patient's ovaries are norm

## 2021-04-12 NOTE — TELEPHONE ENCOUNTER
Regarding: FW: Test Results Question  Contact: [phone]      ----- Message -----  From: Mable Luu RN  Sent: 4/10/2021  12:44 PM CDT  To: Domenic Nunes MD  Subject: RE: Test Results Question                        ----- Message from Irene Mckay

## 2021-04-20 ENCOUNTER — LAB ENCOUNTER (OUTPATIENT)
Dept: LAB | Facility: HOSPITAL | Age: 46
End: 2021-04-20
Attending: OBSTETRICS & GYNECOLOGY
Payer: COMMERCIAL

## 2021-04-20 ENCOUNTER — TELEPHONE (OUTPATIENT)
Dept: OBGYN CLINIC | Facility: CLINIC | Age: 46
End: 2021-04-20

## 2021-04-20 ENCOUNTER — PATIENT MESSAGE (OUTPATIENT)
Dept: OBGYN CLINIC | Facility: CLINIC | Age: 46
End: 2021-04-20

## 2021-04-20 ENCOUNTER — TELEPHONE (OUTPATIENT)
Dept: INTEGRATIVE MEDICINE | Facility: CLINIC | Age: 46
End: 2021-04-20

## 2021-04-20 DIAGNOSIS — N92.6 IRREGULAR MENSES: ICD-10-CM

## 2021-04-20 PROCEDURE — 36415 COLL VENOUS BLD VENIPUNCTURE: CPT

## 2021-04-20 PROCEDURE — 84439 ASSAY OF FREE THYROXINE: CPT

## 2021-04-20 PROCEDURE — 84443 ASSAY THYROID STIM HORMONE: CPT

## 2021-04-20 NOTE — TELEPHONE ENCOUNTER
Patient has been getting period twice a week, gynecologist said that her lab work is abnormal.  Was told to contact you as soon as possible.     PH# [phone]

## 2021-04-20 NOTE — TELEPHONE ENCOUNTER
Routed to Dr. Florina Sykes for review and recommendations. Pt was advised to call for thyroid med adjustment based on blood test results - ordered by obgyn,    Labs from today 4/20/21:  TSH 0.203  T4 0.8    Pt is currently on NP thyroid 105 mg (90 mg + 15 mg).

## 2021-04-20 NOTE — TELEPHONE ENCOUNTER
Labs reviewed, indicating the thyroid dose may be mildly too high. I don't think this is causing her periods to come so frequently.    I recommend that she continue the NP thyroid 90mg and take only 1/2 of the 15mg (7.5mg) tablet daily to get the thyroid

## 2021-04-20 NOTE — TELEPHONE ENCOUNTER
----- Message from Jessica Alcaraz MD sent at 4/20/2021 12:52 PM CDT -----  Patient's TSH is suppressed. Patient should contact her PCP's office to adjust her Thyroid medication. Call patient.

## 2021-04-20 NOTE — TELEPHONE ENCOUNTER
From: Brigid Lopez  To: Gabi Ryan. Baby [place], MD  Sent: 4/20/2021 1:44 PM CDT  Subject: Test Results Question    I got my thyroid test results but I don't know how to read them.  Was it normal?

## 2021-04-20 NOTE — TELEPHONE ENCOUNTER
Patient name and  verified. Patient informed of result note and recommendations. Patient would like to schedule appointment with [facility] to discuss ultrasound that was done on 2021. Patient made aware that [facility] cannot adjust thyroid rx.  Verbalized un

## 2021-04-21 ENCOUNTER — PATIENT MESSAGE (OUTPATIENT)
Dept: INTEGRATIVE MEDICINE | Facility: CLINIC | Age: 46
End: 2021-04-21

## 2021-04-21 NOTE — TELEPHONE ENCOUNTER
Left message advised pt to continue 90 mg of NP thyroid along with 1/2 of 15 mg based on blood test results per Dr. Amador Olvera. Also, not likely that this is causing frequent periods.

## 2021-04-21 NOTE — TELEPHONE ENCOUNTER
Pt would like for provider to recommend and endocrinologist to see because Provider does not have any opening til august.

## 2021-04-26 ENCOUNTER — TELEPHONE (OUTPATIENT)
Dept: FAMILY MEDICINE CLINIC | Facility: CLINIC | Age: 46
End: 2021-04-26

## 2021-04-26 ENCOUNTER — PATIENT MESSAGE (OUTPATIENT)
Dept: FAMILY MEDICINE CLINIC | Facility: CLINIC | Age: 46
End: 2021-04-26

## 2021-04-26 NOTE — TELEPHONE ENCOUNTER
See TE 4-26-21.     Future Appointments   Date Time Provider Jacki Rodgers   5/3/2021 10:40 AM [facility]

## 2021-04-26 NOTE — TELEPHONE ENCOUNTER
RN pls call pt and verify the need of rx refill, pls triage or offer ov if needed, thanks. Per med list rx lsited as historical.       From: Shai St  To:  Javi Fitzpatrick PA-C  Sent: 4/26/2021  3:30 PM CDT  Subject: Prescription Question    Hel

## 2021-04-26 NOTE — TELEPHONE ENCOUNTER
From: Gary Bullard  To: Liz Daniel PA-C  Sent: 4/26/2021 3:30 PM CDT  Subject: Prescription Question    Hello: is there anyway you can prescribe me Xanax 0.25mg and send to my pharmacy in [place] today?  I'm having a lot of anxiety due to my

## 2021-04-26 NOTE — TELEPHONE ENCOUNTER
Patient states she's been feeling very anxious lately and believes it is related to her overactive thyroid. Patient states she'll be seeing a new Endocrinologist soon and feels her former \"did nothing\" for her.     Patient states Dr. Gerry Overton had prescri

## 2021-04-28 RX ORDER — ALPRAZOLAM 0.25 MG/1
0.25 TABLET ORAL 3 TIMES DAILY PRN
Qty: 15 TABLET | Refills: 0 | Status: SHIPPED | OUTPATIENT
Start: 2021-04-28 | End: 2021-08-20

## 2021-04-28 NOTE — TELEPHONE ENCOUNTER
Informed patient of medication sent per [place]. Patient verbalizes understanding and agrees.

## 2021-05-07 RX ORDER — LOSARTAN POTASSIUM 25 MG/1
25 TABLET ORAL DAILY
Qty: 90 TABLET | Refills: 1 | Status: SHIPPED | OUTPATIENT
Start: 2021-05-07 | End: 2021-08-05

## 2021-05-26 RX ORDER — RIZATRIPTAN BENZOATE 10 MG/1
TABLET ORAL
Qty: 9 TABLET | Refills: 0 | Status: SHIPPED | OUTPATIENT
Start: 2021-05-26 | End: 2021-06-22

## 2021-05-27 ENCOUNTER — LAB ENCOUNTER (OUTPATIENT)
Dept: LAB | Facility: HOSPITAL | Age: 46
End: 2021-05-27
Attending: INTERNAL MEDICINE
Payer: COMMERCIAL

## 2021-05-27 DIAGNOSIS — E55.9 VITAMIN D DEFICIENCY: ICD-10-CM

## 2021-05-27 DIAGNOSIS — L65.9 LOSS OF HAIR: ICD-10-CM

## 2021-05-27 DIAGNOSIS — E05.90 HYPERTHYROIDISM, SUBCLINICAL: Primary | ICD-10-CM

## 2021-05-27 DIAGNOSIS — E78.2 MIXED HYPERLIPIDEMIA: ICD-10-CM

## 2021-05-27 DIAGNOSIS — R53.82 CHRONIC FATIGUE: ICD-10-CM

## 2021-05-27 DIAGNOSIS — C50.919 BREAST CA (HCC): ICD-10-CM

## 2021-05-27 DIAGNOSIS — I10 ESSENTIAL HYPERTENSION, MALIGNANT: ICD-10-CM

## 2021-05-27 DIAGNOSIS — L70.9 ACNE: ICD-10-CM

## 2021-05-27 PROCEDURE — 84403 ASSAY OF TOTAL TESTOSTERONE: CPT

## 2021-05-27 PROCEDURE — 80053 COMPREHEN METABOLIC PANEL: CPT

## 2021-05-27 PROCEDURE — 82728 ASSAY OF FERRITIN: CPT

## 2021-05-27 PROCEDURE — 82627 DEHYDROEPIANDROSTERONE: CPT

## 2021-05-27 PROCEDURE — 84481 FREE ASSAY (FT-3): CPT

## 2021-05-27 PROCEDURE — 83002 ASSAY OF GONADOTROPIN (LH): CPT

## 2021-05-27 PROCEDURE — 83001 ASSAY OF GONADOTROPIN (FSH): CPT

## 2021-05-27 PROCEDURE — 84146 ASSAY OF PROLACTIN: CPT

## 2021-05-27 PROCEDURE — 86376 MICROSOMAL ANTIBODY EACH: CPT

## 2021-05-27 PROCEDURE — 82306 VITAMIN D 25 HYDROXY: CPT

## 2021-05-27 PROCEDURE — 84439 ASSAY OF FREE THYROXINE: CPT

## 2021-05-27 PROCEDURE — 36415 COLL VENOUS BLD VENIPUNCTURE: CPT

## 2021-05-27 PROCEDURE — 84443 ASSAY THYROID STIM HORMONE: CPT

## 2021-05-27 PROCEDURE — 84402 ASSAY OF FREE TESTOSTERONE: CPT

## 2021-05-27 PROCEDURE — 86800 THYROGLOBULIN ANTIBODY: CPT

## 2021-05-27 PROCEDURE — 85025 COMPLETE CBC W/AUTO DIFF WBC: CPT

## 2021-05-27 PROCEDURE — 84681 ASSAY OF C-PEPTIDE: CPT

## 2021-06-22 RX ORDER — RIZATRIPTAN BENZOATE 10 MG/1
TABLET ORAL
Qty: 9 TABLET | Refills: 0 | Status: SHIPPED | OUTPATIENT
Start: 2021-06-22 | End: 2021-08-02

## 2021-08-02 RX ORDER — RIZATRIPTAN BENZOATE 10 MG/1
TABLET ORAL
Qty: 9 TABLET | Refills: 0 | Status: SHIPPED | OUTPATIENT
Start: 2021-08-02 | End: 2021-08-20

## 2021-08-06 ENCOUNTER — LAB ENCOUNTER (OUTPATIENT)
Dept: LAB | Age: 46
End: 2021-08-06
Attending: INTERNAL MEDICINE
Payer: COMMERCIAL

## 2021-08-06 DIAGNOSIS — E05.90 HYPERTHYROIDISM: ICD-10-CM

## 2021-08-06 DIAGNOSIS — R53.83 FATIGUE: ICD-10-CM

## 2021-08-06 DIAGNOSIS — L65.9 HAIR LOSS: ICD-10-CM

## 2021-08-06 DIAGNOSIS — E78.2 MIXED HYPERLIPIDEMIA: ICD-10-CM

## 2021-08-06 DIAGNOSIS — C50.919 BREAST CA (HCC): ICD-10-CM

## 2021-08-06 DIAGNOSIS — E55.9 VITAMIN D DEFICIENCY: ICD-10-CM

## 2021-08-06 DIAGNOSIS — L70.9 ACNE: ICD-10-CM

## 2021-08-06 DIAGNOSIS — I10 HTN (HYPERTENSION): ICD-10-CM

## 2021-08-06 DIAGNOSIS — G71.02 FSH (FACIOSCAPULOHUMERAL MUSCULAR DYSTROPHY) (HCC): Primary | ICD-10-CM

## 2021-08-06 LAB
ALBUMIN SERPL-MCNC: 3.8 G/DL (ref 3.4–5)
ALBUMIN/GLOB SERPL: 0.9 {RATIO} (ref 1–2)
ALP LIVER SERPL-CCNC: 94 U/L
ALT SERPL-CCNC: 26 U/L
ANION GAP SERPL CALC-SCNC: 7 MMOL/L (ref 0–18)
AST SERPL-CCNC: 13 U/L (ref 15–37)
BASOPHILS # BLD AUTO: 0.04 X10(3) UL (ref 0–0.2)
BASOPHILS NFR BLD AUTO: 1 %
BILIRUB SERPL-MCNC: 0.6 MG/DL (ref 0.1–2)
BUN BLD-MCNC: 16 MG/DL (ref 7–18)
BUN/CREAT SERPL: 22.2 (ref 10–20)
CALCIUM BLD-MCNC: 9 MG/DL (ref 8.5–10.1)
CHLORIDE SERPL-SCNC: 106 MMOL/L (ref 98–112)
CO2 SERPL-SCNC: 26 MMOL/L (ref 21–32)
CREAT BLD-MCNC: 0.72 MG/DL
DEPRECATED HBV CORE AB SER IA-ACNC: 13.7 NG/ML
DEPRECATED RDW RBC AUTO: 42.5 FL (ref 35.1–46.3)
DHEA-S SERPL-MCNC: 199.5 UG/DL
EOSINOPHIL # BLD AUTO: 0.08 X10(3) UL (ref 0–0.7)
EOSINOPHIL NFR BLD AUTO: 2.1 %
ERYTHROCYTE [DISTWIDTH] IN BLOOD BY AUTOMATED COUNT: 13 % (ref 11–15)
FSH SERPL-ACNC: 4.8 MIU/ML
GLOBULIN PLAS-MCNC: 4.1 G/DL (ref 2.8–4.4)
GLUCOSE BLD-MCNC: 101 MG/DL (ref 70–99)
HCT VFR BLD AUTO: 44.2 %
HGB BLD-MCNC: 14.4 G/DL
IMM GRANULOCYTES # BLD AUTO: 0.01 X10(3) UL (ref 0–1)
IMM GRANULOCYTES NFR BLD: 0.3 %
LH SERPL-ACNC: 9.6 MIU/ML
LYMPHOCYTES # BLD AUTO: 1.46 X10(3) UL (ref 1–4)
LYMPHOCYTES NFR BLD AUTO: 38 %
M PROTEIN MFR SERPL ELPH: 7.9 G/DL (ref 6.4–8.2)
MCH RBC QN AUTO: 29.3 PG (ref 26–34)
MCHC RBC AUTO-ENTMCNC: 32.6 G/DL (ref 31–37)
MCV RBC AUTO: 89.8 FL
MONOCYTES # BLD AUTO: 0.46 X10(3) UL (ref 0.1–1)
MONOCYTES NFR BLD AUTO: 12 %
NEUTROPHILS # BLD AUTO: 1.79 X10 (3) UL (ref 1.5–7.7)
NEUTROPHILS # BLD AUTO: 1.79 X10(3) UL (ref 1.5–7.7)
NEUTROPHILS NFR BLD AUTO: 46.6 %
OSMOLALITY SERPL CALC.SUM OF ELEC: 289 MOSM/KG (ref 275–295)
PATIENT FASTING Y/N/NP: YES
PLATELET # BLD AUTO: 312 10(3)UL (ref 150–450)
POTASSIUM SERPL-SCNC: 4 MMOL/L (ref 3.5–5.1)
PROLACTIN SERPL-MCNC: 10.9 NG/ML
RBC # BLD AUTO: 4.92 X10(6)UL
SODIUM SERPL-SCNC: 139 MMOL/L (ref 136–145)
T3FREE SERPL-MCNC: 4.45 PG/ML (ref 2.4–4.2)
T4 FREE SERPL-MCNC: 1.3 NG/DL (ref 0.8–1.7)
TSI SER-ACNC: 0.07 MIU/ML (ref 0.36–3.74)
VIT D+METAB SERPL-MCNC: 70.6 NG/ML (ref 30–100)
WBC # BLD AUTO: 3.8 X10(3) UL (ref 4–11)

## 2021-08-06 PROCEDURE — 82306 VITAMIN D 25 HYDROXY: CPT

## 2021-08-06 PROCEDURE — 86800 THYROGLOBULIN ANTIBODY: CPT

## 2021-08-06 PROCEDURE — 84403 ASSAY OF TOTAL TESTOSTERONE: CPT

## 2021-08-06 PROCEDURE — 83002 ASSAY OF GONADOTROPIN (LH): CPT

## 2021-08-06 PROCEDURE — 84439 ASSAY OF FREE THYROXINE: CPT

## 2021-08-06 PROCEDURE — 80053 COMPREHEN METABOLIC PANEL: CPT

## 2021-08-06 PROCEDURE — 85025 COMPLETE CBC W/AUTO DIFF WBC: CPT

## 2021-08-06 PROCEDURE — 83001 ASSAY OF GONADOTROPIN (FSH): CPT

## 2021-08-06 PROCEDURE — 84481 FREE ASSAY (FT-3): CPT

## 2021-08-06 PROCEDURE — 84402 ASSAY OF FREE TESTOSTERONE: CPT

## 2021-08-06 PROCEDURE — 84146 ASSAY OF PROLACTIN: CPT

## 2021-08-06 PROCEDURE — 86376 MICROSOMAL ANTIBODY EACH: CPT

## 2021-08-06 PROCEDURE — 84681 ASSAY OF C-PEPTIDE: CPT

## 2021-08-06 PROCEDURE — 82627 DEHYDROEPIANDROSTERONE: CPT

## 2021-08-06 PROCEDURE — 36415 COLL VENOUS BLD VENIPUNCTURE: CPT

## 2021-08-06 PROCEDURE — 84443 ASSAY THYROID STIM HORMONE: CPT

## 2021-08-06 PROCEDURE — 82728 ASSAY OF FERRITIN: CPT

## 2021-08-08 LAB
C-PEPTIDE, SERUM OR PLASMA: 2 NG/ML
THYROGLOBULIN AB: 413.5 IU/ML
THYROID PEROXIDASE (TPO) ANTIBODY: 65.1 IU/ML

## 2021-08-13 LAB
TESTOSTERONE, FREE, S: 0.54 NG/DL
TESTOSTERONE, TOTAL, S: 34 NG/DL

## 2021-08-16 ENCOUNTER — PATIENT MESSAGE (OUTPATIENT)
Dept: FAMILY MEDICINE CLINIC | Facility: CLINIC | Age: 46
End: 2021-08-16

## 2021-08-17 ENCOUNTER — TELEPHONE (OUTPATIENT)
Dept: FAMILY MEDICINE CLINIC | Facility: CLINIC | Age: 46
End: 2021-08-17

## 2021-08-18 NOTE — TELEPHONE ENCOUNTER
From: Purvi Reynolds  To: Lawson Siemens, PA-C  Sent: 8/16/2021 6:28 PM CDT  Subject: Other    Hi doctor. I work for [employer] and with travel picking up BIG time, work has been extremely busy and nonstop.  Sometimes we don't get our breaks or anyi

## 2021-08-18 NOTE — TELEPHONE ENCOUNTER
----- Message from Tivis [place]ken[place]. David Avery sent at 8/16/2021  6:28 PM CDT -----  Regarding: Other  Contact: [phone]  Hi doctor. I work for [employer] and with travel picking up BIG time, work has been extremely busy and nonstop.  Sometimes we don't get

## 2021-08-20 ENCOUNTER — OFFICE VISIT (OUTPATIENT)
Dept: SURGERY | Facility: CLINIC | Age: 46
End: 2021-08-20
Payer: COMMERCIAL

## 2021-08-20 DIAGNOSIS — Z90.13 ACQUIRED ABSENCE OF BOTH BREASTS: Primary | ICD-10-CM

## 2021-08-20 PROCEDURE — 99212 OFFICE O/P EST SF 10 MIN: CPT | Performed by: SURGERY

## 2021-08-20 NOTE — PROGRESS NOTES
HPI:     HPI  54year-old female is here in the office complaining of anxiety. Patient states that her anxiety is getting worse for the past month. Patient is so busy at work and doesn't have breaks or lunches sometimes. Patient feels anxious, agitated.  Pa Breast CA ([facility] [address]    • Chronic pain    • DCIS (ductal carcinoma in situ) of breast 2013    Right breast. radiotherapy and mastectomy    • Depression 2004    medication and therapy   • Encounter for therapeutic drug monitoring    • Helicobacter pylori infecti Cancer Other 50        breast cancer and leukemia   • Dementia Other         Alzheimers, Grandmother [SIDE NOT STATED]   • Cancer Cousin 27        ovarian cancer / maternal cousin   • Diabetes Paternal Grandmother    • Cancer Paternal Aunt         leukemia Transportation Needs:       Lack of Transportation (Medical):       Lack of Transportation (Non-Medical):   Physical Activity:       Days of Exercise per Week:       Minutes of Exercise per Session:   Stress:       Feeling of Stress :   Social Connection No discharge. Conjunctiva/sclera: Conjunctivae normal.   Cardiovascular:      Rate and Rhythm: Normal rate and regular rhythm. Heart sounds: Normal heart sounds, S1 normal and S2 normal. No murmur heard.      Pulmonary:      Effort: Pulmonary effo

## 2021-08-20 NOTE — PROGRESS NOTES
Conner Croft is a 54year old female who presents today for a follow-up. She reports tenderness in the lateral aspect of her left breast which began after she received her Covid vaccine in the spring.   She relates that the pain has persisted and make

## 2021-08-21 PROBLEM — G43.009 MIGRAINE WITHOUT AURA AND WITHOUT STATUS MIGRAINOSUS, NOT INTRACTABLE: Status: ACTIVE | Noted: 2021-08-21

## 2021-08-22 ENCOUNTER — NURSE TRIAGE (OUTPATIENT)
Dept: FAMILY MEDICINE CLINIC | Facility: CLINIC | Age: 46
End: 2021-08-22

## 2021-08-22 NOTE — TELEPHONE ENCOUNTER
Please call Pt to triage. Thanks    ----- Message from [place]. Mariposa Longoria sent at 8/22/2021  8:39 AM CDT -----  Regarding: Other  Contact: [phone]  Good morning. Is there a way to send an order so I can do an X-ray of my left hand and arm?  The pain in

## 2021-08-23 ENCOUNTER — HOSPITAL ENCOUNTER (OUTPATIENT)
Dept: GENERAL RADIOLOGY | Age: 46
Discharge: HOME | End: 2021-08-23
Attending: PHYSICIAN ASSISTANT
Payer: COMMERCIAL

## 2021-08-23 ENCOUNTER — LAB ENCOUNTER (OUTPATIENT)
Dept: LAB | Facility: HOSPITAL | Age: 46
End: 2021-08-23
Attending: PHYSICIAN ASSISTANT
Payer: COMMERCIAL

## 2021-08-23 ENCOUNTER — OFFICE VISIT (OUTPATIENT)
Dept: FAMILY MEDICINE CLINIC | Facility: CLINIC | Age: 46
End: 2021-08-23
Payer: COMMERCIAL

## 2021-08-23 ENCOUNTER — PATIENT MESSAGE (OUTPATIENT)
Dept: FAMILY MEDICINE CLINIC | Facility: CLINIC | Age: 46
End: 2021-08-23

## 2021-08-23 VITALS
SYSTOLIC BLOOD PRESSURE: 129 MMHG | DIASTOLIC BLOOD PRESSURE: 80 MMHG | BODY MASS INDEX: 25.44 KG/M2 | HEIGHT: 64 IN | HEART RATE: 67 BPM | WEIGHT: 149 LBS | RESPIRATION RATE: 16 BRPM | OXYGEN SATURATION: 96 %

## 2021-08-23 DIAGNOSIS — M79.602 LEFT ARM PAIN: ICD-10-CM

## 2021-08-23 DIAGNOSIS — M79.602 LEFT ARM PAIN: Primary | ICD-10-CM

## 2021-08-23 PROCEDURE — 73030 X-RAY EXAM OF SHOULDER: CPT | Performed by: PHYSICIAN ASSISTANT

## 2021-08-23 PROCEDURE — 73110 X-RAY EXAM OF WRIST: CPT | Performed by: PHYSICIAN ASSISTANT

## 2021-08-23 PROCEDURE — 3079F DIAST BP 80-89 MM HG: CPT | Performed by: PHYSICIAN ASSISTANT

## 2021-08-23 PROCEDURE — 85025 COMPLETE CBC W/AUTO DIFF WBC: CPT | Performed by: PHYSICIAN ASSISTANT

## 2021-08-23 PROCEDURE — 99213 OFFICE O/P EST LOW 20 MIN: CPT | Performed by: PHYSICIAN ASSISTANT

## 2021-08-23 PROCEDURE — 3074F SYST BP LT 130 MM HG: CPT | Performed by: PHYSICIAN ASSISTANT

## 2021-08-23 PROCEDURE — 3008F BODY MASS INDEX DOCD: CPT | Performed by: PHYSICIAN ASSISTANT

## 2021-08-23 PROCEDURE — 72050 X-RAY EXAM NECK SPINE 4/5VWS: CPT | Performed by: PHYSICIAN ASSISTANT

## 2021-08-23 PROCEDURE — 73130 X-RAY EXAM OF HAND: CPT | Performed by: PHYSICIAN ASSISTANT

## 2021-08-23 PROCEDURE — 36415 COLL VENOUS BLD VENIPUNCTURE: CPT | Performed by: PHYSICIAN ASSISTANT

## 2021-08-23 NOTE — PROGRESS NOTES
HPI:    Patient ID: Puvri Reynolds is a 54year old female. Patient presents with pain in left arm and hand for the past several months. No injury or trauma noted.  Has noticed that her fat has started to grow back asymmetrically in her right breast. S adult)   Pulse 67   Resp 16   Ht 5' 4\" (1.626 m)   Wt 149 lb (67.6 kg)   LMP 08/06/2021 (Approximate)   SpO2 96%   BMI 25.58 kg/m²   Body mass index is 25.58 kg/m². PHYSICAL EXAM:   Physical Exam  Constitutional:       Appearance: Normal appearance.    Ca SHOULDER, COMPLETE (MIN 2 VIEWS), LEFT (CPT=73030)  XR CERVICAL SPINE (4VIEWS) (CPT=72063)         UV#[id number]

## 2021-08-23 NOTE — TELEPHONE ENCOUNTER
Last evaluation with Min has no mention of any hand pain, this will have to be deferred to her or if patient wants it done today she will need to be reevaluated to determine if it is needed for her to complete x-ray

## 2021-08-23 NOTE — TELEPHONE ENCOUNTER
Pt called to follow up on Xray request. Reviewed doctor's recommendations as noted below. Appt scheduled today with Reyes SEN. Pt states she did travel to [facility] on 7/24/21, returned 7/26/21. Tested negative for Covid.

## 2021-08-23 NOTE — TELEPHONE ENCOUNTER
From: Tiki Henry  To: Andry Wilson PA-C  Sent: 8/23/2021 10:57 AM CDT  Subject: Other    Hello. A nurse called me this morning and we talked about me getting hand X-ray today. But she had to a different doctor to approve X-ray.  Do you know if th

## 2021-08-27 ENCOUNTER — APPOINTMENT (OUTPATIENT)
Dept: CT IMAGING | Facility: HOSPITAL | Age: 46
End: 2021-08-27
Attending: EMERGENCY MEDICINE
Payer: COMMERCIAL

## 2021-08-27 ENCOUNTER — APPOINTMENT (OUTPATIENT)
Dept: GENERAL RADIOLOGY | Facility: HOSPITAL | Age: 46
End: 2021-08-27
Attending: EMERGENCY MEDICINE
Payer: COMMERCIAL

## 2021-08-27 ENCOUNTER — HOSPITAL ENCOUNTER (EMERGENCY)
Facility: HOSPITAL | Age: 46
Discharge: HOME | End: 2021-08-27
Attending: EMERGENCY MEDICINE
Payer: COMMERCIAL

## 2021-08-27 VITALS
TEMPERATURE: 98 F | HEART RATE: 63 BPM | BODY MASS INDEX: 25.61 KG/M2 | DIASTOLIC BLOOD PRESSURE: 79 MMHG | RESPIRATION RATE: 18 BRPM | HEIGHT: 64 IN | WEIGHT: 150 LBS | SYSTOLIC BLOOD PRESSURE: 140 MMHG | OXYGEN SATURATION: 99 %

## 2021-08-27 DIAGNOSIS — M54.2 NECK PAIN: ICD-10-CM

## 2021-08-27 DIAGNOSIS — R07.9 LEFT-SIDED CHEST PAIN: Primary | ICD-10-CM

## 2021-08-27 DIAGNOSIS — M79.602 LEFT ARM PAIN: ICD-10-CM

## 2021-08-27 LAB
ANION GAP SERPL CALC-SCNC: 6 MMOL/L (ref 0–18)
BASOPHILS # BLD AUTO: 0.03 X10(3) UL (ref 0–0.2)
BASOPHILS NFR BLD AUTO: 0.7 %
BUN BLD-MCNC: 13 MG/DL (ref 7–18)
BUN/CREAT SERPL: 15.7 (ref 10–20)
CALCIUM BLD-MCNC: 8.6 MG/DL (ref 8.5–10.1)
CHLORIDE SERPL-SCNC: 109 MMOL/L (ref 98–112)
CO2 SERPL-SCNC: 26 MMOL/L (ref 21–32)
CREAT BLD-MCNC: 0.83 MG/DL
D DIMER PPP FEU-MCNC: 0.55 UG/ML FEU (ref ?–0.5)
DEPRECATED RDW RBC AUTO: 41.7 FL (ref 35.1–46.3)
EOSINOPHIL # BLD AUTO: 0.15 X10(3) UL (ref 0–0.7)
EOSINOPHIL NFR BLD AUTO: 3.5 %
ERYTHROCYTE [DISTWIDTH] IN BLOOD BY AUTOMATED COUNT: 13 % (ref 11–15)
GLUCOSE BLD-MCNC: 127 MG/DL (ref 70–99)
HCT VFR BLD AUTO: 41 %
HGB BLD-MCNC: 13.6 G/DL
IMM GRANULOCYTES # BLD AUTO: 0.01 X10(3) UL (ref 0–1)
IMM GRANULOCYTES NFR BLD: 0.2 %
LYMPHOCYTES # BLD AUTO: 1.96 X10(3) UL (ref 1–4)
LYMPHOCYTES NFR BLD AUTO: 46 %
MCH RBC QN AUTO: 29.2 PG (ref 26–34)
MCHC RBC AUTO-ENTMCNC: 33.2 G/DL (ref 31–37)
MCV RBC AUTO: 88.2 FL
MONOCYTES # BLD AUTO: 0.5 X10(3) UL (ref 0.1–1)
MONOCYTES NFR BLD AUTO: 11.7 %
NEUTROPHILS # BLD AUTO: 1.61 X10 (3) UL (ref 1.5–7.7)
NEUTROPHILS # BLD AUTO: 1.61 X10(3) UL (ref 1.5–7.7)
NEUTROPHILS NFR BLD AUTO: 37.9 %
OSMOLALITY SERPL CALC.SUM OF ELEC: 294 MOSM/KG (ref 275–295)
PLATELET # BLD AUTO: 302 10(3)UL (ref 150–450)
POTASSIUM SERPL-SCNC: 3.3 MMOL/L (ref 3.5–5.1)
RBC # BLD AUTO: 4.65 X10(6)UL
SARS-COV-2 RNA RESP QL NAA+PROBE: NOT DETECTED
SODIUM SERPL-SCNC: 141 MMOL/L (ref 136–145)
WBC # BLD AUTO: 4.3 X10(3) UL (ref 4–11)

## 2021-08-27 PROCEDURE — 93005 ELECTROCARDIOGRAM TRACING: CPT

## 2021-08-27 PROCEDURE — 85379 FIBRIN DEGRADATION QUANT: CPT | Performed by: EMERGENCY MEDICINE

## 2021-08-27 PROCEDURE — 93010 ELECTROCARDIOGRAM REPORT: CPT | Performed by: EMERGENCY MEDICINE

## 2021-08-27 PROCEDURE — 80048 BASIC METABOLIC PNL TOTAL CA: CPT | Performed by: EMERGENCY MEDICINE

## 2021-08-27 PROCEDURE — 85025 COMPLETE CBC W/AUTO DIFF WBC: CPT | Performed by: EMERGENCY MEDICINE

## 2021-08-27 PROCEDURE — 71260 CT THORAX DX C+: CPT | Performed by: EMERGENCY MEDICINE

## 2021-08-27 PROCEDURE — 96375 TX/PRO/DX INJ NEW DRUG ADDON: CPT

## 2021-08-27 PROCEDURE — 71045 X-RAY EXAM CHEST 1 VIEW: CPT | Performed by: EMERGENCY MEDICINE

## 2021-08-27 PROCEDURE — 99284 EMERGENCY DEPT VISIT MOD MDM: CPT

## 2021-08-27 PROCEDURE — 96374 THER/PROPH/DIAG INJ IV PUSH: CPT

## 2021-08-27 RX ORDER — MORPHINE SULFATE 4 MG/ML
4 INJECTION, SOLUTION INTRAMUSCULAR; INTRAVENOUS ONCE
Status: COMPLETED | OUTPATIENT
Start: 2021-08-27 | End: 2021-08-27

## 2021-08-27 RX ORDER — ONDANSETRON 4 MG/1
4 TABLET, ORALLY DISINTEGRATING ORAL EVERY 4 HOURS PRN
Qty: 20 TABLET | Refills: 0 | Status: SHIPPED | OUTPATIENT
Start: 2021-08-27 | End: 2021-09-03

## 2021-08-27 RX ORDER — OXYCODONE HYDROCHLORIDE AND ACETAMINOPHEN 5; 325 MG/1; MG/1
1 TABLET ORAL EVERY 6 HOURS PRN
Qty: 20 TABLET | Refills: 0 | Status: SHIPPED | OUTPATIENT
Start: 2021-08-27 | End: 2021-09-03

## 2021-08-27 RX ORDER — ONDANSETRON 2 MG/ML
4 INJECTION INTRAMUSCULAR; INTRAVENOUS ONCE
Status: COMPLETED | OUTPATIENT
Start: 2021-08-27 | End: 2021-08-27

## 2021-08-27 NOTE — ED PROVIDER NOTES
Patient Seen in: [facility] Emergency Department      History   Patient presents with:  Cough/URI  Pain  Difficulty Breathing    Stated Complaint: pain from l hand radiates up    HPI/Subjective:   HPI    62-year-old female with past medical histor min)  :  Horace Gandhi   • COLONOSCOPY  11/8/2012   • COLONOSCOPY     • CORE BIOPSY Right 6/17/2013    Right breast:  MRI core biopsies   • EGD  2016   • EXPLOR FRONT SINUS,TRANSORBIT,UNI  09/19/2018   • FOREARM/WRIST SURGERY UNLISTED Left 2009    Gelacio Ibarra pulses  Pulmonary/Chest: CTA b/l with no rales, wheezes. Tenderness to the left lateral breast without any masses palpated. Tenderness within the left axilla without true mass palpated. Abdominal: Nontender. Nondistended. Soft.  Bowel sounds are normal. on 8/27/2021 at 7:01 PM          CT CHEST PE AORTA (IV ONLY) (CPT=71260)    Result Date: 8/27/2021  CONCLUSION:  1. No evidence of acute pulmonary embolism to the level of the first order subsegmental pulmonary artery branches.   2. No acute intrathoracic p

## 2021-08-27 NOTE — ED INITIAL ASSESSMENT (HPI)
Patient aox3 to ed via private vehicle patient reported left sided pain x months, hx of breast ca with abnormal left breast that's growing? . Patient stated pain radiating from left arm throughout chest and to left neck pain 10/10 airway patent +cough +anam

## 2021-08-31 ENCOUNTER — LAB ENCOUNTER (OUTPATIENT)
Dept: LAB | Age: 46
End: 2021-08-31
Attending: PHYSICIAN ASSISTANT
Payer: COMMERCIAL

## 2021-08-31 ENCOUNTER — OFFICE VISIT (OUTPATIENT)
Dept: FAMILY MEDICINE CLINIC | Facility: CLINIC | Age: 46
End: 2021-08-31
Payer: COMMERCIAL

## 2021-08-31 VITALS
DIASTOLIC BLOOD PRESSURE: 76 MMHG | SYSTOLIC BLOOD PRESSURE: 131 MMHG | WEIGHT: 152 LBS | HEART RATE: 60 BPM | BODY MASS INDEX: 25.95 KG/M2 | RESPIRATION RATE: 18 BRPM | OXYGEN SATURATION: 98 % | HEIGHT: 64 IN

## 2021-08-31 DIAGNOSIS — K21.9 GASTROESOPHAGEAL REFLUX DISEASE WITHOUT ESOPHAGITIS: Primary | ICD-10-CM

## 2021-08-31 PROCEDURE — 3075F SYST BP GE 130 - 139MM HG: CPT | Performed by: PHYSICIAN ASSISTANT

## 2021-08-31 PROCEDURE — 3078F DIAST BP <80 MM HG: CPT | Performed by: PHYSICIAN ASSISTANT

## 2021-08-31 PROCEDURE — 83013 H PYLORI (C-13) BREATH: CPT

## 2021-08-31 PROCEDURE — 99213 OFFICE O/P EST LOW 20 MIN: CPT | Performed by: PHYSICIAN ASSISTANT

## 2021-08-31 PROCEDURE — 3008F BODY MASS INDEX DOCD: CPT | Performed by: PHYSICIAN ASSISTANT

## 2021-08-31 NOTE — PROGRESS NOTES
HPI:    Patient ID: Kinga Spaulding is a 54year old female. Pt presents for follow up from the urgent care/ ER for left breast pain and does have abdominal pain as well. Was diagnosed with left sided chest pain.  Patient is being treated with pain medi for therapeutic drug monitoring    • Helicobacter pylori infection 2016  • Neuropathic pain due to radiation    • Osteoarthritis    • Ovarian cyst 2005  • Personal history of antineoplastic chemotherapy    • Visual impairment      glasses                 P 157/94  Pulse 77  Resp 18  Temp 98.1 °F (36.7 °C)  Temp src    SpO2 100 %  O2 Device None (Room air)        Current:/80   Pulse 62   Temp 98.1 °F (36.7 °C)   Resp 18   Ht 162.6 cm (5' 4\")   Wt 68 kg   LMP 08/27/2021   SpO2 99%   BMI 25.75 kg/m²     Final result                  Please view results for these tests on the individual orders. RAINBOW DRAW LAVENDER  RAINBOW DRAW LIGHT GREEN  RAINBOW DRAW GOLD  CBC W/ DIFFERENTIAL     EKG     Rate, intervals and axes as noted on EKG Report.   Rate: 74 pm     Follow-up:  Hafsa Becker  [facility] [place], [address]  [address] [facility] [address] [phone]     Call in 2 days              Medications Prescribed:  Current Discharge Medication List     START taking these medications     ondansetron 4 MG Oral Tabl NAUSEA ONLY  Augmentin [Amoxicil*    DIARRHEA  Cephalexin              DIARRHEA  Codeine                 NAUSEA ONLY   /76 (BP Location: Left arm, Patient Position: Sitting, Cuff Size: adult)   Pulse 60   Resp 18   Ht 5' 4\" (1.626 m)   Wt 152 lb (68

## 2021-09-01 ENCOUNTER — PATIENT MESSAGE (OUTPATIENT)
Dept: FAMILY MEDICINE CLINIC | Facility: CLINIC | Age: 46
End: 2021-09-01

## 2021-09-01 DIAGNOSIS — E87.6 HYPOKALEMIA: Primary | ICD-10-CM

## 2021-09-01 LAB — H. PYLORI BREATH TEST: NEGATIVE

## 2021-09-02 ENCOUNTER — PATIENT MESSAGE (OUTPATIENT)
Dept: FAMILY MEDICINE CLINIC | Facility: CLINIC | Age: 46
End: 2021-09-02

## 2021-09-02 ENCOUNTER — TELEPHONE (OUTPATIENT)
Dept: FAMILY MEDICINE CLINIC | Facility: CLINIC | Age: 46
End: 2021-09-02

## 2021-09-02 NOTE — TELEPHONE ENCOUNTER
----- Message from [place]. Danny Giles sent at 9/2/2021  1:04 PM CDT -----  Regarding: Test Results Question  Contact: [phone]  [facility]STER [facility] Doctor Gino Greene. I have felt very ill today. My throat is bothering me very much.  I'm sorry to keep bothering you but this dis

## 2021-09-02 NOTE — TELEPHONE ENCOUNTER
Called and talked to patient. She wants to be seen by GI earlier. I will message and see if they can be seen sooner.

## 2021-09-02 NOTE — TELEPHONE ENCOUNTER
Patient is requesting a call back directly from [facility]. She states is very important and needs a call back.

## 2021-09-03 ENCOUNTER — LAB ENCOUNTER (OUTPATIENT)
Dept: LAB | Facility: HOSPITAL | Age: 46
End: 2021-09-03
Attending: SURGERY
Payer: COMMERCIAL

## 2021-09-03 ENCOUNTER — HOSPITAL ENCOUNTER (OUTPATIENT)
Dept: ULTRASOUND IMAGING | Facility: HOSPITAL | Age: 46
Discharge: HOME | End: 2021-09-03
Attending: SURGERY
Payer: COMMERCIAL

## 2021-09-03 ENCOUNTER — TELEPHONE (OUTPATIENT)
Dept: GASTROENTEROLOGY | Facility: CLINIC | Age: 46
End: 2021-09-03

## 2021-09-03 DIAGNOSIS — Z90.13 ACQUIRED ABSENCE OF BOTH BREASTS: ICD-10-CM

## 2021-09-03 DIAGNOSIS — E87.6 HYPOKALEMIA: ICD-10-CM

## 2021-09-03 LAB
ANION GAP SERPL CALC-SCNC: 3 MMOL/L (ref 0–18)
BUN BLD-MCNC: 14 MG/DL (ref 7–18)
BUN/CREAT SERPL: 20.9 (ref 10–20)
CALCIUM BLD-MCNC: 9.4 MG/DL (ref 8.5–10.1)
CHLORIDE SERPL-SCNC: 106 MMOL/L (ref 98–112)
CO2 SERPL-SCNC: 29 MMOL/L (ref 21–32)
CREAT BLD-MCNC: 0.67 MG/DL
GLUCOSE BLD-MCNC: 92 MG/DL (ref 70–99)
OSMOLALITY SERPL CALC.SUM OF ELEC: 286 MOSM/KG (ref 275–295)
PATIENT FASTING Y/N/NP: YES
POTASSIUM SERPL-SCNC: 4.1 MMOL/L (ref 3.5–5.1)
SODIUM SERPL-SCNC: 138 MMOL/L (ref 136–145)

## 2021-09-03 PROCEDURE — 80048 BASIC METABOLIC PNL TOTAL CA: CPT

## 2021-09-03 PROCEDURE — 36415 COLL VENOUS BLD VENIPUNCTURE: CPT

## 2021-09-03 PROCEDURE — 76642 ULTRASOUND BREAST LIMITED: CPT | Performed by: SURGERY

## 2021-09-03 NOTE — TELEPHONE ENCOUNTER
Followed up with patient, reports would like to follow up with GI but they do not have appts soon, is in a lot of pain now from her GERD symptoms.  Did advised to monitor her diet in the meantime, advised I will call tomorrow to assist with getting a sooner

## 2021-09-03 NOTE — TELEPHONE ENCOUNTER
From: Arnie Obregon  To: Ben Emery PA-C  Sent: 9/2/2021 3:35 PM CDT  Subject: Test Results Question    I just tested negative for pylori but doctor Leeanne Cruz advised me she would send an order for endoscopy but after calling Gastro stomach specialist

## 2021-09-03 NOTE — TELEPHONE ENCOUNTER
Guzman Martinez for next available appointment, 09/23/2021 at 3:30 PM at [facility]. Patient accepted but requesting if anything can be done sooner due to symptoms. She is researching other gastroenterology providers in the area and will go somewhere else if she needs to wait 3 weeks. Please advise if sooner appointment can be facilitated. Thank you!      Future Appointments   Date Time Provider Jacki Rodgers   9/23/2021  3:30 PM Kerry Velez

## 2021-09-03 NOTE — TELEPHONE ENCOUNTER
----- Message from Marleni Rueda PA-C sent at 9/2/2021  3:48 PM CDT -----  Kt Phan,     I am referring this patient to you to see if you can help her. She does have hx of H. Pylori in the past. I did recently test her for H. Pylori and it turned out negative. She is taking pantoprazole as well with no relief. She is unable to eat due to nausea and a bad taste in her mouth. She has eaten for days. I am wondering if you could fit her in sooner. I know it's asking a lot, and I completely understand if you can't see her sooner. Just thought I would ask.      Sincerely,   Marleni Rueda PA-C

## 2021-09-03 NOTE — TELEPHONE ENCOUNTER
From: Brandy Mayberry  To:  John Frances PA-C  Sent: 9/2/2021 3:33 PM CDT  Subject: Test Results Question    I just tested negative for pylori but doctor Maritza Lim advised me she would send an order for endoscopy but after calling Gastro stomach special

## 2021-09-03 NOTE — TELEPHONE ENCOUNTER
Advised patient I will follow up tomorrow for appt, she agreed with plan. Followed up with patient, reports would like to follow up with GI but they do not have appts soon, is in a lot of pain now from her GERD symptoms. Did advised to monitor her diet in the meantime, advised I will call tomorrow to assist with getting a sooner appt than Oct, patient agreed with plan.

## 2021-09-03 NOTE — TELEPHONE ENCOUNTER
From: Kinga Spaulding  To: Kimberlyn Baeza PA-C  Sent: 9/2/2021 7:09 AM CDT  Subject: Test Results Question    Good morning. Since the pylori test was negative May I please have stomach endoscopy then?  This is so frustrating cause I know there's something w

## 2021-09-07 ENCOUNTER — TELEPHONE (OUTPATIENT)
Dept: HEMATOLOGY/ONCOLOGY | Facility: HOSPITAL | Age: 46
End: 2021-09-07

## 2021-09-07 ENCOUNTER — PATIENT MESSAGE (OUTPATIENT)
Dept: HEMATOLOGY/ONCOLOGY | Facility: HOSPITAL | Age: 46
End: 2021-09-07

## 2021-09-07 NOTE — TELEPHONE ENCOUNTER
ORLIN alerted to the pt's request for assistance with [place] paperwork. Message below received via email:    \"My name is Oscar Gillis and I had breast reconstruction surgery and you helped me with my [place] paperwork for [facility].  Well I have been off of work

## 2021-09-07 NOTE — TELEPHONE ENCOUNTER
Leora Ibarra     Patient cancelled appointment scheduled for 09/23/2021. Scheduled consult with Dr. Júnior Cassidy [facility]) today.      Thank you

## 2021-09-08 ENCOUNTER — TELEPHONE (OUTPATIENT)
Dept: HEMATOLOGY/ONCOLOGY | Facility: HOSPITAL | Age: 46
End: 2021-09-08

## 2021-09-08 NOTE — TELEPHONE ENCOUNTER
09/08: SW placed call to the pt to f/u regarding her [facility] paperwork request. Pt confirms she had received the [employer] message with SW contact information.  SW states she has spoken with [facility] and had already filed a claim, has already completed her porti

## 2021-09-14 ENCOUNTER — OFFICE VISIT (OUTPATIENT)
Dept: PAIN CLINIC | Facility: HOSPITAL | Age: 46
End: 2021-09-14
Attending: NURSE PRACTITIONER
Payer: COMMERCIAL

## 2021-09-14 VITALS — DIASTOLIC BLOOD PRESSURE: 93 MMHG | HEART RATE: 73 BPM | OXYGEN SATURATION: 99 % | SYSTOLIC BLOOD PRESSURE: 143 MMHG

## 2021-09-14 DIAGNOSIS — M17.0 PRIMARY OSTEOARTHRITIS OF BOTH KNEES: Primary | ICD-10-CM

## 2021-09-14 DIAGNOSIS — M48.02 SPINAL STENOSIS OF CERVICAL REGION: ICD-10-CM

## 2021-09-14 DIAGNOSIS — M50.30 DEGENERATIVE DISC DISEASE, CERVICAL: ICD-10-CM

## 2021-09-14 PROCEDURE — 99211 OFF/OP EST MAY X REQ PHY/QHP: CPT

## 2021-09-14 NOTE — PROGRESS NOTES
PT presents ambulatory to the CPM to reestablish care. Pt reports the entire left side of her body has stabbing, throbbing pain and numbness. Pain is at its worst at night. OC Farris saw PT for med eval.  See notes for POC.

## 2021-09-14 NOTE — CHRONIC PAIN
Follow up   CC: neck pain, left arm radicular pain   HISTORY OF PRESENT ILLNESS:  Montserrat Hall is a 54year old old female referred to the pain clinic by Dr. Garza ref. provider found for neck pain with left arm radicular pain.  The pain originally started RASH    Comment:Other reaction(s): LATEX  Norco [Hydrocodone-*    HIVES, NAUSEA AND VOMITING  Other                   HIVES    Comment:tegaderm  Tramadol                HIVES, NAUSEA ONLY  Hydrocodone             DIZZINESS    Comment:NAUSEA  Morphine :  Negative  Psychiatric:  Negative  Hematologic: No active bleeding  Allergic/Immunologic:  Negative  Musculoskeletal: As above  Neurological: As above      MEDICAL HISTORY:  Patient Active Problem List:     Carcinoma in situ of breast     History of andrei       Spouse name: Not on file      Number of children: 2      Years of education: Not on file      Highest education level: Not on file    Occupational History      Occupation:         Employer: [address] [place] [address] Not on file      Attends [employer]: Not on file      Active Member of Clubs or Organizations: Not on file      Attends Club or Organization Meetings: Not on file      Marital Status: Not on file  Intimate Partner Violence:       Fear of Current or IMAGING:  No recent imaging     [place] PHYSICIAN MONITORING PROGRAM REVIEWED  Yes      ASSESSMENT:   54year old old female with Primary osteoarthritis of both knees  (primary encounter diagnosis)  Degenerative disc disease, cervical  Spinal stenosis

## 2021-09-17 ENCOUNTER — OFFICE VISIT (OUTPATIENT)
Dept: SURGERY | Facility: CLINIC | Age: 46
End: 2021-09-17
Payer: COMMERCIAL

## 2021-09-17 DIAGNOSIS — Z90.13 ABSENCE OF BREAST, ACQUIRED, BILATERAL: Primary | ICD-10-CM

## 2021-09-17 PROCEDURE — 99212 OFFICE O/P EST SF 10 MIN: CPT | Performed by: SURGERY

## 2021-09-17 NOTE — PROGRESS NOTES
Shai St is a 54year old female who presents today for a follow-up. She continues to complain of left axillary, left upper extremity, and left lower extremity pain which she relates is debilitating.   She is seeing a pain specialist for this and w

## 2021-09-20 ENCOUNTER — TELEPHONE (OUTPATIENT)
Dept: HEMATOLOGY/ONCOLOGY | Facility: HOSPITAL | Age: 46
End: 2021-09-20

## 2021-09-20 NOTE — TELEPHONE ENCOUNTER
9/20: ORLIN received two emails from the pt over the weekend, as copied below. ORLIN forwarded the emails to the Plastics Team to review. ORLIN sent response to the pt to confirm her request was received, but to clarify the date of the extension.  Awaiting response today. He wants to wait till I do Spine MRI on the 27th of September before he proceeds.  I went to see a pain specialist a few days ago since I have spinal stenosis and they want to do MRI to see if my condition is worse and if it is then they will refer m

## 2021-09-21 ENCOUNTER — PATIENT MESSAGE (OUTPATIENT)
Dept: SURGERY | Facility: CLINIC | Age: 46
End: 2021-09-21

## 2021-09-22 NOTE — TELEPHONE ENCOUNTER
Received call from patient. Clarified with patient that her appointment is not considered second opinion due to the fact she has not previously seen a Neurosurgeon.  Patient has MRI scheduled for 9/27 [facility] and has been scheduled for NP apt with Yuli Glover

## 2021-09-22 NOTE — TELEPHONE ENCOUNTER
Nursing noted order of communications with patient and upcoming scheduled appointment after imaging.

## 2021-09-22 NOTE — TELEPHONE ENCOUNTER
Message reply from patient noted. Patient stated in message that she will call to schedule an appointment today. Message sent to patient via [address]

## 2021-09-22 NOTE — TELEPHONE ENCOUNTER
From: Nisha Whatley  To: Karie Snellen  Sent: 9/21/2021 4:04 PM CDT  Subject: 2nd opinion appointment with Aimee Choe 9/23/21 - Cherelle Barajas,    Please call office to make appointment with one of the Neurosurgery physicians.  Becky Cruz is a [employer]

## 2021-09-24 ENCOUNTER — PATIENT MESSAGE (OUTPATIENT)
Dept: HEMATOLOGY/ONCOLOGY | Facility: HOSPITAL | Age: 46
End: 2021-09-24

## 2021-09-24 NOTE — TELEPHONE ENCOUNTER
----- Message from Kevin Riddle MD sent at 9/30/2019 10:57 AM CDT -----  Pelvic U/S shows that the IUD is in the proper location and both ovaries are normal.  Call patient.
Informed pt of [facility]'s message below--    ---- Message from Cristobal Balderas MD sent at 9/30/2019 10:57 AM CDT -----  Pelvic U/S shows that the IUD is in the proper location and both ovaries are normal.  Call patient. Pt voices understanding.  Pt wants re
Pt saw results posted in ,ychart, would like to discuss.  Please advise
Spoke with patient and answered all her questions.
absent

## 2021-09-27 ENCOUNTER — OFFICE VISIT (OUTPATIENT)
Dept: FAMILY MEDICINE CLINIC | Facility: CLINIC | Age: 46
End: 2021-09-27
Payer: COMMERCIAL

## 2021-09-27 ENCOUNTER — TELEPHONE (OUTPATIENT)
Dept: PAIN CLINIC | Facility: HOSPITAL | Age: 46
End: 2021-09-27

## 2021-09-27 VITALS
WEIGHT: 150 LBS | SYSTOLIC BLOOD PRESSURE: 156 MMHG | BODY MASS INDEX: 25.61 KG/M2 | HEART RATE: 67 BPM | DIASTOLIC BLOOD PRESSURE: 94 MMHG | HEIGHT: 64 IN

## 2021-09-27 DIAGNOSIS — M48.02 SPINAL STENOSIS OF CERVICAL REGION: Primary | Chronic | ICD-10-CM

## 2021-09-27 PROCEDURE — 3077F SYST BP >= 140 MM HG: CPT | Performed by: PHYSICIAN ASSISTANT

## 2021-09-27 PROCEDURE — 3008F BODY MASS INDEX DOCD: CPT | Performed by: PHYSICIAN ASSISTANT

## 2021-09-27 PROCEDURE — 3080F DIAST BP >= 90 MM HG: CPT | Performed by: PHYSICIAN ASSISTANT

## 2021-09-27 PROCEDURE — 99213 OFFICE O/P EST LOW 20 MIN: CPT | Performed by: PHYSICIAN ASSISTANT

## 2021-09-27 NOTE — TELEPHONE ENCOUNTER
Denial received from insurance regarding MRI of cervical spine. Peer to peer done today (9/27/21) and denied due to lack of conservative treatment for 6 weeks.

## 2021-09-27 NOTE — PROGRESS NOTES
HPI:     HPI  54year-old female is here in the office complaining of neck pain for the past month. The pain radiates to left arm, left hand and left leg. The pain is at a severity of 9/10.  Patient has a history of cervical stenosis and bulging disc in the History:     Past Medical History:   Diagnosis Date   • Breast CA ([facility] [address]    • Chronic pain    • DCIS (ductal carcinoma in situ) of breast 2013    Right breast. radiotherapy and mastectomy    • Depression 2004    medication and therapy   • Encounter for ther breast cancer   • Skin cancer Maternal Grandfather    • Cancer Other 50        breast cancer and leukemia   • Dementia Other         Alzheimers, Grandmother [SIDE NOT STATED]   • Cancer Cousin 30        ovarian cancer / maternal cousin   • Diabetes Paterna in the Last Year: Not on file      Ran Out of Food in the Last Year: Not on file  Transportation Needs:       Lack of Transportation (Medical): Not on file      Lack of Transportation (Non-Medical):  Not on file  Physical Activity:       Days of Exercise pe 25.75 kg/m². Physical Exam:   Physical Exam  Vitals reviewed. Constitutional:       General: She is not in acute distress. Appearance: She is well-developed. HENT:      Head: Normocephalic and atraumatic.       Right Ear: Tympanic membrane, ear c with pt and pt is in agreement. All questions answered. Pt to call with questions or concerns.

## 2021-09-28 ENCOUNTER — HOSPITAL ENCOUNTER (OUTPATIENT)
Dept: GENERAL RADIOLOGY | Facility: HOSPITAL | Age: 46
Discharge: HOME | End: 2021-09-28
Attending: PHYSICIAN ASSISTANT
Payer: COMMERCIAL

## 2021-09-28 ENCOUNTER — OFFICE VISIT (OUTPATIENT)
Dept: SURGERY | Facility: CLINIC | Age: 46
End: 2021-09-28
Payer: COMMERCIAL

## 2021-09-28 VITALS
DIASTOLIC BLOOD PRESSURE: 88 MMHG | WEIGHT: 150 LBS | SYSTOLIC BLOOD PRESSURE: 130 MMHG | HEIGHT: 64 IN | BODY MASS INDEX: 25.61 KG/M2

## 2021-09-28 DIAGNOSIS — G95.9 CERVICAL MYELOPATHY WITH CERVICAL RADICULOPATHY (HCC): Primary | ICD-10-CM

## 2021-09-28 DIAGNOSIS — M54.12 CERVICAL MYELOPATHY WITH CERVICAL RADICULOPATHY (HCC): ICD-10-CM

## 2021-09-28 DIAGNOSIS — G95.9 CERVICAL MYELOPATHY WITH CERVICAL RADICULOPATHY (HCC): ICD-10-CM

## 2021-09-28 DIAGNOSIS — M54.2 CERVICAL PAIN: ICD-10-CM

## 2021-09-28 DIAGNOSIS — R39.15 URINARY URGENCY: ICD-10-CM

## 2021-09-28 DIAGNOSIS — R32 URINARY INCONTINENCE, UNSPECIFIED TYPE: ICD-10-CM

## 2021-09-28 DIAGNOSIS — M54.12 CERVICAL MYELOPATHY WITH CERVICAL RADICULOPATHY (HCC): Primary | ICD-10-CM

## 2021-09-28 DIAGNOSIS — R29.898 WEAKNESS OF BOTH UPPER EXTREMITIES: ICD-10-CM

## 2021-09-28 DIAGNOSIS — R26.9 NEUROLOGIC GAIT DYSFUNCTION: ICD-10-CM

## 2021-09-28 DIAGNOSIS — R29.898 WEAKNESS OF BOTH LOWER EXTREMITIES: ICD-10-CM

## 2021-09-28 PROCEDURE — 72040 X-RAY EXAM NECK SPINE 2-3 VW: CPT | Performed by: PHYSICIAN ASSISTANT

## 2021-09-28 PROCEDURE — 3079F DIAST BP 80-89 MM HG: CPT | Performed by: PHYSICIAN ASSISTANT

## 2021-09-28 PROCEDURE — 3075F SYST BP GE 130 - 139MM HG: CPT | Performed by: PHYSICIAN ASSISTANT

## 2021-09-28 PROCEDURE — 3008F BODY MASS INDEX DOCD: CPT | Performed by: PHYSICIAN ASSISTANT

## 2021-09-28 PROCEDURE — 99214 OFFICE O/P EST MOD 30 MIN: CPT | Performed by: PHYSICIAN ASSISTANT

## 2021-09-28 NOTE — PROGRESS NOTES
Pt is here regarding: New patient, neck    Pain level at this moment 10/10    What 1 location is your pain most intense left side of her body from head to toes      If new patient: has patient had any imaging done: No new imaging     No PT  Yes to injectio

## 2021-09-28 NOTE — PATIENT INSTRUCTIONS
Refill policies:    • Allow 2-3 business days for refills; controlled substances may take longer.   • Contact your pharmacy at least 5 days prior to running out of medication and have them send an electronic request or submit request through the “request re Depending on your insurance carrier, approval may take 3-10 days. It is highly recommended patients contact their insurance carrier directly to determine coverage.   If test is done without insurance authorization, patient may be responsible for the entire physical therapy  She would like to consider surgical correction.   She will follow-up with Dr. Elaine Fagan

## 2021-09-28 NOTE — PROGRESS NOTES
[facility] Neurosurgery Consultation      HISTORY OF PRESENT Freida Dunn is a 54year old left-handed female here for spinal evaluation. She states her symptoms began after having mastectomy in 2013. She had an MRI in 2015.   She was unable to aff breast   • BREAST SURGERY Bilateral 2/3/16    Bilateral nipple reconstruction   •   2008   •   2010    APGAR: 9 (1min) 9 (5 min)  DR:  [place]    • COLONOSCOPY  2012   • COLONOSCOPY     • CORE BIOPSY Right 2013    Right ONLY  Augmentin [Amoxicil*    DIARRHEA  Cephalexin              DIARRHEA  Codeine                 NAUSEA ONLY    REVIEW OF SYSTEMS:  A 10-point system was reviewed. Pertinent positives and negatives are noted in HPI.       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 She has a flattening of the anterior cord at C3-4 stenosis at C4-5 and C5-6 with cord deformity.     ASSESSMENT:  Cervical stenosis with myelopathy primarily at C4-5 C5-6  Cervical pain  Weakness in the upper and lower ex

## 2021-09-29 ENCOUNTER — PATIENT MESSAGE (OUTPATIENT)
Dept: SURGERY | Facility: CLINIC | Age: 46
End: 2021-09-29

## 2021-09-29 NOTE — TELEPHONE ENCOUNTER
From: Maria Esther Stephenson  To: FRANCY Vyas  Sent: 9/29/2021 9:10 AM CDT  Subject: Chills    Good morning. Just wanted to send a note to say thank you so much for seeing me yesterday.  I have confidence that you and the surgeons will help me relieve my

## 2021-10-04 ENCOUNTER — LAB ENCOUNTER (OUTPATIENT)
Dept: LAB | Age: 46
End: 2021-10-04
Attending: INTERNAL MEDICINE
Payer: COMMERCIAL

## 2021-10-04 DIAGNOSIS — E04.2 NONTOXIC MULTINODULAR GOITER: Primary | ICD-10-CM

## 2021-10-04 PROCEDURE — 36415 COLL VENOUS BLD VENIPUNCTURE: CPT

## 2021-10-04 PROCEDURE — 84443 ASSAY THYROID STIM HORMONE: CPT

## 2021-10-04 PROCEDURE — 84439 ASSAY OF FREE THYROXINE: CPT

## 2021-10-08 ENCOUNTER — HOSPITAL ENCOUNTER (OUTPATIENT)
Dept: MRI IMAGING | Facility: HOSPITAL | Age: 46
Discharge: HOME | End: 2021-10-08
Attending: NURSE PRACTITIONER
Payer: COMMERCIAL

## 2021-10-08 DIAGNOSIS — R29.898 WEAKNESS OF BOTH LOWER EXTREMITIES: ICD-10-CM

## 2021-10-08 DIAGNOSIS — M50.30 DEGENERATIVE DISC DISEASE, CERVICAL: ICD-10-CM

## 2021-10-08 DIAGNOSIS — G95.9 CERVICAL MYELOPATHY WITH CERVICAL RADICULOPATHY (HCC): ICD-10-CM

## 2021-10-08 DIAGNOSIS — M48.02 SPINAL STENOSIS OF CERVICAL REGION: ICD-10-CM

## 2021-10-08 DIAGNOSIS — R29.898 WEAKNESS OF BOTH UPPER EXTREMITIES: ICD-10-CM

## 2021-10-08 DIAGNOSIS — R32 URINARY INCONTINENCE, UNSPECIFIED TYPE: ICD-10-CM

## 2021-10-08 DIAGNOSIS — R26.9 NEUROLOGIC GAIT DYSFUNCTION: ICD-10-CM

## 2021-10-08 DIAGNOSIS — R39.15 URINARY URGENCY: ICD-10-CM

## 2021-10-08 DIAGNOSIS — M54.12 CERVICAL MYELOPATHY WITH CERVICAL RADICULOPATHY (HCC): ICD-10-CM

## 2021-10-08 PROCEDURE — 72141 MRI NECK SPINE W/O DYE: CPT | Performed by: PHYSICIAN ASSISTANT

## 2021-10-12 ENCOUNTER — TELEPHONE (OUTPATIENT)
Dept: SURGERY | Facility: CLINIC | Age: 46
End: 2021-10-12

## 2021-10-12 NOTE — TELEPHONE ENCOUNTER
Patient would like to know if clinical staff can call her and discuss her MRI results prior to her apt with  on 11/18/21. Please contact to further discuss.

## 2021-10-12 NOTE — TELEPHONE ENCOUNTER
Mild multilevel degenerative change cervical spine as detailed above.  Overall findings slightly progressed since the prior study.  Most significant levels include mild C4-C5 and C5-C6 central canal narrowing.  No evidence of significant foraminal   narrow

## 2021-10-14 ENCOUNTER — TELEPHONE (OUTPATIENT)
Dept: SURGERY | Facility: CLINIC | Age: 46
End: 2021-10-14

## 2021-10-14 ENCOUNTER — PATIENT MESSAGE (OUTPATIENT)
Dept: SURGERY | Facility: CLINIC | Age: 46
End: 2021-10-14

## 2021-10-14 DIAGNOSIS — R26.9 NEUROLOGIC GAIT DYSFUNCTION: ICD-10-CM

## 2021-10-14 DIAGNOSIS — R29.898 WEAKNESS OF BOTH UPPER EXTREMITIES: ICD-10-CM

## 2021-10-14 DIAGNOSIS — G95.9 CERVICAL MYELOPATHY WITH CERVICAL RADICULOPATHY (HCC): Primary | ICD-10-CM

## 2021-10-14 DIAGNOSIS — M54.2 CERVICAL PAIN: ICD-10-CM

## 2021-10-14 DIAGNOSIS — R29.898 WEAKNESS OF BOTH LOWER EXTREMITIES: ICD-10-CM

## 2021-10-14 DIAGNOSIS — M54.12 CERVICAL MYELOPATHY WITH CERVICAL RADICULOPATHY (HCC): Primary | ICD-10-CM

## 2021-10-14 NOTE — TELEPHONE ENCOUNTER
From: FRANCY Manzanares  To: Shai St  Sent: 10/14/2021 1:20 PM CDT  Subject: MRI neck    Trista Mcdonald,  Your MRI of the neck is similar to the last one I showed you in the office. The narrowing has increased from the last MRI.  Still mainly C4-5-6 l

## 2021-10-14 NOTE — TELEPHONE ENCOUNTER
Received consideration for disability benefits for the patient from [place]. Forwarded to Provider for review.

## 2021-10-14 NOTE — TELEPHONE ENCOUNTER
Patient messages to and from [facility] [place], [place] noted regarding imaging. [employer] message sent to patient informing her of scheduled appointment and offering option to be placed on a \"move up list\" if patient prefers.

## 2021-10-14 NOTE — TELEPHONE ENCOUNTER
Discussed with providers, nothing sooner available with Dr. Rosa Isela Dunbar. Since he has never seen patient before he stated pt can be offered an appointment with Dr. Edward Guallpa who has more availability.     If she wishes to keep her appt with Dr. Rosa Isela Dunbar, we can

## 2021-10-15 RX ORDER — METHYLPREDNISOLONE 4 MG/1
TABLET ORAL
Qty: 1 EACH | Refills: 0 | Status: SHIPPED | OUTPATIENT
Start: 2021-10-15 | End: 2021-11-09

## 2021-10-15 NOTE — TELEPHONE ENCOUNTER
Patient called. She agreed to a Medrol Dosepak. She is scheduled physical therapy.   She would like to move her appointment up if possible otherwise she will see Dr. Lyn Guerrero to schedule appointment November 18

## 2021-10-15 NOTE — TELEPHONE ENCOUNTER
Pt agreeable to start PT.   Order placed for [facility] as recommended by provider    [facility] sent with PT contact information

## 2021-10-18 NOTE — TELEPHONE ENCOUNTER
Per Dr lFavia Cordero \"The lump/bump on the bottom of her foot is likely not related to cervical stenosis   Rec f/u with pcp \"    mychart message sent.

## 2021-10-21 ENCOUNTER — PATIENT MESSAGE (OUTPATIENT)
Dept: SURGERY | Facility: CLINIC | Age: 46
End: 2021-10-21

## 2021-10-21 NOTE — TELEPHONE ENCOUNTER
Per Samuel Gomez 9/28/21    \"ASSESSMENT:  Cervical stenosis with myelopathy primarily at C4-5 C5-6  Cervical pain  Weakness in the upper and lower extremities  Gait dysfunction  Urinary urgency and incontinence  Rectal urgency  History of mastectom

## 2021-10-21 NOTE — TELEPHONE ENCOUNTER
Disability paperwork for Rowena Del Valle was completed    Patient was first seen in our office 9/28/2021    She self-reported being off of work due to her disability August 20th-2021  Her paperwork was completed for her to be off her 3 months through November 20,

## 2021-10-21 NOTE — TELEPHONE ENCOUNTER
From: Martin Dumas  To: Samuel Bates  Sent: 10/21/2021 4:04 PM CDT  Subject: Steroid Pills     Hello. I just finished Steroid pack and I honestly didn’t see a positive change on my pain. Reji Fairbanks  my pain is still there but at least I tried this [employer]

## 2021-10-25 NOTE — TELEPHONE ENCOUNTER
Received completed paperwork. Faxed to [facility] with last OV note and imaging results. Copy mailed to patient, copy sent to scan.

## 2021-11-01 ENCOUNTER — TELEPHONE (OUTPATIENT)
Dept: PHYSICAL THERAPY | Facility: HOSPITAL | Age: 46
End: 2021-11-01

## 2021-11-02 ENCOUNTER — PATIENT MESSAGE (OUTPATIENT)
Dept: SURGERY | Facility: CLINIC | Age: 46
End: 2021-11-02

## 2021-11-02 ENCOUNTER — OFFICE VISIT (OUTPATIENT)
Dept: PHYSICAL THERAPY | Age: 46
End: 2021-11-02
Attending: PHYSICIAN ASSISTANT
Payer: COMMERCIAL

## 2021-11-02 DIAGNOSIS — M54.12 CERVICAL MYELOPATHY WITH CERVICAL RADICULOPATHY (HCC): ICD-10-CM

## 2021-11-02 DIAGNOSIS — M54.2 CERVICAL PAIN: ICD-10-CM

## 2021-11-02 DIAGNOSIS — R26.9 NEUROLOGIC GAIT DYSFUNCTION: ICD-10-CM

## 2021-11-02 DIAGNOSIS — G95.9 CERVICAL MYELOPATHY WITH CERVICAL RADICULOPATHY (HCC): ICD-10-CM

## 2021-11-02 DIAGNOSIS — R29.898 WEAKNESS OF BOTH UPPER EXTREMITIES: ICD-10-CM

## 2021-11-02 DIAGNOSIS — R29.898 WEAKNESS OF BOTH LOWER EXTREMITIES: ICD-10-CM

## 2021-11-02 PROCEDURE — 97162 PT EVAL MOD COMPLEX 30 MIN: CPT | Performed by: PHYSICAL THERAPIST

## 2021-11-02 PROCEDURE — 97110 THERAPEUTIC EXERCISES: CPT | Performed by: PHYSICAL THERAPIST

## 2021-11-02 NOTE — PROGRESS NOTES
SPINE EVALUATION:   Referring Physician:  Lauro Eaton PA-C   Diagnosis:  Cervical myelopathy with cervical radiculopathy (HCC) (G95.9,M54.12)  Cervical pain (M54.2)  Weakness of both lower extremities (R29.898)  Weakness of both upper extremities (R29.898 reviewed with Sherry Christian.  Significant findings include:  Migraine without aura and without status migrainosus, not intractable 8/21/2021   Hypothyroidism following radioiodine therapy 3/24/2020   Hyperthyroidism 11/15/2019   Degenerative disc disease, cervic light touch. Patent reports saddle paresthesia (not tested). + L LE SLUMP, negative R. L/R patellar and Solo's tendons reflexes grossly WNL.  Clonus not present with ankle DF test.     Cervical spine AROM: (* denotes performed with pain)  Flexion: flexio outcome measures, this evaluation involved Moderate Complexity decision making due to 1-2 personal factors/comorbidities, 3 body structures involved/activity limitations, and unstable symptoms including changing pain levels.   PLAN OF CARE:    Goals: (to be

## 2021-11-03 NOTE — TELEPHONE ENCOUNTER
From: Jonathan Rosales  To: Samuel Peng  Sent: 11/2/2021 6:50 PM CDT  Subject: Pain     Hi.  So sorry to bother you but I noticed when I got home today and did the two exercises the physical therapist told me to do, I feel like I’m in even more lg

## 2021-11-04 ENCOUNTER — PATIENT MESSAGE (OUTPATIENT)
Dept: SURGERY | Facility: CLINIC | Age: 46
End: 2021-11-04

## 2021-11-04 NOTE — TELEPHONE ENCOUNTER
Advised pt can go to any PT location she likes but to hold PT if it causes her more pain as [facility] [address] [facility] pt.

## 2021-11-04 NOTE — TELEPHONE ENCOUNTER
From: Montserrat Hall  To: FRANCY Carreno  Sent: 11/4/2021 10:52 AM CDT  Subject: Pain     Hello. I noticed I’m in more pain ever since I started doing these exercises.  Not sure if it’s from the physical therapy but the day after my neck, back and l

## 2021-11-09 ENCOUNTER — OFFICE VISIT (OUTPATIENT)
Dept: SURGERY | Facility: CLINIC | Age: 46
End: 2021-11-09
Payer: COMMERCIAL

## 2021-11-09 VITALS
SYSTOLIC BLOOD PRESSURE: 124 MMHG | DIASTOLIC BLOOD PRESSURE: 78 MMHG | HEIGHT: 64 IN | WEIGHT: 150 LBS | BODY MASS INDEX: 25.61 KG/M2

## 2021-11-09 DIAGNOSIS — R39.15 URINARY URGENCY: ICD-10-CM

## 2021-11-09 DIAGNOSIS — G95.9 CERVICAL MYELOPATHY WITH CERVICAL RADICULOPATHY (HCC): Primary | ICD-10-CM

## 2021-11-09 DIAGNOSIS — M54.2 CERVICAL PAIN: ICD-10-CM

## 2021-11-09 DIAGNOSIS — M51.16 LUMBAR DISC HERNIATION WITH RADICULOPATHY: ICD-10-CM

## 2021-11-09 DIAGNOSIS — M54.12 CERVICAL MYELOPATHY WITH CERVICAL RADICULOPATHY (HCC): Primary | ICD-10-CM

## 2021-11-09 PROCEDURE — 3074F SYST BP LT 130 MM HG: CPT | Performed by: PHYSICIAN ASSISTANT

## 2021-11-09 PROCEDURE — 3008F BODY MASS INDEX DOCD: CPT | Performed by: PHYSICIAN ASSISTANT

## 2021-11-09 PROCEDURE — 3078F DIAST BP <80 MM HG: CPT | Performed by: PHYSICIAN ASSISTANT

## 2021-11-09 PROCEDURE — 99214 OFFICE O/P EST MOD 30 MIN: CPT | Performed by: PHYSICIAN ASSISTANT

## 2021-11-09 RX ORDER — GABAPENTIN 100 MG/1
100 CAPSULE ORAL 3 TIMES DAILY
Qty: 90 CAPSULE | Refills: 0 | Status: SHIPPED | OUTPATIENT
Start: 2021-11-09 | End: 2021-12-17

## 2021-11-09 NOTE — PATIENT INSTRUCTIONS
Refill policies:    • Allow 2-3 business days for refills; controlled substances may take longer.   • Contact your pharmacy at least 5 days prior to running out of medication and have them send an electronic request or submit request through the “request re Depending on your insurance carrier, approval may take 3-10 days. It is highly recommended patients contact their insurance carrier directly to determine coverage.   If test is done without insurance authorization, patient may be responsible for the entire dispensed  Gabapentin 100 3 times daily

## 2021-11-09 NOTE — PROGRESS NOTES
[facility]   Follow-up      HISTORY OF PRESENT Elaine Gross is a 54year old left-handed female returns in follow-up. She was in physical therapy for 1 session and aggravated things.   She is having burning and hypersensitivity in the l mastectomy    • Depression 2004    medication and therapy   • Encounter for therapeutic drug monitoring    • Helicobacter pylori infection 2016   • Neuropathic pain due to radiation    • Osteoarthritis    • Ovarian cyst 2005   • Personal history of antineo in her daughter and son; Skin cancer in her maternal grandfather. SOCIAL HISTORY:   reports that she has never smoked. She has never used smokeless tobacco. She reports that she does not drink alcohol and does not use drugs.     ALLERGIES:    Latex Brachioradialis   Triceps    Patellar    Ankle  Hamstring   Right      2+           2+       1+        3+       2+           Left      3+           2+       1+        3+       2+              IMAGING:  X-rays of cervical spine 8/23/2021 shows a very high r

## 2021-11-09 NOTE — PROGRESS NOTES
Feeling worse that when she was here last    Did some PT, caused more pain    Did have 2 injections, no relief    Did have a steroid pack and that didn't change anything with how she felt    Pain is affecting the left side, going down the arm  Has a [place]

## 2021-11-10 ENCOUNTER — PATIENT MESSAGE (OUTPATIENT)
Dept: SURGERY | Facility: CLINIC | Age: 46
End: 2021-11-10

## 2021-11-10 ENCOUNTER — OFFICE VISIT (OUTPATIENT)
Dept: FAMILY MEDICINE CLINIC | Facility: CLINIC | Age: 46
End: 2021-11-10
Payer: COMMERCIAL

## 2021-11-10 ENCOUNTER — TELEPHONE (OUTPATIENT)
Dept: SURGERY | Facility: CLINIC | Age: 46
End: 2021-11-10

## 2021-11-10 VITALS
HEIGHT: 64 IN | SYSTOLIC BLOOD PRESSURE: 134 MMHG | BODY MASS INDEX: 26.8 KG/M2 | HEART RATE: 65 BPM | WEIGHT: 157 LBS | DIASTOLIC BLOOD PRESSURE: 82 MMHG

## 2021-11-10 DIAGNOSIS — H65.01 NON-RECURRENT ACUTE SEROUS OTITIS MEDIA OF RIGHT EAR: Primary | ICD-10-CM

## 2021-11-10 PROCEDURE — 3079F DIAST BP 80-89 MM HG: CPT | Performed by: PHYSICIAN ASSISTANT

## 2021-11-10 PROCEDURE — 3008F BODY MASS INDEX DOCD: CPT | Performed by: PHYSICIAN ASSISTANT

## 2021-11-10 PROCEDURE — 3075F SYST BP GE 130 - 139MM HG: CPT | Performed by: PHYSICIAN ASSISTANT

## 2021-11-10 PROCEDURE — 99213 OFFICE O/P EST LOW 20 MIN: CPT | Performed by: PHYSICIAN ASSISTANT

## 2021-11-10 RX ORDER — IBUPROFEN 600 MG/1
600 TABLET ORAL EVERY 8 HOURS PRN
Qty: 30 TABLET | Refills: 0 | Status: SHIPPED | OUTPATIENT
Start: 2021-11-10 | End: 2021-12-10

## 2021-11-10 RX ORDER — AZITHROMYCIN 250 MG/1
TABLET, FILM COATED ORAL
Qty: 6 TABLET | Refills: 0 | Status: SHIPPED | OUTPATIENT
Start: 2021-11-10 | End: 2021-11-15

## 2021-11-10 NOTE — PROGRESS NOTES
HPI:     Ear Pain   There is pain in the left ear. Episode onset: 5 days. The problem has been gradually worsening. There has been no fever. Associated symptoms include headaches.  Pertinent negatives include no abdominal pain, coughing, diarrhea, ear disch ONLY    History:   Annual Physical due on 06/01/2018  Pap Smear,3 Years due on 06/01/2020  Influenza Vaccine(1) Never done  Annual Depression Screen due on 01/19/2022  Colonoscopy due on 11/08/2022  Pneumococcal Vaccine: Birth to 63yrs(1 of 4 - PCV13) due laparotomy   • Skin surgery  7/22/2016    bilateral areaola tattoing    • Tonsillectomy  1979   • Upper gi endoscopy,exam         Family History:     Family History   Problem Relation Age of Onset   • Lipids Father         hyperlipidemia   • Hypertension F Not Asked        Grew up on a farm: Not Asked        History of tanning: Not Asked        Outdoor occupation: Not Asked        Breast feeding: Not Asked        Reaction to local anesthetic: No    Social History Narrative      Not on file    Social Determin Right eye: No discharge. Left eye: No discharge. Conjunctiva/sclera: Conjunctivae normal.   Cardiovascular:      Rate and Rhythm: Normal rate and regular rhythm.       Heart sounds: Normal heart sounds, S1 normal and S2 normal. No murmur h

## 2021-11-10 NOTE — TELEPHONE ENCOUNTER
S: Pt message via [facility] stated in below note    B: Per Samuel Gomez 11/9/21    \"ASSESSMENT:  Cervical stenosis with myelopathy primarily at C4-5 C5-6  Cervical pain  Weakness in the upper and lower extremities improved  Gait dysfunction  Urinary urgen

## 2021-11-10 NOTE — TELEPHONE ENCOUNTER
From: Ozzie Delong  To: FRANCY Morfin  Sent: 11/10/2021 9:35 AM CST  Subject: Medication     Hello. I tried the Gabapentin you prescribed me last night as soon as I got home at 4:44 and I felt very dizzy, with upset stomach and very agitated.  I

## 2021-11-10 NOTE — TELEPHONE ENCOUNTER
Received receipt for [employer] vista multipost collar set that was distributed at [facility] on 11-9. Faxed to Sunil Branham 73 with OV note, face sheet and insurance card.

## 2021-11-10 NOTE — TELEPHONE ENCOUNTER
From: Jhonatan Rosen  To: Samuel Strickland  Sent: 11/10/2021 12:20 PM CST  Subject: Pain     Good afternoon. This morning I woke up dizzy from medicine, so I assumed it was that So I stopped taking it.  I went to the grocery store and became overwhelm

## 2021-11-11 ENCOUNTER — APPOINTMENT (OUTPATIENT)
Dept: PHYSICAL THERAPY | Age: 46
End: 2021-11-11
Attending: PHYSICIAN ASSISTANT
Payer: COMMERCIAL

## 2021-11-11 ENCOUNTER — HOSPITAL ENCOUNTER (OUTPATIENT)
Dept: MRI IMAGING | Facility: HOSPITAL | Age: 46
Discharge: HOME | End: 2021-11-11
Attending: PHYSICIAN ASSISTANT
Payer: COMMERCIAL

## 2021-11-11 DIAGNOSIS — M51.16 LUMBAR DISC HERNIATION WITH RADICULOPATHY: ICD-10-CM

## 2021-11-11 DIAGNOSIS — R39.15 URINARY URGENCY: ICD-10-CM

## 2021-11-11 PROCEDURE — 72148 MRI LUMBAR SPINE W/O DYE: CPT | Performed by: PHYSICIAN ASSISTANT

## 2021-11-17 ENCOUNTER — TELEPHONE (OUTPATIENT)
Dept: SURGERY | Facility: CLINIC | Age: 46
End: 2021-11-17

## 2021-11-17 ENCOUNTER — VIRTUAL PHONE E/M (OUTPATIENT)
Dept: SURGERY | Facility: CLINIC | Age: 46
End: 2021-11-17
Payer: COMMERCIAL

## 2021-11-17 DIAGNOSIS — G95.9 CERVICAL MYELOPATHY WITH CERVICAL RADICULOPATHY (HCC): Primary | ICD-10-CM

## 2021-11-17 DIAGNOSIS — M47.12 CERVICAL SPONDYLOSIS WITH MYELOPATHY AND RADICULOPATHY: Primary | ICD-10-CM

## 2021-11-17 DIAGNOSIS — M47.22 CERVICAL SPONDYLOSIS WITH MYELOPATHY AND RADICULOPATHY: Primary | ICD-10-CM

## 2021-11-17 DIAGNOSIS — M54.12 CERVICAL MYELOPATHY WITH CERVICAL RADICULOPATHY (HCC): Primary | ICD-10-CM

## 2021-11-17 PROCEDURE — G2012 BRIEF CHECK IN BY MD/QHP: HCPCS | Performed by: NEUROLOGICAL SURGERY

## 2021-11-17 NOTE — PROGRESS NOTES
[facility]    Name: Yash Butcher verbally consents to a virtual/telephone check-in service on 11/17/2021.   Patient understands and accepts financial responsibility for any deductabile, c 2/3/16    Bilateral nipple reconstruction   •   2008   •   2010    APGAR: 9 (1min) 9 (5 min)  DR:  Vincent Manuel   • COLONOSCOPY  2012   • COLONOSCOPY     • CORE BIOPSY Right 2013    Right breast:  MRI core biopsies   • EGD DIARRHEA  Cephalexin              DIARRHEA  Codeine                 NAUSEA ONLY    MEDICATIONS:  (Not in a hospital admission)    No current facility-administered medications for this visit.     DIAGNOSIS:  Cervical stenosis with myelopathy primarily at C4- patients are encouraged to maintain a daily low-impact exercise program. Walking, and slowly increasing the distance each day, is the best exercise after this type of surgery.  Some discomfort is normal, but pain is a signal to slow down and rest. The amoun patient regaining a return to normal function. 5.   Surgery will be scheduled once the patient obtains a history and physical and medical clearance from their primary care physician.     Drake Gaines MD, [facility]  Board Certified Neurological Surgeon

## 2021-11-18 ENCOUNTER — TELEPHONE (OUTPATIENT)
Dept: SURGERY | Facility: CLINIC | Age: 46
End: 2021-11-18

## 2021-11-18 NOTE — TELEPHONE ENCOUNTER
Received Disability Forms from [address] [place] [address]. Placed in FMLA/Disability folder at Nurse [facility] for .

## 2021-11-18 NOTE — TELEPHONE ENCOUNTER
You are scheduled for ANTERIOR CERVICAL FUSION BG & INST 3 LEVEL. cervical four-cervical five, cervical five-cervical six, and possibly cervical six- cervical seven anterior cervical disectomy and fusion on 1/20/22 with .     · You will need to co surgery. Take 1000 mg of Tylenol (Acetaminophen) 4 hours before your scheduled surgery time, take this with your scheduled Gatorade. · Surgery is usually scheduled as 2-3 day admission. · Our office will get authorization for surgery.  We will attemp most Wednesdays from 4:00-5:00 pm.  Call Pre-admission Testing (PAT) to register at:  [phone]. Please park in the [facility], check in at registration and meet in the [employer] for your escort to class on the Ortho Spine unit.      If u

## 2021-11-18 NOTE — TELEPHONE ENCOUNTER
Patient is scheduled for ACDF C4-C5, C5-C6, possible C6-C7 on 1/20/22 with Dr Rolo Alonzo.     No RN available for  form  Y Surgery order signed   Y Placed sx on surgery sheet  Y Placed on outlook calendar  Y [employer] message sent to patient with sx instr

## 2021-11-19 ENCOUNTER — APPOINTMENT (OUTPATIENT)
Dept: PHYSICAL THERAPY | Age: 46
End: 2021-11-19
Attending: PHYSICIAN ASSISTANT
Payer: COMMERCIAL

## 2021-11-19 NOTE — TELEPHONE ENCOUNTER
[facility] collar order placed, printed and put in FRANCY Hardy bin for review and signature.

## 2021-11-20 RX ORDER — LOSARTAN POTASSIUM 25 MG/1
25 TABLET ORAL DAILY
Qty: 90 TABLET | Refills: 1 | Status: SHIPPED | OUTPATIENT
Start: 2021-11-20 | End: 2022-05-17

## 2021-11-20 NOTE — TELEPHONE ENCOUNTER
Refill passed per [address] [facility] protocol.     Requested Prescriptions   Pending Prescriptions Disp Refills    LOSARTAN 25 MG Oral Tab [Pharmacy Med Name: Losartan Potassium 25 Mg Tab [facility]] 30 tablet 0     Sig: TAKE ONE TABLET BY MOUTH ONE TIME DAILY        Hypertensive Medications Protocol Passed - 11/20/2021  1:13 PM        Passed - CMP or BMP in past 12 months        Passed - Appointment in past 6 or next 3 months        Passed - GFR Non- > 50     Lab Results   Component Value Date    GFRNAA 106 09/03/2021                     Future Appointments         Provider Department Appt Notes    In 6 days [address]  no c/p, no [place], [facility] pcy    In 1 week [address]  no c/p, no [place], [place] pcy    In 1 week [address]  no c/p, no [place], [facility] pcy    In 2 weeks [address]  no c/p, no [place], [facility] pcy    In 2 weeks [address]  no c/p, no [place], [place] pcy    In 2 months [place], [place] [address] [facility] 2wk post op sx 1/20/22    In 3 months Ally Armenta MD [address] [facility] 6wk post op sx 1/20/22          Recent Outpatient Visits              1 week ago Non-recurrent acute serous otitis media of right ear    [address] Vicente Dumont PA-C    Office Visit    1 week ago Cervical myelopathy with cervical radiculopathy [facility])    [address] [place], [place]    Office Visit    2 weeks ago Cervical myelopathy with cervical radiculopathy [facility])    [address] [place], [place]    Office Visit    1 month ago Cervical myelopathy with cervical radiculopathy [facility])    [address] [facility] [place], [place]    Office Visit    1 month ago Spinal stenosis of cervical region    [address] [place], [place]    Office Visit

## 2021-11-22 ENCOUNTER — OFFICE VISIT (OUTPATIENT)
Dept: OBGYN CLINIC | Facility: CLINIC | Age: 46
End: 2021-11-22
Payer: COMMERCIAL

## 2021-11-22 ENCOUNTER — TELEPHONE (OUTPATIENT)
Dept: OBGYN CLINIC | Facility: CLINIC | Age: 46
End: 2021-11-22

## 2021-11-22 VITALS
HEART RATE: 81 BPM | WEIGHT: 156.19 LBS | SYSTOLIC BLOOD PRESSURE: 147 MMHG | BODY MASS INDEX: 27 KG/M2 | DIASTOLIC BLOOD PRESSURE: 95 MMHG

## 2021-11-22 DIAGNOSIS — N89.8 VAGINAL ODOR: ICD-10-CM

## 2021-11-22 DIAGNOSIS — Z12.31 ENCOUNTER FOR SCREENING MAMMOGRAM FOR BREAST CANCER: ICD-10-CM

## 2021-11-22 DIAGNOSIS — Z01.411 ENCOUNTER FOR GYNECOLOGICAL EXAMINATION WITH ABNORMAL FINDING: ICD-10-CM

## 2021-11-22 DIAGNOSIS — Z01.419 ENCOUNTER FOR WELL WOMAN EXAM WITH ROUTINE GYNECOLOGICAL EXAM: Primary | ICD-10-CM

## 2021-11-22 PROCEDURE — 3080F DIAST BP >= 90 MM HG: CPT | Performed by: OBSTETRICS & GYNECOLOGY

## 2021-11-22 PROCEDURE — 3077F SYST BP >= 140 MM HG: CPT | Performed by: OBSTETRICS & GYNECOLOGY

## 2021-11-22 PROCEDURE — 99396 PREV VISIT EST AGE 40-64: CPT | Performed by: OBSTETRICS & GYNECOLOGY

## 2021-11-22 RX ORDER — METRONIDAZOLE 7.5 MG/G
1 GEL VAGINAL NIGHTLY
Qty: 70 G | Refills: 0 | Status: SHIPPED | OUTPATIENT
Start: 2021-11-22 | End: 2021-11-27

## 2021-11-22 NOTE — TELEPHONE ENCOUNTER
Pt Name and  verified. Pt is calling because she states that her boyfriend is not circumcised and notices a smell coming from the area.  States that she now has noticed this smell on her after intercourse and she describes it as a \"fishy odor\" and d

## 2021-11-22 NOTE — TELEPHONE ENCOUNTER
Patient needs to come in for an exam.  She is overdue for a pap smear according to our records anyway. Call patient. Patient's boyfriend needs to retract his foreskin and clean under it regularly. - - -

## 2021-11-22 NOTE — PROGRESS NOTES
Subjective:   Patient ID: Braulio Mccall is a 51year old female. Patient here for routine exam.  Reports vaginal discharge and odor for a couple of months. Patient notice it more after intercourse with her new uncircumcized partner.   Discussed doing Comment:Other reaction(s): LATEX  Norco [Hydrocodone-*    HIVES, NAUSEA AND VOMITING  Other                   HIVES    Comment:tegaderm  Tramadol                HIVES, NAUSEA ONLY  Hydrocodone             DIZZINESS    Comment:NAUSEA  Morphine cancer  Vaginal odor   F/U Labs. Encouraged monthly SBE. Discussed diet and exercise. Medication reviewed. Encouraged condoms with new partner.   Orders Placed This Encounter      Hpv Dna  High Risk , Thin Prep Collect      ThinPrep PAP Smear      Vagin

## 2021-11-22 NOTE — TELEPHONE ENCOUNTER
Patient requesting to speak with nurse regarding question, no further details. Please call at [phone].

## 2021-11-26 ENCOUNTER — APPOINTMENT (OUTPATIENT)
Dept: PHYSICAL THERAPY | Age: 46
End: 2021-11-26
Attending: PHYSICIAN ASSISTANT
Payer: COMMERCIAL

## 2021-11-26 NOTE — TELEPHONE ENCOUNTER
Paperwork reviewed and signed by provider.     Paperwork faxed to # [phone]  Copy sent to scan with cover sheet Y  Copy kept in the office until verified in media Y  Original mailed to home of record Y

## 2021-11-26 NOTE — TELEPHONE ENCOUNTER
Order faxed with all pertinent information  Order  Face sheet  Insurance information  Last office note

## 2021-11-29 ENCOUNTER — APPOINTMENT (OUTPATIENT)
Dept: PHYSICAL THERAPY | Age: 46
End: 2021-11-29
Attending: PHYSICIAN ASSISTANT
Payer: COMMERCIAL

## 2021-11-30 ENCOUNTER — TELEPHONE (OUTPATIENT)
Dept: FAMILY MEDICINE CLINIC | Facility: CLINIC | Age: 46
End: 2021-11-30

## 2021-12-01 ENCOUNTER — APPOINTMENT (OUTPATIENT)
Dept: PHYSICAL THERAPY | Age: 46
End: 2021-12-01
Attending: PHYSICIAN ASSISTANT
Payer: COMMERCIAL

## 2021-12-02 ENCOUNTER — PATIENT MESSAGE (OUTPATIENT)
Dept: SURGERY | Facility: CLINIC | Age: 46
End: 2021-12-02

## 2021-12-02 NOTE — TELEPHONE ENCOUNTER
From: Destinee Marin  To: Samuel Smith  Sent: 12/2/2021 12:26 PM CST  Subject: Pain     Hi. How are you? I noticed my left hand throbs and tingles even worse this past week especially to the point that I try not to use that hand.  I noticed my thu

## 2021-12-02 NOTE — TELEPHONE ENCOUNTER
Message below noted and routed to provider for input. Upon review- urinary urgency and incontinence are not new as they were reported at [address]    Pt is scheduled for surgery 1/20/21 with Dr. Alycia Deleon. - no sooner surgery currently available.

## 2021-12-04 ENCOUNTER — PATIENT MESSAGE (OUTPATIENT)
Dept: SURGERY | Facility: CLINIC | Age: 46
End: 2021-12-04

## 2021-12-06 ENCOUNTER — APPOINTMENT (OUTPATIENT)
Dept: PHYSICAL THERAPY | Age: 46
End: 2021-12-06
Attending: PHYSICIAN ASSISTANT
Payer: COMMERCIAL

## 2021-12-06 NOTE — TELEPHONE ENCOUNTER
Called patient in regard to moving surgery to an earlier date. Informed patient that Dr. Aviva Lund currently has no earlier January OR openings, however she may be moved to 12/9. Patient declined December date.   Patient stated that she understood, surgery

## 2021-12-06 NOTE — TELEPHONE ENCOUNTER
Noted in [place] [employer] message that patient is requesting to have an earlier surgery date. Called patient in regard to possibly changing surgery date to 12/9/21, as Dr. Emily Yeung has availability.   Per patient, she will not be able to make arrangements for 12/9 ford

## 2021-12-06 NOTE — TELEPHONE ENCOUNTER
From: Martin Dumas  To: Samuel Bates  Sent: 12/4/2021 2:45 PM CST  Subject: Surgery     Hello. Just an idea. Not sure if Doctor Moon Villarreal has any openings for surgery earlier than January 20th in the month of January?  I know he can’t do surgery i

## 2021-12-07 ENCOUNTER — PATIENT MESSAGE (OUTPATIENT)
Dept: SURGERY | Facility: CLINIC | Age: 46
End: 2021-12-07

## 2021-12-08 ENCOUNTER — APPOINTMENT (OUTPATIENT)
Dept: PHYSICAL THERAPY | Age: 46
End: 2021-12-08
Attending: PHYSICIAN ASSISTANT
Payer: COMMERCIAL

## 2021-12-16 ENCOUNTER — HOSPITAL ENCOUNTER (EMERGENCY)
Facility: HOSPITAL | Age: 46
Discharge: HOME | End: 2021-12-16
Attending: EMERGENCY MEDICINE
Payer: COMMERCIAL

## 2021-12-16 ENCOUNTER — APPOINTMENT (OUTPATIENT)
Dept: MRI IMAGING | Facility: HOSPITAL | Age: 46
End: 2021-12-16
Attending: EMERGENCY MEDICINE
Payer: COMMERCIAL

## 2021-12-16 VITALS
OXYGEN SATURATION: 97 % | WEIGHT: 157 LBS | HEART RATE: 71 BPM | DIASTOLIC BLOOD PRESSURE: 67 MMHG | BODY MASS INDEX: 27.82 KG/M2 | SYSTOLIC BLOOD PRESSURE: 135 MMHG | HEIGHT: 63 IN | TEMPERATURE: 97 F | RESPIRATION RATE: 18 BRPM

## 2021-12-16 DIAGNOSIS — M50.30 DEGENERATIVE DISC DISEASE, CERVICAL: Primary | Chronic | ICD-10-CM

## 2021-12-16 DIAGNOSIS — R32 URINARY INCONTINENCE, UNSPECIFIED TYPE: ICD-10-CM

## 2021-12-16 PROCEDURE — 99285 EMERGENCY DEPT VISIT HI MDM: CPT

## 2021-12-16 PROCEDURE — 96374 THER/PROPH/DIAG INJ IV PUSH: CPT

## 2021-12-16 PROCEDURE — 72141 MRI NECK SPINE W/O DYE: CPT | Performed by: EMERGENCY MEDICINE

## 2021-12-16 PROCEDURE — 72148 MRI LUMBAR SPINE W/O DYE: CPT | Performed by: EMERGENCY MEDICINE

## 2021-12-16 PROCEDURE — 81003 URINALYSIS AUTO W/O SCOPE: CPT | Performed by: EMERGENCY MEDICINE

## 2021-12-16 PROCEDURE — 72146 MRI CHEST SPINE W/O DYE: CPT | Performed by: EMERGENCY MEDICINE

## 2021-12-16 PROCEDURE — 85025 COMPLETE CBC W/AUTO DIFF WBC: CPT | Performed by: EMERGENCY MEDICINE

## 2021-12-16 PROCEDURE — 81025 URINE PREGNANCY TEST: CPT

## 2021-12-16 PROCEDURE — 80048 BASIC METABOLIC PNL TOTAL CA: CPT | Performed by: EMERGENCY MEDICINE

## 2021-12-16 RX ORDER — KETOROLAC TROMETHAMINE 15 MG/ML
15 INJECTION, SOLUTION INTRAMUSCULAR; INTRAVENOUS ONCE
Status: COMPLETED | OUTPATIENT
Start: 2021-12-16 | End: 2021-12-16

## 2021-12-16 NOTE — ED PROVIDER NOTES
Patient Seen in: [facility] AND [facility] Emergency Department    History   Patient presents with:  Back Pain      HPI    65-year-old female presents the ER with worsening pain of her neck and back as well as urinary incontinence for the past 3 days.   Patient wit segmental mastectomy with wire localization, advancement   • MASTECTOMY LEFT     • MASTECTOMY RIGHT     • RADIATION LEFT  2013   • RADIATION RIGHT  2013   • REDUCTION LEFT     • REDUCTION RIGHT     • REMOVAL OF OVARIAN CYST(S) Left 2005    laparoscopic   • signs reviewed. Social History and Family History elements reviewed from today, pertinent positives to the presenting problem noted.     Physical Exam     ED Triage Vitals [12/16/21 1512]   BP (!) 177/111   Pulse 79   Resp 18   Temp 97.2 °F (36.2 °C) Reflexes are normal and symmetric. Psychiatric:         Behavior: Behavior normal.         Thought Content:  Thought content normal.         Judgment: Judgment normal.         ED Course        Labs Reviewed   BASIC METABOLIC PANEL (8) - Normal   URINALYSI PM     Finalized by (CST): Tao Neff MD on 12/16/2021 at 8:54 PM          MRI SPINE LUMBAR ([id number])    Result Date: 12/16/2021  CONCLUSION:   Mild degenerative disc and facet disease throughout the lumbar spine without high-grade canal or foraminal n void residual 25 cc per    Condition upon leaving the department: Stable    Disposition and Plan     Clinical Impression:  Degenerative disc disease, cervical  (primary encounter diagnosis)  Urinary incontinence, unspecified type    Disposition:  Discharge

## 2021-12-16 NOTE — ED INITIAL ASSESSMENT (HPI)
Pt c/o mid back pain, chronic, no injury/trauma. Pt reports inability to control bladder x 3 days, pt reports this has happened in the past.  Hx of cervical spinal stenosis, pt having surgery Jan 20th.    No pain meds taken PTA

## 2021-12-20 ENCOUNTER — EKG ENCOUNTER (OUTPATIENT)
Dept: LAB | Age: 46
End: 2021-12-20
Attending: PHYSICIAN ASSISTANT
Payer: COMMERCIAL

## 2021-12-20 ENCOUNTER — OFFICE VISIT (OUTPATIENT)
Dept: FAMILY MEDICINE CLINIC | Facility: CLINIC | Age: 46
End: 2021-12-20
Payer: COMMERCIAL

## 2021-12-20 ENCOUNTER — LAB ENCOUNTER (OUTPATIENT)
Dept: LAB | Age: 46
End: 2021-12-20
Attending: PHYSICIAN ASSISTANT
Payer: COMMERCIAL

## 2021-12-20 VITALS
HEART RATE: 61 BPM | BODY MASS INDEX: 27.82 KG/M2 | DIASTOLIC BLOOD PRESSURE: 78 MMHG | HEIGHT: 63 IN | SYSTOLIC BLOOD PRESSURE: 118 MMHG | WEIGHT: 157 LBS

## 2021-12-20 DIAGNOSIS — Z01.818 PREOP EXAMINATION: ICD-10-CM

## 2021-12-20 DIAGNOSIS — Z01.818 PREOP EXAMINATION: Primary | ICD-10-CM

## 2021-12-20 DIAGNOSIS — M48.02 SPINAL STENOSIS OF CERVICAL REGION: Chronic | ICD-10-CM

## 2021-12-20 PROCEDURE — 87081 CULTURE SCREEN ONLY: CPT

## 2021-12-20 PROCEDURE — 85730 THROMBOPLASTIN TIME PARTIAL: CPT

## 2021-12-20 PROCEDURE — 85025 COMPLETE CBC W/AUTO DIFF WBC: CPT | Performed by: PHYSICIAN ASSISTANT

## 2021-12-20 PROCEDURE — 3078F DIAST BP <80 MM HG: CPT | Performed by: PHYSICIAN ASSISTANT

## 2021-12-20 PROCEDURE — 86901 BLOOD TYPING SEROLOGIC RH(D): CPT

## 2021-12-20 PROCEDURE — 93010 ELECTROCARDIOGRAM REPORT: CPT | Performed by: PHYSICIAN ASSISTANT

## 2021-12-20 PROCEDURE — 3074F SYST BP LT 130 MM HG: CPT | Performed by: PHYSICIAN ASSISTANT

## 2021-12-20 PROCEDURE — 3008F BODY MASS INDEX DOCD: CPT | Performed by: PHYSICIAN ASSISTANT

## 2021-12-20 PROCEDURE — 36415 COLL VENOUS BLD VENIPUNCTURE: CPT

## 2021-12-20 PROCEDURE — 80053 COMPREHEN METABOLIC PANEL: CPT | Performed by: PHYSICIAN ASSISTANT

## 2021-12-20 PROCEDURE — 85610 PROTHROMBIN TIME: CPT

## 2021-12-20 PROCEDURE — 99213 OFFICE O/P EST LOW 20 MIN: CPT | Performed by: PHYSICIAN ASSISTANT

## 2021-12-20 PROCEDURE — 93005 ELECTROCARDIOGRAM TRACING: CPT

## 2021-12-20 PROCEDURE — 86900 BLOOD TYPING SEROLOGIC ABO: CPT

## 2021-12-20 RX ORDER — POLYETHYLENE GLYCOL 3350 17 G/17G
17 POWDER, FOR SOLUTION ORAL DAILY
Qty: 30 PACKET | Refills: 2 | Status: SHIPPED | OUTPATIENT
Start: 2021-12-20 | End: 2021-12-21

## 2021-12-20 NOTE — PROGRESS NOTES
HPI:     HPI  51year-old female is here in the office for Pre-op. Patient is scheduled for anterior discectomy and fusion of C4-5, C5-C6, and C6-C7 on 01/20/2022. Patient complains of neck pain daily.   Patient denies of chest pain, SOB, N/V/C/D, fever, di Birth to 63yrs(1 of 4 - PCV13) due on 03/12/2022  Mammogram due on 03/26/2022  Annual Depression Screen due on 11/22/2022  Annual Physical due on 11/22/2022  Pap Smear,3 Years due on 11/22/2024    Patient's last menstrual period was 12/09/2021 (approximate cyst(s) Bilateral 1/2014    laparotomy   • Skin surgery  7/22/2016    bilateral areaola tattoing    • Tonsillectomy  1979   • Upper gi endoscopy,exam         Family History:     Family History   Problem Relation Age of Onset   • Lipids Father         hyper Not Asked        Self-Exams: Not Asked        Grew up on a farm: Not Asked        History of tanning: Not Asked        Outdoor occupation: Not Asked        Breast feeding: Not Asked        Reaction to local anesthetic: No    Social History Narrative      N Pharynx: Oropharynx is clear. No oropharyngeal exudate or posterior oropharyngeal erythema. Eyes:      General:         Right eye: No discharge. Left eye: No discharge.       Conjunctiva/sclera: Conjunctivae normal.      Pupils: Pupils are equal,

## 2021-12-21 ENCOUNTER — PATIENT MESSAGE (OUTPATIENT)
Dept: FAMILY MEDICINE CLINIC | Facility: CLINIC | Age: 46
End: 2021-12-21

## 2021-12-21 RX ORDER — POLYETHYLENE GLYCOL 3350 17 G/17G
17 POWDER, FOR SOLUTION ORAL DAILY
Qty: 30 PACKET | Refills: 2 | Status: ON HOLD | OUTPATIENT
Start: 2021-12-21 | End: 2022-01-20

## 2021-12-21 NOTE — TELEPHONE ENCOUNTER
Per PCP Dr Cait Tellez"      Patient is scheduled for anterior discectomy and fusion of C4-5, C5-C6, and C6-C7 on 01/20/2022 by Dr Kianna Schaeffer. Patient is cleared for surgery.   \"

## 2021-12-21 NOTE — TELEPHONE ENCOUNTER
From: Karen Palacio  To: Mayi Navarrete PA-C  Sent: 12/21/2021 7:33 AM CST  Subject: Prescription     Good morning.  When I saw you at yesterdays visit you said that a prescription for Polyethylene Glycol 3350 17 g Pack was sent to my pharmacy, but wh

## 2021-12-21 NOTE — TELEPHONE ENCOUNTER
Case change request changed to \"Surgical Admit Inpatient\" as pt will be received a 3 level ACDF.  PA team informed

## 2021-12-21 NOTE — TELEPHONE ENCOUNTER
Received Testing over Guideline notice from [facility] PAT re: PT/PTT and MRSA.   Placed in Nurse in basket for review

## 2021-12-22 NOTE — TELEPHONE ENCOUNTER
From: Jonathan Rosales  Sent: 12/21/2021 5:00 PM CST  To: Romy Reyes 2 Nurse  Subject: Paperwork    Good evening. I just wanted to ask a question.  After my surgery if I decide I want to go back to work before the 12 weeks is your office able to redo the pap

## 2021-12-23 ENCOUNTER — TELEPHONE (OUTPATIENT)
Dept: FAMILY MEDICINE CLINIC | Facility: CLINIC | Age: 46
End: 2021-12-23

## 2021-12-26 ENCOUNTER — PATIENT MESSAGE (OUTPATIENT)
Dept: FAMILY MEDICINE CLINIC | Facility: CLINIC | Age: 46
End: 2021-12-26

## 2021-12-27 NOTE — TELEPHONE ENCOUNTER
Received signed notification from Dr. Ana M Carlos.   Faxed PTT/MRSA-over 30 day pre op testing notification back to PAT to fax number:    [phone]

## 2021-12-27 NOTE — TELEPHONE ENCOUNTER
From: Martin Dumas  To: Iris Jenkins PA-C  Sent: 12/26/2021 10:53 PM CST  Subject: Booster     Hello. How can I schedule appointment for booster shot on MyChart?

## 2022-01-04 NOTE — TELEPHONE ENCOUNTER
Prior Authorization for inpatient surgery initiated with [facility] at [place] [address].   Requesting coverage for:[facility] 3 levels  Date of Service: 1/20/22   Inpatient days requested:1  CPT codes: 52410,38592R3,00133,99183    Request for surgery pending review, pen

## 2022-01-11 NOTE — TELEPHONE ENCOUNTER
D/t hospital availability pt will need to be rescheduled. Offering sx date 3/10/22 at [facility] pt and informed. Pt tearful upon hearing that shes will need to be rescheduled. Pt accepting of new sx date and time.      Will complete rescheduling proces

## 2022-01-12 NOTE — TELEPHONE ENCOUNTER
Prior authorization request completed for: [facility] 3 levels  Authorization #[id number]  Authorization dates: 1/20/2022  CPT codes approved: 33370,88492H3,69060,72903  Number of visits/dates of service approved: 1  Physician: Dr Gabino Barraza

## 2022-01-17 ENCOUNTER — LAB ENCOUNTER (OUTPATIENT)
Dept: LAB | Age: 47
End: 2022-01-17
Attending: NEUROLOGICAL SURGERY
Payer: COMMERCIAL

## 2022-01-17 DIAGNOSIS — I10 HYPERTENSION: ICD-10-CM

## 2022-01-17 DIAGNOSIS — E78.2 MIXED HYPERLIPIDEMIA: ICD-10-CM

## 2022-01-17 DIAGNOSIS — M48.02 SPINAL STENOSIS OF CERVICAL REGION: Chronic | ICD-10-CM

## 2022-01-17 DIAGNOSIS — E04.9 GOITER: Primary | ICD-10-CM

## 2022-01-17 DIAGNOSIS — E55.9 VITAMIN D DEFICIENCY DISEASE: ICD-10-CM

## 2022-01-17 LAB
T3FREE SERPL-MCNC: 2.4 PG/ML (ref 2.4–4.2)
T4 FREE SERPL-MCNC: 1 NG/DL (ref 0.8–1.7)
TSI SER-ACNC: 2.07 MIU/ML (ref 0.36–3.74)

## 2022-01-17 PROCEDURE — 84481 FREE ASSAY (FT-3): CPT

## 2022-01-17 PROCEDURE — 84443 ASSAY THYROID STIM HORMONE: CPT

## 2022-01-17 PROCEDURE — 36415 COLL VENOUS BLD VENIPUNCTURE: CPT

## 2022-01-17 PROCEDURE — 84439 ASSAY OF FREE THYROXINE: CPT

## 2022-01-17 NOTE — TELEPHONE ENCOUNTER
Pt would like call back to discuss exactly what will take place during her surgery, how long she will be in hospital, how long surgery takes, etc.

## 2022-01-17 NOTE — TELEPHONE ENCOUNTER
Received a call from [facility] who stated that patient called to confirm surgery date. No changes in surgery date as of today.

## 2022-01-18 NOTE — TELEPHONE ENCOUNTER
Spoke with [employer] and informed that this case is okay to stay on for the original sx date of 1/20/22    Called pt to inform who was very appreciative. Sent updated email to Epic surg to ensure SA is assigned.

## 2022-01-19 ENCOUNTER — ANESTHESIA EVENT (OUTPATIENT)
Dept: SURGERY | Facility: HOSPITAL | Age: 47
DRG: 454 | End: 2022-01-19
Payer: COMMERCIAL

## 2022-01-19 LAB — SARS-COV-2 RNA RESP QL NAA+PROBE: NOT DETECTED

## 2022-01-20 ENCOUNTER — APPOINTMENT (OUTPATIENT)
Dept: GENERAL RADIOLOGY | Facility: HOSPITAL | Age: 47
DRG: 454 | End: 2022-01-20
Attending: NEUROLOGICAL SURGERY
Payer: COMMERCIAL

## 2022-01-20 ENCOUNTER — ANESTHESIA (OUTPATIENT)
Dept: SURGERY | Facility: HOSPITAL | Age: 47
DRG: 454 | End: 2022-01-20
Payer: COMMERCIAL

## 2022-01-20 ENCOUNTER — HOSPITAL ENCOUNTER (INPATIENT)
Facility: HOSPITAL | Age: 47
LOS: 2 days | Discharge: HOME | DRG: 454 | End: 2022-01-22
Attending: NEUROLOGICAL SURGERY | Admitting: NEUROLOGICAL SURGERY
Payer: COMMERCIAL

## 2022-01-20 DIAGNOSIS — M47.22 CERVICAL SPONDYLOSIS WITH MYELOPATHY AND RADICULOPATHY: ICD-10-CM

## 2022-01-20 DIAGNOSIS — M48.02 SPINAL STENOSIS OF CERVICAL REGION: Primary | Chronic | ICD-10-CM

## 2022-01-20 DIAGNOSIS — M47.12 CERVICAL SPONDYLOSIS WITH MYELOPATHY AND RADICULOPATHY: ICD-10-CM

## 2022-01-20 PROBLEM — E03.9 ACQUIRED HYPOTHYROIDISM: Status: ACTIVE | Noted: 2020-03-24

## 2022-01-20 LAB — B-HCG UR QL: NEGATIVE

## 2022-01-20 PROCEDURE — 99222 1ST HOSP IP/OBS MODERATE 55: CPT | Performed by: HOSPITALIST

## 2022-01-20 PROCEDURE — BR141ZZ FLUOROSCOPY OF CERVICAL FACET JOINT(S) USING LOW OSMOLAR CONTRAST: ICD-10-PCS | Performed by: NEUROLOGICAL SURGERY

## 2022-01-20 PROCEDURE — 0RT30ZZ RESECTION OF CERVICAL VERTEBRAL DISC, OPEN APPROACH: ICD-10-PCS | Performed by: NEUROLOGICAL SURGERY

## 2022-01-20 PROCEDURE — 0RG20A0 FUSION OF 2 OR MORE CERVICAL VERTEBRAL JOINTS WITH INTERBODY FUSION DEVICE, ANTERIOR APPROACH, ANTERIOR COLUMN, OPEN APPROACH: ICD-10-PCS | Performed by: NEUROLOGICAL SURGERY

## 2022-01-20 PROCEDURE — 76000 FLUOROSCOPY <1 HR PHYS/QHP: CPT | Performed by: NEUROLOGICAL SURGERY

## 2022-01-20 PROCEDURE — 0RG20K1 FUSION OF 2 OR MORE CERVICAL VERTEBRAL JOINTS WITH NONAUTOLOGOUS TISSUE SUBSTITUTE, POSTERIOR APPROACH, POSTERIOR COLUMN, OPEN APPROACH: ICD-10-PCS | Performed by: NEUROLOGICAL SURGERY

## 2022-01-20 PROCEDURE — 72040 X-RAY EXAM NECK SPINE 2-3 VW: CPT | Performed by: NEUROLOGICAL SURGERY

## 2022-01-20 PROCEDURE — 3079F DIAST BP 80-89 MM HG: CPT | Performed by: HOSPITALIST

## 2022-01-20 PROCEDURE — S0028 INJECTION, FAMOTIDINE, 20 MG: HCPCS | Performed by: NURSE ANESTHETIST, CERTIFIED REGISTERED

## 2022-01-20 PROCEDURE — 3008F BODY MASS INDEX DOCD: CPT | Performed by: HOSPITALIST

## 2022-01-20 PROCEDURE — 3075F SYST BP GE 130 - 139MM HG: CPT | Performed by: HOSPITALIST

## 2022-01-20 PROCEDURE — 4A11X4G MONITORING OF PERIPHERAL NERVOUS ELECTRICAL ACTIVITY, INTRAOPERATIVE, EXTERNAL APPROACH: ICD-10-PCS | Performed by: NEUROLOGICAL SURGERY

## 2022-01-20 DEVICE — IMPLANTABLE DEVICE: Type: IMPLANTABLE DEVICE | Site: NECK | Status: FUNCTIONAL

## 2022-01-20 DEVICE — UNIPLATE ANTERIOR CERVICAL PLATE SYSTEM SCREW, SELF-DRILLING 4.6 X 12MM
Type: IMPLANTABLE DEVICE | Site: NECK | Status: FUNCTIONAL
Brand: UNIPLATE

## 2022-01-20 DEVICE — UNIPLATE 2 ANTERIOR CERVICAL PLATE SYSTEM TWO LEVEL PLATE 32MM
Type: IMPLANTABLE DEVICE | Site: NECK | Status: FUNCTIONAL
Brand: UNIPLATE

## 2022-01-20 RX ORDER — SODIUM CHLORIDE 9 MG/ML
INJECTION, SOLUTION INTRAVENOUS CONTINUOUS PRN
Status: DISCONTINUED | OUTPATIENT
Start: 2022-01-20 | End: 2022-01-20 | Stop reason: SURG

## 2022-01-20 RX ORDER — ACETAMINOPHEN 500 MG
1000 TABLET ORAL ONCE
Status: DISCONTINUED | OUTPATIENT
Start: 2022-01-20 | End: 2022-01-20 | Stop reason: HOSPADM

## 2022-01-20 RX ORDER — FAMOTIDINE 10 MG/ML
INJECTION, SOLUTION INTRAVENOUS AS NEEDED
Status: DISCONTINUED | OUTPATIENT
Start: 2022-01-20 | End: 2022-01-20 | Stop reason: SURG

## 2022-01-20 RX ORDER — LOSARTAN POTASSIUM 25 MG/1
25 TABLET ORAL DAILY
Status: DISCONTINUED | OUTPATIENT
Start: 2022-01-21 | End: 2022-01-22

## 2022-01-20 RX ORDER — DIAZEPAM 10 MG/1
10 TABLET ORAL EVERY 8 HOURS PRN
Status: DISCONTINUED | OUTPATIENT
Start: 2022-01-20 | End: 2022-01-22

## 2022-01-20 RX ORDER — SODIUM CHLORIDE, SODIUM LACTATE, POTASSIUM CHLORIDE, CALCIUM CHLORIDE 600; 310; 30; 20 MG/100ML; MG/100ML; MG/100ML; MG/100ML
INJECTION, SOLUTION INTRAVENOUS CONTINUOUS
Status: DISCONTINUED | OUTPATIENT
Start: 2022-01-20 | End: 2022-01-20 | Stop reason: HOSPADM

## 2022-01-20 RX ORDER — SENNOSIDES 8.6 MG
17.2 TABLET ORAL NIGHTLY
Status: DISCONTINUED | OUTPATIENT
Start: 2022-01-20 | End: 2022-01-22

## 2022-01-20 RX ORDER — MIDAZOLAM HYDROCHLORIDE 1 MG/ML
1 INJECTION INTRAMUSCULAR; INTRAVENOUS EVERY 5 MIN PRN
Status: DISCONTINUED | OUTPATIENT
Start: 2022-01-20 | End: 2022-01-20 | Stop reason: HOSPADM

## 2022-01-20 RX ORDER — SODIUM PHOSPHATE, DIBASIC AND SODIUM PHOSPHATE, MONOBASIC 7; 19 G/133ML; G/133ML
1 ENEMA RECTAL ONCE AS NEEDED
Status: DISCONTINUED | OUTPATIENT
Start: 2022-01-20 | End: 2022-01-22

## 2022-01-20 RX ORDER — DIPHENHYDRAMINE HYDROCHLORIDE 50 MG/ML
12.5 INJECTION INTRAMUSCULAR; INTRAVENOUS AS NEEDED
Status: DISCONTINUED | OUTPATIENT
Start: 2022-01-20 | End: 2022-01-20 | Stop reason: HOSPADM

## 2022-01-20 RX ORDER — LEVOTHYROXINE SODIUM 88 UG/1
88 TABLET ORAL
Status: DISCONTINUED | OUTPATIENT
Start: 2022-01-21 | End: 2022-01-22

## 2022-01-20 RX ORDER — BISACODYL 10 MG
10 SUPPOSITORY, RECTAL RECTAL
Status: DISCONTINUED | OUTPATIENT
Start: 2022-01-20 | End: 2022-01-22

## 2022-01-20 RX ORDER — ONDANSETRON 2 MG/ML
4 INJECTION INTRAMUSCULAR; INTRAVENOUS EVERY 4 HOURS PRN
Status: ACTIVE | OUTPATIENT
Start: 2022-01-20 | End: 2022-01-21

## 2022-01-20 RX ORDER — HYDROMORPHONE HYDROCHLORIDE 1 MG/ML
0.2 INJECTION, SOLUTION INTRAMUSCULAR; INTRAVENOUS; SUBCUTANEOUS EVERY 2 HOUR PRN
Status: DISCONTINUED | OUTPATIENT
Start: 2022-01-20 | End: 2022-01-22

## 2022-01-20 RX ORDER — SODIUM CHLORIDE, SODIUM LACTATE, POTASSIUM CHLORIDE, CALCIUM CHLORIDE 600; 310; 30; 20 MG/100ML; MG/100ML; MG/100ML; MG/100ML
INJECTION, SOLUTION INTRAVENOUS CONTINUOUS
Status: DISCONTINUED | OUTPATIENT
Start: 2022-01-20 | End: 2022-01-22

## 2022-01-20 RX ORDER — HYDROMORPHONE HYDROCHLORIDE 1 MG/ML
0.8 INJECTION, SOLUTION INTRAMUSCULAR; INTRAVENOUS; SUBCUTANEOUS EVERY 2 HOUR PRN
Status: DISCONTINUED | OUTPATIENT
Start: 2022-01-20 | End: 2022-01-22

## 2022-01-20 RX ORDER — DIPHENHYDRAMINE HYDROCHLORIDE 50 MG/ML
25 INJECTION INTRAMUSCULAR; INTRAVENOUS EVERY 4 HOURS PRN
Status: DISCONTINUED | OUTPATIENT
Start: 2022-01-20 | End: 2022-01-22

## 2022-01-20 RX ORDER — POLYETHYLENE GLYCOL 3350 17 G/17G
17 POWDER, FOR SOLUTION ORAL DAILY PRN
Status: DISCONTINUED | OUTPATIENT
Start: 2022-01-20 | End: 2022-01-22

## 2022-01-20 RX ORDER — LIDOCAINE HYDROCHLORIDE 10 MG/ML
INJECTION, SOLUTION EPIDURAL; INFILTRATION; INTRACAUDAL; PERINEURAL AS NEEDED
Status: DISCONTINUED | OUTPATIENT
Start: 2022-01-20 | End: 2022-01-20 | Stop reason: SURG

## 2022-01-20 RX ORDER — NALOXONE HYDROCHLORIDE 0.4 MG/ML
80 INJECTION, SOLUTION INTRAMUSCULAR; INTRAVENOUS; SUBCUTANEOUS AS NEEDED
Status: DISCONTINUED | OUTPATIENT
Start: 2022-01-20 | End: 2022-01-20 | Stop reason: HOSPADM

## 2022-01-20 RX ORDER — PROCHLORPERAZINE EDISYLATE 5 MG/ML
10 INJECTION INTRAMUSCULAR; INTRAVENOUS EVERY 6 HOURS PRN
Status: ACTIVE | OUTPATIENT
Start: 2022-01-20 | End: 2022-01-22

## 2022-01-20 RX ORDER — ROCURONIUM BROMIDE 10 MG/ML
INJECTION, SOLUTION INTRAVENOUS AS NEEDED
Status: DISCONTINUED | OUTPATIENT
Start: 2022-01-20 | End: 2022-01-20 | Stop reason: SURG

## 2022-01-20 RX ORDER — CEFAZOLIN SODIUM 1 G/3ML
INJECTION, POWDER, FOR SOLUTION INTRAMUSCULAR; INTRAVENOUS AS NEEDED
Status: DISCONTINUED | OUTPATIENT
Start: 2022-01-20 | End: 2022-01-20 | Stop reason: SURG

## 2022-01-20 RX ORDER — HYDROMORPHONE HYDROCHLORIDE 1 MG/ML
0.4 INJECTION, SOLUTION INTRAMUSCULAR; INTRAVENOUS; SUBCUTANEOUS EVERY 2 HOUR PRN
Status: DISCONTINUED | OUTPATIENT
Start: 2022-01-20 | End: 2022-01-22

## 2022-01-20 RX ORDER — ONDANSETRON 2 MG/ML
INJECTION INTRAMUSCULAR; INTRAVENOUS AS NEEDED
Status: DISCONTINUED | OUTPATIENT
Start: 2022-01-20 | End: 2022-01-20 | Stop reason: SURG

## 2022-01-20 RX ORDER — DIPHENHYDRAMINE HCL 25 MG
25 CAPSULE ORAL EVERY 4 HOURS PRN
Status: DISCONTINUED | OUTPATIENT
Start: 2022-01-20 | End: 2022-01-22

## 2022-01-20 RX ORDER — ONDANSETRON 2 MG/ML
4 INJECTION INTRAMUSCULAR; INTRAVENOUS AS NEEDED
Status: DISCONTINUED | OUTPATIENT
Start: 2022-01-20 | End: 2022-01-20 | Stop reason: HOSPADM

## 2022-01-20 RX ORDER — DIPHENHYDRAMINE HYDROCHLORIDE 50 MG/ML
INJECTION INTRAMUSCULAR; INTRAVENOUS AS NEEDED
Status: DISCONTINUED | OUTPATIENT
Start: 2022-01-20 | End: 2022-01-20 | Stop reason: SURG

## 2022-01-20 RX ORDER — LIOTHYRONINE SODIUM 5 UG/1
5 TABLET ORAL DAILY
Status: DISCONTINUED | OUTPATIENT
Start: 2022-01-21 | End: 2022-01-22

## 2022-01-20 RX ORDER — KETOROLAC TROMETHAMINE 30 MG/ML
30 INJECTION, SOLUTION INTRAMUSCULAR; INTRAVENOUS EVERY 6 HOURS
Status: ACTIVE | OUTPATIENT
Start: 2022-01-20 | End: 2022-01-20

## 2022-01-20 RX ORDER — MEPERIDINE HYDROCHLORIDE 25 MG/ML
12.5 INJECTION INTRAMUSCULAR; INTRAVENOUS; SUBCUTANEOUS AS NEEDED
Status: DISCONTINUED | OUTPATIENT
Start: 2022-01-20 | End: 2022-01-20 | Stop reason: HOSPADM

## 2022-01-20 RX ORDER — ALPRAZOLAM 0.25 MG/1
0.25 TABLET ORAL 3 TIMES DAILY PRN
Status: DISCONTINUED | OUTPATIENT
Start: 2022-01-20 | End: 2022-01-22

## 2022-01-20 RX ORDER — MORPHINE SULFATE 15 MG/1
15 TABLET ORAL EVERY 4 HOURS PRN
Status: DISCONTINUED | OUTPATIENT
Start: 2022-01-20 | End: 2022-01-22

## 2022-01-20 RX ORDER — SODIUM CHLORIDE 0.9 % (FLUSH) 0.9 %
SYRINGE (ML) INJECTION AS NEEDED
Status: DISCONTINUED | OUTPATIENT
Start: 2022-01-20 | End: 2022-01-20 | Stop reason: HOSPADM

## 2022-01-20 RX ORDER — DEXTROSE, SODIUM CHLORIDE, AND POTASSIUM CHLORIDE 5; .45; .15 G/100ML; G/100ML; G/100ML
INJECTION INTRAVENOUS CONTINUOUS
Status: DISCONTINUED | OUTPATIENT
Start: 2022-01-20 | End: 2022-01-22

## 2022-01-20 RX ORDER — 0.9 % SODIUM CHLORIDE 0.9 %
INTRAVENOUS SOLUTION INTRAVENOUS
Status: DISPENSED
Start: 2022-01-20 | End: 2022-01-20

## 2022-01-20 RX ORDER — DOCUSATE SODIUM 100 MG/1
100 CAPSULE, LIQUID FILLED ORAL 2 TIMES DAILY
Status: DISCONTINUED | OUTPATIENT
Start: 2022-01-20 | End: 2022-01-22

## 2022-01-20 RX ORDER — VANCOMYCIN HYDROCHLORIDE 1 G/20ML
INJECTION, POWDER, LYOPHILIZED, FOR SOLUTION INTRAVENOUS AS NEEDED
Status: DISCONTINUED | OUTPATIENT
Start: 2022-01-20 | End: 2022-01-20 | Stop reason: HOSPADM

## 2022-01-20 RX ORDER — METOCLOPRAMIDE HYDROCHLORIDE 5 MG/ML
10 INJECTION INTRAMUSCULAR; INTRAVENOUS AS NEEDED
Status: DISCONTINUED | OUTPATIENT
Start: 2022-01-20 | End: 2022-01-20 | Stop reason: HOSPADM

## 2022-01-20 RX ORDER — MIDAZOLAM HYDROCHLORIDE 1 MG/ML
INJECTION INTRAMUSCULAR; INTRAVENOUS
Status: COMPLETED
Start: 2022-01-20 | End: 2022-01-20

## 2022-01-20 RX ORDER — DEXAMETHASONE SODIUM PHOSPHATE 4 MG/ML
VIAL (ML) INJECTION AS NEEDED
Status: DISCONTINUED | OUTPATIENT
Start: 2022-01-20 | End: 2022-01-20 | Stop reason: SURG

## 2022-01-20 RX ORDER — HYDROMORPHONE HYDROCHLORIDE 1 MG/ML
INJECTION, SOLUTION INTRAMUSCULAR; INTRAVENOUS; SUBCUTANEOUS
Status: COMPLETED
Start: 2022-01-20 | End: 2022-01-20

## 2022-01-20 RX ORDER — HYDROMORPHONE HYDROCHLORIDE 1 MG/ML
0.4 INJECTION, SOLUTION INTRAMUSCULAR; INTRAVENOUS; SUBCUTANEOUS EVERY 5 MIN PRN
Status: DISCONTINUED | OUTPATIENT
Start: 2022-01-20 | End: 2022-01-20 | Stop reason: HOSPADM

## 2022-01-20 RX ORDER — BUPIVACAINE HYDROCHLORIDE AND EPINEPHRINE 5; 5 MG/ML; UG/ML
INJECTION, SOLUTION EPIDURAL; INTRACAUDAL; PERINEURAL AS NEEDED
Status: DISCONTINUED | OUTPATIENT
Start: 2022-01-20 | End: 2022-01-20 | Stop reason: HOSPADM

## 2022-01-20 RX ORDER — VANCOMYCIN HYDROCHLORIDE 1 G/20ML
INJECTION, POWDER, LYOPHILIZED, FOR SOLUTION INTRAVENOUS
Status: DISPENSED
Start: 2022-01-20 | End: 2022-01-20

## 2022-01-20 RX ORDER — MIDAZOLAM HYDROCHLORIDE 1 MG/ML
INJECTION INTRAMUSCULAR; INTRAVENOUS AS NEEDED
Status: DISCONTINUED | OUTPATIENT
Start: 2022-01-20 | End: 2022-01-20 | Stop reason: SURG

## 2022-01-20 RX ORDER — HYDROMORPHONE HYDROCHLORIDE 1 MG/ML
INJECTION, SOLUTION INTRAMUSCULAR; INTRAVENOUS; SUBCUTANEOUS AS NEEDED
Status: DISCONTINUED | OUTPATIENT
Start: 2022-01-20 | End: 2022-01-20 | Stop reason: SURG

## 2022-01-20 RX ADMIN — MIDAZOLAM HYDROCHLORIDE 2 MG: 1 INJECTION INTRAMUSCULAR; INTRAVENOUS at 07:30:00

## 2022-01-20 RX ADMIN — FAMOTIDINE 20 MG: 10 INJECTION, SOLUTION INTRAVENOUS at 07:55:00

## 2022-01-20 RX ADMIN — CEFAZOLIN SODIUM 2 G: 1 INJECTION, POWDER, FOR SOLUTION INTRAMUSCULAR; INTRAVENOUS at 07:51:00

## 2022-01-20 RX ADMIN — ONDANSETRON 4 MG: 2 INJECTION INTRAMUSCULAR; INTRAVENOUS at 09:50:00

## 2022-01-20 RX ADMIN — SODIUM CHLORIDE: 9 INJECTION, SOLUTION INTRAVENOUS at 07:45:00

## 2022-01-20 RX ADMIN — LIDOCAINE HYDROCHLORIDE 50 MG: 10 INJECTION, SOLUTION EPIDURAL; INFILTRATION; INTRACAUDAL; PERINEURAL at 07:38:00

## 2022-01-20 RX ADMIN — HYDROMORPHONE HYDROCHLORIDE 0.2 MG: 1 INJECTION, SOLUTION INTRAMUSCULAR; INTRAVENOUS; SUBCUTANEOUS at 09:48:00

## 2022-01-20 RX ADMIN — SODIUM CHLORIDE, SODIUM LACTATE, POTASSIUM CHLORIDE, CALCIUM CHLORIDE: 600; 310; 30; 20 INJECTION, SOLUTION INTRAVENOUS at 07:30:00

## 2022-01-20 RX ADMIN — DIPHENHYDRAMINE HYDROCHLORIDE 12.5 MG: 50 INJECTION INTRAMUSCULAR; INTRAVENOUS at 09:48:00

## 2022-01-20 RX ADMIN — SODIUM CHLORIDE, SODIUM LACTATE, POTASSIUM CHLORIDE, CALCIUM CHLORIDE: 600; 310; 30; 20 INJECTION, SOLUTION INTRAVENOUS at 10:09:00

## 2022-01-20 RX ADMIN — DEXAMETHASONE SODIUM PHOSPHATE 8 MG: 4 MG/ML VIAL (ML) INJECTION at 07:55:00

## 2022-01-20 RX ADMIN — ROCURONIUM BROMIDE 5 MG: 10 INJECTION, SOLUTION INTRAVENOUS at 07:38:00

## 2022-01-20 RX ADMIN — DIPHENHYDRAMINE HYDROCHLORIDE 12.5 MG: 50 INJECTION INTRAMUSCULAR; INTRAVENOUS at 08:07:00

## 2022-01-20 NOTE — PLAN OF CARE
Patient post-op day 0 with ACDF. Alert and oriented x4. Vital signs within normal range. Pain management PRN medications in place. History of Right breast cancer - right limb restrictions in place. Incentive spirometer 10x/hour. SCD's in place.  Coverlet

## 2022-01-20 NOTE — OPERATIVE REPORT
[facility]  Operative Note    Beba Harper Patient Status:  Inpatient    1975 MRN [id number]   [facility] SURGERY Attending Lynn Tomlinson MD   PCP Kenna Gee PA-C Date of Surgery 2022     PREOPERATIVE DIAGNOSIS: #10 blade to incise the skin. Hemostasis was achieved using bipolar electrocautery. We dissected the soft tissue away from the platysma using Metzenbaum scissors. We then opened the platysma along the anterior border of the sternocleidomastoid muscle.  We u and C5. I felt an 7 mm height interbody implant would be the appropriate height. We filled this with morselized allograft and placed it into the disc space under distraction.  I then released the distraction and removed the handle from the interbody implant

## 2022-01-20 NOTE — H&P
[facility]  [address] Patient Status:  Inpatient    1975 MRN [id number]   [facility] 3SW-A Attending Ally Armenta MD   Hosp Day # 0 PCP Aaron Patel PA-C     Reason for consult: medical management FRONT SINUS,TRANSORBIT,UNI  09/19/2018   • FOREARM/WRIST SURGERY UNLISTED Left 2009    DeQuervain's release   • LAPAROSCOPY,DIAGNOSTIC     • LUMPECTOMY RIGHT Right 6/28/2013    Right segmental mastectomy with wire localization, advancement   • MASTECTOMY L facility-administered medications on file prior to encounter. VITAMIN D, CHOLECALCIFEROL, OR, Take 1 tablet by mouth daily. , Disp: , Rfl:   VITAMIN E OR, Take 1 capsule by mouth daily. , Disp: , Rfl:   Ascorbic Acid (VITAMIN C OR), Take 1 tablet by mouth d intact. Musculoskeletal: Moves all extremities. Extremities: No edema or cyanosis. Integument: No rashes or lesions. Psychiatric: Appropriate mood and affect.       Diagnostic Data:      Labs:  No results for input(s): WBC, HGB, MCV, PLT, BAND, INR in

## 2022-01-20 NOTE — PROGRESS NOTES
Patient transferred from PACU after a C4-C5; C5-C6 ACDF with Dr. Valdez De La Torre. Patient alert and oriented x4. Cervical soft collar in place. Dressing is clean, dry, and intact. Vital signs within normal range.  Admission assessment complete and primary care noti

## 2022-01-20 NOTE — ANESTHESIA POSTPROCEDURE EVALUATION
[facility] Patient Status:  Inpatient   Age/Gender 51year old female MRN [id number]   Location [address] Attending Amie Skaggs MD   Hosp Day # 0 PCP Jaskaran Castellanos PA-C       Anesthesia Post-o

## 2022-01-20 NOTE — ANESTHESIA PROCEDURE NOTES
Airway  Date/Time: 1/20/2022 7:40 AM  Urgency: elective    Airway not difficult    General Information and Staff    Patient location during procedure: OR  Anesthesiologist: Anmol Cabral MD  Resident/CRNA: Gina Azar CRNA  Performed: FARHEEN Jose

## 2022-01-20 NOTE — ANESTHESIA PREPROCEDURE EVALUATION
PRE-OP EVALUATION    Patient Name: Rickey Shane    Admit Diagnosis: Cervical spondylosis with myelopathy and radiculopathy [M47.12, M47.22]    Pre-op Diagnosis: Cervical spondylosis with myelopathy and radiculopathy [M47.12, M47.22]    Cervical four-fi Surgical History:   Procedure Laterality Date   • APPENDECTOMY  1979   • BREAST SURGERY  12/9/2015    Revision of bilateral reconstructed breasts   • BREAST SURGERY  12/9/2015    Fat grafting to left reconstructed breast   • BREAST SURGERY Bilateral 2/3/16 normal.         Dental    No notable dental history.          Pulmonary    Pulmonary exam normal.                 Other findings            ASA: 3   Plan: general  NPO status verified and     Post-procedure pain management plan discussed with surgeon and pa

## 2022-01-20 NOTE — H&P
[facility]  Neurological Surgery History and Physical    Elba Weaver, 51year old female Patient Status:  Inpatient    1975 MRN [id number]   Location [address] [place] [address] Attending Dominique Larkin, [facility]   APGAR: 9 (1min) 9 (5 min)  DR:  Jaclyn Cooper   • COLONOSCOPY   2012   • COLONOSCOPY       • CORE BIOPSY Right 2013     Right breast:  MRI core biopsies   • EGD      • EXPLOR FRONT SINUS,TRANSORBIT,UNI   2018   • FOREARM/WRIST SURGE NAUSEA ONLY     MEDICATIONS:    Prescriptions Prior to Admission   (Not in a hospital admission)      No current facility-administered medications for this visit.     DIAGNOSIS:  Cervical stenosis with myelopathy primarily at C4-5 C5-6  Cervical pain to maintain a daily low-impact exercise program. Walking, and slowly increasing the distance each day, is the best exercise after this type of surgery.  Some discomfort is normal, but pain is a signal to slow down and rest. The amount of time it takes to re problems even after treatment. Also, smoking is related to a number other health issues (e.g. cardiovascular and pulmonary disorders, etc.) which might interfere with a spinal disorder patient regaining a return to normal function.     Christiano Dao MD,

## 2022-01-21 PROCEDURE — 99232 SBSQ HOSP IP/OBS MODERATE 35: CPT | Performed by: HOSPITALIST

## 2022-01-21 PROCEDURE — 99024 POSTOP FOLLOW-UP VISIT: CPT | Performed by: PHYSICIAN ASSISTANT

## 2022-01-21 RX ORDER — CELECOXIB 100 MG/1
100 CAPSULE ORAL 2 TIMES DAILY
Status: DISCONTINUED | OUTPATIENT
Start: 2022-01-21 | End: 2022-01-22

## 2022-01-21 RX ORDER — METOPROLOL TARTRATE 50 MG/1
50 TABLET, FILM COATED ORAL
Status: DISCONTINUED | OUTPATIENT
Start: 2022-01-21 | End: 2022-01-22

## 2022-01-21 NOTE — PLAN OF CARE
Aox4, denying numbness or tingling at this time, reporting severe pain overnight, pain medication offered and administered as needed, weakness to BUE, passed RN dysphagia screening, coverlet to anterior neck c/d/I, on room air , teds + scds in place, vo

## 2022-01-21 NOTE — PLAN OF CARE
Patient alert and oriented x4. VSS, on RA. Pain to posterior neck, PRN PO pain medications given with some stated relief. Sore throat relieved with lozenge and ice cream. Encouraging PO fluid intake. Soft collar for comfort.  Incision site to anterior neck

## 2022-01-21 NOTE — PROGRESS NOTES
Reviewed swallowing precautions and soft collar use for comfort. Reviewed indications, side effects of pain medication/narcotics and constipation prevention.  Stressed importance of increased fluids/roughage in diet, continued use stool softeners along with

## 2022-01-21 NOTE — OCCUPATIONAL THERAPY NOTE
OCCUPATIONAL THERAPY EVALUATION - INPATIENT    Room Number: 949/400-Z  Evaluation Date: 1/21/2022     Type of Evaluation: Initial  Presenting Problem: ACDF    Physician Order: IP Consult to Occupational Therapy  Reason for Therapy:  ADL/IADL Dysfunction an tries to wear button shirt. LB dressing: independent pulling on socks. Did not want to get dressed today. Staying another day.     Functional Mobility:  Supine to Sit : Independent  Sit to Stand: Independent  Sit to supine: independent  Chair transfer: i 24  Approx Degree of Impairment: 0%  Standardized Score (AM-PAC Scale): 57.54    PLAN   Patient has been evaluated and presents with no skilled Occupational Therapy needs at this time. Patient discharged from Occupational Therapy services.   Please re-orde

## 2022-01-21 NOTE — PHYSICAL THERAPY NOTE
PHYSICAL THERAPY EVALUATION - INPATIENT     Room Number: 155/753-I  Evaluation Date: 1/21/2022  Type of Evaluation: Initial  Physician Order: PT Eval and Treat    Presenting Problem: s/p ACDF C4-6 on 1/20/22  Co-Morbidities : OA, migraines, HTN chron Bed/chair alarm;Spine;Limb alert - right (soft collar for comfort )       WEIGHT BEARING RESTRICTION  Weight Bearing Restriction: None                PAIN ASSESSMENT  Ratin  Location: posterior neck and arms   Management Techniques:  Activity promotion; Functional Mobility/Gait Assessment  Gait Assistance: Supervision  Distance (ft): 150  Assistive Device: Rolling walker  Pattern: Within Functional Limits (slow yecenia, mildly flexed posture)  Stairs: Stairs; Car transfer  How Many Stairs: 4  Device: 2 R were worn.   Patient/Family PPE: Mask most of session

## 2022-01-21 NOTE — PROGRESS NOTES
[facility]     Hospitalist Progress Note     Chris Wilkes Patient Status:  Inpatient    1975 MRN [id number]   [facility] Attending Asmita Tracy MD   Hosp Day # 1 PCP Tylor Schmidt     Chief Complaint: follow input(s): TROP, TROPHS, CK in the last 168 hours. Imaging: Imaging data reviewed in Epic.     Medications:   • metoprolol tartrate  50 mg Oral 2x Daily(Beta Blocker)   • celecoxib  100 mg Oral BID   • sennosides  17.2 mg Oral Nightly   • docusate sodium

## 2022-01-21 NOTE — PROGRESS NOTES
[facility]  Neurosurgery Progress Note    Álvaro Borja Patient Status:  Inpatient    1975 MRN [id number]   [facility] 3SW-A Attending Maryam Grossman MD   [facility] Day # 1 PCP Nia Vega     Subjective:  Esthela Manzanares

## 2022-01-22 ENCOUNTER — TELEPHONE (OUTPATIENT)
Dept: SURGERY | Facility: CLINIC | Age: 47
End: 2022-01-22

## 2022-01-22 VITALS
SYSTOLIC BLOOD PRESSURE: 139 MMHG | HEIGHT: 63 IN | TEMPERATURE: 97 F | BODY MASS INDEX: 27.82 KG/M2 | WEIGHT: 157 LBS | OXYGEN SATURATION: 94 % | HEART RATE: 78 BPM | RESPIRATION RATE: 17 BRPM | DIASTOLIC BLOOD PRESSURE: 81 MMHG

## 2022-01-22 PROBLEM — I10 PRIMARY HYPERTENSION: Status: ACTIVE | Noted: 2017-12-18

## 2022-01-22 PROCEDURE — 99232 SBSQ HOSP IP/OBS MODERATE 35: CPT | Performed by: HOSPITALIST

## 2022-01-22 RX ORDER — CELECOXIB 200 MG/1
200 CAPSULE ORAL DAILY
Qty: 30 CAPSULE | Refills: 0 | Status: SHIPPED | OUTPATIENT
Start: 2022-01-22

## 2022-01-22 RX ORDER — MORPHINE SULFATE 15 MG/1
15 TABLET ORAL EVERY 4 HOURS PRN
Qty: 60 TABLET | Refills: 0 | Status: SHIPPED | OUTPATIENT
Start: 2022-01-22

## 2022-01-22 RX ORDER — MORPHINE SULFATE 15 MG/1
15 TABLET ORAL EVERY 4 HOURS PRN
Qty: 60 TABLET | Refills: 0 | Status: SHIPPED | OUTPATIENT
Start: 2022-01-22 | End: 2022-01-22

## 2022-01-22 RX ORDER — CELECOXIB 200 MG/1
200 CAPSULE ORAL DAILY
Qty: 30 CAPSULE | Refills: 0 | Status: SHIPPED | OUTPATIENT
Start: 2022-01-22 | End: 2022-01-22

## 2022-01-22 NOTE — PLAN OF CARE
Patient A/O x4. VSS, on RA. Pain to posterior neck, PRN PO pain medications given, patient asleep upon checking. Sore throat relieved with lozenge and ice cream. Encouraging PO fluid intake. Soft collar for comfort.  Incision site to anterior neck with cove

## 2022-01-22 NOTE — PROGRESS NOTES
[facility]     Hospitalist Progress Note     Yadi Iyer Patient Status:  Inpatient    1975 MRN [id number]   [facility] 3SW-A Attending Gissel Pierre MD   [address] Day # 2 PCP Billy Rudd PA-C     Chief Complaint: follow last 168 hours. Imaging: Imaging data reviewed in Epic.     Medications:   • metoprolol tartrate  50 mg Oral 2x Daily(Beta Blocker)   • celecoxib  100 mg Oral BID   • sennosides  17.2 mg Oral Nightly   • docusate sodium  100 mg Oral BID   • levothyroxine

## 2022-01-22 NOTE — DISCHARGE SUMMARY
[facility]  Discharge Summary    Yodit Poster Patient Status:  Inpatient    1975 MRN [id number]   [facility] 3SW-A Attending Ally Armenta MD   [facility] Day # 2 PCP Aaron Patel PA-C     Date of Admission: 2022    Adam mcg by mouth daily. Levothyroxine Sodium 88 MCG Oral Tab  Take 88 mcg by mouth before breakfast.    ALPRAZolam (XANAX) 0.25 MG Oral Tab  Take 1 tablet (0.25 mg total) by mouth 3 (three) times daily as needed for Anxiety.   Qty: 30 tablet Refills: 1  Asso is 10% greater than the average population over 10-20 years. Most people, however, do very well.   Patients also may notice the sensation that something is in their throat when they swallow due to the mobilization of the esophagus during surgery and the po

## 2022-01-23 NOTE — PROGRESS NOTES
RN communicated to [facility] pharmacist and Dr Leroy Dubois to ensure patient receives medication upon discharge. Pharmacy was successfully changed to one closer to patient that had morphine IR in stock. Dr Leroy Dubois updated on insurance coverage for morphine.

## 2022-01-26 NOTE — PAYOR COMM NOTE
--------------  ADMISSION REVIEW     Payor: [address] #:  [id number]  Authorization Number: [id number]    Admit date: 1/20/22  Admit time:  5:01 AM         [facility]  1 Thi Diaz Patient Statu APGAR: 9 (1min) 9 (5 min)  DR:  Mayo Stevens   • COLONOSCOPY  2012   • COLONOSCOPY     • CORE BIOPSY Right 2013    Right breast:  MRI core biopsies   • EGD     • EXPLOR FRONT SINUS,TRANSORBIT,UNI  2018   • FOREARM/WRIST SURGERY UNLI DIZZINESS, NAUSEA ONLY  Augmentin [Amoxicil*    DIARRHEA  Cephalexin              DIARRHEA  Codeine                 NAUSEA ONLY    Medications:  No current facility-administered medications on file prior to encounter.   VITAMIN D, CHOLECALCIFEROL, Soft, nontender, nondistended. Positive bowel sounds. No rebound, guarding or organomegaly. Neurologic: No focal neurological deficits. CNII-XII grossly intact. Musculoskeletal: Moves all extremities. Extremities: No edema or cyanosis.   Integument: No induced. The patient was given 2 g Ancef preoperatively. Gil Nieves was placed supine on an operative bed with the shoulders pulled downwards towards the feet using tape affixed to the end of the bed.  Head was placed in a donut, and a horizontal shoulder bum copiously irrigated with bacitracin throughout the entire case. There were some posterior osteophytes that needed to be drilled down as well. I used a nerve hook to create a plane between posterior longitudinal ligament and the dura.  I used a #11 blade kn the recovery room.               :  Neurosurgery Progress Note           Abraham Gregorio Patient Status:  Inpatient    1975 MRN [id number]   [facility] 3SW-A Attending Concetta Torres MD   Hosp Day # 1 PCP Shyann Wade PA-C (36.4 °C)-98.5 °F (36.9 °C)] 98.1 °F (36.7 °C)  Pulse:  [79-92] 85  Resp:  [16-20] 18  BP: (137-158)/(75-98) 143/80     Physical Exam:    General: No acute distress. Respiratory: Clear to auscultation bilaterally. No wheezes. No rhonchi.   Cardiovascular:

## 2022-01-31 ENCOUNTER — PATIENT MESSAGE (OUTPATIENT)
Dept: SURGERY | Facility: CLINIC | Age: 47
End: 2022-01-31

## 2022-01-31 RX ORDER — METOPROLOL TARTRATE 50 MG/1
50 TABLET, FILM COATED ORAL 2 TIMES DAILY
Qty: 180 TABLET | Refills: 1 | Status: SHIPPED | OUTPATIENT
Start: 2022-01-31 | End: 2022-07-26

## 2022-01-31 NOTE — TELEPHONE ENCOUNTER
Refill passed per [place] [employer], [facility] protocol. Requested Prescriptions   Pending Prescriptions Disp Refills    METOPROLOL TARTRATE 50 MG Oral Tab [Pharmacy Med Name: Metoprolol Tartrate 50 Mg Tab Auro] 180 tablet 0     Sig: Take 1 tablet (50 mg total) by mouth 2 (two) times daily.         Hypertensive Medications Protocol Passed - 1/30/2022  2:14 PM        Passed - CMP or BMP in past 12 months        Passed - Appointment in past 6 or next 3 months        Passed - GFR Non- > 50     Lab Results   Component Value Date    GFRNAA 100 12/20/2021                       [unfilled]      @[facility]@

## 2022-01-31 NOTE — TELEPHONE ENCOUNTER
Discussed with Dr. Evans Newton  Pt should come in to be assessed prior to medication being prescribed. Called and rescheduled appt. Offered appt for today, pt declined, pt rescheduled to Wednesday.

## 2022-01-31 NOTE — TELEPHONE ENCOUNTER
Message below noted, and [facility] sent requesting pt come in for appt today for assessment and to discuss medication.

## 2022-01-31 NOTE — TELEPHONE ENCOUNTER
From: Amparo Cullen  To: Raffaele Mead MD  Sent: 1/31/2022 7:30 AM CST  Subject: Pain     Good morning. My left arm feels pain and so does the back of my neck and since I ran out of morphine is there something else I get get for pain?  Like a muscle rel

## 2022-02-02 ENCOUNTER — TELEPHONE (OUTPATIENT)
Dept: SURGERY | Facility: CLINIC | Age: 47
End: 2022-02-02

## 2022-02-02 ENCOUNTER — TELEMEDICINE (OUTPATIENT)
Dept: SURGERY | Facility: CLINIC | Age: 47
End: 2022-02-02
Payer: COMMERCIAL

## 2022-02-02 DIAGNOSIS — Z98.1 S/P CERVICAL SPINAL FUSION: ICD-10-CM

## 2022-02-02 DIAGNOSIS — R29.898 WEAKNESS OF BOTH UPPER EXTREMITIES: Primary | ICD-10-CM

## 2022-02-02 PROCEDURE — 99024 POSTOP FOLLOW-UP VISIT: CPT | Performed by: NEUROLOGICAL SURGERY

## 2022-02-02 RX ORDER — MORPHINE SULFATE 30 MG/1
30 TABLET ORAL EVERY 4 HOURS PRN
Qty: 40 TABLET | Refills: 0 | Status: SHIPPED | OUTPATIENT
Start: 2022-02-02 | End: 2022-02-15

## 2022-02-02 RX ORDER — PREDNISONE 20 MG/1
TABLET ORAL
Qty: 22 TABLET | Refills: 0 | Status: SHIPPED | OUTPATIENT
Start: 2022-02-02 | End: 2022-02-15

## 2022-02-02 RX ORDER — MORPHINE SULFATE 15 MG/1
15 TABLET ORAL EVERY 4 HOURS PRN
Qty: 60 TABLET | Refills: 0 | Status: SHIPPED | OUTPATIENT
Start: 2022-02-02 | End: 2022-02-15

## 2022-02-02 NOTE — TELEPHONE ENCOUNTER
Received notification of patient's medication request.  TE was routed to provider and provider has been messaged.

## 2022-02-02 NOTE — TELEPHONE ENCOUNTER
Pt calling because she thought her medication was being sent to pharmacy by Dr oTbin Worthington right after virtual visit this morning.   She states she is in [employer] and wants to make sure script is sent today

## 2022-02-03 NOTE — TELEPHONE ENCOUNTER
Pharmacy contacted me this evening; morphine 50 mg tablets were not available. He had 30 mg tablets; I change the prescription to 30 mg tablets.

## 2022-02-09 ENCOUNTER — PATIENT MESSAGE (OUTPATIENT)
Dept: SURGERY | Facility: CLINIC | Age: 47
End: 2022-02-09

## 2022-02-09 ENCOUNTER — HOSPITAL ENCOUNTER (OUTPATIENT)
Dept: MRI IMAGING | Facility: HOSPITAL | Age: 47
Discharge: HOME | End: 2022-02-09
Attending: NEUROLOGICAL SURGERY
Payer: COMMERCIAL

## 2022-02-09 DIAGNOSIS — Z98.1 S/P CERVICAL SPINAL FUSION: ICD-10-CM

## 2022-02-09 DIAGNOSIS — R29.898 WEAKNESS OF BOTH UPPER EXTREMITIES: ICD-10-CM

## 2022-02-09 LAB — CREAT BLD-MCNC: 0.7 MG/DL

## 2022-02-09 PROCEDURE — A9575 INJ GADOTERATE MEGLUMI 0.1ML: HCPCS | Performed by: NEUROLOGICAL SURGERY

## 2022-02-09 PROCEDURE — 82565 ASSAY OF CREATININE: CPT

## 2022-02-09 PROCEDURE — 72156 MRI NECK SPINE W/O & W/DYE: CPT | Performed by: NEUROLOGICAL SURGERY

## 2022-02-09 NOTE — TELEPHONE ENCOUNTER
Osiel message noted and routed to Dr. Catherine Corado. NOV scheduled to see provider on 3/2/22 for 6 week post op.

## 2022-02-10 ENCOUNTER — TELEPHONE (OUTPATIENT)
Dept: FAMILY MEDICINE CLINIC | Facility: CLINIC | Age: 47
End: 2022-02-10

## 2022-02-10 RX ORDER — RIZATRIPTAN BENZOATE 10 MG/1
10 TABLET ORAL AS NEEDED
Qty: 12 TABLET | Refills: 5 | Status: SHIPPED | OUTPATIENT
Start: 2022-02-10

## 2022-02-10 RX ORDER — RIZATRIPTAN BENZOATE 10 MG/1
TABLET ORAL
Qty: 9 TABLET | Refills: 0 | OUTPATIENT
Start: 2022-02-10

## 2022-02-10 NOTE — TELEPHONE ENCOUNTER
Followed up with Fredi, reports Rizatriptan 10mg comes in boxes of 12 not 9 tabs, asking if script can be adjusted so patient receives 12 tabs/1 box supply of refill. Spoke with patient, she prefers refill go to [facility]. New script pended please advise.

## 2022-02-12 ENCOUNTER — PATIENT MESSAGE (OUTPATIENT)
Dept: SURGERY | Facility: CLINIC | Age: 47
End: 2022-02-12

## 2022-02-14 ENCOUNTER — PATIENT MESSAGE (OUTPATIENT)
Dept: SURGERY | Facility: CLINIC | Age: 47
End: 2022-02-14

## 2022-02-14 NOTE — TELEPHONE ENCOUNTER
Called pt and advised that she should go to ER for increased pain. Pt states that she does not want any more pain medication but feels that she is doing worse that she did prior to her surgery and this is depressing. Left hand numb 2 weeks post op, left leg and left foot numb. Informed pt that because these are new and worsening symptoms we advise she go to ER for evaluation. Pt states she does not feel this is an emergency and that she does not have transportation. Highly encouraged pt to go to ER. Pt then states she would like someone to go over her resent MRI results. Informed pt that Dr. Kacie Bowden is out of office for the week but will forward on to PA team.    Pt inquired when her RTW date is. Informed pt of RTW date. Pt inquired what will happen if she can not return to work at that time.  Informed pt that provider will order an FCE that she will complete with PT and will have to pay out of pocket for this exam. Pt VU and appreciative of call     Routed to [facility] pool

## 2022-02-14 NOTE — TELEPHONE ENCOUNTER
From: Nitish Lockhart  Sent: 2/14/2022 11:03 AM CST  To: Rosa Garrison 2 Nurse  Subject: Pain     Do I call the office to make an appointment?

## 2022-02-15 ENCOUNTER — TELEPHONE (OUTPATIENT)
Dept: SURGERY | Facility: CLINIC | Age: 47
End: 2022-02-15

## 2022-02-15 ENCOUNTER — OFFICE VISIT (OUTPATIENT)
Dept: SURGERY | Facility: CLINIC | Age: 47
End: 2022-02-15
Payer: COMMERCIAL

## 2022-02-15 VITALS — DIASTOLIC BLOOD PRESSURE: 80 MMHG | SYSTOLIC BLOOD PRESSURE: 140 MMHG

## 2022-02-15 DIAGNOSIS — M47.12 CERVICAL SPONDYLOSIS WITH MYELOPATHY AND RADICULOPATHY: ICD-10-CM

## 2022-02-15 DIAGNOSIS — M54.12 CERVICAL MYELOPATHY WITH CERVICAL RADICULOPATHY (HCC): Primary | ICD-10-CM

## 2022-02-15 DIAGNOSIS — G95.9 CERVICAL MYELOPATHY WITH CERVICAL RADICULOPATHY (HCC): Primary | ICD-10-CM

## 2022-02-15 DIAGNOSIS — Z98.1 S/P CERVICAL SPINAL FUSION: ICD-10-CM

## 2022-02-15 DIAGNOSIS — M47.22 CERVICAL SPONDYLOSIS WITH MYELOPATHY AND RADICULOPATHY: ICD-10-CM

## 2022-02-15 PROCEDURE — 3079F DIAST BP 80-89 MM HG: CPT | Performed by: PHYSICIAN ASSISTANT

## 2022-02-15 PROCEDURE — 3077F SYST BP >= 140 MM HG: CPT | Performed by: PHYSICIAN ASSISTANT

## 2022-02-15 PROCEDURE — 99024 POSTOP FOLLOW-UP VISIT: CPT | Performed by: PHYSICIAN ASSISTANT

## 2022-02-15 RX ORDER — PREDNISONE 10 MG/1
10 TABLET ORAL DAILY
Qty: 30 TABLET | Refills: 0 | Status: SHIPPED | OUTPATIENT
Start: 2022-02-15 | End: 2022-03-03

## 2022-02-15 NOTE — TELEPHONE ENCOUNTER
Received [employer] Collar order from [employer] [place]     Order, facesheet, insurance, and Valdemar Esquivel faxed to [email or web address]

## 2022-02-15 NOTE — PROGRESS NOTES
Patient state she is feeling worst now then before the surg. States she can lift her left arm needs help getting dressing and doing her hair.

## 2022-02-15 NOTE — TELEPHONE ENCOUNTER
Received call from Rosalee Gonzalez, patient experience, and informed and request someone reach out to pt in regard to below note

## 2022-02-22 ENCOUNTER — PATIENT MESSAGE (OUTPATIENT)
Dept: FAMILY MEDICINE CLINIC | Facility: CLINIC | Age: 47
End: 2022-02-22

## 2022-02-22 RX ORDER — ALPRAZOLAM 0.25 MG/1
TABLET ORAL
Qty: 30 TABLET | Refills: 0 | Status: SHIPPED | OUTPATIENT
Start: 2022-02-22

## 2022-02-23 ENCOUNTER — TELEMEDICINE (OUTPATIENT)
Dept: FAMILY MEDICINE CLINIC | Facility: CLINIC | Age: 47
End: 2022-02-23
Payer: COMMERCIAL

## 2022-02-23 DIAGNOSIS — R35.0 URINARY FREQUENCY: ICD-10-CM

## 2022-02-23 DIAGNOSIS — N92.6 MISSED MENSES: Primary | ICD-10-CM

## 2022-02-23 PROCEDURE — 99213 OFFICE O/P EST LOW 20 MIN: CPT | Performed by: PHYSICIAN ASSISTANT

## 2022-02-23 NOTE — TELEPHONE ENCOUNTER
Patient scheduled for video visit today at 4:45 p.m. with Ruslan Miller to discuss symptoms below. Patient made aware stress of surgery can throw off cycle--patient denies spotting/bleeding or severe abdominal pain--does report Hx ovarian cysts. Patient advised to complete the e-check in in [email or web address], if active. Understands to follow the prompts and links to complete the visit. Patient advised that there may be a co-pay involved in this type of visit. Patient agreed to proceed, they understand the provider may be calling from a blocked, or unknown phone number on their caller ID and they know to answer the phone.     Best call back:  [phone]

## 2022-02-25 ENCOUNTER — HOSPITAL ENCOUNTER (OUTPATIENT)
Dept: GENERAL RADIOLOGY | Age: 47
Discharge: HOME | End: 2022-02-25
Attending: PHYSICIAN ASSISTANT
Payer: COMMERCIAL

## 2022-02-25 DIAGNOSIS — M54.12 CERVICAL MYELOPATHY WITH CERVICAL RADICULOPATHY (HCC): ICD-10-CM

## 2022-02-25 DIAGNOSIS — M47.22 CERVICAL SPONDYLOSIS WITH MYELOPATHY AND RADICULOPATHY: ICD-10-CM

## 2022-02-25 DIAGNOSIS — Z98.1 S/P CERVICAL SPINAL FUSION: ICD-10-CM

## 2022-02-25 DIAGNOSIS — M47.12 CERVICAL SPONDYLOSIS WITH MYELOPATHY AND RADICULOPATHY: ICD-10-CM

## 2022-02-25 DIAGNOSIS — G95.9 CERVICAL MYELOPATHY WITH CERVICAL RADICULOPATHY (HCC): ICD-10-CM

## 2022-02-25 PROCEDURE — 72040 X-RAY EXAM NECK SPINE 2-3 VW: CPT | Performed by: PHYSICIAN ASSISTANT

## 2022-02-27 ENCOUNTER — PATIENT MESSAGE (OUTPATIENT)
Dept: SURGERY | Facility: CLINIC | Age: 47
End: 2022-02-27

## 2022-02-28 ENCOUNTER — PATIENT MESSAGE (OUTPATIENT)
Dept: SURGERY | Facility: CLINIC | Age: 47
End: 2022-02-28

## 2022-02-28 NOTE — TELEPHONE ENCOUNTER
From: Rickey Shane  Sent: 2/28/2022 12:15 PM CST  To: Emerson Pollock 2 Nurse  Subject: Follow Up     Ok sounds good.  See you soon

## 2022-03-03 ENCOUNTER — OFFICE VISIT (OUTPATIENT)
Dept: SURGERY | Facility: CLINIC | Age: 47
End: 2022-03-03
Payer: COMMERCIAL

## 2022-03-03 VITALS
BODY MASS INDEX: 27.82 KG/M2 | HEIGHT: 63 IN | DIASTOLIC BLOOD PRESSURE: 82 MMHG | SYSTOLIC BLOOD PRESSURE: 134 MMHG | WEIGHT: 157 LBS

## 2022-03-03 DIAGNOSIS — Z98.1 S/P CERVICAL SPINAL FUSION: ICD-10-CM

## 2022-03-03 DIAGNOSIS — M54.12 CERVICAL MYELOPATHY WITH CERVICAL RADICULOPATHY (HCC): Primary | ICD-10-CM

## 2022-03-03 DIAGNOSIS — G95.9 CERVICAL MYELOPATHY WITH CERVICAL RADICULOPATHY (HCC): Primary | ICD-10-CM

## 2022-03-03 PROCEDURE — 3079F DIAST BP 80-89 MM HG: CPT | Performed by: PHYSICIAN ASSISTANT

## 2022-03-03 PROCEDURE — 3075F SYST BP GE 130 - 139MM HG: CPT | Performed by: PHYSICIAN ASSISTANT

## 2022-03-03 PROCEDURE — 99024 POSTOP FOLLOW-UP VISIT: CPT | Performed by: PHYSICIAN ASSISTANT

## 2022-03-03 PROCEDURE — 3008F BODY MASS INDEX DOCD: CPT | Performed by: PHYSICIAN ASSISTANT

## 2022-03-03 NOTE — PROGRESS NOTES
Recent imaging completed here to discuss    Still not feeling well  Feeling some new pain, whole left side of body  Hurts,  Left foot feels like it is on fire  Left breast still has pain when she seems to do any movement with left arm especially when raising her arm

## 2022-03-11 ENCOUNTER — TELEPHONE (OUTPATIENT)
Dept: SURGERY | Facility: CLINIC | Age: 47
End: 2022-03-11

## 2022-03-11 NOTE — TELEPHONE ENCOUNTER
Patient called to discuss her recent left breast pain. She states that she has a history of chronic pain due to a spinal issue and has been treated by a neurosurgeon for cervical spinal stenosis fusion. Since then, her left breast has felt larger and caused her severe pain. She states there is some \"scar tissue\" near her left arm pit. She was advised to contact her oncologist but states she would rather follow up with Dr. Mary Machuca first. She was offered an appt Tuesday with Dr. Mary Machuca. Patient was very appreciative of the help.

## 2022-03-13 ENCOUNTER — PATIENT MESSAGE (OUTPATIENT)
Dept: SURGERY | Facility: CLINIC | Age: 47
End: 2022-03-13

## 2022-03-14 NOTE — TELEPHONE ENCOUNTER
Spoke with patient, advised patient to go to ED if pt continues to have urinary incontinence, saddle anesthesia, or weakness.   Pt states she would like to observe for now but understands to go to the ED if any of the above symptoms occur

## 2022-03-14 NOTE — TELEPHONE ENCOUNTER
Message noted and routed to provider. MyChart set to pt informing provider is not in the office and recommend she go to ED.

## 2022-03-15 ENCOUNTER — OFFICE VISIT (OUTPATIENT)
Dept: SURGERY | Facility: CLINIC | Age: 47
End: 2022-03-15
Payer: COMMERCIAL

## 2022-03-15 DIAGNOSIS — Z98.890 S/P TRAM (TRANSVERSE RECTUS ABDOMINIS MUSCLE) FLAP BREAST RECONSTRUCTION: Primary | ICD-10-CM

## 2022-03-15 PROCEDURE — 99212 OFFICE O/P EST SF 10 MIN: CPT | Performed by: SURGERY

## 2022-03-15 NOTE — PROGRESS NOTES
Radha Dalton is a 51year old female who presents today for a follow-up. Patient relates that she underwent cervical spine surgery for cervical spondylosis with radiculopathy. The patient continues to report left breast discomfort and returns here today for evaluation. She relates that the pain radiates along her entire left side of her body. Physical Examination:  HEENT: There is no cervical or supraclavicular lymphadenopathy noted. Breasts: Bilateral breast flaps are warm and soft. The left breast is noted to have tenderness to palpation along the lateral aspect. There are no palpable masses, there is no erythema or seroma noted. There is no axillary lymphadenopathy noted bilaterally. Assessment and Plan:  The patient was reassured that I see no findings related to her autologous breast reconstruction. She will continue to follow-up with her neurosurgeon, Dr. Christa Leggett. She will follow-up on a as needed basis. The plan was reviewed with the patient and questions were answered.

## 2022-03-17 ENCOUNTER — TELEPHONE (OUTPATIENT)
Dept: SURGERY | Facility: CLINIC | Age: 47
End: 2022-03-17

## 2022-03-17 ENCOUNTER — TELEPHONE (OUTPATIENT)
Dept: HEMATOLOGY/ONCOLOGY | Facility: HOSPITAL | Age: 47
End: 2022-03-17

## 2022-03-17 NOTE — TELEPHONE ENCOUNTER
Disability form and office notes received.   due date 3/31/2022   needs information from 1/1/2022 thru current  Placed in red folder for nursing

## 2022-03-17 NOTE — TELEPHONE ENCOUNTER
03/17: SW received FMLA/Disability paperwork from [employer], directed to Dr. Rito Oakley. Inquiry sent to the Plastics team to clarify details, awaiting response to proceed. --  03/18: Plastics team does not feel the pt's functional limitations are related to her breast procedures. Plastics team is deferring to the pt's other providers regarding this paperwork. SW sent the most recent office notes (09/2021 and 03/2022) via fax back to [address] [place] [address] and alerted of the defer to NeuroSurg team with their contact information. Claim# [id number]. Pt has a hx of Surg Onc Dr. Manjinder Starks around [phone] and with two Criselda Ramey & Dr. Richie Mendoza around the same time, but no visits in the last few years with those providers.     Sonia Mccormick MSW, LCSW  Outpatient Oncology Social Worker  [address]  [phone]

## 2022-03-18 NOTE — TELEPHONE ENCOUNTER
Request for medical records forwarded to medical records by front staff,  Form initiated and endorsed to [facility] for completion, review, and signature.

## 2022-03-22 NOTE — TELEPHONE ENCOUNTER
Received completed form. [employer] message sent asking how pt would like to receive the form. Copy made and sent to scan. Placed back in \"addressed\" bin.

## 2022-03-23 ENCOUNTER — HOSPITAL ENCOUNTER (OUTPATIENT)
Dept: GENERAL RADIOLOGY | Age: 47
Discharge: HOME | End: 2022-03-23
Attending: PHYSICIAN ASSISTANT
Payer: COMMERCIAL

## 2022-03-23 DIAGNOSIS — Z98.1 S/P CERVICAL SPINAL FUSION: ICD-10-CM

## 2022-03-23 DIAGNOSIS — M54.12 CERVICAL MYELOPATHY WITH CERVICAL RADICULOPATHY (HCC): ICD-10-CM

## 2022-03-23 DIAGNOSIS — G95.9 CERVICAL MYELOPATHY WITH CERVICAL RADICULOPATHY (HCC): ICD-10-CM

## 2022-03-23 PROCEDURE — 72050 X-RAY EXAM NECK SPINE 4/5VWS: CPT | Performed by: PHYSICIAN ASSISTANT

## 2022-03-23 NOTE — TELEPHONE ENCOUNTER
Upon reviewing form, FRANCY Tyson signed on page 4 instead of page 5 where signature is required. Placed in his bin for signature tomorrow when he returns to clinic. [employer] message sent to patient.

## 2022-03-25 NOTE — TELEPHONE ENCOUNTER
Received form back from [address] [place] [address]. Faxed to [place]. Copy mailed to patient's home address. Copy sent to scan. [employer] message sent notifying patient.

## 2022-03-31 ENCOUNTER — HOSPITAL ENCOUNTER (OUTPATIENT)
Dept: ULTRASOUND IMAGING | Facility: HOSPITAL | Age: 47
Discharge: HOME | End: 2022-03-31
Attending: PHYSICIAN ASSISTANT
Payer: COMMERCIAL

## 2022-03-31 ENCOUNTER — TELEPHONE (OUTPATIENT)
Dept: SURGERY | Facility: CLINIC | Age: 47
End: 2022-03-31

## 2022-03-31 ENCOUNTER — OFFICE VISIT (OUTPATIENT)
Dept: SURGERY | Facility: CLINIC | Age: 47
End: 2022-03-31
Payer: COMMERCIAL

## 2022-03-31 VITALS
BODY MASS INDEX: 27.82 KG/M2 | HEIGHT: 63 IN | SYSTOLIC BLOOD PRESSURE: 126 MMHG | WEIGHT: 157 LBS | DIASTOLIC BLOOD PRESSURE: 74 MMHG

## 2022-03-31 DIAGNOSIS — M54.12 CERVICAL MYELOPATHY WITH CERVICAL RADICULOPATHY (HCC): ICD-10-CM

## 2022-03-31 DIAGNOSIS — Z98.1 S/P CERVICAL SPINAL FUSION: ICD-10-CM

## 2022-03-31 DIAGNOSIS — Z98.1 S/P CERVICAL SPINAL FUSION: Primary | ICD-10-CM

## 2022-03-31 DIAGNOSIS — M79.662 PAIN OF LEFT CALF: ICD-10-CM

## 2022-03-31 DIAGNOSIS — G95.9 CERVICAL MYELOPATHY WITH CERVICAL RADICULOPATHY (HCC): ICD-10-CM

## 2022-03-31 PROCEDURE — 3008F BODY MASS INDEX DOCD: CPT | Performed by: PHYSICIAN ASSISTANT

## 2022-03-31 PROCEDURE — 3074F SYST BP LT 130 MM HG: CPT | Performed by: PHYSICIAN ASSISTANT

## 2022-03-31 PROCEDURE — 3078F DIAST BP <80 MM HG: CPT | Performed by: PHYSICIAN ASSISTANT

## 2022-03-31 PROCEDURE — 99024 POSTOP FOLLOW-UP VISIT: CPT | Performed by: PHYSICIAN ASSISTANT

## 2022-03-31 PROCEDURE — 93970 EXTREMITY STUDY: CPT | Performed by: PHYSICIAN ASSISTANT

## 2022-03-31 NOTE — TELEPHONE ENCOUNTER
Received document and request for bone growth stimulator order from [facility] rep. Representative Viridiana Lomax is requesting these items:    Face sheet, op report, signed order for DME. To be faxed back to O to fax number:  [phone]    Document endorsed to Dr. Pee Jeffrey for signature.

## 2022-03-31 NOTE — TELEPHONE ENCOUNTER
Received stat results from radiology for [facility] to review    Ultrasound is negative for DVT    Jordan reviewed and asked nusing to call and provide negative results to patient    Called to inform pt of negative results, no answer: NINA

## 2022-04-01 ENCOUNTER — APPOINTMENT (OUTPATIENT)
Dept: HEMATOLOGY/ONCOLOGY | Facility: HOSPITAL | Age: 47
End: 2022-04-01
Payer: COMMERCIAL

## 2022-04-04 ENCOUNTER — PATIENT MESSAGE (OUTPATIENT)
Dept: SURGERY | Facility: CLINIC | Age: 47
End: 2022-04-04

## 2022-04-04 NOTE — TELEPHONE ENCOUNTER
From: Bartolome Croft  To: Raquel Mcfarland  Sent: 4/4/2022 4:16 PM CDT  Subject: Bone Growth Stimulator    Good Afternoon, Echo Bennett-    In reviewing Zaid's notes, he recommended that you begin to use a bone growth stimulator which should aid in the healing and regrowth of your cervical spine. Alexander Hyde recommended that you continue to wear the collar for another 6-8 weeks, stay off of work and obtain an X-ray of the cervical spine in 6-8 weeks. The bone growth stimulator company [employer] representative's name is Jose China. Viridiana Lomax will be the person to follow up on the insurance coverage of the bone growth stimulator. If you have any questions, please contact Viridiana Lomax at [phone]. If you have any questions or concerns please contact our office at [phone] #1 or via [employer] message.      Thank you, and take care-    Allegra Khan RN   Clinical Staff Nurse,   [facility]

## 2022-04-04 NOTE — TELEPHONE ENCOUNTER
From: Gerald Shaw  To: Samuel Nunez  Sent: 4/4/2022 2:38 PM CDT  Subject: Neck brace     Valente Mohan. How are you? You had mentioned that you were going to order something for my neck. Do you know if the insurance covered the cost of the item? Thank you.  Have a nice day

## 2022-04-12 ENCOUNTER — PATIENT MESSAGE (OUTPATIENT)
Dept: SURGERY | Facility: CLINIC | Age: 47
End: 2022-04-12

## 2022-04-13 ENCOUNTER — HOSPITAL ENCOUNTER (OUTPATIENT)
Dept: MAMMOGRAPHY | Age: 47
Discharge: HOME | End: 2022-04-13
Attending: OBSTETRICS & GYNECOLOGY
Payer: COMMERCIAL

## 2022-04-13 ENCOUNTER — LAB ENCOUNTER (OUTPATIENT)
Dept: LAB | Age: 47
End: 2022-04-13
Attending: OBSTETRICS & GYNECOLOGY
Payer: COMMERCIAL

## 2022-04-13 DIAGNOSIS — E55.9 VITAMIN D DEFICIENCY: ICD-10-CM

## 2022-04-13 DIAGNOSIS — E04.2 NONTOXIC MULTINODULAR GOITER: Primary | ICD-10-CM

## 2022-04-13 DIAGNOSIS — E78.2 MIXED HYPERLIPIDEMIA: ICD-10-CM

## 2022-04-13 DIAGNOSIS — Z12.31 ENCOUNTER FOR SCREENING MAMMOGRAM FOR BREAST CANCER: ICD-10-CM

## 2022-04-13 DIAGNOSIS — I10 HYPERTENSION: ICD-10-CM

## 2022-04-13 LAB
T3FREE SERPL-MCNC: 2.34 PG/ML (ref 2.4–4.2)
T4 FREE SERPL-MCNC: 0.9 NG/DL (ref 0.8–1.7)
TSI SER-ACNC: 5.3 MIU/ML (ref 0.36–3.74)

## 2022-04-13 PROCEDURE — 84443 ASSAY THYROID STIM HORMONE: CPT

## 2022-04-13 PROCEDURE — 84481 FREE ASSAY (FT-3): CPT

## 2022-04-13 PROCEDURE — 77067 SCR MAMMO BI INCL CAD: CPT | Performed by: OBSTETRICS & GYNECOLOGY

## 2022-04-13 PROCEDURE — 84439 ASSAY OF FREE THYROXINE: CPT

## 2022-04-13 PROCEDURE — 77063 BREAST TOMOSYNTHESIS BI: CPT | Performed by: OBSTETRICS & GYNECOLOGY

## 2022-04-13 PROCEDURE — 36415 COLL VENOUS BLD VENIPUNCTURE: CPT

## 2022-04-18 ENCOUNTER — OFFICE VISIT (OUTPATIENT)
Dept: HEMATOLOGY/ONCOLOGY | Facility: HOSPITAL | Age: 47
End: 2022-04-18
Attending: INTERNAL MEDICINE
Payer: COMMERCIAL

## 2022-04-18 VITALS
DIASTOLIC BLOOD PRESSURE: 67 MMHG | HEIGHT: 63 IN | RESPIRATION RATE: 16 BRPM | OXYGEN SATURATION: 100 % | BODY MASS INDEX: 28.17 KG/M2 | WEIGHT: 159 LBS | HEART RATE: 62 BPM | TEMPERATURE: 99 F | SYSTOLIC BLOOD PRESSURE: 146 MMHG

## 2022-04-18 DIAGNOSIS — Z86.000 HISTORY OF DUCTAL CARCINOMA IN SITU (DCIS) OF BREAST: ICD-10-CM

## 2022-04-18 DIAGNOSIS — R07.89 LEFT-SIDED CHEST WALL PAIN: Primary | ICD-10-CM

## 2022-04-18 DIAGNOSIS — Z90.13 HISTORY OF BILATERAL MASTECTOMY: ICD-10-CM

## 2022-04-18 PROCEDURE — 99211 OFF/OP EST MAY X REQ PHY/QHP: CPT

## 2022-04-18 RX ORDER — LEVOTHYROXINE SODIUM 0.1 MG/1
TABLET ORAL
COMMUNITY
Start: 2022-04-15

## 2022-04-19 ENCOUNTER — TELEPHONE (OUTPATIENT)
Dept: SURGERY | Facility: CLINIC | Age: 47
End: 2022-04-19

## 2022-04-19 NOTE — TELEPHONE ENCOUNTER
[facility] Denial for electrical device to help with bone healing. Please have nurse forward to physician for review.

## 2022-05-02 ENCOUNTER — TELEPHONE (OUTPATIENT)
Dept: HEMATOLOGY/ONCOLOGY | Facility: HOSPITAL | Age: 47
End: 2022-05-02

## 2022-05-02 ENCOUNTER — HOSPITAL ENCOUNTER (OUTPATIENT)
Dept: MRI IMAGING | Facility: HOSPITAL | Age: 47
Discharge: HOME | End: 2022-05-02
Attending: INTERNAL MEDICINE
Payer: COMMERCIAL

## 2022-05-02 DIAGNOSIS — Z90.13 HISTORY OF BILATERAL MASTECTOMY: ICD-10-CM

## 2022-05-02 DIAGNOSIS — R07.89 LEFT-SIDED CHEST WALL PAIN: ICD-10-CM

## 2022-05-02 DIAGNOSIS — Z86.000 HISTORY OF DUCTAL CARCINOMA IN SITU (DCIS) OF BREAST: ICD-10-CM

## 2022-05-02 LAB — CREAT BLD-MCNC: 0.7 MG/DL

## 2022-05-02 PROCEDURE — A9575 INJ GADOTERATE MEGLUMI 0.1ML: HCPCS | Performed by: INTERNAL MEDICINE

## 2022-05-02 PROCEDURE — 77049 MRI BREAST C-+ W/CAD BI: CPT | Performed by: INTERNAL MEDICINE

## 2022-05-02 PROCEDURE — 82565 ASSAY OF CREATININE: CPT

## 2022-05-02 NOTE — TELEPHONE ENCOUNTER
Patient calling, and has received the results of the Breast MRI.     She is asking if a nurse or Dr Leatha De La Cruz can call her back to review

## 2022-05-03 ENCOUNTER — TELEPHONE (OUTPATIENT)
Dept: HEMATOLOGY/ONCOLOGY | Facility: HOSPITAL | Age: 47
End: 2022-05-03

## 2022-05-03 NOTE — TELEPHONE ENCOUNTER
I called Leatha Gaucher to review her MRI breast imaging results with her. I have explained to the patient that she has a few areas in the right breast area reported as likely benign suggestive of fat necrosis recommended for repeat breast imaging in 6 months. Explained to Leatha Gaucher that I have already placed a repeat MRI image for her. I offered her an opportunity to discuss her results in person with me. However, she was more comfortable discussing her results via phone which we did today. Patient mentions she like to be seen by her original surgeon, Dr. Amelia De La Torre, based on MRI results as there were no abnormal enhancement noted on her MRI breast image regarding left chest wall pain or left axillary symptoms. She states she will see me in follow-up at 6 months following the repeat MRI breast imaging. Patient would like to have a follow-up with Dr. Amelia De La Torre at this time in light of current MRI breast results. She thanked me for the call and information. Mini Ramirez MD  [facility]  [address], [facility]

## 2022-05-10 ENCOUNTER — PATIENT MESSAGE (OUTPATIENT)
Dept: SURGERY | Facility: CLINIC | Age: 47
End: 2022-05-10

## 2022-05-10 NOTE — TELEPHONE ENCOUNTER
From: Casandra Rodriguez  Sent: 5/10/2022 9:27 AM CDT  To: Diamante Blackmon 2 Nurse  Subject: Update    Will he get this message I sent him when he returns the 12th?

## 2022-05-15 ENCOUNTER — PATIENT MESSAGE (OUTPATIENT)
Dept: FAMILY MEDICINE CLINIC | Facility: CLINIC | Age: 47
End: 2022-05-15

## 2022-05-16 ENCOUNTER — TELEMEDICINE (OUTPATIENT)
Dept: FAMILY MEDICINE CLINIC | Facility: CLINIC | Age: 47
End: 2022-05-16

## 2022-05-16 DIAGNOSIS — J01.90 ACUTE NON-RECURRENT SINUSITIS, UNSPECIFIED LOCATION: Primary | ICD-10-CM

## 2022-05-16 PROCEDURE — 99213 OFFICE O/P EST LOW 20 MIN: CPT

## 2022-05-16 RX ORDER — FLUTICASONE PROPIONATE 50 MCG
2 SPRAY, SUSPENSION (ML) NASAL DAILY
Qty: 16 G | Refills: 0 | Status: SHIPPED | OUTPATIENT
Start: 2022-05-16 | End: 2023-05-11

## 2022-05-16 RX ORDER — DOXYCYCLINE HYCLATE 100 MG/1
100 CAPSULE ORAL EVERY 12 HOURS
Qty: 14 CAPSULE | Refills: 0 | Status: SHIPPED | OUTPATIENT
Start: 2022-05-16 | End: 2022-05-23

## 2022-05-16 NOTE — TELEPHONE ENCOUNTER
Spoke to patient. She has been sick for a week. She has a lot of congestion and pain in her head/sinues. She feels a lot of pressure in her head. She has been taking Coricidin for a week with no relief. She denies fever or redness/swelling around eye/cheeks.      Future Appointments   Date Time Provider Jacki Rodgers   5/16/2022  3:00 PM KATE Subramanian [facility]   5/23/2022  9:15 AM LMB XR IC RM1 LMB RAD EM [place]   5/27/2022 10:00 AM FRANCY Bruner ENINAPER2 EM[facility] [place]   6/10/2022 10:00 AM Otis Flores MD EMGSURGONCEL [facility] Surg [facility]     [phone]

## 2022-05-17 PROBLEM — J01.90 ACUTE NON-RECURRENT SINUSITIS: Status: ACTIVE | Noted: 2022-05-17

## 2022-05-17 RX ORDER — LOSARTAN POTASSIUM 25 MG/1
25 TABLET ORAL DAILY
Qty: 90 TABLET | Refills: 1 | Status: SHIPPED | OUTPATIENT
Start: 2022-05-17 | End: 2022-09-16 | Stop reason: DRUGHIGH

## 2022-05-23 ENCOUNTER — PATIENT MESSAGE (OUTPATIENT)
Dept: SURGERY | Facility: CLINIC | Age: 47
End: 2022-05-23

## 2022-05-23 ENCOUNTER — HOSPITAL ENCOUNTER (OUTPATIENT)
Dept: GENERAL RADIOLOGY | Age: 47
Discharge: HOME | End: 2022-05-23
Attending: PHYSICIAN ASSISTANT
Payer: COMMERCIAL

## 2022-05-23 DIAGNOSIS — Z98.1 S/P CERVICAL SPINAL FUSION: ICD-10-CM

## 2022-05-23 DIAGNOSIS — M54.12 CERVICAL MYELOPATHY WITH CERVICAL RADICULOPATHY (HCC): ICD-10-CM

## 2022-05-23 DIAGNOSIS — G95.9 CERVICAL MYELOPATHY WITH CERVICAL RADICULOPATHY (HCC): ICD-10-CM

## 2022-05-23 PROCEDURE — 72050 X-RAY EXAM NECK SPINE 4/5VWS: CPT | Performed by: PHYSICIAN ASSISTANT

## 2022-05-27 ENCOUNTER — OFFICE VISIT (OUTPATIENT)
Dept: SURGERY | Facility: CLINIC | Age: 47
End: 2022-05-27
Payer: COMMERCIAL

## 2022-05-27 VITALS — HEART RATE: 70 BPM | SYSTOLIC BLOOD PRESSURE: 140 MMHG | DIASTOLIC BLOOD PRESSURE: 90 MMHG

## 2022-05-27 DIAGNOSIS — M54.2 CERVICAL PAIN: ICD-10-CM

## 2022-05-27 DIAGNOSIS — G95.9 CERVICAL MYELOPATHY WITH CERVICAL RADICULOPATHY (HCC): ICD-10-CM

## 2022-05-27 DIAGNOSIS — M54.12 CERVICAL MYELOPATHY WITH CERVICAL RADICULOPATHY (HCC): ICD-10-CM

## 2022-05-27 DIAGNOSIS — Z98.1 S/P CERVICAL SPINAL FUSION: Primary | ICD-10-CM

## 2022-05-27 PROCEDURE — 3080F DIAST BP >= 90 MM HG: CPT | Performed by: PHYSICIAN ASSISTANT

## 2022-05-27 PROCEDURE — 99214 OFFICE O/P EST MOD 30 MIN: CPT | Performed by: PHYSICIAN ASSISTANT

## 2022-05-27 PROCEDURE — 3077F SYST BP >= 140 MM HG: CPT | Performed by: PHYSICIAN ASSISTANT

## 2022-05-27 RX ORDER — SPIRONOLACTONE 100 MG/1
100 TABLET, FILM COATED ORAL DAILY
COMMUNITY
Start: 2022-04-29

## 2022-05-27 NOTE — PROGRESS NOTES
States left leg and arm are still numb, and still has next pain, states her left hand is numb all the time. Here to go over her X- RAY.

## 2022-05-31 ENCOUNTER — NURSE TRIAGE (OUTPATIENT)
Dept: FAMILY MEDICINE CLINIC | Facility: CLINIC | Age: 47
End: 2022-05-31

## 2022-05-31 ENCOUNTER — PATIENT MESSAGE (OUTPATIENT)
Dept: FAMILY MEDICINE CLINIC | Facility: CLINIC | Age: 47
End: 2022-05-31

## 2022-06-01 ENCOUNTER — HOSPITAL ENCOUNTER (OUTPATIENT)
Dept: CT IMAGING | Age: 47
Discharge: HOME | End: 2022-06-01
Attending: PHYSICIAN ASSISTANT
Payer: COMMERCIAL

## 2022-06-01 ENCOUNTER — LAB ENCOUNTER (OUTPATIENT)
Dept: LAB | Age: 47
End: 2022-06-01
Attending: INTERNAL MEDICINE
Payer: COMMERCIAL

## 2022-06-01 ENCOUNTER — PATIENT MESSAGE (OUTPATIENT)
Dept: FAMILY MEDICINE CLINIC | Facility: CLINIC | Age: 47
End: 2022-06-01

## 2022-06-01 ENCOUNTER — OFFICE VISIT (OUTPATIENT)
Dept: FAMILY MEDICINE CLINIC | Facility: CLINIC | Age: 47
End: 2022-06-01
Payer: COMMERCIAL

## 2022-06-01 VITALS
HEART RATE: 71 BPM | WEIGHT: 160 LBS | SYSTOLIC BLOOD PRESSURE: 146 MMHG | BODY MASS INDEX: 28.35 KG/M2 | HEIGHT: 63 IN | DIASTOLIC BLOOD PRESSURE: 85 MMHG

## 2022-06-01 DIAGNOSIS — R10.32 LLQ PAIN: Primary | ICD-10-CM

## 2022-06-01 DIAGNOSIS — R10.32 LLQ PAIN: ICD-10-CM

## 2022-06-01 DIAGNOSIS — E03.9 HYPOTHYROIDISM: Primary | ICD-10-CM

## 2022-06-01 LAB
APPEARANCE: CLEAR
BILIRUBIN: NEGATIVE
CONTROL LINE PRESENT WITH A CLEAR BACKGROUND (YES/NO): YES YES/NO
GLUCOSE (URINE DIPSTICK): NEGATIVE MG/DL
KETONES (URINE DIPSTICK): NEGATIVE MG/DL
LEUKOCYTES: NEGATIVE
MULTISTIX LOT#: ABNORMAL NUMERIC
NITRITE, URINE: NEGATIVE
PH, URINE: 7 (ref 4.5–8)
PREGNANCY TEST, URINE: NEGATIVE
PROTEIN (URINE DIPSTICK): NEGATIVE MG/DL
SPECIFIC GRAVITY: 1.01 (ref 1–1.03)
T3 SERPL-MCNC: 97 NG/DL (ref 60–181)
T4 FREE SERPL-MCNC: 0.9 NG/DL (ref 0.8–1.7)
TSI SER-ACNC: 0.47 MIU/ML (ref 0.36–3.74)
URINE-COLOR: YELLOW
UROBILINOGEN,SEMI-QN: 0.2 MG/DL (ref 0–1.9)

## 2022-06-01 PROCEDURE — 3079F DIAST BP 80-89 MM HG: CPT | Performed by: PHYSICIAN ASSISTANT

## 2022-06-01 PROCEDURE — 84443 ASSAY THYROID STIM HORMONE: CPT

## 2022-06-01 PROCEDURE — 3008F BODY MASS INDEX DOCD: CPT | Performed by: PHYSICIAN ASSISTANT

## 2022-06-01 PROCEDURE — 99213 OFFICE O/P EST LOW 20 MIN: CPT | Performed by: PHYSICIAN ASSISTANT

## 2022-06-01 PROCEDURE — 84480 ASSAY TRIIODOTHYRONINE (T3): CPT

## 2022-06-01 PROCEDURE — 36415 COLL VENOUS BLD VENIPUNCTURE: CPT

## 2022-06-01 PROCEDURE — 74177 CT ABD & PELVIS W/CONTRAST: CPT | Performed by: PHYSICIAN ASSISTANT

## 2022-06-01 PROCEDURE — 3077F SYST BP >= 140 MM HG: CPT | Performed by: PHYSICIAN ASSISTANT

## 2022-06-01 PROCEDURE — 84439 ASSAY OF FREE THYROXINE: CPT

## 2022-06-01 PROCEDURE — 81025 URINE PREGNANCY TEST: CPT | Performed by: PHYSICIAN ASSISTANT

## 2022-06-01 PROCEDURE — 81002 URINALYSIS NONAUTO W/O SCOPE: CPT | Performed by: PHYSICIAN ASSISTANT

## 2022-06-02 ENCOUNTER — OFFICE VISIT (OUTPATIENT)
Dept: OBGYN CLINIC | Facility: CLINIC | Age: 47
End: 2022-06-02
Payer: COMMERCIAL

## 2022-06-02 VITALS — WEIGHT: 159 LBS | BODY MASS INDEX: 28 KG/M2 | DIASTOLIC BLOOD PRESSURE: 68 MMHG | SYSTOLIC BLOOD PRESSURE: 116 MMHG

## 2022-06-02 DIAGNOSIS — D25.1 INTRAMURAL LEIOMYOMA OF UTERUS: ICD-10-CM

## 2022-06-02 DIAGNOSIS — R10.2 PELVIC PAIN: Primary | ICD-10-CM

## 2022-06-02 DIAGNOSIS — N83.202 CYST OF LEFT OVARY: ICD-10-CM

## 2022-06-02 DIAGNOSIS — Z85.3 HISTORY OF BREAST CANCER: ICD-10-CM

## 2022-06-04 ENCOUNTER — PATIENT MESSAGE (OUTPATIENT)
Dept: OBGYN CLINIC | Facility: CLINIC | Age: 47
End: 2022-06-04

## 2022-06-06 ENCOUNTER — TELEPHONE (OUTPATIENT)
Dept: OBGYN CLINIC | Facility: CLINIC | Age: 47
End: 2022-06-06

## 2022-06-06 ENCOUNTER — APPOINTMENT (OUTPATIENT)
Dept: PHYSICAL THERAPY | Age: 47
End: 2022-06-06
Attending: PHYSICIAN ASSISTANT
Payer: COMMERCIAL

## 2022-06-06 ENCOUNTER — PATIENT MESSAGE (OUTPATIENT)
Dept: SURGERY | Facility: CLINIC | Age: 47
End: 2022-06-06

## 2022-06-06 ENCOUNTER — OFFICE VISIT (OUTPATIENT)
Dept: FAMILY MEDICINE CLINIC | Facility: CLINIC | Age: 47
End: 2022-06-06
Payer: COMMERCIAL

## 2022-06-06 VITALS
WEIGHT: 159 LBS | HEIGHT: 63 IN | BODY MASS INDEX: 28.17 KG/M2 | HEART RATE: 53 BPM | DIASTOLIC BLOOD PRESSURE: 78 MMHG | SYSTOLIC BLOOD PRESSURE: 125 MMHG

## 2022-06-06 DIAGNOSIS — N83.209 CYST OF OVARY, UNSPECIFIED LATERALITY: Primary | ICD-10-CM

## 2022-06-06 DIAGNOSIS — R10.2 PELVIC PAIN: ICD-10-CM

## 2022-06-06 DIAGNOSIS — D25.9 UTERINE LEIOMYOMA, UNSPECIFIED LOCATION: ICD-10-CM

## 2022-06-06 DIAGNOSIS — Z01.818 PRE-OP EXAMINATION: Primary | ICD-10-CM

## 2022-06-06 DIAGNOSIS — R10.2 PAIN IN PELVIS: ICD-10-CM

## 2022-06-06 DIAGNOSIS — Z85.3 HISTORY OF BREAST CANCER: ICD-10-CM

## 2022-06-06 PROCEDURE — 3008F BODY MASS INDEX DOCD: CPT | Performed by: PHYSICIAN ASSISTANT

## 2022-06-06 PROCEDURE — 3078F DIAST BP <80 MM HG: CPT | Performed by: PHYSICIAN ASSISTANT

## 2022-06-06 PROCEDURE — 99213 OFFICE O/P EST LOW 20 MIN: CPT | Performed by: PHYSICIAN ASSISTANT

## 2022-06-06 PROCEDURE — 3074F SYST BP LT 130 MM HG: CPT | Performed by: PHYSICIAN ASSISTANT

## 2022-06-06 NOTE — TELEPHONE ENCOUNTER
----- Message from Neeru Mcclellan MD sent at 6/2/2022  6:43 PM CDT -----    SURGERY:  WESTON/BSO  DATE REQUESTED:  per pt   IN OFFICE/MAIN OR:  OR   ASSIST NEEDED:  yes   PRE-OP WITH PCP: yes     DX:  ovarian cyst, uterine fibroid, history of breast cancer and pelvic pain     Need bowel prep, ERAS protocol, spinal duramorph pre op

## 2022-06-06 NOTE — TELEPHONE ENCOUNTER
Called and spoke to pt. Informed pt that OR did not have availability in June. Will check with TA to see if ok to wait until July. I informed pt that I will call her back with surgery date. Pt verbalized understanding.

## 2022-06-07 NOTE — TELEPHONE ENCOUNTER
Called and spoke to pt. Pt agreed to surgery date and time. Aware that she needs medical clearance from PCP. Did have some questions for Dr. Rom Pereira. She reports that she did send them via [employer]. Per RN note, TA to call pt. Pt aware I will send her the bowel prep information once I receive it from TA.       -p/a pending  -assist pending     Upon review of allergies noted allergy to Morphine. States reaction is dizziness and nausea. States that she had this reaction with last surgery in January 2022.  Will route to TA

## 2022-06-07 NOTE — TELEPHONE ENCOUNTER
Noted that patient has messaged requesting an extension to [facility] work\" status, as she was not able to be fit into a PT schedule until 7/7/22. Patient underwent surgery on 1/20/22 with Dr. Ninoska Galdamez:    Diamante Sim 69 BG & INST 3 LEVEL. cervical four-cervical five, cervical five-cervical six, and possibly cervical six- cervical seven anterior cervical disectomy and fusion    Letter initiated and routed to provider for review and completion.

## 2022-06-08 ENCOUNTER — APPOINTMENT (OUTPATIENT)
Dept: PHYSICAL THERAPY | Age: 47
End: 2022-06-08
Attending: PHYSICIAN ASSISTANT
Payer: COMMERCIAL

## 2022-06-09 ENCOUNTER — LAB ENCOUNTER (OUTPATIENT)
Dept: LAB | Age: 47
End: 2022-06-09
Attending: PHYSICIAN ASSISTANT
Payer: COMMERCIAL

## 2022-06-09 DIAGNOSIS — Z01.818 PRE-OP EXAMINATION: ICD-10-CM

## 2022-06-09 DIAGNOSIS — N92.6 MISSED MENSES: ICD-10-CM

## 2022-06-09 LAB
ANION GAP SERPL CALC-SCNC: 5 MMOL/L (ref 0–18)
APTT PPP: 27.6 SECONDS (ref 23.3–35.6)
BASOPHILS # BLD AUTO: 0.04 X10(3) UL (ref 0–0.2)
BASOPHILS NFR BLD AUTO: 0.9 %
BILIRUB UR QL: NEGATIVE
BUN BLD-MCNC: 15 MG/DL (ref 7–18)
BUN/CREAT SERPL: 17.2 (ref 10–20)
CALCIUM BLD-MCNC: 9.5 MG/DL (ref 8.5–10.1)
CHLORIDE SERPL-SCNC: 107 MMOL/L (ref 98–112)
CO2 SERPL-SCNC: 28 MMOL/L (ref 21–32)
COLOR UR: YELLOW
CREAT BLD-MCNC: 0.87 MG/DL
DEPRECATED RDW RBC AUTO: 44.8 FL (ref 35.1–46.3)
EOSINOPHIL # BLD AUTO: 0.16 X10(3) UL (ref 0–0.7)
EOSINOPHIL NFR BLD AUTO: 3.4 %
ERYTHROCYTE [DISTWIDTH] IN BLOOD BY AUTOMATED COUNT: 13.2 % (ref 11–15)
FASTING STATUS PATIENT QL REPORTED: YES
GLUCOSE BLD-MCNC: 103 MG/DL (ref 70–99)
GLUCOSE UR-MCNC: NEGATIVE MG/DL
HCG SERPL QL: NEGATIVE
HCT VFR BLD AUTO: 40.7 %
HGB BLD-MCNC: 13.5 G/DL
IMM GRANULOCYTES # BLD AUTO: 0.02 X10(3) UL (ref 0–1)
IMM GRANULOCYTES NFR BLD: 0.4 %
INR BLD: 0.96 (ref 0.8–1.2)
KETONES UR-MCNC: NEGATIVE MG/DL
LEUKOCYTE ESTERASE UR QL STRIP.AUTO: NEGATIVE
LYMPHOCYTES # BLD AUTO: 2.01 X10(3) UL (ref 1–4)
LYMPHOCYTES NFR BLD AUTO: 42.9 %
MCH RBC QN AUTO: 30.6 PG (ref 26–34)
MCHC RBC AUTO-ENTMCNC: 33.2 G/DL (ref 31–37)
MCV RBC AUTO: 92.3 FL
MONOCYTES # BLD AUTO: 0.44 X10(3) UL (ref 0.1–1)
MONOCYTES NFR BLD AUTO: 9.4 %
NEUTROPHILS # BLD AUTO: 2.01 X10 (3) UL (ref 1.5–7.7)
NEUTROPHILS # BLD AUTO: 2.01 X10(3) UL (ref 1.5–7.7)
NEUTROPHILS NFR BLD AUTO: 43 %
NITRITE UR QL STRIP.AUTO: NEGATIVE
OSMOLALITY SERPL CALC.SUM OF ELEC: 291 MOSM/KG (ref 275–295)
PH UR: 6 [PH] (ref 5–8)
PLATELET # BLD AUTO: 277 10(3)UL (ref 150–450)
POTASSIUM SERPL-SCNC: 4.3 MMOL/L (ref 3.5–5.1)
PROT UR-MCNC: NEGATIVE MG/DL
PROTHROMBIN TIME: 12.9 SECONDS (ref 11.6–14.8)
RBC # BLD AUTO: 4.41 X10(6)UL
RBC #/AREA URNS AUTO: >10 /HPF
SODIUM SERPL-SCNC: 140 MMOL/L (ref 136–145)
SP GR UR STRIP: 1.02 (ref 1–1.03)
UROBILINOGEN UR STRIP-ACNC: <2
VIT C UR-MCNC: NEGATIVE MG/DL
WBC # BLD AUTO: 4.7 X10(3) UL (ref 4–11)

## 2022-06-09 PROCEDURE — 87086 URINE CULTURE/COLONY COUNT: CPT

## 2022-06-09 PROCEDURE — 81001 URINALYSIS AUTO W/SCOPE: CPT

## 2022-06-09 PROCEDURE — 85025 COMPLETE CBC W/AUTO DIFF WBC: CPT | Performed by: PHYSICIAN ASSISTANT

## 2022-06-09 PROCEDURE — 85730 THROMBOPLASTIN TIME PARTIAL: CPT

## 2022-06-09 PROCEDURE — 85610 PROTHROMBIN TIME: CPT

## 2022-06-09 PROCEDURE — 93010 ELECTROCARDIOGRAM REPORT: CPT | Performed by: PHYSICIAN ASSISTANT

## 2022-06-09 PROCEDURE — 80048 BASIC METABOLIC PNL TOTAL CA: CPT | Performed by: PHYSICIAN ASSISTANT

## 2022-06-09 PROCEDURE — 36415 COLL VENOUS BLD VENIPUNCTURE: CPT

## 2022-06-09 PROCEDURE — 84703 CHORIONIC GONADOTROPIN ASSAY: CPT

## 2022-06-09 PROCEDURE — 93005 ELECTROCARDIOGRAM TRACING: CPT

## 2022-06-09 NOTE — TELEPHONE ENCOUNTER
Spoke to eli GARCIA for spinal duramorph. Case change request sent. Pt informed per TA that nausea and dizziness can be a normal reaction to morphine and not a true allergy. Informed that morphine will be removed from allergy list. Pt verbalized understanding and agrees.     Routed to TA to review/edit pt's allergy list.

## 2022-06-10 ENCOUNTER — OFFICE VISIT (OUTPATIENT)
Dept: SURGERY | Facility: CLINIC | Age: 47
End: 2022-06-10
Payer: COMMERCIAL

## 2022-06-10 VITALS
HEART RATE: 56 BPM | SYSTOLIC BLOOD PRESSURE: 160 MMHG | WEIGHT: 158 LBS | OXYGEN SATURATION: 100 % | RESPIRATION RATE: 18 BRPM | DIASTOLIC BLOOD PRESSURE: 88 MMHG | BODY MASS INDEX: 28 KG/M2

## 2022-06-10 DIAGNOSIS — Z85.3 HISTORY OF BREAST CANCER: Primary | ICD-10-CM

## 2022-06-10 DIAGNOSIS — Z98.890 S/P TRAM (TRANSVERSE RECTUS ABDOMINIS MUSCLE) FLAP BREAST RECONSTRUCTION: ICD-10-CM

## 2022-06-10 DIAGNOSIS — Z90.13 ABSENCE OF BREAST, ACQUIRED, BILATERAL: ICD-10-CM

## 2022-06-14 ENCOUNTER — PATIENT MESSAGE (OUTPATIENT)
Dept: OBGYN CLINIC | Facility: CLINIC | Age: 47
End: 2022-06-14

## 2022-06-14 ENCOUNTER — TELEPHONE (OUTPATIENT)
Dept: SURGERY | Facility: CLINIC | Age: 47
End: 2022-06-14

## 2022-06-14 ENCOUNTER — PATIENT MESSAGE (OUTPATIENT)
Dept: SURGERY | Facility: CLINIC | Age: 47
End: 2022-06-14

## 2022-06-14 NOTE — TELEPHONE ENCOUNTER
From: Beba Harper  To: Tanja Bryant MD  Sent: 6/14/2022 11:18 AM CDT  Subject: Surgery     Good morning. I am off on medical leave since I had surgery January 20th, 2022 and neurosurgeon has me returning back to work full duty August 17Th, but I called [facility]) this morning to report my upcoming total hysterectomy surgery. Nolan Allen advised me to have Doctor Samina Zacarias fax paperwork to them stating what specific surgery, the details of surgery and my recovery time. My hopes are to be back to work August 17Th but I leave this in doctors hands. Below is information that needs to be sent to [employer] Allen.  Thank you so much       Nolan Allen Fax Number:  [employer] Number: [id number]  Suzanna Cruz employee number: [id number]  Attention to: Preston Mclain

## 2022-06-14 NOTE — TELEPHONE ENCOUNTER
From: Gita Richardson  To: FRANCY Stone  Sent: 6/14/2022 11:11 AM CDT  Subject: Medical Leave     Good morning. I called [employer] to let them know Sheri Flatten be out of work till august 17Th and they said they will approve it. But they said they need medical certification paperwork from you to be sent to them showing I will return back to work august 17Th. I know you sent me approval letter via [address] but [employer] said they never received it. The fax number is: [phone]  It would be attention to: Jannie Palacios   And my employee number is: [id number]    Thank you so much.  Have a good day

## 2022-06-14 NOTE — TELEPHONE ENCOUNTER
Rec'd fax from [place] for disability paperwork. Placing paperwork in disability folder for pickup.

## 2022-06-15 ENCOUNTER — LAB ENCOUNTER (OUTPATIENT)
Dept: LAB | Age: 47
End: 2022-06-15
Attending: PHYSICIAN ASSISTANT
Payer: COMMERCIAL

## 2022-06-15 ENCOUNTER — PATIENT MESSAGE (OUTPATIENT)
Dept: SURGERY | Facility: CLINIC | Age: 47
End: 2022-06-15

## 2022-06-15 LAB
BILIRUB UR QL: NEGATIVE
CLARITY UR: CLEAR
COLOR UR: YELLOW
GLUCOSE UR-MCNC: NEGATIVE MG/DL
KETONES UR-MCNC: NEGATIVE MG/DL
LEUKOCYTE ESTERASE UR QL STRIP.AUTO: NEGATIVE
NITRITE UR QL STRIP.AUTO: NEGATIVE
PH UR: 6 [PH] (ref 5–8)
PROT UR-MCNC: NEGATIVE MG/DL
SP GR UR STRIP: 1.02 (ref 1–1.03)
UROBILINOGEN UR STRIP-ACNC: 0.2

## 2022-06-16 ENCOUNTER — TELEPHONE (OUTPATIENT)
Dept: OBGYN CLINIC | Facility: CLINIC | Age: 47
End: 2022-06-16

## 2022-06-16 NOTE — TELEPHONE ENCOUNTER
Patient will be in the hospital overnight. She will then require anywhere from 4 to 6 weeks for recovery from her surgery. We can fill out any FMLA/disability paperwork that she needs.     Jonathan Saenz MD

## 2022-06-16 NOTE — TELEPHONE ENCOUNTER
Patient can travel by car after her surgery on July 11 without concern. Patient may travel by plane if she is comfortable enough to do so.     Elizabeth Ortiz MD

## 2022-06-18 ENCOUNTER — PATIENT MESSAGE (OUTPATIENT)
Dept: SURGERY | Facility: CLINIC | Age: 47
End: 2022-06-18

## 2022-06-21 ENCOUNTER — TELEPHONE (OUTPATIENT)
Dept: OBGYN CLINIC | Facility: CLINIC | Age: 47
End: 2022-06-21

## 2022-06-21 NOTE — TELEPHONE ENCOUNTER
Received call from pre-op. Orders was received from Dr. Manjinder Morgan for a ERAS protocol. Was informed ERAS protocol is not done on gyne patients. Does Dr. Manjinder Morgan have any special orders for this pt after her surgery?

## 2022-06-22 NOTE — TELEPHONE ENCOUNTER
Called [employer] to check status of p/a. Per Karen Aase, p/a approved. CTWU#[id number]. CEX#[id number]. Patient received medical clearance from PCP on 6/6/2022.

## 2022-06-27 ENCOUNTER — NURSE ONLY (OUTPATIENT)
Dept: LAB | Age: 47
End: 2022-06-27
Attending: OBSTETRICS & GYNECOLOGY
Payer: COMMERCIAL

## 2022-06-27 ENCOUNTER — LAB ENCOUNTER (OUTPATIENT)
Dept: LAB | Age: 47
End: 2022-06-27
Attending: OBSTETRICS & GYNECOLOGY
Payer: COMMERCIAL

## 2022-06-27 DIAGNOSIS — R53.83 FATIGUE: ICD-10-CM

## 2022-06-27 DIAGNOSIS — E05.90 HYPERTHYROIDISM: ICD-10-CM

## 2022-06-27 DIAGNOSIS — E78.49 OTHER HYPERLIPIDEMIA: ICD-10-CM

## 2022-06-27 DIAGNOSIS — E55.9 VITAMIN D DEFICIENCY: ICD-10-CM

## 2022-06-27 DIAGNOSIS — Z01.818 PREOP TESTING: ICD-10-CM

## 2022-06-27 DIAGNOSIS — E03.9 HYPOTHYROIDISM: Primary | ICD-10-CM

## 2022-06-27 DIAGNOSIS — L70.9 ACNE: ICD-10-CM

## 2022-06-27 DIAGNOSIS — D50.0 IRON DEFICIENCY ANEMIA SECONDARY TO BLOOD LOSS (CHRONIC): ICD-10-CM

## 2022-06-27 DIAGNOSIS — I10 HYPERTENSION: ICD-10-CM

## 2022-06-27 DIAGNOSIS — C50.919 BREAST CA (HCC): ICD-10-CM

## 2022-06-27 DIAGNOSIS — L65.9 HAIR LOSS: ICD-10-CM

## 2022-06-27 LAB
ANTIBODY SCREEN: NEGATIVE
BASOPHILS # BLD AUTO: 0.02 X10(3) UL (ref 0–0.2)
BASOPHILS NFR BLD AUTO: 0.4 %
DEPRECATED HBV CORE AB SER IA-ACNC: 15.7 NG/ML
DEPRECATED RDW RBC AUTO: 44.1 FL (ref 35.1–46.3)
DHEA-S SERPL-MCNC: 140.8 UG/DL
EOSINOPHIL # BLD AUTO: 0.12 X10(3) UL (ref 0–0.7)
EOSINOPHIL NFR BLD AUTO: 2.7 %
ERYTHROCYTE [DISTWIDTH] IN BLOOD BY AUTOMATED COUNT: 13 % (ref 11–15)
HCT VFR BLD AUTO: 43.1 %
HGB BLD-MCNC: 13.9 G/DL
IMM GRANULOCYTES # BLD AUTO: 0.01 X10(3) UL (ref 0–1)
IMM GRANULOCYTES NFR BLD: 0.2 %
IRON SATN MFR SERPL: 39 %
IRON SERPL-MCNC: 172 UG/DL
LYMPHOCYTES # BLD AUTO: 1.73 X10(3) UL (ref 1–4)
LYMPHOCYTES NFR BLD AUTO: 38.9 %
MCH RBC QN AUTO: 30 PG (ref 26–34)
MCHC RBC AUTO-ENTMCNC: 32.3 G/DL (ref 31–37)
MCV RBC AUTO: 93.1 FL
MONOCYTES # BLD AUTO: 0.41 X10(3) UL (ref 0.1–1)
MONOCYTES NFR BLD AUTO: 9.2 %
NEUTROPHILS # BLD AUTO: 2.16 X10 (3) UL (ref 1.5–7.7)
NEUTROPHILS # BLD AUTO: 2.16 X10(3) UL (ref 1.5–7.7)
NEUTROPHILS NFR BLD AUTO: 48.6 %
PLATELET # BLD AUTO: 302 10(3)UL (ref 150–450)
RBC # BLD AUTO: 4.63 X10(6)UL
RH BLOOD TYPE: POSITIVE
SARS-COV-2 RNA RESP QL NAA+PROBE: NOT DETECTED
TIBC SERPL-MCNC: 440 UG/DL (ref 240–450)
TRANSFERRIN SERPL-MCNC: 295 MG/DL (ref 200–360)
VIT D+METAB SERPL-MCNC: 41.2 NG/ML (ref 30–100)
WBC # BLD AUTO: 4.5 X10(3) UL (ref 4–11)

## 2022-06-27 PROCEDURE — 86901 BLOOD TYPING SEROLOGIC RH(D): CPT

## 2022-06-27 PROCEDURE — 85025 COMPLETE CBC W/AUTO DIFF WBC: CPT

## 2022-06-27 PROCEDURE — 82306 VITAMIN D 25 HYDROXY: CPT

## 2022-06-27 PROCEDURE — 84466 ASSAY OF TRANSFERRIN: CPT

## 2022-06-27 PROCEDURE — 82728 ASSAY OF FERRITIN: CPT

## 2022-06-27 PROCEDURE — 36415 COLL VENOUS BLD VENIPUNCTURE: CPT

## 2022-06-27 PROCEDURE — 82627 DEHYDROEPIANDROSTERONE: CPT

## 2022-06-27 PROCEDURE — 86900 BLOOD TYPING SEROLOGIC ABO: CPT

## 2022-06-27 PROCEDURE — 86850 RBC ANTIBODY SCREEN: CPT

## 2022-06-27 PROCEDURE — 83540 ASSAY OF IRON: CPT

## 2022-06-28 ENCOUNTER — HOSPITAL ENCOUNTER (EMERGENCY)
Facility: HOSPITAL | Age: 47
Discharge: HOME | End: 2022-06-28
Attending: EMERGENCY MEDICINE
Payer: COMMERCIAL

## 2022-06-28 ENCOUNTER — TELEPHONE (OUTPATIENT)
Dept: SURGERY | Facility: CLINIC | Age: 47
End: 2022-06-28

## 2022-06-28 ENCOUNTER — TELEPHONE (OUTPATIENT)
Dept: OBGYN CLINIC | Facility: CLINIC | Age: 47
End: 2022-06-28

## 2022-06-28 VITALS
SYSTOLIC BLOOD PRESSURE: 128 MMHG | WEIGHT: 160 LBS | DIASTOLIC BLOOD PRESSURE: 81 MMHG | HEART RATE: 66 BPM | TEMPERATURE: 98 F | OXYGEN SATURATION: 99 % | HEIGHT: 63 IN | BODY MASS INDEX: 28.35 KG/M2 | RESPIRATION RATE: 22 BRPM

## 2022-06-28 DIAGNOSIS — N93.9 VAGINAL BLEEDING: Primary | ICD-10-CM

## 2022-06-28 LAB
ANION GAP SERPL CALC-SCNC: 9 MMOL/L (ref 0–18)
B-HCG UR QL: NEGATIVE
BASOPHILS # BLD AUTO: 0.04 X10(3) UL (ref 0–0.2)
BASOPHILS NFR BLD AUTO: 0.8 %
BUN BLD-MCNC: 10 MG/DL (ref 7–18)
BUN/CREAT SERPL: 10.4 (ref 10–20)
CALCIUM BLD-MCNC: 9.2 MG/DL (ref 8.5–10.1)
CHLORIDE SERPL-SCNC: 107 MMOL/L (ref 98–112)
CO2 SERPL-SCNC: 27 MMOL/L (ref 21–32)
CREAT BLD-MCNC: 0.96 MG/DL
DEPRECATED RDW RBC AUTO: 43.6 FL (ref 35.1–46.3)
EOSINOPHIL # BLD AUTO: 0.14 X10(3) UL (ref 0–0.7)
EOSINOPHIL NFR BLD AUTO: 3 %
ERYTHROCYTE [DISTWIDTH] IN BLOOD BY AUTOMATED COUNT: 12.8 % (ref 11–15)
GLUCOSE BLD-MCNC: 98 MG/DL (ref 70–99)
HCT VFR BLD AUTO: 43.6 %
HGB BLD-MCNC: 14.3 G/DL
IMM GRANULOCYTES # BLD AUTO: 0.01 X10(3) UL (ref 0–1)
IMM GRANULOCYTES NFR BLD: 0.2 %
LYMPHOCYTES # BLD AUTO: 1.86 X10(3) UL (ref 1–4)
LYMPHOCYTES NFR BLD AUTO: 39.3 %
MCH RBC QN AUTO: 30.4 PG (ref 26–34)
MCHC RBC AUTO-ENTMCNC: 32.8 G/DL (ref 31–37)
MCV RBC AUTO: 92.6 FL
MONOCYTES # BLD AUTO: 0.47 X10(3) UL (ref 0.1–1)
MONOCYTES NFR BLD AUTO: 9.9 %
NEUTROPHILS # BLD AUTO: 2.21 X10 (3) UL (ref 1.5–7.7)
NEUTROPHILS # BLD AUTO: 2.21 X10(3) UL (ref 1.5–7.7)
NEUTROPHILS NFR BLD AUTO: 46.8 %
OSMOLALITY SERPL CALC.SUM OF ELEC: 295 MOSM/KG (ref 275–295)
PLATELET # BLD AUTO: 331 10(3)UL (ref 150–450)
POTASSIUM SERPL-SCNC: 3.6 MMOL/L (ref 3.5–5.1)
RBC # BLD AUTO: 4.71 X10(6)UL
SARS-COV-2 RNA RESP QL NAA+PROBE: NOT DETECTED
SODIUM SERPL-SCNC: 143 MMOL/L (ref 136–145)
WBC # BLD AUTO: 4.7 X10(3) UL (ref 4–11)

## 2022-06-28 PROCEDURE — 36415 COLL VENOUS BLD VENIPUNCTURE: CPT

## 2022-06-28 PROCEDURE — 80048 BASIC METABOLIC PNL TOTAL CA: CPT | Performed by: EMERGENCY MEDICINE

## 2022-06-28 PROCEDURE — 81025 URINE PREGNANCY TEST: CPT

## 2022-06-28 PROCEDURE — 99283 EMERGENCY DEPT VISIT LOW MDM: CPT

## 2022-06-28 PROCEDURE — 85025 COMPLETE CBC W/AUTO DIFF WBC: CPT | Performed by: EMERGENCY MEDICINE

## 2022-06-28 RX ORDER — HYDROCODONE BITARTRATE AND ACETAMINOPHEN 5; 325 MG/1; MG/1
1 TABLET ORAL ONCE
Status: COMPLETED | OUTPATIENT
Start: 2022-06-28 | End: 2022-06-28

## 2022-06-28 NOTE — TELEPHONE ENCOUNTER
Dr. Pilo Livingston     Please sign off on form: Disab  -Highlight the patient and hit \"Chart\" button. -In Chart Review, w/in the Encounter tab - click 1 time on the Telephone call encounter for 6/16/22 Scroll down the telephone encounter.  -Click \"scan on\" blue Hyperlink under \"Media\" heading for Disab Dr Pilo Livingston 6/28/22  w/in the telephone enc.  -Click on Acknowledge button at the bottom right corner and left-click onto image, signature stamp appears and drag signature to Provider signature line. Stamp will turn blue. Close window.      Thank you,  Flavia Her [Midline] : trachea located in midline position [Normal] : no rashes [de-identified] : ria AU removed

## 2022-06-28 NOTE — TELEPHONE ENCOUNTER
Received FMLA/Statement of Incapacity/Attending Physician's Statement [facility] Endorsed to provider for review and completion. Placed in [id number] [place] folder in [facility].

## 2022-06-28 NOTE — TELEPHONE ENCOUNTER
Pt is calling to check the status of the paperwork to be completed and faxed back. Pt states there was a deadline for the paperwork to be completed and since her job hasn't rec'd the paperwork they are stating they will deny pt from being off from work.

## 2022-06-28 NOTE — ED INITIAL ASSESSMENT (HPI)
PT here with vaginal bleeding and pain x 1 hour -soaking pads x 4.  States due to have hysterectomy for uterine cysts and fibroids with dr Daily Krishnan, pt already contacted md. Denies dizziness or headache. + left leg and pelvic pain

## 2022-06-28 NOTE — TELEPHONE ENCOUNTER
Pt is  calling having surgery tomorrow ans started bleeding .  She not sure could it be the cyst ruptured ,

## 2022-06-28 NOTE — TELEPHONE ENCOUNTER
Document faxed to [facility] Wally Resides to fax number:    [phone]    Copies sent to:    Patient's home address on file. To be scanned into chart. Copy remains at Nurses' desk until confirmed to be scanned into chart.

## 2022-06-29 ENCOUNTER — ANESTHESIA (OUTPATIENT)
Dept: SURGERY | Facility: HOSPITAL | Age: 47
End: 2022-06-29
Payer: COMMERCIAL

## 2022-06-29 ENCOUNTER — HOSPITAL ENCOUNTER (INPATIENT)
Facility: HOSPITAL | Age: 47
LOS: 1 days | Discharge: HOME | DRG: 743 | End: 2022-06-30
Attending: OBSTETRICS & GYNECOLOGY | Admitting: OBSTETRICS & GYNECOLOGY
Payer: COMMERCIAL

## 2022-06-29 ENCOUNTER — HOSPITAL ENCOUNTER (INPATIENT)
Facility: HOSPITAL | Age: 47
LOS: 1 days | Discharge: HOME | End: 2022-06-30
Attending: OBSTETRICS & GYNECOLOGY | Admitting: OBSTETRICS & GYNECOLOGY
Payer: COMMERCIAL

## 2022-06-29 ENCOUNTER — TELEPHONE (OUTPATIENT)
Dept: OBGYN CLINIC | Facility: CLINIC | Age: 47
End: 2022-06-29

## 2022-06-29 ENCOUNTER — ANESTHESIA EVENT (OUTPATIENT)
Dept: SURGERY | Facility: HOSPITAL | Age: 47
End: 2022-06-29
Payer: COMMERCIAL

## 2022-06-29 DIAGNOSIS — D25.9 UTERINE LEIOMYOMA, UNSPECIFIED LOCATION: ICD-10-CM

## 2022-06-29 DIAGNOSIS — N83.209 CYST OF OVARY, UNSPECIFIED LATERALITY: ICD-10-CM

## 2022-06-29 DIAGNOSIS — R10.2 PELVIC PAIN: ICD-10-CM

## 2022-06-29 DIAGNOSIS — Z85.3 HISTORY OF BREAST CANCER: ICD-10-CM

## 2022-06-29 PROBLEM — D25.1 FIBROIDS, INTRAMURAL: Chronic | Status: ACTIVE | Noted: 2022-06-29

## 2022-06-29 LAB — B-HCG UR QL: NEGATIVE

## 2022-06-29 PROCEDURE — 58150 TOTAL HYSTERECTOMY: CPT | Performed by: OBSTETRICS & GYNECOLOGY

## 2022-06-29 PROCEDURE — 0UT90ZZ RESECTION OF UTERUS, OPEN APPROACH: ICD-10-PCS | Performed by: OBSTETRICS & GYNECOLOGY

## 2022-06-29 PROCEDURE — 0UT70ZZ RESECTION OF BILATERAL FALLOPIAN TUBES, OPEN APPROACH: ICD-10-PCS | Performed by: OBSTETRICS & GYNECOLOGY

## 2022-06-29 PROCEDURE — 3E0T3BZ INTRODUCTION OF ANESTHETIC AGENT INTO PERIPHERAL NERVES AND PLEXI, PERCUTANEOUS APPROACH: ICD-10-PCS | Performed by: OBSTETRICS & GYNECOLOGY

## 2022-06-29 PROCEDURE — 76942 ECHO GUIDE FOR BIOPSY: CPT | Performed by: ANESTHESIOLOGY

## 2022-06-29 PROCEDURE — 0UT20ZZ RESECTION OF BILATERAL OVARIES, OPEN APPROACH: ICD-10-PCS | Performed by: OBSTETRICS & GYNECOLOGY

## 2022-06-29 DEVICE — INTERCEED: Type: IMPLANTABLE DEVICE | Status: FUNCTIONAL

## 2022-06-29 RX ORDER — FAMOTIDINE 20 MG/1
20 TABLET, FILM COATED ORAL 2 TIMES DAILY
Status: DISCONTINUED | OUTPATIENT
Start: 2022-06-29 | End: 2022-06-30

## 2022-06-29 RX ORDER — KETAMINE HYDROCHLORIDE 50 MG/ML
INJECTION, SOLUTION, CONCENTRATE INTRAMUSCULAR; INTRAVENOUS AS NEEDED
Status: DISCONTINUED | OUTPATIENT
Start: 2022-06-29 | End: 2022-06-29 | Stop reason: SURG

## 2022-06-29 RX ORDER — ONDANSETRON 2 MG/ML
INJECTION INTRAMUSCULAR; INTRAVENOUS AS NEEDED
Status: DISCONTINUED | OUTPATIENT
Start: 2022-06-29 | End: 2022-06-29 | Stop reason: SURG

## 2022-06-29 RX ORDER — LEVOTHYROXINE SODIUM 112 UG/1
112 TABLET ORAL
Status: DISCONTINUED | OUTPATIENT
Start: 2022-06-29 | End: 2022-06-30

## 2022-06-29 RX ORDER — KETOROLAC TROMETHAMINE 30 MG/ML
30 INJECTION, SOLUTION INTRAMUSCULAR; INTRAVENOUS ONCE
Status: COMPLETED | OUTPATIENT
Start: 2022-06-29 | End: 2022-06-29

## 2022-06-29 RX ORDER — HYDROMORPHONE HYDROCHLORIDE 1 MG/ML
0.4 INJECTION, SOLUTION INTRAMUSCULAR; INTRAVENOUS; SUBCUTANEOUS EVERY 5 MIN PRN
Status: DISCONTINUED | OUTPATIENT
Start: 2022-06-29 | End: 2022-06-29 | Stop reason: HOSPADM

## 2022-06-29 RX ORDER — LIDOCAINE HYDROCHLORIDE ANHYDROUS AND DEXTROSE MONOHYDRATE .8; 5 G/100ML; G/100ML
INJECTION, SOLUTION INTRAVENOUS CONTINUOUS PRN
Status: DISCONTINUED | OUTPATIENT
Start: 2022-06-29 | End: 2022-06-29 | Stop reason: SURG

## 2022-06-29 RX ORDER — IBUPROFEN 600 MG/1
600 TABLET ORAL EVERY 6 HOURS SCHEDULED
Qty: 30 TABLET | Refills: 0 | Status: SHIPPED | COMMUNITY
Start: 2022-06-30 | End: 2022-07-07

## 2022-06-29 RX ORDER — SODIUM CHLORIDE, SODIUM LACTATE, POTASSIUM CHLORIDE, CALCIUM CHLORIDE 600; 310; 30; 20 MG/100ML; MG/100ML; MG/100ML; MG/100ML
INJECTION, SOLUTION INTRAVENOUS CONTINUOUS
Status: DISCONTINUED | OUTPATIENT
Start: 2022-06-29 | End: 2022-06-30

## 2022-06-29 RX ORDER — ACETAMINOPHEN 500 MG
1000 TABLET ORAL EVERY 8 HOURS
Status: DISCONTINUED | OUTPATIENT
Start: 2022-06-29 | End: 2022-06-29

## 2022-06-29 RX ORDER — ACETAMINOPHEN 500 MG
1000 TABLET ORAL EVERY 8 HOURS SCHEDULED
Qty: 30 TABLET | Refills: 0 | Status: SHIPPED | COMMUNITY
Start: 2022-06-29 | End: 2022-07-06

## 2022-06-29 RX ORDER — ROCURONIUM BROMIDE 10 MG/ML
INJECTION, SOLUTION INTRAVENOUS AS NEEDED
Status: DISCONTINUED | OUTPATIENT
Start: 2022-06-29 | End: 2022-06-29 | Stop reason: SURG

## 2022-06-29 RX ORDER — NEOSTIGMINE METHYLSULFATE 1 MG/ML
INJECTION, SOLUTION INTRAVENOUS AS NEEDED
Status: DISCONTINUED | OUTPATIENT
Start: 2022-06-29 | End: 2022-06-29 | Stop reason: SURG

## 2022-06-29 RX ORDER — HYDROMORPHONE HYDROCHLORIDE 1 MG/ML
0.6 INJECTION, SOLUTION INTRAMUSCULAR; INTRAVENOUS; SUBCUTANEOUS EVERY 5 MIN PRN
Status: DISCONTINUED | OUTPATIENT
Start: 2022-06-29 | End: 2022-06-29 | Stop reason: HOSPADM

## 2022-06-29 RX ORDER — SODIUM CHLORIDE 9 MG/ML
INJECTION, SOLUTION INTRAVENOUS CONTINUOUS
Status: DISCONTINUED | OUTPATIENT
Start: 2022-06-29 | End: 2022-06-30

## 2022-06-29 RX ORDER — LIDOCAINE HYDROCHLORIDE 10 MG/ML
INJECTION, SOLUTION EPIDURAL; INFILTRATION; INTRACAUDAL; PERINEURAL AS NEEDED
Status: DISCONTINUED | OUTPATIENT
Start: 2022-06-29 | End: 2022-06-29 | Stop reason: SURG

## 2022-06-29 RX ORDER — MORPHINE SULFATE 10 MG/ML
6 INJECTION, SOLUTION INTRAMUSCULAR; INTRAVENOUS EVERY 10 MIN PRN
Status: DISCONTINUED | OUTPATIENT
Start: 2022-06-29 | End: 2022-06-29 | Stop reason: HOSPADM

## 2022-06-29 RX ORDER — LOSARTAN POTASSIUM 25 MG/1
25 TABLET ORAL DAILY
Status: DISCONTINUED | OUTPATIENT
Start: 2022-06-29 | End: 2022-06-30

## 2022-06-29 RX ORDER — ONDANSETRON 2 MG/ML
4 INJECTION INTRAMUSCULAR; INTRAVENOUS EVERY 8 HOURS PRN
Status: DISCONTINUED | OUTPATIENT
Start: 2022-06-29 | End: 2022-06-30

## 2022-06-29 RX ORDER — SODIUM CHLORIDE, SODIUM LACTATE, POTASSIUM CHLORIDE, CALCIUM CHLORIDE 600; 310; 30; 20 MG/100ML; MG/100ML; MG/100ML; MG/100ML
INJECTION, SOLUTION INTRAVENOUS CONTINUOUS
Status: DISCONTINUED | OUTPATIENT
Start: 2022-06-29 | End: 2022-06-29 | Stop reason: HOSPADM

## 2022-06-29 RX ORDER — ROPIVACAINE HYDROCHLORIDE 5 MG/ML
INJECTION, SOLUTION EPIDURAL; INFILTRATION; PERINEURAL
Status: COMPLETED | OUTPATIENT
Start: 2022-06-29 | End: 2022-06-29

## 2022-06-29 RX ORDER — ACETAMINOPHEN 500 MG
1000 TABLET ORAL ONCE
Status: COMPLETED | OUTPATIENT
Start: 2022-06-29 | End: 2022-06-29

## 2022-06-29 RX ORDER — OXYCODONE HYDROCHLORIDE 5 MG/1
10 TABLET ORAL EVERY 4 HOURS PRN
Status: DISCONTINUED | OUTPATIENT
Start: 2022-06-29 | End: 2022-06-30

## 2022-06-29 RX ORDER — KETOROLAC TROMETHAMINE 30 MG/ML
30 INJECTION, SOLUTION INTRAMUSCULAR; INTRAVENOUS EVERY 6 HOURS
Status: COMPLETED | OUTPATIENT
Start: 2022-06-29 | End: 2022-06-30

## 2022-06-29 RX ORDER — MORPHINE SULFATE 4 MG/ML
4 INJECTION, SOLUTION INTRAMUSCULAR; INTRAVENOUS EVERY 10 MIN PRN
Status: DISCONTINUED | OUTPATIENT
Start: 2022-06-29 | End: 2022-06-29 | Stop reason: HOSPADM

## 2022-06-29 RX ORDER — ONDANSETRON 4 MG/1
4 TABLET, FILM COATED ORAL EVERY 8 HOURS PRN
Status: DISCONTINUED | OUTPATIENT
Start: 2022-06-29 | End: 2022-06-30

## 2022-06-29 RX ORDER — GLYCOPYRROLATE 0.2 MG/ML
INJECTION, SOLUTION INTRAMUSCULAR; INTRAVENOUS AS NEEDED
Status: DISCONTINUED | OUTPATIENT
Start: 2022-06-29 | End: 2022-06-29 | Stop reason: SURG

## 2022-06-29 RX ORDER — ACETAMINOPHEN 10 MG/ML
1000 INJECTION, SOLUTION INTRAVENOUS ONCE
Status: COMPLETED | OUTPATIENT
Start: 2022-06-29 | End: 2022-06-29

## 2022-06-29 RX ORDER — HYDROMORPHONE HYDROCHLORIDE 1 MG/ML
0.2 INJECTION, SOLUTION INTRAMUSCULAR; INTRAVENOUS; SUBCUTANEOUS EVERY 5 MIN PRN
Status: DISCONTINUED | OUTPATIENT
Start: 2022-06-29 | End: 2022-06-29 | Stop reason: HOSPADM

## 2022-06-29 RX ORDER — ONDANSETRON 2 MG/ML
4 INJECTION INTRAMUSCULAR; INTRAVENOUS EVERY 6 HOURS PRN
Status: DISCONTINUED | OUTPATIENT
Start: 2022-06-29 | End: 2022-06-29 | Stop reason: HOSPADM

## 2022-06-29 RX ORDER — NALOXONE HYDROCHLORIDE 0.4 MG/ML
80 INJECTION, SOLUTION INTRAMUSCULAR; INTRAVENOUS; SUBCUTANEOUS AS NEEDED
Status: DISCONTINUED | OUTPATIENT
Start: 2022-06-29 | End: 2022-06-29 | Stop reason: HOSPADM

## 2022-06-29 RX ORDER — OXYCODONE HYDROCHLORIDE 10 MG/1
10 TABLET ORAL EVERY 6 HOURS PRN
Qty: 20 TABLET | Refills: 0 | Status: SHIPPED | OUTPATIENT
Start: 2022-06-29

## 2022-06-29 RX ORDER — ACETAMINOPHEN 500 MG
1000 TABLET ORAL EVERY 8 HOURS SCHEDULED
Status: DISCONTINUED | OUTPATIENT
Start: 2022-06-29 | End: 2022-06-30

## 2022-06-29 RX ORDER — MORPHINE SULFATE 4 MG/ML
2 INJECTION, SOLUTION INTRAMUSCULAR; INTRAVENOUS EVERY 10 MIN PRN
Status: DISCONTINUED | OUTPATIENT
Start: 2022-06-29 | End: 2022-06-29 | Stop reason: HOSPADM

## 2022-06-29 RX ORDER — CEFAZOLIN SODIUM/WATER 2 G/20 ML
2 SYRINGE (ML) INTRAVENOUS ONCE
Status: COMPLETED | OUTPATIENT
Start: 2022-06-29 | End: 2022-06-29

## 2022-06-29 RX ORDER — OXYCODONE HYDROCHLORIDE 5 MG/1
5 TABLET ORAL EVERY 4 HOURS PRN
Status: DISCONTINUED | OUTPATIENT
Start: 2022-06-29 | End: 2022-06-30

## 2022-06-29 RX ORDER — MELATONIN
3 NIGHTLY PRN
Status: DISCONTINUED | OUTPATIENT
Start: 2022-06-29 | End: 2022-06-30

## 2022-06-29 RX ORDER — METOPROLOL TARTRATE 5 MG/5ML
2.5 INJECTION INTRAVENOUS ONCE
Status: DISCONTINUED | OUTPATIENT
Start: 2022-06-29 | End: 2022-06-29 | Stop reason: HOSPADM

## 2022-06-29 RX ORDER — DEXAMETHASONE SODIUM PHOSPHATE 4 MG/ML
VIAL (ML) INJECTION AS NEEDED
Status: DISCONTINUED | OUTPATIENT
Start: 2022-06-29 | End: 2022-06-29 | Stop reason: SURG

## 2022-06-29 RX ORDER — MIDAZOLAM HYDROCHLORIDE 1 MG/ML
INJECTION INTRAMUSCULAR; INTRAVENOUS AS NEEDED
Status: DISCONTINUED | OUTPATIENT
Start: 2022-06-29 | End: 2022-06-29 | Stop reason: SURG

## 2022-06-29 RX ORDER — DEXAMETHASONE SODIUM PHOSPHATE 10 MG/ML
INJECTION, SOLUTION INTRAMUSCULAR; INTRAVENOUS
Status: COMPLETED | OUTPATIENT
Start: 2022-06-29 | End: 2022-06-29

## 2022-06-29 RX ORDER — IBUPROFEN 600 MG/1
600 TABLET ORAL EVERY 6 HOURS SCHEDULED
Status: DISCONTINUED | OUTPATIENT
Start: 2022-06-30 | End: 2022-06-30

## 2022-06-29 RX ADMIN — DEXAMETHASONE SODIUM PHOSPHATE 5 MG: 10 INJECTION, SOLUTION INTRAMUSCULAR; INTRAVENOUS at 09:35:00

## 2022-06-29 RX ADMIN — ROCURONIUM BROMIDE 10 MG: 10 INJECTION, SOLUTION INTRAVENOUS at 08:44:00

## 2022-06-29 RX ADMIN — DEXAMETHASONE SODIUM PHOSPHATE 4 MG: 4 MG/ML VIAL (ML) INJECTION at 07:46:00

## 2022-06-29 RX ADMIN — MIDAZOLAM HYDROCHLORIDE 2 MG: 1 INJECTION INTRAMUSCULAR; INTRAVENOUS at 07:34:00

## 2022-06-29 RX ADMIN — ROCURONIUM BROMIDE 45 MG: 10 INJECTION, SOLUTION INTRAVENOUS at 07:46:00

## 2022-06-29 RX ADMIN — SODIUM CHLORIDE, SODIUM LACTATE, POTASSIUM CHLORIDE, CALCIUM CHLORIDE: 600; 310; 30; 20 INJECTION, SOLUTION INTRAVENOUS at 09:45:00

## 2022-06-29 RX ADMIN — ONDANSETRON 4 MG: 2 INJECTION INTRAMUSCULAR; INTRAVENOUS at 09:16:00

## 2022-06-29 RX ADMIN — GLYCOPYRROLATE 0.4 MG: 0.2 INJECTION, SOLUTION INTRAMUSCULAR; INTRAVENOUS at 09:28:00

## 2022-06-29 RX ADMIN — KETAMINE HYDROCHLORIDE 50 MG: 50 INJECTION, SOLUTION, CONCENTRATE INTRAMUSCULAR; INTRAVENOUS at 07:53:00

## 2022-06-29 RX ADMIN — ROPIVACAINE HYDROCHLORIDE 20 ML: 5 INJECTION, SOLUTION EPIDURAL; INFILTRATION; PERINEURAL at 09:35:00

## 2022-06-29 RX ADMIN — CEFAZOLIN SODIUM/WATER 2 G: 2 G/20 ML SYRINGE (ML) INTRAVENOUS at 07:50:00

## 2022-06-29 RX ADMIN — ROPIVACAINE HYDROCHLORIDE 20 ML: 5 INJECTION, SOLUTION EPIDURAL; INFILTRATION; PERINEURAL at 09:18:00

## 2022-06-29 RX ADMIN — LIDOCAINE HYDROCHLORIDE 50 MG: 10 INJECTION, SOLUTION EPIDURAL; INFILTRATION; INTRACAUDAL; PERINEURAL at 07:41:00

## 2022-06-29 RX ADMIN — NEOSTIGMINE METHYLSULFATE 3 MG: 1 INJECTION, SOLUTION INTRAVENOUS at 09:28:00

## 2022-06-29 RX ADMIN — SODIUM CHLORIDE, SODIUM LACTATE, POTASSIUM CHLORIDE, CALCIUM CHLORIDE: 600; 310; 30; 20 INJECTION, SOLUTION INTRAVENOUS at 08:00:00

## 2022-06-29 RX ADMIN — SODIUM CHLORIDE, SODIUM LACTATE, POTASSIUM CHLORIDE, CALCIUM CHLORIDE: 600; 310; 30; 20 INJECTION, SOLUTION INTRAVENOUS at 09:19:00

## 2022-06-29 RX ADMIN — ROCURONIUM BROMIDE 5 MG: 10 INJECTION, SOLUTION INTRAVENOUS at 07:41:00

## 2022-06-29 RX ADMIN — DEXAMETHASONE SODIUM PHOSPHATE 5 MG: 10 INJECTION, SOLUTION INTRAMUSCULAR; INTRAVENOUS at 09:18:00

## 2022-06-29 RX ADMIN — LIDOCAINE HYDROCHLORIDE ANHYDROUS AND DEXTROSE MONOHYDRATE 2 MG/MIN: .8; 5 INJECTION, SOLUTION INTRAVENOUS at 07:49:00

## 2022-06-29 NOTE — ANESTHESIA PROCEDURE NOTES
Peripheral Block    Date/Time: 6/29/2022 9:18 AM  Performed by: Isabelle Nunez MD  Authorized by: Isabelle Nunez MD       General Information and Staff    Start Time:  6/29/2022 9:18 AM  End Time:  6/29/2022 9:22 AM  Anesthesiologist:  Isabelle Nunez MD  Performed by: Anesthesiologist  Patient Location:  OR    Block Placement: Post Induction  Site Identification: real time ultrasound guided, surface landmarks and image stored and retrievable      Reason for Block: at surgeon's request and post-op pain management    Preanesthetic Checklist: 2 patient identifers, IV checked, risks and benefits discussed, monitors and equipment checked, pre-op evaluation, timeout performed, anesthesia consent, sterile technique used, no prohibitive neurological deficits and no local skin infection at insertion site      Procedure Details    Patient Position:  Supine  Prep: ChloraPrep and patient draped    Monitoring:  Heart rate, continuous pulse ox and blood pressure cuff  Block Type:  TAP  Laterality:  Left  Injection Technique:  Single-shot    Needle    Needle Type:  Short-bevel  Needle Gauge:  20 G  Needle Length:  50 mm  Needle Localization:  Anatomical landmarks and ultrasound guidance  Reason for Ultrasound Use: appropriate spread of the medication was noted in real time and no ultrasound evidence of intravascular and/or intraneural injection            Assessment    Injection Assessment:  Good spread noted, incremental injection, local visualized surrounding nerve on ultrasound, low pressure, negative aspiration for heme, negative resistance and no pain on injection  Paresthesia Pain:  None  Heart Rate Change: No    - Patient tolerated block procedure well without evidence of immediate block related complications.      Medications  6/29/2022 9:18 AM  Ropivacaine (NAROPIN) injection 0.5%, 20 mL  dexamethasone (DECADRON) PF injection 10 mg/ml, 5 mg    Additional Comments

## 2022-06-29 NOTE — ANESTHESIA PROCEDURE NOTES
Airway  Date/Time: 6/29/2022 7:42 AM  Urgency: Elective    Airway not difficult    General Information and Staff    Patient location during procedure: OR  Anesthesiologist: Constanza Lopez MD  Resident/CRNA: Frank Boyd CRNA  Performed: CRNA     Indications and Patient Condition  Indications for airway management: anesthesia  Spontaneous Ventilation: absent  Sedation level: deep  Preoxygenated: yes  Patient position: sniffing  MILS maintained throughout  Mask difficulty assessment: 0 - not attempted    Final Airway Details  Final airway type: endotracheal airway      Successful airway: ETT  Cuffed: yes   Successful intubation technique: Video laryngoscopy  Endotracheal tube insertion site: oral  Blade: GlideScope  Blade size: #3  ETT size (mm): 7.0    Cormack-Lehane Classification: grade I - full view of glottis  Placement verified by: chest auscultation, capnometry and palpation of cuff   Cuff volume (mL): 8  Measured from: teeth  ETT to teeth (cm): 21  Number of attempts at approach: 1

## 2022-06-29 NOTE — TELEPHONE ENCOUNTER
Pharmacy called in re to patients recent Oxycodone prescription. Unfortunately they have gone over their requirement for prescribing C2 drugs for this month and are unable to fill. Asked if medication could be changed or prescription can be called in/sent to another pharmacy. Please advise.

## 2022-06-29 NOTE — OPERATIVE REPORT
[facility] OPERATING ROOM  Operative Note     Liss Banuelos Location: OR   CSN [id number] MRN [id number]   Admission Date 6/29/2022 Operation Date 6/29/2022   Attending Physician Dat Samuel MD Operating Physician Jodee Perez MD      Preoperative Diagnosis: Cyst of ovary, unspecified laterality [N83.209]  Uterine leiomyoma, unspecified location [D25.9]  History of breast cancer [Z85.3]  Pelvic pain [R10.2]     Postoperative Diagnosis: Cyst of ovary, unspecified laterality [N83.209]Uterine leiomyoma, unspecified location [D25. 9]History of breast cancer Gabbie.Weakley. 3]Pelvic pain [R10.2]     Procedure Performed:   TOTAL ABDOMINAL HYSTERECTOMY, BILATERAL SALPINGO-OOPHORECTOMY     Primary Surgeon: Jodee Perez MD      Assistant: Walter Valiente MD      Surgical Findings: fibroid uterus, cystic ovaries and normal tubes adhesions from prior surgery to bladder and anterior wall      Anesthesia: General     Complications: none      Specimen: Uterus, tubes and ovaries     Condition: Stable      Estimated Blood Loss: Blood Output: 125 mL (6/29/2022  9:00 AM)       Summary of Case:   After informed consent was obtained, patient was taken to the operating room where general anesthesia was induced. The patient had been given  IV Ancef. She was then prepped and draped in the normal sterile fashion. Castro catheter was placed and was draining clear urine. SCDs were placed on her lower extremities. Time out was done. A Pfannenstiel skin incision was made with a scalpel and carried down to the underlying layer of anterior rectus sheath. Anterior rectus sheath was incised using a scalpel, and it was opened superiorly  and inferiorly using the King scissors. Rectus muscles were then , and the posterior rectus sheath and parietal peritoneum were identified, tented up with hemostats, entered bluntly. The peritoneum was then extended bluntly with good visualization of the bladder and bowel.   At this point, an Theron retractor was placed and  the bowel was packed away using moist laparotomy sponges. The uterus was elevated with Kailee clamps at the  cornua  for traction throughout the entire case. The round ligament was suture ligated with 0 Vicryl and divided sharply. At this point, the ureter was identified, and the uteroovarian ligament was identified. A peritoneal window was created, and the infundibulopelvic ligament was doubly clamped using Kevin clamps, divided using the King scissors, and transfixed, then double ligated using 0 Vicryl suture material with good hemostasis. The same exact steps were then repeated on the contralateral side. The anterior leaflet of the broad ligament was identified, defect created and extended creating a bladder flap. The bladder was carefully dissected downward without complications. At this point, the uterine vessels on both sides were doubly clamped, cut, and suture ligated using 0 Vicryl suture material.  The uterosacral-cardinal ligament complexes on both sides were then serially clamped, cut, and transfixed using 0 Vicryl suture material without complications. This was continued to the level of the cervix. At this point, the cervix was cross clamped  and the uterus was removed under the cervix with Manisha scissors and sent to pathology. The uterus, cervix, bilateral tubes & ovaries were sent for pathological evaluation. Vaginal cuff angle sutures were then placed using 0 Vicryl suture. The uterosacral ligaments were secured to the vaginal cuff. The vaginal cuff was then closed using 0 Vicryl suture in a running locked fashion. Good hemostasis was noted. The  pelvis was irrigated using normal saline solution and revealed good hemostasis. Interceed was placed over the vaginal cuff. All pedicles were examined and found to be hemostatic. The instruments, laps and retractor were removed from the abdomen. All OR counts were correct.   The fascia was closed using 0 looped PDS in a running fashion. The subcutaneous space was then closed using 0 plain gut suture. The skin was closed with 4-0 vicryl in a running subcuticular fashion. Dermabond was applied to the skin. A sterile dressing was applied. All OR counts were correct x2. The patient was taken to recovery room in a stable condition. Dr. Karishma Perez  was utilized as a surgical assist for the entire procedure due to the need for tissue retraction, dissection of vital structures, prevention and management of blood loss, and reduction in overall operative and anesthesia time. They were also involved in the critical decision making as it relates to the complexity of this case.         Jimmie Kussmaul, MD  6/29/2022  9:14 AM

## 2022-06-29 NOTE — ANESTHESIA PROCEDURE NOTES
Peripheral Block  Performed by: Chuck Powell MD  Authorized by: Chuck Powell MD       General Information and Staff    Start Time:  6/29/2022 9:22 AM  End Time:  6/29/2022 9:29 AM  Anesthesiologist:  Chuck Powell MD  Performed by: Anesthesiologist  Patient Location:  OR    Block Placement: Post Induction  Site Identification: real time ultrasound guided, surface landmarks and image stored and retrievable      Reason for Block: at surgeon's request and post-op pain management    Preanesthetic Checklist: 2 patient identifers, IV checked, risks and benefits discussed, monitors and equipment checked, pre-op evaluation, timeout performed, anesthesia consent, sterile technique used, no prohibitive neurological deficits and no local skin infection at insertion site      Procedure Details    Patient Position:  Supine  Prep: ChloraPrep and patient draped    Monitoring:  Heart rate, continuous pulse ox and blood pressure cuff  Block Type:  TAP  Laterality:  Right  Injection Technique:  Single-shot    Needle    Needle Type:  Short-bevel  Needle Gauge:  20 G  Needle Length:  50 mm  Needle Localization:  Ultrasound guidance and anatomical landmarks  Reason for Ultrasound Use: appropriate spread of the medication was noted in real time and no ultrasound evidence of intravascular and/or intraneural injection            Assessment    Injection Assessment:  Good spread noted, incremental injection, local visualized surrounding nerve on ultrasound, low pressure, negative aspiration for heme, negative resistance and no pain on injection  Paresthesia Pain:  None  Heart Rate Change: No    - Patient tolerated block procedure well without evidence of immediate block related complications.      Medications    ropivacaine (NAROPIN) injection 0.5%, 20 mL  dexamethasone (DECADRON) PF injection 10 mg/ml, 5 mg    Additional Comments

## 2022-06-30 ENCOUNTER — PATIENT MESSAGE (OUTPATIENT)
Dept: OBGYN CLINIC | Facility: CLINIC | Age: 47
End: 2022-06-30

## 2022-06-30 VITALS
SYSTOLIC BLOOD PRESSURE: 144 MMHG | RESPIRATION RATE: 18 BRPM | BODY MASS INDEX: 29.23 KG/M2 | HEART RATE: 72 BPM | HEIGHT: 63 IN | OXYGEN SATURATION: 100 % | DIASTOLIC BLOOD PRESSURE: 82 MMHG | WEIGHT: 165 LBS | TEMPERATURE: 99 F

## 2022-06-30 LAB
BASOPHILS # BLD AUTO: 0.01 X10(3) UL (ref 0–0.2)
BASOPHILS NFR BLD AUTO: 0.1 %
DEPRECATED RDW RBC AUTO: 41.4 FL (ref 35.1–46.3)
EOSINOPHIL # BLD AUTO: 0 X10(3) UL (ref 0–0.7)
EOSINOPHIL NFR BLD AUTO: 0 %
ERYTHROCYTE [DISTWIDTH] IN BLOOD BY AUTOMATED COUNT: 12.7 % (ref 11–15)
HCT VFR BLD AUTO: 35.3 %
HGB BLD-MCNC: 11.8 G/DL
IMM GRANULOCYTES # BLD AUTO: 0.04 X10(3) UL (ref 0–1)
IMM GRANULOCYTES NFR BLD: 0.4 %
LYMPHOCYTES # BLD AUTO: 1.32 X10(3) UL (ref 1–4)
LYMPHOCYTES NFR BLD AUTO: 12 %
MCH RBC QN AUTO: 30.3 PG (ref 26–34)
MCHC RBC AUTO-ENTMCNC: 33.4 G/DL (ref 31–37)
MCV RBC AUTO: 90.7 FL
MONOCYTES # BLD AUTO: 0.87 X10(3) UL (ref 0.1–1)
MONOCYTES NFR BLD AUTO: 7.9 %
NEUTROPHILS # BLD AUTO: 8.78 X10 (3) UL (ref 1.5–7.7)
NEUTROPHILS # BLD AUTO: 8.78 X10(3) UL (ref 1.5–7.7)
NEUTROPHILS NFR BLD AUTO: 79.6 %
PLATELET # BLD AUTO: 267 10(3)UL (ref 150–450)
RBC # BLD AUTO: 3.89 X10(6)UL
WBC # BLD AUTO: 11 X10(3) UL (ref 4–11)

## 2022-06-30 RX ORDER — OXYCODONE HYDROCHLORIDE 5 MG/1
5 TABLET ORAL EVERY 4 HOURS PRN
Qty: 20 TABLET | Refills: 0 | Status: SHIPPED | OUTPATIENT
Start: 2022-06-30

## 2022-06-30 NOTE — ANESTHESIA POST-OP FOLLOW-UP NOTE
Trinidad Mccoy is POD#1 after   Surgical Procedures     Case IDs Date Procedure Surgeon Location Status    [id number] 6/29/22 TOTAL ABDOMINAL HYSTERECTOMY, BILATERAL SALPINGO-OOPHORECTOMY; TAP BLOCK Jaycob King MD [facility] OR [facility]      . Trinidad Mccoy underwent TAP block for analgesia. Tolerated the procedure well. Injection sites examined, no erythema, hematoma, or tenderness to palpation. Pain score is 2/10. No further anesthesia management needed at this time. Doing well on oral pain meds. All anesthetic questions answered.      Jennifer Daly MD

## 2022-07-01 NOTE — TELEPHONE ENCOUNTER
Timi Zhang, RN 6/30/2022 1:40 PM CDT    Please advise  ----- Message -----  From: Casandra Rodriguez  Sent: 6/30/2022 1:31 PM CDT  To: [facility] Ob/Gyne Clinical Staff  Subject: Surgical pics     Hi Doctor Marilou Nicholas. You told me to send you a message so you can send me pics of my cyst and fibroid.  Thank you so much

## 2022-07-04 ENCOUNTER — PATIENT MESSAGE (OUTPATIENT)
Dept: SURGERY | Facility: CLINIC | Age: 47
End: 2022-07-04

## 2022-07-05 NOTE — TELEPHONE ENCOUNTER
Noted that patient has messaged requesting FMLA paperwork be sent to [facility] again. Progress notes and imaging reports sent from 3/23/22-Current date to [phone]     [employer] message sent to patient informing her that documents have been re-faxed and to call [facility] to determine if they needed any additional documents.

## 2022-07-05 NOTE — TELEPHONE ENCOUNTER
This was addressed in alternate TE.      Fax sent to [facility] with completed [facility] documents:    [phone]

## 2022-07-05 NOTE — TELEPHONE ENCOUNTER
From: [facility]  To: Fabio Hager, [address]  Sent: 7/4/2022 4:12 PM CDT  Subject: Medical Paperwork    Hello. So sorry to bother you but Brittney Daugherty been so stressed because I was just checking if the updated medical paperwork was sent to [facility]. A nurse from your office called me Tuesday the 28th but I was distracted since i was at the ER. Im very stressed cause im still trying to recover from metal barbara in my neck and didn't realize I had major issues going now on with several large cysts and uterine fibroid. So I've been under so much stress and during this time [facility] keeps pressuring me about paperwork. I hope you can understand my anxiety about this whole situation Im in.  But I do appreciate you very much

## 2022-07-05 NOTE — TELEPHONE ENCOUNTER
Dr. Sha Colbert     Please sign off on form: Disab  -Highlight the patient and hit \"Chart\" button. -In Chart Review, w/in the Encounter tab - click 1 time on the Telephone call encounter for 6/16/22 Scroll down the telephone encounter.  -Click \"scan on\" blue Hyperlink under \"Media\" heading for Disab Dr Sha Colbert 6/28/22  w/in the telephone enc.  -Click on Acknowledge button at the bottom right corner and left-click onto image, signature stamp appears and drag signature to Provider signature line. Stamp will turn blue. Close window.      Thank you,  Airam Bell

## 2022-07-07 ENCOUNTER — PATIENT MESSAGE (OUTPATIENT)
Dept: SURGERY | Facility: CLINIC | Age: 47
End: 2022-07-07

## 2022-07-07 ENCOUNTER — TELEPHONE (OUTPATIENT)
Dept: PHYSICAL THERAPY | Facility: HOSPITAL | Age: 47
End: 2022-07-07

## 2022-07-07 ENCOUNTER — APPOINTMENT (OUTPATIENT)
Dept: PHYSICAL THERAPY | Age: 47
End: 2022-07-07
Attending: PHYSICIAN ASSISTANT
Payer: COMMERCIAL

## 2022-07-07 NOTE — TELEPHONE ENCOUNTER
Received additional request for disability forms to be completed by provider:    Claim # [id number]    Spoke with Wilian Chew who stated that forms and supporting documents were received in 23 page long fax on 7/5/22.

## 2022-07-08 ENCOUNTER — PATIENT MESSAGE (OUTPATIENT)
Dept: SURGERY | Facility: CLINIC | Age: 47
End: 2022-07-08

## 2022-07-11 ENCOUNTER — PATIENT MESSAGE (OUTPATIENT)
Dept: SURGERY | Facility: CLINIC | Age: 47
End: 2022-07-11

## 2022-07-11 NOTE — TELEPHONE ENCOUNTER
Noted that patient has requested updated disability document stating that she will be able to lift 70 lbs. [email or web address] message routed to Samuel Gomez.

## 2022-07-11 NOTE — TELEPHONE ENCOUNTER
From: Casandra Rodriguez  To: FRANCY Worthington  Sent: 7/8/2022 11:04 AM CDT  Subject: Medical paperwork     Here's a continuation of my previous email I sent. I know you sent updated medical report to [facility] last week but I am not allowed light duty or restrictions at work. I have to be able to lift 70lbs+    On paperwork you put I can only lift 10lbs. Is my body ready for 70lbs? I start physical therapy on the 12th of august.       Are you able to update my medical paperwork and resend fax asap so I can at least complete physical therapy and be able to lift 70lbs so I can go back to work [employer] please. [facility] advised me this needs to be updated quickly so it doesn't affect my paycheck and my medical leave.      I think you feel I can lift 70lbs+ I am confident of returning back to work September 1st. I'm so sorry about

## 2022-07-12 ENCOUNTER — OFFICE VISIT (OUTPATIENT)
Dept: PHYSICAL THERAPY | Age: 47
End: 2022-07-12
Attending: PHYSICIAN ASSISTANT
Payer: COMMERCIAL

## 2022-07-12 ENCOUNTER — PATIENT MESSAGE (OUTPATIENT)
Dept: SURGERY | Facility: CLINIC | Age: 47
End: 2022-07-12

## 2022-07-12 PROCEDURE — 97162 PT EVAL MOD COMPLEX 30 MIN: CPT

## 2022-07-12 PROCEDURE — 97110 THERAPEUTIC EXERCISES: CPT

## 2022-07-13 ENCOUNTER — PATIENT MESSAGE (OUTPATIENT)
Dept: SURGERY | Facility: CLINIC | Age: 47
End: 2022-07-13

## 2022-07-13 NOTE — TELEPHONE ENCOUNTER
From: Amparo Cullen  To: FRANCY Contreras  Sent: 7/11/2022 11:00 AM CDT  Subject: Medical paperwork     Good morning Aleisha . I'm writing this emergency email with urgency. I know your extremely busy but just wanted to say [employer] Narayanan is pressuring me to have new updated medical paperwork because your previous one showed I can only lift 10lbs at work. Which I'm required to lift 70lbs+ . I start physical therapy July 12th and this ends august 2nd. 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 I know your nurse was making sure to ask me if sure I wanted to go back august 17Th but I'm honestly not ready. I prefer to go back September 1st so I still can get evaluated by you in person first when I see you august 11th, make sure my metal barbara has fused, complete my physical therapy and to make sure I'm healed from my hysterectomy surgery that I wasn't expecting while healing from my cervical fusion surgery. Rocco Narayanan advised me they will approve my medical leave for September 1st but they need updated paperwork to reflect that. If you feel I can lift 70lbs+ And that my metal barbara has fused, I am ready to go back to work especially for financial reasons.

## 2022-07-13 NOTE — TELEPHONE ENCOUNTER
From: Mario Mckeon  To: FRANCY Saucedo  Sent: 7/12/2022 9:59 AM CDT  Subject: Medical leave     Good morning Doctor Jess Farias. I just received an email from my  from [employer]. I'm so happy she didn't deny my medical leave and approved me till August 31st. I have included email she sent me. Have a nice day.

## 2022-07-13 NOTE — TELEPHONE ENCOUNTER
Patient has sent multiple MyChart messages, requesting updated paperwork for [facility]. [facility] was called last week by Nursing and rep informed [employer] Staff that they had everything in order for patient's disability paperwork. Patient now requesting to be off work until September 1/2022. Called patient X 2 attempts and left message. Nursing to inquire if an \"off work letter\" written by provider will be sufficient. Awaiting reply from patient. In a previous TE from 7/7/22:    \"Received additional request for disability forms to be completed by provider:  Josep Joe # [id number]  [address] with [employer] who stated that forms and supporting documents were received in 23 page long fax on 7/5/22. \"    [employer] message sent to patient as well.

## 2022-07-13 NOTE — TELEPHONE ENCOUNTER
Patient with multiple concerns/questions, will route to NSGY provider to review prior to letter being signed

## 2022-07-13 NOTE — TELEPHONE ENCOUNTER
Received message from patient, updating provider regarding update and increase in medical leave time allotted by [facility]. [employer] messages have been sent to patient inquiring if she needs any additional documentation. Message sent to patient requesting she contact [employer] if she needs any additional documentation to be off of work until 9/1/22.

## 2022-07-13 NOTE — TELEPHONE ENCOUNTER
From: Yodit Poster  To: Angélicamarcelino [place]  Sent: 7/7/2022 2:21 PM CDT  Subject: Medical paperwork    Valente Mohan. I just received updated medical paperwork in the mail. But it says my restrictions for work are to only lift, reach overhead, pushing or pulling 10lbs. My job will deny me to come back to work. I have to be able to lift 70lbs plus. I'm extremely worried now that I may be returning back to work too early if I'm only allowed to handle 10lbs, especially since I just had full hysterectomy surgery. In your honest opinion should I return to work sept 1st to give me time to heal from metal barbara? My job does not allow light duty at all. I have to return back to work at full capacity and I received email from my employer saying their glad I'm coming back but I have to make sure I'm 100% healed. I think I made the mistake of trying to return back to work too early and I only did it for financial reasons. What should I do?

## 2022-07-13 NOTE — TELEPHONE ENCOUNTER
Patient has messaged via [employer] requesting a letter to be off-work until 9/1 be faxed to [employer]. Reply sent to patient informing her that JOE Ann, [address] is not in clinic today, but letter will be initiated and routed to provider. Letter pended for patient to remain off of work until 9/1/22 and routed to provider.

## 2022-07-14 ENCOUNTER — OFFICE VISIT (OUTPATIENT)
Dept: PHYSICAL THERAPY | Age: 47
End: 2022-07-14
Attending: PHYSICIAN ASSISTANT
Payer: COMMERCIAL

## 2022-07-14 PROCEDURE — 97110 THERAPEUTIC EXERCISES: CPT

## 2022-07-14 PROCEDURE — 97140 MANUAL THERAPY 1/> REGIONS: CPT

## 2022-07-19 ENCOUNTER — APPOINTMENT (OUTPATIENT)
Dept: PHYSICAL THERAPY | Age: 47
End: 2022-07-19
Attending: PHYSICIAN ASSISTANT
Payer: COMMERCIAL

## 2022-07-19 ENCOUNTER — PATIENT MESSAGE (OUTPATIENT)
Dept: SURGERY | Facility: CLINIC | Age: 47
End: 2022-07-19

## 2022-07-19 NOTE — TELEPHONE ENCOUNTER
From: Ben Andino  Sent: 7/19/2022 1:48 PM CDT  To: Rigo Aldana 2 Nurse  Subject: Medical paperwork    Hello.  When sending this fax please include this claim number: [id number] with my name of course lol and please put attention to : Hanh Jack    Fax number is: [phone]    Thank you so much

## 2022-07-19 NOTE — TELEPHONE ENCOUNTER
In alternate TE via [facility] [employer], patient requested addition to letter stating that patient is able to lift up to 70 lbs. \"So sorry to bother you but does updated medical paperwork state that I'm to return to work full capacity and be able to  70lbs? I should of asked this before and I apologize\"    Letter initiated and routed to Samuel Gomez.

## 2022-07-21 ENCOUNTER — APPOINTMENT (OUTPATIENT)
Dept: PHYSICAL THERAPY | Age: 47
End: 2022-07-21
Attending: PHYSICIAN ASSISTANT
Payer: COMMERCIAL

## 2022-07-26 ENCOUNTER — PATIENT MESSAGE (OUTPATIENT)
Dept: OBGYN CLINIC | Facility: CLINIC | Age: 47
End: 2022-07-26

## 2022-07-26 ENCOUNTER — TELEPHONE (OUTPATIENT)
Dept: OBGYN CLINIC | Facility: CLINIC | Age: 47
End: 2022-07-26

## 2022-07-26 ENCOUNTER — APPOINTMENT (OUTPATIENT)
Dept: PHYSICAL THERAPY | Age: 47
End: 2022-07-26
Attending: PHYSICIAN ASSISTANT
Payer: COMMERCIAL

## 2022-07-26 RX ORDER — METOPROLOL TARTRATE 50 MG/1
50 TABLET, FILM COATED ORAL 2 TIMES DAILY
Qty: 180 TABLET | Refills: 1 | Status: SHIPPED | OUTPATIENT
Start: 2022-07-26 | End: 2023-01-23

## 2022-07-26 RX ORDER — NITROFURANTOIN 25; 75 MG/1; MG/1
100 CAPSULE ORAL 2 TIMES DAILY
Qty: 10 CAPSULE | Refills: 0 | Status: SHIPPED | OUTPATIENT
Start: 2022-07-26 | End: 2022-07-31

## 2022-07-28 ENCOUNTER — APPOINTMENT (OUTPATIENT)
Dept: PHYSICAL THERAPY | Age: 47
End: 2022-07-28
Attending: PHYSICIAN ASSISTANT
Payer: COMMERCIAL

## 2022-08-01 ENCOUNTER — OFFICE VISIT (OUTPATIENT)
Dept: INTERNAL MEDICINE CLINIC | Facility: CLINIC | Age: 47
End: 2022-08-01
Payer: COMMERCIAL

## 2022-08-01 ENCOUNTER — LAB ENCOUNTER (OUTPATIENT)
Dept: LAB | Age: 47
End: 2022-08-01
Attending: FAMILY MEDICINE
Payer: COMMERCIAL

## 2022-08-01 VITALS
DIASTOLIC BLOOD PRESSURE: 84 MMHG | WEIGHT: 153 LBS | HEIGHT: 63 IN | BODY MASS INDEX: 27.11 KG/M2 | SYSTOLIC BLOOD PRESSURE: 131 MMHG | HEART RATE: 73 BPM

## 2022-08-01 DIAGNOSIS — Z90.710 H/O: HYSTERECTOMY: ICD-10-CM

## 2022-08-01 DIAGNOSIS — L65.9 HAIR LOSS: ICD-10-CM

## 2022-08-01 DIAGNOSIS — R14.0 ABDOMINAL BLOATING: ICD-10-CM

## 2022-08-01 DIAGNOSIS — K59.04 CHRONIC IDIOPATHIC CONSTIPATION: ICD-10-CM

## 2022-08-01 DIAGNOSIS — R14.0 ABDOMINAL BLOATING: Primary | ICD-10-CM

## 2022-08-01 LAB — IGA SERPL-MCNC: 164 MG/DL (ref 70–312)

## 2022-08-01 PROCEDURE — 3079F DIAST BP 80-89 MM HG: CPT | Performed by: INTERNAL MEDICINE

## 2022-08-01 PROCEDURE — 3075F SYST BP GE 130 - 139MM HG: CPT | Performed by: INTERNAL MEDICINE

## 2022-08-01 PROCEDURE — 99205 OFFICE O/P NEW HI 60 MIN: CPT | Performed by: INTERNAL MEDICINE

## 2022-08-01 PROCEDURE — 82157 ASSAY OF ANDROSTENEDIONE: CPT

## 2022-08-01 PROCEDURE — 86364 TISS TRNSGLTMNASE EA IG CLAS: CPT

## 2022-08-01 PROCEDURE — 3008F BODY MASS INDEX DOCD: CPT | Performed by: INTERNAL MEDICINE

## 2022-08-01 PROCEDURE — 36415 COLL VENOUS BLD VENIPUNCTURE: CPT

## 2022-08-01 PROCEDURE — 82784 ASSAY IGA/IGD/IGG/IGM EACH: CPT

## 2022-08-01 RX ORDER — LEVOTHYROXINE SODIUM 112 UG/1
TABLET ORAL
COMMUNITY
Start: 2022-07-24

## 2022-08-02 ENCOUNTER — PATIENT MESSAGE (OUTPATIENT)
Dept: INTERNAL MEDICINE CLINIC | Facility: CLINIC | Age: 47
End: 2022-08-02

## 2022-08-02 ENCOUNTER — APPOINTMENT (OUTPATIENT)
Dept: PHYSICAL THERAPY | Age: 47
End: 2022-08-02
Attending: PHYSICIAN ASSISTANT
Payer: COMMERCIAL

## 2022-08-02 LAB — TTG IGA SER-ACNC: 0.5 U/ML (ref ?–7)

## 2022-08-02 NOTE — TELEPHONE ENCOUNTER
From: Liss Banuelos  To: Eliseo Agarwal MD  Sent: 8/2/2022 9:45 AM CDT  Subject: Question regarding CELIAC DISEASE SCREEN REFLEX    Hi Doctor Stacy Ko. I never got phone number to a stomach specialist. Do I search for one on my own?

## 2022-08-02 NOTE — TELEPHONE ENCOUNTER
Dr. Radha Sánchez, please see patient message. This RN did call GI department, there are no sooner appointments available with any providers. Patient is on the waiting list.     Please advise on further recommendations. Thanks.

## 2022-08-02 NOTE — TELEPHONE ENCOUNTER
Nancy Dean RN 8/2/2022 12:09 PM CDT        ----- Message -----  From: Amparo Cullen  Sent: 8/2/2022 10:01 AM CDT  To: Em Rn Triage  Subject: GI Doctor     Good morning. I made an appointment this morning to see a GI but their first opening is August 30th. This is very disappointing for myself since I'm in so much discomfort and I thought I would be seen sooner. Is there anyway you can call their office to see if they can squeeze me in sooner? To wait 29 days for medical relief or to find out if I have IBS or something else really stresses me out.      Here is the GI Doctors information: Dr. Cassius Shepherd   [phone] [facility],[address]

## 2022-08-03 ENCOUNTER — PATIENT MESSAGE (OUTPATIENT)
Dept: SURGERY | Facility: CLINIC | Age: 47
End: 2022-08-03

## 2022-08-03 ENCOUNTER — PATIENT MESSAGE (OUTPATIENT)
Dept: INTERNAL MEDICINE CLINIC | Facility: CLINIC | Age: 47
End: 2022-08-03

## 2022-08-04 ENCOUNTER — PATIENT MESSAGE (OUTPATIENT)
Dept: INTERNAL MEDICINE CLINIC | Facility: CLINIC | Age: 47
End: 2022-08-04

## 2022-08-04 RX ORDER — LINACLOTIDE 72 UG/1
1 CAPSULE, GELATIN COATED ORAL
Qty: 30 CAPSULE | Refills: 0 | Status: SHIPPED | OUTPATIENT
Start: 2022-08-04 | End: 2022-09-03

## 2022-08-04 NOTE — TELEPHONE ENCOUNTER
I spoke to patient and gave her instructions about how to take it. I will send a 30 day supply of Linzess to the pharmacy. She still needs to do her CT scan and see GI.

## 2022-08-04 NOTE — TELEPHONE ENCOUNTER
Received Medical Emergency Certificate from [facility] dated 8.3.22.   Endorsed to RN for completion

## 2022-08-04 NOTE — TELEPHONE ENCOUNTER
From: Mario Mckeon  To: Amos Felty, MD  Sent: 8/3/2022 4:28 PM CDT  Subject: Test results     Hello. I misunderstood your email. I don't do my CT stomach scan till august 12th and I don't see GI doctor until august 30th.      I would honestly prefer to get prescription linzess now because of my stomach bloating, bad gas and that sour pit in my stomach, nausea and constipation are extremely discomforting to wait 9 days especially since I don't see GI doctor till the 30th of august. That was the soonest appointment

## 2022-08-05 NOTE — TELEPHONE ENCOUNTER
Noted MyChart message. Replied to pt that provider is currently out of the office and we will have him follow up with pt when he returns.

## 2022-08-08 ENCOUNTER — HOSPITAL ENCOUNTER (OUTPATIENT)
Dept: GENERAL RADIOLOGY | Age: 47
Discharge: HOME | End: 2022-08-08
Attending: PHYSICIAN ASSISTANT
Payer: COMMERCIAL

## 2022-08-08 DIAGNOSIS — M54.12 CERVICAL MYELOPATHY WITH CERVICAL RADICULOPATHY (HCC): ICD-10-CM

## 2022-08-08 DIAGNOSIS — Z98.1 S/P CERVICAL SPINAL FUSION: ICD-10-CM

## 2022-08-08 DIAGNOSIS — G95.9 CERVICAL MYELOPATHY WITH CERVICAL RADICULOPATHY (HCC): ICD-10-CM

## 2022-08-08 PROCEDURE — 72050 X-RAY EXAM NECK SPINE 4/5VWS: CPT | Performed by: PHYSICIAN ASSISTANT

## 2022-08-11 LAB — ANDROSTENEDIONE BY TMS: 0.45 NG/ML

## 2022-08-12 ENCOUNTER — TELEPHONE (OUTPATIENT)
Dept: SURGERY | Facility: CLINIC | Age: 47
End: 2022-08-12

## 2022-08-12 NOTE — TELEPHONE ENCOUNTER
Pt r/s appt due to provider not in office, pt asking if possible to do video visit for r/s appt on 8/3/2022. Mariana Leslie Pt also asking for sooner appt if available.

## 2022-08-14 ENCOUNTER — PATIENT MESSAGE (OUTPATIENT)
Dept: INTERNAL MEDICINE CLINIC | Facility: CLINIC | Age: 47
End: 2022-08-14

## 2022-08-15 NOTE — TELEPHONE ENCOUNTER
From: Erika Alcaraz  To: Yesica Fermin MD  Sent: 8/14/2022 9:51 AM CDT  Subject: Linzess    Good morning. It's been about a week since I've been taking Linzess. Not sure if these things I'm experiencing are normal, so I wanted to ask you. From the day I picked up pills on august 4th and till now august 14th I've passed a total of regular of 3 regular soft stools and the rest is watery stool. My stomach is still swollen but not as bad as you first saw me. I thought this medication was suppose to regulate me to poop everyday and have regular poop. Sometimes I don't poop at all from that 10 day use of medication. Or is this pill trying to get used to my body and it's working slowly ? I don't want to stop It if it's going to help me. What bothers me is the bloating. I eat well and I've been drinking 80 ounces of water daily? Do I need to drink even more water. Am I not doing enough with this pill?  Thank you so much for your time

## 2022-08-17 ENCOUNTER — HOSPITAL ENCOUNTER (OUTPATIENT)
Dept: GENERAL RADIOLOGY | Age: 47
Discharge: HOME | End: 2022-08-17
Attending: STUDENT IN AN ORGANIZED HEALTH CARE EDUCATION/TRAINING PROGRAM
Payer: COMMERCIAL

## 2022-08-17 ENCOUNTER — OFFICE VISIT (OUTPATIENT)
Dept: GASTROENTEROLOGY | Facility: CLINIC | Age: 47
End: 2022-08-17
Payer: COMMERCIAL

## 2022-08-17 VITALS
HEART RATE: 55 BPM | DIASTOLIC BLOOD PRESSURE: 97 MMHG | HEIGHT: 63 IN | BODY MASS INDEX: 26.58 KG/M2 | SYSTOLIC BLOOD PRESSURE: 154 MMHG | WEIGHT: 150 LBS

## 2022-08-17 DIAGNOSIS — R14.0 BLOATING: ICD-10-CM

## 2022-08-17 DIAGNOSIS — K59.00 CONSTIPATION, UNSPECIFIED CONSTIPATION TYPE: ICD-10-CM

## 2022-08-17 DIAGNOSIS — K59.00 CONSTIPATION, UNSPECIFIED CONSTIPATION TYPE: Primary | ICD-10-CM

## 2022-08-17 PROCEDURE — 3077F SYST BP >= 140 MM HG: CPT | Performed by: STUDENT IN AN ORGANIZED HEALTH CARE EDUCATION/TRAINING PROGRAM

## 2022-08-17 PROCEDURE — 3008F BODY MASS INDEX DOCD: CPT | Performed by: STUDENT IN AN ORGANIZED HEALTH CARE EDUCATION/TRAINING PROGRAM

## 2022-08-17 PROCEDURE — 74018 RADEX ABDOMEN 1 VIEW: CPT | Performed by: STUDENT IN AN ORGANIZED HEALTH CARE EDUCATION/TRAINING PROGRAM

## 2022-08-17 PROCEDURE — 99204 OFFICE O/P NEW MOD 45 MIN: CPT | Performed by: STUDENT IN AN ORGANIZED HEALTH CARE EDUCATION/TRAINING PROGRAM

## 2022-08-17 PROCEDURE — 3080F DIAST BP >= 90 MM HG: CPT | Performed by: STUDENT IN AN ORGANIZED HEALTH CARE EDUCATION/TRAINING PROGRAM

## 2022-08-17 RX ORDER — LEVOTHYROXINE SODIUM 88 UG/1
88 TABLET ORAL DAILY
COMMUNITY
Start: 2022-08-07

## 2022-08-17 RX ORDER — SODIUM, POTASSIUM,MAG SULFATES 17.5-3.13G
SOLUTION, RECONSTITUTED, ORAL ORAL
Qty: 1 EACH | Refills: 0 | Status: SHIPPED | OUTPATIENT
Start: 2022-08-17

## 2022-08-17 RX ORDER — LINACLOTIDE 145 UG/1
145 CAPSULE, GELATIN COATED ORAL DAILY
Qty: 90 CAPSULE | Refills: 0 | Status: SHIPPED | OUTPATIENT
Start: 2022-08-17 | End: 2022-09-16

## 2022-08-17 NOTE — PROGRESS NOTES
FELISHA reviewed: There is a significant amount of stool in the right colon. I called patient to discuss the results and the plan. Will prescribe bowel prep for bowel purge, then she will start linzess 145mcg daily (the following day). She expressed understanding.

## 2022-08-17 NOTE — PATIENT INSTRUCTIONS
1) Xray of your abdomen    2) I will call you with results and we will discuss bowel preparation to clean your colon out.     3) We will increase your Linzess dose to 145mcg -- take daily

## 2022-08-18 DIAGNOSIS — G43.009 MIGRAINE WITHOUT AURA AND WITHOUT STATUS MIGRAINOSUS, NOT INTRACTABLE: ICD-10-CM

## 2022-08-18 RX ORDER — RIZATRIPTAN BENZOATE 10 MG/1
10 TABLET ORAL AS NEEDED
Qty: 9 TABLET | Refills: 5 | Status: SHIPPED | OUTPATIENT
Start: 2022-08-18 | End: 2023-01-05

## 2022-08-21 ENCOUNTER — PATIENT MESSAGE (OUTPATIENT)
Dept: GASTROENTEROLOGY | Facility: CLINIC | Age: 47
End: 2022-08-21

## 2022-08-23 ENCOUNTER — PATIENT MESSAGE (OUTPATIENT)
Dept: GASTROENTEROLOGY | Facility: CLINIC | Age: 47
End: 2022-08-23

## 2022-08-23 NOTE — TELEPHONE ENCOUNTER
From: Radha Dalton  To: Bassam Allen MD  Sent: 8/21/2022 9:13 AM CDT  Subject: Bowl Prep    Good morning. I just took first bottle of Bowl Prep but I don't think I can take the 2nd bottle. The taste is so disgusting that I feel like I'm going to barf. Is there a way to add something else to it so I can take it?  I feel very nauseous from taking the first dose

## 2022-08-24 NOTE — TELEPHONE ENCOUNTER
From: Francesco Guerra  To: James Zambrano MD  Sent: 8/23/2022 1:14 PM CDT  Subject: Hi     I just ordered the iberogast 100ml. [employer] didn't have product until after end of October, so I found it at another website which will be delivered sept 1st since it's coming from [address].  I couldn't find it any sooner

## 2022-08-29 LAB — AMB EXT COVID-19 RESULT: DETECTED

## 2022-08-30 ENCOUNTER — PATIENT MESSAGE (OUTPATIENT)
Dept: FAMILY MEDICINE CLINIC | Facility: CLINIC | Age: 47
End: 2022-08-30

## 2022-08-30 NOTE — TELEPHONE ENCOUNTER
From: Gerald Shaw  To: Nuha Garcia PA-C  Sent: 8/30/2022 8:54 AM CDT  Subject: Covid     Good morning. I tested myself yesterday and the test said positive for covid. Should I be worried with my health having covid? Or will rest be my fastest recovery?  I think I've had covid since Friday the 26th of august

## 2022-09-01 ENCOUNTER — PATIENT MESSAGE (OUTPATIENT)
Dept: FAMILY MEDICINE CLINIC | Facility: CLINIC | Age: 47
End: 2022-09-01

## 2022-09-01 ENCOUNTER — NURSE TRIAGE (OUTPATIENT)
Dept: FAMILY MEDICINE CLINIC | Facility: CLINIC | Age: 47
End: 2022-09-01

## 2022-09-01 ENCOUNTER — TELEPHONE (OUTPATIENT)
Dept: GASTROENTEROLOGY | Facility: CLINIC | Age: 47
End: 2022-09-01

## 2022-09-01 DIAGNOSIS — K59.00 CONSTIPATION, UNSPECIFIED CONSTIPATION TYPE: Primary | ICD-10-CM

## 2022-09-01 RX ORDER — PLECANATIDE 3 MG/1
1 TABLET ORAL DAILY
Qty: 30 TABLET | Refills: 2 | Status: SHIPPED | OUTPATIENT
Start: 2022-09-01 | End: 2022-11-30

## 2022-09-02 NOTE — TELEPHONE ENCOUNTER
I spoke to Yuniorarthur Raji Giovanna regarding her ongoing symptoms. We will stop Linzess and start Trulance. We are also awaiting use of Iberogast or Atrantil to see if this helps her symptoms of bloating. She is still struggling with bowel movements and constipation which is contributing to her bloating. For her bowel movements she has tried Miralax/senokot/dulcolax/linzess and none of these helped. Plan  -Stop Linzess  -Start Trulance 3mg daily  -Start Iberogast or Atrantil  -Plan for EGD and Colonoscopy in the near future for further evaluation.     Marquita Riddle MD

## 2022-09-02 NOTE — TELEPHONE ENCOUNTER
Infection banner updated. CDC isolation guidelines was already sent by previous RN.    ----- Message from [place]. Eric Zepeda sent at 9/1/2022  2:08 PM CDT -----  Regarding: Covid   I've felt extremely dizzy all day. Is that a part of coVid?

## 2022-09-02 NOTE — TELEPHONE ENCOUNTER
See acute encounter 9/1/22    From: Chris Wilkes  Sent: 9/1/2022  2:08 PM CDT  To: Em Rn Triage  Subject: Covid     I've felt extremely dizzy all day. Is that a part of coVid?

## 2022-09-07 ENCOUNTER — TELEPHONE (OUTPATIENT)
Dept: GASTROENTEROLOGY | Facility: CLINIC | Age: 47
End: 2022-09-07

## 2022-09-07 NOTE — TELEPHONE ENCOUNTER
PPD,    Please assistance with PA for Trulance 3mg, thank you!      Constipation, unspecified constipation type K59.00     Bloating R14.0

## 2022-09-08 ENCOUNTER — PATIENT MESSAGE (OUTPATIENT)
Dept: FAMILY MEDICINE CLINIC | Facility: CLINIC | Age: 47
End: 2022-09-08

## 2022-09-08 NOTE — TELEPHONE ENCOUNTER
From: Yodit Poster  To: Aaron Patel PA-C  Sent: 9/8/2022 7:12 AM CDT  Subject: Work     Good morning doctor. Two days ago I returned back to work from being off for a year on medical leave. My employer said many rules have changed in the year I was gone, so women's hair has to be up in a ponytail or if they have shoulder length hair they don't need to pull it up. But in regards to me my hair is so long it touches the bottom of my back. I expressed to management that I have always had my hair down due to my many migraines and if I start putting up my hair everyday I will start getting bad migraines and also with my thyroid my hair is thin it's only going to make things worse and make my already thin hair even more thinner. I've been working very hard to maintain my hair to healthy and to stop it from falling out. My employer stated the only way I can wear my hair down is if you wrote a letter and or note saying I need to wear my hair down and that I won't get in trouble with my employer and this letter you write will allow me to wear my hair down freely. I would greatly appreciate anything you can do to help me. Thank you so much and have a great day.

## 2022-09-15 NOTE — TELEPHONE ENCOUNTER
Called [employer] [phone], per automated system PA for Trulance denied. Transferred to [facility]. Requested refax of letter with denial rationale to [phone].    Call ref # [id number]

## 2022-09-16 ENCOUNTER — OFFICE VISIT (OUTPATIENT)
Dept: FAMILY MEDICINE CLINIC | Facility: CLINIC | Age: 47
End: 2022-09-16
Payer: COMMERCIAL

## 2022-09-16 VITALS
WEIGHT: 157 LBS | HEIGHT: 63 IN | DIASTOLIC BLOOD PRESSURE: 82 MMHG | SYSTOLIC BLOOD PRESSURE: 143 MMHG | HEART RATE: 60 BPM | BODY MASS INDEX: 27.82 KG/M2

## 2022-09-16 DIAGNOSIS — L65.9 HAIR LOSS: ICD-10-CM

## 2022-09-16 DIAGNOSIS — I10 ESSENTIAL HYPERTENSION: Primary | ICD-10-CM

## 2022-09-16 PROCEDURE — 99213 OFFICE O/P EST LOW 20 MIN: CPT | Performed by: PHYSICIAN ASSISTANT

## 2022-09-16 PROCEDURE — 3077F SYST BP >= 140 MM HG: CPT | Performed by: PHYSICIAN ASSISTANT

## 2022-09-16 PROCEDURE — 3079F DIAST BP 80-89 MM HG: CPT | Performed by: PHYSICIAN ASSISTANT

## 2022-09-16 PROCEDURE — 3008F BODY MASS INDEX DOCD: CPT | Performed by: PHYSICIAN ASSISTANT

## 2022-09-16 RX ORDER — LOSARTAN POTASSIUM 50 MG/1
50 TABLET ORAL DAILY
Qty: 90 TABLET | Refills: 3 | Status: SHIPPED | OUTPATIENT
Start: 2022-09-16 | End: 2022-12-15

## 2022-09-17 PROBLEM — I10 ESSENTIAL HYPERTENSION: Status: ACTIVE | Noted: 2022-09-17

## 2022-09-21 ENCOUNTER — TELEPHONE (OUTPATIENT)
Dept: GASTROENTEROLOGY | Facility: CLINIC | Age: 47
End: 2022-09-21

## 2022-09-21 DIAGNOSIS — K59.04 CHRONIC IDIOPATHIC CONSTIPATION: Primary | ICD-10-CM

## 2022-09-21 RX ORDER — LUBIPROSTONE 24 UG/1
24 CAPSULE, GELATIN COATED ORAL 2 TIMES DAILY WITH MEALS
Qty: 60 CAPSULE | Refills: 2 | Status: SHIPPED | OUTPATIENT
Start: 2022-09-21 | End: 2022-12-20

## 2022-09-21 NOTE — TELEPHONE ENCOUNTER
I am trying to get either Trulance 3mg daily OR amitiza 24mcg BID approved by insurance. .. Trulance was denied? Or did we appeal and still waiting on decision from insurance company?

## 2022-09-21 NOTE — TELEPHONE ENCOUNTER
[address] and spoke to [employer]. States Gill does require PA. PA form filled out and faxed to [facility] Kathie Garland. Will await determination.

## 2022-09-21 NOTE — TELEPHONE ENCOUNTER
I updated the diagnosis yesterday and re-prescribed it. .. I was asking yesterday if this would change anything. Does it?

## 2022-09-21 NOTE — TELEPHONE ENCOUNTER
We can try to appeal the decision and send in any additional information to help support why patient needs Trulance to see if they would overturn the decision. Can send updated diagnosis of chronic idiopathic constipation. Otherwise, since Amitiza was sent in to pharmacy yesterday, I would need to follow up with them to see if it also requires a prior authorization.

## 2022-09-21 NOTE — TELEPHONE ENCOUNTER
GI Staff:    Can we please schedule this patient for an EGD (Dx: abdominal pain) and Colonoscopy (screening) with MAC. Prep: Split dose suprep, if suprep not covered then golytely or moviprep    Thank you.     James Zambrano MD

## 2022-09-21 NOTE — TELEPHONE ENCOUNTER
Regarding: Same stomach issues   ----- Message from Jose D Barrett sent at 9/20/2022  6:05 PM CDT -----       ----- Message from Shakira Bonner to Partha Henry MD sent at 9/20/2022  2:29 PM -----   Hello. Sorry I missed your call. Those drops you mentioned that I should buy didn't work at all. I'm still having plumbing problems. Thank you so much for checking on me. I appreciate you saying we should find other options because I need to figure out what's going on.  Have a nice day

## 2022-09-21 NOTE — TELEPHONE ENCOUNTER
Dr. Nicole Perkins,     I received the copy of the denial letter for Trulance. The notice states the medication was denied due to the requested medication/diagnosis are not a covered benefit and excluded from coverage in accordance with the terms and conditions of the patient's benefit plan. The medication was administratively denied. I will follow up to see if amitiza requires PA.

## 2022-09-23 ENCOUNTER — PATIENT MESSAGE (OUTPATIENT)
Dept: SURGERY | Facility: CLINIC | Age: 47
End: 2022-09-23

## 2022-09-23 DIAGNOSIS — M54.2 NECK PAIN: ICD-10-CM

## 2022-09-23 DIAGNOSIS — Z98.1 S/P CERVICAL SPINAL FUSION: Primary | ICD-10-CM

## 2022-09-24 NOTE — TELEPHONE ENCOUNTER
Reply received from patient who wishes to schedule virtual appointment with FRANCY Kilgore. Routed to [facility] to arrange.

## 2022-09-24 NOTE — TELEPHONE ENCOUNTER
Patient messaged [employer] with details about her work conditions and managing her pain. Reply sent and messages have been forwarded onto JOE Vallejo [place].

## 2022-09-26 ENCOUNTER — TELEMEDICINE (OUTPATIENT)
Dept: SURGERY | Facility: CLINIC | Age: 47
End: 2022-09-26

## 2022-09-26 ENCOUNTER — TELEPHONE (OUTPATIENT)
Dept: GASTROENTEROLOGY | Facility: CLINIC | Age: 47
End: 2022-09-26

## 2022-09-26 ENCOUNTER — PATIENT MESSAGE (OUTPATIENT)
Dept: GASTROENTEROLOGY | Facility: CLINIC | Age: 47
End: 2022-09-26

## 2022-09-26 DIAGNOSIS — M54.2 CERVICAL PAIN: ICD-10-CM

## 2022-09-26 DIAGNOSIS — Z98.1 S/P CERVICAL SPINAL FUSION: Primary | ICD-10-CM

## 2022-09-26 PROCEDURE — 99213 OFFICE O/P EST LOW 20 MIN: CPT | Performed by: PHYSICIAN ASSISTANT

## 2022-09-26 NOTE — TELEPHONE ENCOUNTER
Dr. Ema Reyna    Peer to peer looks like an option-may be faster. I attached the information for the peer to peer below. \"If the treating physician would like to discuss the coverage decision with the physician or health care professional reviewer, please call [facility] Prior Authorization department at [phone]. \"    Patient ID [id number]      Thank you

## 2022-09-26 NOTE — TELEPHONE ENCOUNTER
GI Staff,    Everything has been denied at this point by the insurance company? Linzess did not work, motegrity, trulance and amitiza all denied? How can I get in touch with the insurance company to directly appeal this via telephone? Please let me know.  Thanks    Natasha Donato MD

## 2022-09-26 NOTE — TELEPHONE ENCOUNTER
From: Mary Morales  To: Mell Pepe MD  Sent: 9/26/2022 8:22 AM CDT  Subject: Medication     Good morning. I know the last message I got from [facility] was that you were sending a prescription to my [place] for bloating but I never received that. Do you know if your office sent over prescription?

## 2022-09-26 NOTE — PATIENT INSTRUCTIONS
PLAN:  -Work as tolerated  -She can try using over-the-counter NSAIDs 3 times a day for the next 5 to 7 days to see if it helps, and alternate with Tylenol  -X-rays of cervical spine in December or January near her first year anniversary  -Call with any changes  -Her questions were answered

## 2022-09-30 ENCOUNTER — TELEPHONE (OUTPATIENT)
Dept: GASTROENTEROLOGY | Facility: CLINIC | Age: 47
End: 2022-09-30

## 2022-09-30 DIAGNOSIS — R10.9 ABDOMINAL PAIN, UNSPECIFIED ABDOMINAL LOCATION: ICD-10-CM

## 2022-09-30 DIAGNOSIS — Z12.11 COLON CANCER SCREENING: Primary | ICD-10-CM

## 2022-09-30 NOTE — TELEPHONE ENCOUNTER
I discussed amitiza approval with the patient. She will  medication when ready at the pharmacy. She continues to feel bloated and is constipated. I'm hoping Amisilvioza approves these symptoms. Given chronicity of symptoms she should be evaluated with EGD for abdominal pain and colonoscopy for screening purposes. Bonnie Boucher MD      GI Staff: can we please set this patient up for an EGD and Colonoscopy with MAC    Dx: abdominal pain, screening.     Prep: Split dose suprep, if suprep not covered then golytely or moviprep    Thank you

## 2022-09-30 NOTE — TELEPHONE ENCOUNTER
Spoke to [employer] and resubmitted PA for Lubiprostone. It was approved and valid until 9/30/2023. ref# [id number]      I contacted [address] [place] [address] and they were able to process the prescription. Patient will be notified once available for pickup.     FRANCISCO- Dr. Giorgio Wynn

## 2022-09-30 NOTE — TELEPHONE ENCOUNTER
FRANCISCO- Dr. Cody Simental was approved and will be available for patient to  today from [address] [place] [address].

## 2022-10-07 ENCOUNTER — PATIENT MESSAGE (OUTPATIENT)
Dept: GASTROENTEROLOGY | Facility: CLINIC | Age: 47
End: 2022-10-07

## 2022-10-07 NOTE — TELEPHONE ENCOUNTER
Action Requested: Summary for Provider     []  Critical Lab, Recommendations Needed  [] Need Additional Advice  []   FYI    []   Need Orders  [] Need Medications Sent to Pharmacy  []  Other     SUMMARY: pt states that she has had a migraine for the past we
Called patient, she is busy at the moment , will call back , we need to help her make an appt.
Dr. Filomena Nettles pt was called and informed her of your message below. She stated that she needs to see you on 3/3 so you can check her blood pressure  and because she needs to talk to you about FMLA.  Pt goes to work at 10:30 so not able to see you at 2:30 [facility]
I will have people schedule per routine. Please respect my decision.
Note pt has a appt with Ria Kennedy today. No further action is needed.        Future Appointments   Date Time Provider Jacki Rodgers   3/3/2020  9:45 AM Usman Hagen [facility]   5/6/2020 10:00 AM Anitra Cheadle, MD [address] [place] [address]
Patient returning call, call dropped upon transfer, Left message to call back
Sorry  Please schedule per routine.
no weight-bearing restrictions

## 2022-10-09 NOTE — TELEPHONE ENCOUNTER
Patient has replied to provider via [employer] and updated TSamuel Sales that she is taking time off of work. Reply sent.

## 2022-10-09 NOTE — TELEPHONE ENCOUNTER
Noted patient has messaged requesting contact information for Pain Management office to arrange appointment. Reply sent to patient, informing her that Samuel Minre has not yet placed order/referral.  Message reply sent and TE forwarded onto provider to place referral.

## 2022-10-10 NOTE — TELEPHONE ENCOUNTER
From: Maite Sosa  To: Charlies Brittle, MD  Sent: 10/7/2022 10:03 PM CDT  Subject: Meds     Good evening. I just wanted to say thank you for the latest pills you prescribed me because they seem to be working so far. I've been going to bathroom one to two times a day. That's progress lol.  Have a good evening

## 2022-10-10 NOTE — TELEPHONE ENCOUNTER
Reply sent to patient. See telephone encounter from 9/30/2022 routed to the schedulers by Dr. Renata Zambrano with EGD and colonoscopy orders.

## 2022-10-18 DIAGNOSIS — F41.1 GENERALIZED ANXIETY DISORDER: ICD-10-CM

## 2022-10-18 RX ORDER — ALPRAZOLAM 0.25 MG/1
TABLET ORAL
Qty: 30 TABLET | Refills: 0 | Status: SHIPPED | OUTPATIENT
Start: 2022-10-18

## 2022-10-18 NOTE — TELEPHONE ENCOUNTER
Please Review. Protocol Failed or has no protocol.        Requested Prescriptions   Pending Prescriptions Disp Refills    ALPRAZOLAM 0.25 MG Oral Tab [Pharmacy Med Name: Alprazolam 0.25 Mg Tab Sand] 30 tablet 0     Sig: TAKE ONE TABLET BY MOUTH THREE TIMES DAILY AS NEEDED FOR ANXIETY        There is no refill protocol information for this order         Recent Outpatient Visits              3 weeks ago S/P cervical spinal fusion    [address] [place], [place]    Telemedicine    1 month ago Essential hypertension    [address] Becki Hillman PA-C    Office Visit    2 months ago Constipation, unspecified constipation type    Shania Moreno MD    Office Visit    2 months ago Abdominal bloating    Keisha Chowdhury MD    Office Visit    3 months ago     [address] L[address] Sin    Office Visit

## 2022-10-22 ENCOUNTER — PATIENT MESSAGE (OUTPATIENT)
Facility: CLINIC | Age: 47
End: 2022-10-22

## 2022-10-24 NOTE — TELEPHONE ENCOUNTER
From: Mary Morales  To: Mell Pepe MD  Sent: 10/22/2022 3:05 PM CDT  Subject: Stomach issues     Hi Doctor. I've been on that medication and I'm still very bloated. I'm so frustrated. Id rather get a colonoscopy to see why my stomach gets so swollen. There has to be a reason for this. I look 6 months pregnant and today is the first time I haven't passed a stool.  Before today I've been going once a day

## 2022-10-25 RX ORDER — SODIUM, POTASSIUM,MAG SULFATES 17.5-3.13G
SOLUTION, RECONSTITUTED, ORAL ORAL
Qty: 1 EACH | Refills: 0 | Status: SHIPPED | OUTPATIENT
Start: 2022-10-25

## 2022-10-25 NOTE — TELEPHONE ENCOUNTER
Scheduled for:  Colonoscopy - [address] [facility] [address] & EGD - [phone]  Provider Name:  Dr. Juan Alberto Griffin  Date:  11/3/22  Location:  [facility]  Sedation:  MAC  Time:  Approx 3:30 pm (pt is aware that Mariano Araujo will call with arrival time)  Prep:  Suprep, Prep instructions were given to pt over the phone, pt verbalized understanding. Meds/Allergies Reconciled?:  Yes, Physician reviewed    Diagnosis with codes:  Colon screening - Z12.11, Abdominal pain - R10.9  Was patient informed to call insurance with codes (Y/N):  Yes, I confirmed [facility] insurance with this patient. Referral sent?:  Yes, referral sent. [facility] or [address] notified?:  Yes, Electronic case request was sent to Mariano Araujo via [employer]. Medication Orders:  Pt is to HOLD Losartan the morning of the procedure. Misc Orders:  Patient was informed that they will need a COVID 19 test prior to their procedure. Patient verbally understood & will await a phone call from [facility] to schedule. Further instructions given by staff:  I discussed the prep instructions with the patient which she verbally understood and is aware that I will send the instructions via [employer].

## 2022-10-25 NOTE — TELEPHONE ENCOUNTER
To: EM GI CLINICAL STAFF      From: Leighann Mcconnell      Created: 10/25/2022 11:44 AM        *-*-*This message has not been handled. *-*-*    Will the Omeprazole work on bloating?

## 2022-11-09 ENCOUNTER — PATIENT MESSAGE (OUTPATIENT)
Dept: FAMILY MEDICINE CLINIC | Facility: CLINIC | Age: 47
End: 2022-11-09

## 2022-11-09 RX ORDER — LEVOTHYROXINE SODIUM 88 UG/1
88 TABLET ORAL DAILY
Qty: 90 TABLET | Refills: 1 | Status: SHIPPED | OUTPATIENT
Start: 2022-11-09

## 2022-11-09 NOTE — TELEPHONE ENCOUNTER
Min please see pt message below. This medication has not been prescribed by you in the past. Pt stated that her endocrinologist   is no longer in practice if you would you be able to refill for her as she is out.   Please advise

## 2022-11-21 ENCOUNTER — PATIENT MESSAGE (OUTPATIENT)
Dept: SURGERY | Facility: CLINIC | Age: 47
End: 2022-11-21

## 2022-11-21 DIAGNOSIS — Z98.1 S/P CERVICAL SPINAL FUSION: Primary | ICD-10-CM

## 2022-11-21 NOTE — TELEPHONE ENCOUNTER
Noted pt  message  Pt is concerned with numbness in both hands. Pt states in [facility] message that her right is worse than her left. PT states it is painful but did not provide a pain scale. Pt is inquiring if the cause is due to the hardware in her spine. Pt is concerned of the numbness intensity and new frequency. Pt is requesting feedback from the provider     Per [facility] telemedicine session with [place], [place] on 9/26/22:  \"ASSESSMENT:  -C4-5 C5-6 ACDF 1/20/2022 Dr. Stacie Arndt:  -Work as tolerated  -She can try using over-the-counter NSAIDs 3 times a day for the next 5 to 7 days to see if it helps, and alternate with Tylenol  -X-rays of cervical spine in December or January near her first year anniversary  -Call with any changes  -Her questions were answered\"    Noted pt has not completed XRAY of cervical spine ordered and recommended on 9/26/22     Noted pt is taking Rizatriptan Benzoate 10mg     Routed to the provider for review and feedback.

## 2022-11-21 NOTE — TELEPHONE ENCOUNTER
From: Ron Perez  To: FRANCY Nayak  Sent: 11/21/2022 6:00 AM CST  Subject: Numbness in my hands     Good morning. I've noticed for the last two months I've had severe numbness in both of my hand's especially my right one. It gets to the point that it becomes so painful. Is this numbness related to the metal barbara in my spine not being fused yet? It worries me a lot because I've never had this numbness especially having The last two days my right hand being numb all day. Is there something I can take to relief this numbness or pain?

## 2022-11-21 NOTE — TELEPHONE ENCOUNTER
Pt is due for her 1 year follow up and xrays in January. Can she follow up with an SRI early next month instead so we can re-evaluate her?   I will order the XRs now

## 2022-12-02 ENCOUNTER — PATIENT MESSAGE (OUTPATIENT)
Dept: SURGERY | Facility: CLINIC | Age: 47
End: 2022-12-02

## 2022-12-05 ENCOUNTER — HOSPITAL ENCOUNTER (OUTPATIENT)
Dept: GENERAL RADIOLOGY | Age: 47
Discharge: HOME | End: 2022-12-05
Attending: PHYSICIAN ASSISTANT
Payer: COMMERCIAL

## 2022-12-05 DIAGNOSIS — M54.2 CERVICAL PAIN: ICD-10-CM

## 2022-12-05 DIAGNOSIS — Z98.1 S/P CERVICAL SPINAL FUSION: ICD-10-CM

## 2022-12-05 PROCEDURE — 72050 X-RAY EXAM NECK SPINE 4/5VWS: CPT | Performed by: PHYSICIAN ASSISTANT

## 2022-12-05 NOTE — TELEPHONE ENCOUNTER
From: Bassam Felix  To: Marsha Valles [place]  Sent: 12/2/2022 10:27 AM CST  Subject: Pain     Good morning Shanda Sylvester. I called in sick from work today (December 2nd) because I'm having so much pain and numbness in my legs, collarbone and feet. I called Maryjane Alvarado this morning to see if I can get approved on days where I call in sick due to flare ups. I told Maryjane Alvarado since my metal barbara in my spine isn't completely fused yet that it's hard sometimes to stand up for the 7 hours straight in areas where I'm required to stand. Maryjane Alvarado advised me there sending you a form to fill out so they can approve me. But I advised them these forms may not be filled out until I see you on December 9th and they said that's fine. I wouldn't be on medical leave but instead if I get approved it would be considered Intermittent time off due to flare ups. So if I do call in sick to rest my legs, feet or spine I would be covered by [employer]jane Alvarado and I wouldn't get in trouble at work. So I've called in sick twice starting with November 17Th and today's date so they notated these days so I'm covered with my job. Both of my arms and hands are numb and throbbing. My legs ache very much and my right foot hurts a lot. Thank you so much for your time.  Have a good day

## 2022-12-09 ENCOUNTER — OFFICE VISIT (OUTPATIENT)
Dept: SURGERY | Facility: CLINIC | Age: 47
End: 2022-12-09
Payer: COMMERCIAL

## 2022-12-09 VITALS
HEIGHT: 63 IN | SYSTOLIC BLOOD PRESSURE: 110 MMHG | DIASTOLIC BLOOD PRESSURE: 70 MMHG | WEIGHT: 150 LBS | BODY MASS INDEX: 26.58 KG/M2 | HEART RATE: 67 BPM

## 2022-12-09 DIAGNOSIS — G95.9 CERVICAL MYELOPATHY WITH CERVICAL RADICULOPATHY (HCC): ICD-10-CM

## 2022-12-09 DIAGNOSIS — M54.12 CERVICAL MYELOPATHY WITH CERVICAL RADICULOPATHY (HCC): ICD-10-CM

## 2022-12-09 DIAGNOSIS — Z98.1 S/P CERVICAL SPINAL FUSION: Primary | ICD-10-CM

## 2022-12-09 DIAGNOSIS — M54.2 CERVICAL PAIN: ICD-10-CM

## 2022-12-09 PROCEDURE — 3008F BODY MASS INDEX DOCD: CPT | Performed by: PHYSICIAN ASSISTANT

## 2022-12-09 PROCEDURE — 99214 OFFICE O/P EST MOD 30 MIN: CPT | Performed by: PHYSICIAN ASSISTANT

## 2022-12-09 PROCEDURE — 3074F SYST BP LT 130 MM HG: CPT | Performed by: PHYSICIAN ASSISTANT

## 2022-12-09 PROCEDURE — 3078F DIAST BP <80 MM HG: CPT | Performed by: PHYSICIAN ASSISTANT

## 2022-12-09 RX ORDER — PREDNISONE 10 MG/1
40 TABLET ORAL DAILY
Qty: 30 TABLET | Refills: 0 | Status: SHIPPED | OUTPATIENT
Start: 2022-12-09

## 2022-12-09 NOTE — PROGRESS NOTES
Pt here for follow up. Pt states she has pain and also there was paperwork that was sent out to be filled out.

## 2022-12-12 ENCOUNTER — PATIENT MESSAGE (OUTPATIENT)
Dept: SURGERY | Facility: CLINIC | Age: 47
End: 2022-12-12

## 2022-12-12 NOTE — TELEPHONE ENCOUNTER
Review of EMR indicates nothing has been received from [facility]. Pt should call and request this be sent to our office. [employer] message sent.

## 2022-12-13 NOTE — TELEPHONE ENCOUNTER
From: Gita Richardson  Sent: 12/12/2022 1:14 PM CST  To: Aung Morales 2 Nurse  Subject: Pain     Good afternoon. I spoke to [facility] yesterday and they advised me I don't have enough hours to qualify for intermittent time. 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 So I will contact you and Valarie Christian once I have sufficient hours.  Thank you so much for your time

## 2023-01-04 ENCOUNTER — PATIENT MESSAGE (OUTPATIENT)
Dept: SURGERY | Facility: CLINIC | Age: 48
End: 2023-01-04

## 2023-01-04 NOTE — TELEPHONE ENCOUNTER
From: Beba Harper  [facility]  Sent: 1/4/2023 10:54 AM CST  Subject: Utility Bill     Good morning Sammarcelino Raya. I'm So sorry to bother but you but I have an [employer] bill that's overdue because it's been hard for me to work overtime at work,  one place for hours especially when I'm still in pain from my neck. My [employer] bill can extend payment and assist me but they need a letterhead from you saying I'm still under your care. I would greatly appreciate it because If I don't get this, they'll shut off my water January 10th and with two kids it would affect us greatly. Here is the information needed from [facility]:     Fax Number: [phone]  They need your name and signature. They also need attached with letterhead is my name, address and my [employer] account number, which is: [id number]    The letterhead needs to say the nature of my medical condition and the anticipated length that I will be under your care. I truly appreciate your time and hope I can get any assistance from you in reference to getting this letterhead sent to [facility].      Thank you so much and have a great day

## 2023-01-06 ENCOUNTER — PATIENT MESSAGE (OUTPATIENT)
Dept: SURGERY | Facility: CLINIC | Age: 48
End: 2023-01-06

## 2023-01-06 NOTE — TELEPHONE ENCOUNTER
From: Trinidad Mccoy  To: Bin Deras [place]  Sent: 1/4/2023 10:54 AM CST  Subject: Utility Bill     Good morning Percy Koenig. I'm So sorry to bother but you but I have an [employer] bill that's overdue because it's been hard for me to work overtime at work,  one place for hours especially when I'm still in pain from my neck. My [employer] bill can extend payment and assist me but they need a letterhead from you saying I'm still under your care. I would greatly appreciate it because If I don't get this, they'll shut off my water January 10th and with two kids it would affect us greatly. Here is the information needed from [facility]:     Fax Number: [phone]  They need your name and signature. They also need attached with letterhead is my name, address and my [employer] account number, which is: [id number]    The letterhead needs to say the nature of my medical condition and the anticipated length that I will be under your care. I truly appreciate your time and hope I can get any assistance from you in reference to getting this letterhead sent to [facility].      Thank you so much and have a great day

## 2023-01-06 NOTE — TELEPHONE ENCOUNTER
Message below noted. Letter and form completed and faxed to number provided. Pt wife called for his lab result from 8/3/20. Please review and advise.

## 2023-01-17 ENCOUNTER — PATIENT MESSAGE (OUTPATIENT)
Dept: SURGERY | Facility: CLINIC | Age: 48
End: 2023-01-17

## 2023-01-17 NOTE — TELEPHONE ENCOUNTER
Patient has responded via [employer] message requesting disability paperwork to be provided to bridge any time between today's date and a possible surgery with Dr. Jaylon Greco. Routed to JOE [place], [address].

## 2023-01-17 NOTE — TELEPHONE ENCOUNTER
From: Amparo Cullen  To: Kassy Contrerasma  Sent: 1/17/2023 9:06 AM CST  Subject: Appointment     Hi. I made an appointment to see the surgeon January 23rd, but in the mean time when [employer] sends the forms are you able to at least fill out forms so I don't get in trouble? I have to stay at work until I complete enough hours to be off for possible surgery. Thank you so much.      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

## 2023-01-23 ENCOUNTER — PATIENT MESSAGE (OUTPATIENT)
Dept: SURGERY | Facility: CLINIC | Age: 48
End: 2023-01-23

## 2023-01-23 RX ORDER — METOPROLOL TARTRATE 50 MG/1
50 TABLET, FILM COATED ORAL 2 TIMES DAILY
Qty: 180 TABLET | Refills: 0 | Status: SHIPPED | OUTPATIENT
Start: 2023-01-23

## 2023-01-25 NOTE — TELEPHONE ENCOUNTER
From: Nomi Gilbert  To: Samuel Conway  Sent: 1/23/2023 7:55 PM CST  Subject: Accommodation Paperwork     Good evening. My employer has sent me the paperwork today. So I was wondering what days your at [facility] so you can fill it out for me? I can drop it off and then you can fax or email documentation at your leisure.      Thank you so much

## 2023-01-26 ENCOUNTER — OFFICE VISIT (OUTPATIENT)
Dept: SURGERY | Facility: CLINIC | Age: 48
End: 2023-01-26
Payer: COMMERCIAL

## 2023-01-26 ENCOUNTER — TELEPHONE (OUTPATIENT)
Dept: SURGERY | Facility: CLINIC | Age: 48
End: 2023-01-26

## 2023-01-26 VITALS
BODY MASS INDEX: 26.58 KG/M2 | WEIGHT: 150 LBS | DIASTOLIC BLOOD PRESSURE: 84 MMHG | HEART RATE: 64 BPM | SYSTOLIC BLOOD PRESSURE: 130 MMHG | HEIGHT: 63 IN

## 2023-01-26 DIAGNOSIS — G95.9 CERVICAL MYELOPATHY WITH CERVICAL RADICULOPATHY (HCC): ICD-10-CM

## 2023-01-26 DIAGNOSIS — Z98.1 S/P CERVICAL SPINAL FUSION: Primary | ICD-10-CM

## 2023-01-26 DIAGNOSIS — M54.2 CERVICAL PAIN: ICD-10-CM

## 2023-01-26 DIAGNOSIS — M54.12 CERVICAL MYELOPATHY WITH CERVICAL RADICULOPATHY (HCC): ICD-10-CM

## 2023-01-26 PROCEDURE — 99214 OFFICE O/P EST MOD 30 MIN: CPT | Performed by: PHYSICIAN ASSISTANT

## 2023-01-26 PROCEDURE — 3075F SYST BP GE 130 - 139MM HG: CPT | Performed by: PHYSICIAN ASSISTANT

## 2023-01-26 PROCEDURE — 3079F DIAST BP 80-89 MM HG: CPT | Performed by: PHYSICIAN ASSISTANT

## 2023-01-26 PROCEDURE — 3008F BODY MASS INDEX DOCD: CPT | Performed by: PHYSICIAN ASSISTANT

## 2023-01-26 NOTE — TELEPHONE ENCOUNTER
Received completed copy of Medical Accommodation Request Form.      Copy kept in office until verified under media tab

## 2023-01-26 NOTE — PROGRESS NOTES
Established patient:  Reason for follow up:  Paperwork     Numeric Rating Scale:      Pain at Present:  10/10       Distribution of Pain:    left    Most recent treatments for Current Pain Condition:   Physical Therapy, Surgery and Other injections  Response to treatment: no relief    New imaging or testing since your last office visit:   XR cervical spine 12/05/22

## 2023-01-26 NOTE — TELEPHONE ENCOUNTER
Per pt PA told her for office to make copy of pt's paperwork 2 copies made one placed in bin and other one to sent to be scanned in

## 2023-01-26 NOTE — TELEPHONE ENCOUNTER
Patient came in today January 26 to have her accommodations paperwork completed and a letter drafted for [employer]

## 2023-01-27 ENCOUNTER — HOSPITAL ENCOUNTER (OUTPATIENT)
Dept: GENERAL RADIOLOGY | Facility: HOSPITAL | Age: 48
Discharge: HOME | End: 2023-01-27
Attending: NEUROLOGICAL SURGERY
Payer: COMMERCIAL

## 2023-01-27 ENCOUNTER — OFFICE VISIT (OUTPATIENT)
Dept: SURGERY | Facility: CLINIC | Age: 48
End: 2023-01-27
Payer: COMMERCIAL

## 2023-01-27 ENCOUNTER — TELEPHONE (OUTPATIENT)
Dept: SURGERY | Facility: CLINIC | Age: 48
End: 2023-01-27

## 2023-01-27 VITALS — DIASTOLIC BLOOD PRESSURE: 82 MMHG | SYSTOLIC BLOOD PRESSURE: 122 MMHG

## 2023-01-27 DIAGNOSIS — M50.30 DEGENERATIVE DISC DISEASE, CERVICAL: Primary | ICD-10-CM

## 2023-01-27 DIAGNOSIS — M54.12 CERVICAL MYELOPATHY WITH CERVICAL RADICULOPATHY (HCC): ICD-10-CM

## 2023-01-27 DIAGNOSIS — R29.898 WEAKNESS OF BOTH UPPER EXTREMITIES: ICD-10-CM

## 2023-01-27 DIAGNOSIS — M50.30 DEGENERATIVE DISC DISEASE, CERVICAL: ICD-10-CM

## 2023-01-27 DIAGNOSIS — G95.9 CERVICAL MYELOPATHY WITH CERVICAL RADICULOPATHY (HCC): ICD-10-CM

## 2023-01-27 DIAGNOSIS — M54.2 CERVICAL PAIN: ICD-10-CM

## 2023-01-27 DIAGNOSIS — M47.12 CERVICAL SPONDYLOSIS WITH MYELOPATHY AND RADICULOPATHY: ICD-10-CM

## 2023-01-27 DIAGNOSIS — M54.2 NECK PAIN: ICD-10-CM

## 2023-01-27 DIAGNOSIS — R39.15 URINARY URGENCY: ICD-10-CM

## 2023-01-27 DIAGNOSIS — M25.512 ACUTE PAIN OF LEFT SHOULDER: ICD-10-CM

## 2023-01-27 DIAGNOSIS — M47.22 CERVICAL SPONDYLOSIS WITH MYELOPATHY AND RADICULOPATHY: ICD-10-CM

## 2023-01-27 DIAGNOSIS — Z98.1 S/P CERVICAL SPINAL FUSION: ICD-10-CM

## 2023-01-27 PROCEDURE — 99213 OFFICE O/P EST LOW 20 MIN: CPT | Performed by: NEUROLOGICAL SURGERY

## 2023-01-27 PROCEDURE — 73030 X-RAY EXAM OF SHOULDER: CPT | Performed by: NEUROLOGICAL SURGERY

## 2023-01-27 PROCEDURE — 3074F SYST BP LT 130 MM HG: CPT | Performed by: NEUROLOGICAL SURGERY

## 2023-01-27 PROCEDURE — 3079F DIAST BP 80-89 MM HG: CPT | Performed by: NEUROLOGICAL SURGERY

## 2023-01-27 NOTE — PROGRESS NOTES
Established patient:  Reason for follow up: follow up cervical fusion      Numeric Rating Scale: (No Pain) 0  to  10 (Worst Pain)        Pain at Present:  10/10       Distribution of Pain:    left    Most recent treatments for Current Pain Condition:   Physical Therapy, NSAIDS, Surgery and Other injections   Response to treatment: worsening    New imaging or testing since your last office visit:  XR cervical 12.05.22

## 2023-01-31 ENCOUNTER — TELEPHONE (OUTPATIENT)
Dept: FAMILY MEDICINE CLINIC | Facility: CLINIC | Age: 48
End: 2023-01-31

## 2023-01-31 NOTE — TELEPHONE ENCOUNTER
Patient is scheduled for surgery on 3/28/2023. Patient needs to schedule Pre-op exam in order to be cleared for surgery a month prior to surgery. [Follow-Up: _____] : a [unfilled] follow-up visit [Family Member] : family member

## 2023-02-06 ENCOUNTER — PATIENT MESSAGE (OUTPATIENT)
Dept: SURGERY | Facility: CLINIC | Age: 48
End: 2023-02-06

## 2023-02-06 NOTE — TELEPHONE ENCOUNTER
Reply sent to patient informing her that provider will address her request when he has the opportunity to do so.

## 2023-02-06 NOTE — TELEPHONE ENCOUNTER
From: Jessie Martinez  To: Samuel Gordon  Sent: 2/6/2023 10:40 AM CST  Subject: Accommodations     Good morning. I just received this [facility] from [facility]. They've stressed me out with the constant emails reminding me and reminding me of the deadline which is February 13th. I'm not here to pressure you at all. I've already told them several times that Sandy Edwards will be sending paperwork. I'm so sorry that I emailed you again, but I've had so much anxiety over this and how my employer is stressing me over this situation.     I thank you so much for your assistance

## 2023-02-07 ENCOUNTER — TELEPHONE (OUTPATIENT)
Dept: SURGERY | Facility: CLINIC | Age: 48
End: 2023-02-07

## 2023-02-07 NOTE — TELEPHONE ENCOUNTER
Patient called to discuss a persistent left armpit odor. She denies wound drainage, open wound, redness or fever. She states this has been going on for over 1 year. Patient believed this is caused by excess tissue on the left armpit and is requesting an appt with Dr. Dominic Karimi. Dr. Dominic Karimi stated he will see her. Call transferred to [facility].

## 2023-02-09 NOTE — TELEPHONE ENCOUNTER
Surgery order still needed. Routed to provider for surgery order.    Will be [facility] alone

## 2023-02-09 NOTE — TELEPHONE ENCOUNTER
Order incomplete, missing correct consent. Corrected by Donaldo Meraz, [id number]    Patient is scheduled for C3-C4 ACDF on 3.28.23 with Brain Butler at [facility]    Y Surgical instructions reviewed by nursing staff with patient  Erika Zack form completed  Y Surgery order signed   Y Placed sx on surgery sheet  Y Placed on outlook calendar  Y [employer] message sent to patient with sx instructions  Y Faxed pre-op clearance request to PCP DR. Oneil Cos E-mail sent to [facility]  Y Post-op appointments made-Missing 12 week post op appt. Y PA NEEDED. Routed to PA team to initiate. Y Post-Op outreach pt reminder placed. Y Entire Neurosurgery Checklist Completed    CPT [id number], [id number], [id number], [id number], [id number], [id number]    BGS and collar orders placed.  Pending signature

## 2023-02-10 NOTE — TELEPHONE ENCOUNTER
[employer] DME Order has been placed    Fax the following information to [phone]    DME Order  Face sheet  Insurance card copy  Last office visit note    Received fax confirmation       Signed [employer] collar order received     Collar order  Face sheet  Insurance card   LOV note     Faxed to [employer] #:[phone].     Received fax confirmation

## 2023-02-13 ENCOUNTER — PATIENT MESSAGE (OUTPATIENT)
Dept: SURGERY | Facility: CLINIC | Age: 48
End: 2023-02-13

## 2023-02-13 NOTE — TELEPHONE ENCOUNTER
Received fax with patient's medical records. Placed in envelope at . [facility] msg sent to patient.

## 2023-02-13 NOTE — TELEPHONE ENCOUNTER
Patient called back and would like to  the paperwork at [facility]. Paperwork was faxed and received confirmation.  Shanna is aware patient will stop by to  the paperwork

## 2023-02-13 NOTE — TELEPHONE ENCOUNTER
Called and spoke to the patient informing her that writer tried to fax paperwork 2 times to the fax number she provided and number is not working. Patient states she will call [employer] to get another fax number.  Now waiting for patient to call back with different fax number

## 2023-02-13 NOTE — TELEPHONE ENCOUNTER
From: Nitish Lockhart  To: Samuel Perez Caro  Sent: 2/13/2023 9:36 AM CST  Subject: Job accommodation     Good morning Krisivette Kerr. Their fax number is: [phone]    And I didn't realize today is the last day to send my employer the paperwork. Thank you so much for reaching out or me.  It's greatly appreciated of all the efforts you and Blanca Larsen have done for me and hope I get approved

## 2023-02-21 ENCOUNTER — TELEPHONE (OUTPATIENT)
Dept: SURGERY | Facility: CLINIC | Age: 48
End: 2023-02-21

## 2023-02-21 RX ORDER — SCOLOPAMINE TRANSDERMAL SYSTEM 1 MG/1
1 PATCH, EXTENDED RELEASE TRANSDERMAL ONCE
Status: CANCELLED | OUTPATIENT
Start: 2023-02-21 | End: 2023-02-21

## 2023-02-21 RX ORDER — MELATONIN
325
COMMUNITY

## 2023-02-21 RX ORDER — MELATONIN
1000 DAILY
COMMUNITY

## 2023-02-21 RX ORDER — MULTIVITAMIN WITH IRON
250 TABLET ORAL DAILY
COMMUNITY

## 2023-02-21 NOTE — TELEPHONE ENCOUNTER
Attempted to call patient regarding her \"extreme pain on L side near underarm since yesterday\". No answer, voicemail box is full. Will try to contact patient again later.

## 2023-02-21 NOTE — PAT NURSING NOTE
Patient declines to take the Spine Class since she has had a previous spine surgery. My Chart message with the Link to view details online was sent.

## 2023-02-27 ENCOUNTER — TELEPHONE (OUTPATIENT)
Dept: ORTHOPEDICS CLINIC | Facility: CLINIC | Age: 48
End: 2023-02-27

## 2023-02-27 NOTE — TELEPHONE ENCOUNTER
Patient is scheduled with Dr. Lester Keane for left shoulder pain. Please advise if imaging is needed.

## 2023-02-27 NOTE — TELEPHONE ENCOUNTER
Future Appointments   Date Time Provider Jacki Rodgers   3/6/2023 11:00 AM [address] [facility] [place]   3/6/2023 11:30 AM [address] [facility] [place]   3/6/2023  2:20 PM Jenn Maria MD [facility] ORTHO [facility] [place]   3/15/2023 11:00 AM Jonathon Byrd MD [facility]   3/22/2023  9:20 AM Michelle Roach DO ENINAPER2 [facility] [place]   3/25/2023  1:30 PM [facility] COVID RESOURCE [facility] [place]   4/7/2023  9:15 AM FRANCY Coombs ENLUIS ALBERTOPER2 [facility] [place]   4/28/2023 10:15 AM FRANCY CoombsPER2 [facility] [place]     XR left shoulder completed on 1/27/23. Further imaging required per ortho protocol. No further imaging required per ortho protocol. Routed to front staff.

## 2023-03-06 ENCOUNTER — NURSE ONLY (OUTPATIENT)
Dept: LAB | Age: 48
End: 2023-03-06
Attending: NEUROLOGICAL SURGERY
Payer: COMMERCIAL

## 2023-03-06 ENCOUNTER — OFFICE VISIT (OUTPATIENT)
Dept: ORTHOPEDICS CLINIC | Facility: CLINIC | Age: 48
End: 2023-03-06
Payer: COMMERCIAL

## 2023-03-06 VITALS — HEIGHT: 63 IN | WEIGHT: 150 LBS | BODY MASS INDEX: 26.58 KG/M2

## 2023-03-06 DIAGNOSIS — M54.2 CERVICAL PAIN: ICD-10-CM

## 2023-03-06 DIAGNOSIS — M25.512 LEFT SHOULDER PAIN, UNSPECIFIED CHRONICITY: Primary | ICD-10-CM

## 2023-03-06 LAB
ALBUMIN SERPL-MCNC: 4 G/DL (ref 3.4–5)
ALBUMIN/GLOB SERPL: 1 {RATIO} (ref 1–2)
ALP LIVER SERPL-CCNC: 132 U/L
ALT SERPL-CCNC: 28 U/L
ANION GAP SERPL CALC-SCNC: 4 MMOL/L (ref 0–18)
APTT PPP: 27.2 SECONDS (ref 23.3–35.6)
AST SERPL-CCNC: 14 U/L (ref 15–37)
ATRIAL RATE: 56 BPM
BASOPHILS # BLD AUTO: 0.04 X10(3) UL (ref 0–0.2)
BASOPHILS NFR BLD AUTO: 0.8 %
BILIRUB SERPL-MCNC: 1.1 MG/DL (ref 0.1–2)
BUN BLD-MCNC: 16 MG/DL (ref 7–18)
BUN/CREAT SERPL: 18.8 (ref 10–20)
CALCIUM BLD-MCNC: 9.8 MG/DL (ref 8.5–10.1)
CHLORIDE SERPL-SCNC: 106 MMOL/L (ref 98–112)
CO2 SERPL-SCNC: 30 MMOL/L (ref 21–32)
CREAT BLD-MCNC: 0.85 MG/DL
DEPRECATED RDW RBC AUTO: 41.7 FL (ref 35.1–46.3)
EOSINOPHIL # BLD AUTO: 0.11 X10(3) UL (ref 0–0.7)
EOSINOPHIL NFR BLD AUTO: 2.1 %
ERYTHROCYTE [DISTWIDTH] IN BLOOD BY AUTOMATED COUNT: 12.3 % (ref 11–15)
FASTING STATUS PATIENT QL REPORTED: YES
GFR SERPLBLD BASED ON 1.73 SQ M-ARVRAT: 84 ML/MIN/1.73M2 (ref 60–?)
GLOBULIN PLAS-MCNC: 4 G/DL (ref 2.8–4.4)
GLUCOSE BLD-MCNC: 106 MG/DL (ref 70–99)
HCT VFR BLD AUTO: 43.8 %
HGB BLD-MCNC: 14.4 G/DL
IMM GRANULOCYTES # BLD AUTO: 0.02 X10(3) UL (ref 0–1)
IMM GRANULOCYTES NFR BLD: 0.4 %
INR BLD: 1.05 (ref 0.85–1.16)
LYMPHOCYTES # BLD AUTO: 2.37 X10(3) UL (ref 1–4)
LYMPHOCYTES NFR BLD AUTO: 46.3 %
MCH RBC QN AUTO: 30.3 PG (ref 26–34)
MCHC RBC AUTO-ENTMCNC: 32.9 G/DL (ref 31–37)
MCV RBC AUTO: 92 FL
MONOCYTES # BLD AUTO: 0.46 X10(3) UL (ref 0.1–1)
MONOCYTES NFR BLD AUTO: 9 %
NEUTROPHILS # BLD AUTO: 2.12 X10 (3) UL (ref 1.5–7.7)
NEUTROPHILS # BLD AUTO: 2.12 X10(3) UL (ref 1.5–7.7)
NEUTROPHILS NFR BLD AUTO: 41.4 %
OSMOLALITY SERPL CALC.SUM OF ELEC: 292 MOSM/KG (ref 275–295)
P AXIS: 54 DEGREES
P-R INTERVAL: 180 MS
PLATELET # BLD AUTO: 296 10(3)UL (ref 150–450)
POTASSIUM SERPL-SCNC: 3.9 MMOL/L (ref 3.5–5.1)
PROT SERPL-MCNC: 8 G/DL (ref 6.4–8.2)
PROTHROMBIN TIME: 13.6 SECONDS (ref 11.6–14.8)
Q-T INTERVAL: 420 MS
QRS DURATION: 90 MS
QTC CALCULATION (BEZET): 405 MS
R AXIS: 13 DEGREES
RBC # BLD AUTO: 4.76 X10(6)UL
SODIUM SERPL-SCNC: 140 MMOL/L (ref 136–145)
T AXIS: 5 DEGREES
VENTRICULAR RATE: 56 BPM
WBC # BLD AUTO: 5.1 X10(3) UL (ref 4–11)

## 2023-03-06 PROCEDURE — 85025 COMPLETE CBC W/AUTO DIFF WBC: CPT

## 2023-03-06 PROCEDURE — 36415 COLL VENOUS BLD VENIPUNCTURE: CPT

## 2023-03-06 PROCEDURE — 93005 ELECTROCARDIOGRAM TRACING: CPT

## 2023-03-06 PROCEDURE — 80053 COMPREHEN METABOLIC PANEL: CPT

## 2023-03-06 PROCEDURE — 93010 ELECTROCARDIOGRAM REPORT: CPT | Performed by: INTERNAL MEDICINE

## 2023-03-06 PROCEDURE — 85610 PROTHROMBIN TIME: CPT

## 2023-03-06 PROCEDURE — 87081 CULTURE SCREEN ONLY: CPT

## 2023-03-06 PROCEDURE — 85730 THROMBOPLASTIN TIME PARTIAL: CPT

## 2023-03-06 RX ORDER — LOSARTAN POTASSIUM 25 MG/1
25 TABLET ORAL DAILY
COMMUNITY

## 2023-03-13 ENCOUNTER — TELEPHONE (OUTPATIENT)
Dept: SURGERY | Facility: CLINIC | Age: 48
End: 2023-03-13

## 2023-03-13 NOTE — TELEPHONE ENCOUNTER
In separate \"Patient Advice Request\" TE:    Patient has messaged via [employer] with a request for [employer] paperwork to be completed with specific off-work dates to be included in documents. Per patient's message, she is requesting information to be included:    \"I also advised [employer] I missed some days from work due to illness related to my spine/neck. Can you please make sure doctor includes these days in medical paperwork so I don't get penalized. 1. November 1st, 2022  2. November 17Th, 2022  3. January 27th, 2023   4. February 25th, 2023\"    Messaged patient back, informing her that we have not yet received [employer] documents. Dr. Mona Laureano added to patient's care team.     Routed to Nursing call pool to add to any incoming disability fax TE's, as documents have not yet arrived to [facility].

## 2023-03-15 ENCOUNTER — OFFICE VISIT (OUTPATIENT)
Dept: SURGERY | Facility: CLINIC | Age: 48
End: 2023-03-15
Payer: COMMERCIAL

## 2023-03-15 ENCOUNTER — PATIENT MESSAGE (OUTPATIENT)
Facility: LOCATION | Age: 48
End: 2023-03-15

## 2023-03-15 VITALS
RESPIRATION RATE: 16 BRPM | HEIGHT: 63 IN | HEART RATE: 90 BPM | BODY MASS INDEX: 26.58 KG/M2 | DIASTOLIC BLOOD PRESSURE: 70 MMHG | WEIGHT: 150 LBS | SYSTOLIC BLOOD PRESSURE: 155 MMHG | OXYGEN SATURATION: 98 % | TEMPERATURE: 98 F

## 2023-03-15 DIAGNOSIS — Z90.13 ABSENCE OF BREAST, ACQUIRED, BILATERAL: ICD-10-CM

## 2023-03-15 DIAGNOSIS — L30.4 INTERTRIGO: Primary | ICD-10-CM

## 2023-03-15 DIAGNOSIS — Z90.13 ACQUIRED ABSENCE OF BOTH BREASTS: ICD-10-CM

## 2023-03-15 DIAGNOSIS — Z85.3 HISTORY OF BREAST CANCER: ICD-10-CM

## 2023-03-15 PROCEDURE — 3078F DIAST BP <80 MM HG: CPT | Performed by: SURGERY

## 2023-03-15 PROCEDURE — 3008F BODY MASS INDEX DOCD: CPT | Performed by: SURGERY

## 2023-03-15 PROCEDURE — 99213 OFFICE O/P EST LOW 20 MIN: CPT | Performed by: SURGERY

## 2023-03-15 PROCEDURE — 3077F SYST BP >= 140 MM HG: CPT | Performed by: SURGERY

## 2023-03-15 RX ORDER — NYSTATIN 100000 [USP'U]/G
1 POWDER TOPICAL DAILY
Qty: 30 G | Refills: 0 | Status: SHIPPED | OUTPATIENT
Start: 2023-03-15

## 2023-03-16 NOTE — TELEPHONE ENCOUNTER
From: Liss Banuelos  To: Kaylee Meléndez MD  Sent: 3/15/2023 9:29 AM CDT  Subject: Appointment     Good morning. I had an appointment this morning at 9:15 am and i called to cancel at 8:30 am and left message on voicemail. I am emailing you so I don't get charged as if I didn't show up.  Thank you so much for your time

## 2023-03-22 ENCOUNTER — TELEPHONE (OUTPATIENT)
Dept: OBGYN CLINIC | Facility: CLINIC | Age: 48
End: 2023-03-22

## 2023-03-22 ENCOUNTER — OFFICE VISIT (OUTPATIENT)
Dept: SURGERY | Facility: CLINIC | Age: 48
End: 2023-03-22
Payer: COMMERCIAL

## 2023-03-22 VITALS
OXYGEN SATURATION: 98 % | BODY MASS INDEX: 26.58 KG/M2 | HEART RATE: 76 BPM | SYSTOLIC BLOOD PRESSURE: 138 MMHG | DIASTOLIC BLOOD PRESSURE: 80 MMHG | WEIGHT: 150 LBS | HEIGHT: 63 IN

## 2023-03-22 DIAGNOSIS — G95.9 CERVICAL MYELOPATHY WITH CERVICAL RADICULOPATHY (HCC): Primary | ICD-10-CM

## 2023-03-22 DIAGNOSIS — M54.12 CERVICAL MYELOPATHY WITH CERVICAL RADICULOPATHY (HCC): Primary | ICD-10-CM

## 2023-03-22 PROCEDURE — 3075F SYST BP GE 130 - 139MM HG: CPT | Performed by: NEUROLOGICAL SURGERY

## 2023-03-22 PROCEDURE — 3079F DIAST BP 80-89 MM HG: CPT | Performed by: NEUROLOGICAL SURGERY

## 2023-03-22 PROCEDURE — 99215 OFFICE O/P EST HI 40 MIN: CPT | Performed by: NEUROLOGICAL SURGERY

## 2023-03-22 PROCEDURE — 3008F BODY MASS INDEX DOCD: CPT | Performed by: NEUROLOGICAL SURGERY

## 2023-03-22 RX ORDER — LOSARTAN POTASSIUM 50 MG/1
50 TABLET ORAL DAILY
COMMUNITY
Start: 2023-03-11

## 2023-03-22 NOTE — TELEPHONE ENCOUNTER
Pt has welts/boils in the insider of her thighs, possible allergy to her uniform. Appt made for April 13, but maybe she can do something about it before then.     Pls advise

## 2023-03-22 NOTE — PROGRESS NOTES
Established patient:  Reason for follow up: Pre Op for Surgery 3/28/2023 CERVICAL THREE TO CERVICAL FOUR ANTERIOR CERVICAL DISCECTOMY AND FUSION. BONE GRAFT AND ALL REQUIRED INSTRUMENTATION. NEUROMONITORING.      Numeric Rating Scale: (No Pain) 0  to  10 (Worst Pain)        Pain at Present:  8/10       Distribution of Pain:    bilateral    Most recent treatments for Current Pain Condition:   Other n/a  Response to treatment: n/a    New imaging or testing since your last office visit:  No

## 2023-03-22 NOTE — TELEPHONE ENCOUNTER
Patient name and  verified. Patient contacted office complaining of boils/cyst to her inner thighs. Voices painful when walking and unsure if boils are an allergic reaction to uniform. States she has tried warm compresses, otc remedies,and sitz baths without relief. Patient offered appt for tomorrow with TA and patient declined. Asking if rx can be called in for patient? Advised patient TA not in office today and if feeling uncomfortable patient to be seen by PCP or in UC. Patient states she would rather wait for a response from TA. Routing to provider.

## 2023-03-23 ENCOUNTER — TELEPHONE (OUTPATIENT)
Dept: SURGERY | Facility: CLINIC | Age: 48
End: 2023-03-23

## 2023-03-23 ENCOUNTER — TELEPHONE (OUTPATIENT)
Dept: OBGYN CLINIC | Facility: CLINIC | Age: 48
End: 2023-03-23

## 2023-03-23 RX ORDER — CEPHALEXIN 500 MG/1
500 CAPSULE ORAL 4 TIMES DAILY
Qty: 40 CAPSULE | Refills: 0 | Status: SHIPPED | OUTPATIENT
Start: 2023-03-23 | End: 2023-04-02

## 2023-03-23 NOTE — TELEPHONE ENCOUNTER
As twice a day for about 10 minutes afterwards pat dry and then apply a topical Neosporin for 1 time a day and either Vaseline Aquaphor use or in the second time of the day. Please start her on Keflex 500 mg p.o. 4 times daily for 7 days and try to apply a Tegaderm over the area while she works during the daytime hours have her keep her appointment so that we can follow-up on it in April if she needs to get in sooner we can squeeze her in.     Jose Olivo MD

## 2023-03-23 NOTE — TELEPHONE ENCOUNTER
Patient with a listed allergy for Augmentin and cephalosporin with a  Reaction of diarrhea. This RN unable to order rx. TA informed and will order.

## 2023-03-24 ENCOUNTER — OFFICE VISIT (OUTPATIENT)
Dept: FAMILY MEDICINE CLINIC | Facility: CLINIC | Age: 48
End: 2023-03-24

## 2023-03-24 VITALS
DIASTOLIC BLOOD PRESSURE: 84 MMHG | WEIGHT: 151 LBS | HEART RATE: 55 BPM | BODY MASS INDEX: 26.75 KG/M2 | HEIGHT: 63 IN | SYSTOLIC BLOOD PRESSURE: 136 MMHG

## 2023-03-24 DIAGNOSIS — Z01.818 PRE-OP EXAMINATION: Primary | ICD-10-CM

## 2023-03-24 DIAGNOSIS — M50.30 DDD (DEGENERATIVE DISC DISEASE), CERVICAL: ICD-10-CM

## 2023-03-24 PROCEDURE — 99213 OFFICE O/P EST LOW 20 MIN: CPT | Performed by: PHYSICIAN ASSISTANT

## 2023-03-24 PROCEDURE — 3008F BODY MASS INDEX DOCD: CPT | Performed by: PHYSICIAN ASSISTANT

## 2023-03-24 PROCEDURE — 3079F DIAST BP 80-89 MM HG: CPT | Performed by: PHYSICIAN ASSISTANT

## 2023-03-24 PROCEDURE — 3075F SYST BP GE 130 - 139MM HG: CPT | Performed by: PHYSICIAN ASSISTANT

## 2023-03-24 NOTE — TELEPHONE ENCOUNTER
Patient name and  verified. Patient informed of TA message and recommendation. Patient is allergic to Tegaderm. Advised to ask pharmacist for alterative to tegaderm to apply to the area. Verbalized understanding.

## 2023-03-26 ENCOUNTER — PATIENT MESSAGE (OUTPATIENT)
Dept: SURGERY | Facility: CLINIC | Age: 48
End: 2023-03-26

## 2023-03-27 ENCOUNTER — LAB ENCOUNTER (OUTPATIENT)
Dept: LAB | Facility: HOSPITAL | Age: 48
End: 2023-03-27
Attending: NEUROLOGICAL SURGERY
Payer: COMMERCIAL

## 2023-03-27 ENCOUNTER — TELEPHONE (OUTPATIENT)
Dept: FAMILY MEDICINE CLINIC | Facility: CLINIC | Age: 48
End: 2023-03-27

## 2023-03-27 ENCOUNTER — ANESTHESIA EVENT (OUTPATIENT)
Dept: SURGERY | Facility: HOSPITAL | Age: 48
End: 2023-03-27
Payer: COMMERCIAL

## 2023-03-27 DIAGNOSIS — Z01.812 ENCOUNTER FOR PREPROCEDURE SCREENING LABORATORY TESTING FOR COVID-19: ICD-10-CM

## 2023-03-27 DIAGNOSIS — M54.2 CERVICAL PAIN: ICD-10-CM

## 2023-03-27 DIAGNOSIS — Z20.822 ENCOUNTER FOR PREPROCEDURE SCREENING LABORATORY TESTING FOR COVID-19: ICD-10-CM

## 2023-03-27 LAB — SARS-COV-2 RNA RESP QL NAA+PROBE: NOT DETECTED

## 2023-03-28 ENCOUNTER — ANESTHESIA (OUTPATIENT)
Dept: SURGERY | Facility: HOSPITAL | Age: 48
End: 2023-03-28
Payer: COMMERCIAL

## 2023-03-28 ENCOUNTER — HOSPITAL ENCOUNTER (OUTPATIENT)
Facility: HOSPITAL | Age: 48
Discharge: HOME HEALTH | End: 2023-03-30
Attending: NEUROLOGICAL SURGERY | Admitting: NEUROLOGICAL SURGERY
Payer: COMMERCIAL

## 2023-03-28 ENCOUNTER — APPOINTMENT (OUTPATIENT)
Dept: GENERAL RADIOLOGY | Facility: HOSPITAL | Age: 48
End: 2023-03-28
Attending: NEUROLOGICAL SURGERY
Payer: COMMERCIAL

## 2023-03-28 DIAGNOSIS — M54.2 CERVICAL PAIN: Primary | ICD-10-CM

## 2023-03-28 LAB — B-HCG UR QL: NEGATIVE

## 2023-03-28 PROCEDURE — 0RT30ZZ RESECTION OF CERVICAL VERTEBRAL DISC, OPEN APPROACH: ICD-10-PCS | Performed by: NEUROLOGICAL SURGERY

## 2023-03-28 PROCEDURE — 76000 FLUOROSCOPY <1 HR PHYS/QHP: CPT | Performed by: NEUROLOGICAL SURGERY

## 2023-03-28 PROCEDURE — 3078F DIAST BP <80 MM HG: CPT | Performed by: INTERNAL MEDICINE

## 2023-03-28 PROCEDURE — 3008F BODY MASS INDEX DOCD: CPT | Performed by: INTERNAL MEDICINE

## 2023-03-28 PROCEDURE — 3074F SYST BP LT 130 MM HG: CPT | Performed by: INTERNAL MEDICINE

## 2023-03-28 PROCEDURE — 00NW0ZZ RELEASE CERVICAL SPINAL CORD, OPEN APPROACH: ICD-10-PCS | Performed by: NEUROLOGICAL SURGERY

## 2023-03-28 PROCEDURE — 99214 OFFICE O/P EST MOD 30 MIN: CPT | Performed by: INTERNAL MEDICINE

## 2023-03-28 PROCEDURE — 0RG10A0 FUSION OF CERVICAL VERTEBRAL JOINT WITH INTERBODY FUSION DEVICE, ANTERIOR APPROACH, ANTERIOR COLUMN, OPEN APPROACH: ICD-10-PCS | Performed by: NEUROLOGICAL SURGERY

## 2023-03-28 RX ORDER — MIDAZOLAM HYDROCHLORIDE 1 MG/ML
1 INJECTION INTRAMUSCULAR; INTRAVENOUS EVERY 5 MIN PRN
Status: DISCONTINUED | OUTPATIENT
Start: 2023-03-28 | End: 2023-03-28 | Stop reason: HOSPADM

## 2023-03-28 RX ORDER — SODIUM PHOSPHATE, DIBASIC AND SODIUM PHOSPHATE, MONOBASIC 7; 19 G/133ML; G/133ML
1 ENEMA RECTAL ONCE AS NEEDED
Status: DISCONTINUED | OUTPATIENT
Start: 2023-03-28 | End: 2023-03-30

## 2023-03-28 RX ORDER — BISACODYL 10 MG
10 SUPPOSITORY, RECTAL RECTAL
Status: DISCONTINUED | OUTPATIENT
Start: 2023-03-28 | End: 2023-03-30

## 2023-03-28 RX ORDER — NEOSTIGMINE METHYLSULFATE 1 MG/ML
INJECTION, SOLUTION INTRAVENOUS AS NEEDED
Status: DISCONTINUED | OUTPATIENT
Start: 2023-03-28 | End: 2023-03-28 | Stop reason: SURG

## 2023-03-28 RX ORDER — MELATONIN
1000 DAILY
Status: DISCONTINUED | OUTPATIENT
Start: 2023-03-29 | End: 2023-03-30

## 2023-03-28 RX ORDER — ONDANSETRON 2 MG/ML
4 INJECTION INTRAMUSCULAR; INTRAVENOUS EVERY 6 HOURS PRN
Status: DISCONTINUED | OUTPATIENT
Start: 2023-03-28 | End: 2023-03-30

## 2023-03-28 RX ORDER — SODIUM CHLORIDE 9 MG/ML
INJECTION, SOLUTION INTRAVENOUS CONTINUOUS
Status: DISCONTINUED | OUTPATIENT
Start: 2023-03-28 | End: 2023-03-30

## 2023-03-28 RX ORDER — HYDROMORPHONE HYDROCHLORIDE 1 MG/ML
0.4 INJECTION, SOLUTION INTRAMUSCULAR; INTRAVENOUS; SUBCUTANEOUS EVERY 2 HOUR PRN
Status: DISCONTINUED | OUTPATIENT
Start: 2023-03-28 | End: 2023-03-30

## 2023-03-28 RX ORDER — NALOXONE HYDROCHLORIDE 0.4 MG/ML
80 INJECTION, SOLUTION INTRAMUSCULAR; INTRAVENOUS; SUBCUTANEOUS AS NEEDED
Status: DISCONTINUED | OUTPATIENT
Start: 2023-03-28 | End: 2023-03-28 | Stop reason: HOSPADM

## 2023-03-28 RX ORDER — ACETAMINOPHEN 10 MG/ML
INJECTION, SOLUTION INTRAVENOUS
Status: COMPLETED
Start: 2023-03-28 | End: 2023-03-28

## 2023-03-28 RX ORDER — LIDOCAINE HYDROCHLORIDE AND EPINEPHRINE 20; 5 MG/ML; UG/ML
INJECTION, SOLUTION EPIDURAL; INFILTRATION; INTRACAUDAL; PERINEURAL AS NEEDED
Status: DISCONTINUED | OUTPATIENT
Start: 2023-03-28 | End: 2023-03-28

## 2023-03-28 RX ORDER — PROCHLORPERAZINE EDISYLATE 5 MG/ML
5 INJECTION INTRAMUSCULAR; INTRAVENOUS EVERY 8 HOURS PRN
Status: DISCONTINUED | OUTPATIENT
Start: 2023-03-28 | End: 2023-03-30

## 2023-03-28 RX ORDER — LIDOCAINE HYDROCHLORIDE 10 MG/ML
INJECTION, SOLUTION EPIDURAL; INFILTRATION; INTRACAUDAL; PERINEURAL AS NEEDED
Status: DISCONTINUED | OUTPATIENT
Start: 2023-03-28 | End: 2023-03-28 | Stop reason: SURG

## 2023-03-28 RX ORDER — HYDROMORPHONE HYDROCHLORIDE 1 MG/ML
0.6 INJECTION, SOLUTION INTRAMUSCULAR; INTRAVENOUS; SUBCUTANEOUS EVERY 5 MIN PRN
Status: DISCONTINUED | OUTPATIENT
Start: 2023-03-28 | End: 2023-03-28 | Stop reason: HOSPADM

## 2023-03-28 RX ORDER — ROCURONIUM BROMIDE 10 MG/ML
INJECTION, SOLUTION INTRAVENOUS AS NEEDED
Status: DISCONTINUED | OUTPATIENT
Start: 2023-03-28 | End: 2023-03-28 | Stop reason: SURG

## 2023-03-28 RX ORDER — HYDROMORPHONE HYDROCHLORIDE 1 MG/ML
INJECTION, SOLUTION INTRAMUSCULAR; INTRAVENOUS; SUBCUTANEOUS
Status: COMPLETED
Start: 2023-03-28 | End: 2023-03-28

## 2023-03-28 RX ORDER — LEVOTHYROXINE SODIUM 88 UG/1
88 TABLET ORAL
Status: DISCONTINUED | OUTPATIENT
Start: 2023-03-29 | End: 2023-03-30

## 2023-03-28 RX ORDER — HYDROMORPHONE HYDROCHLORIDE 1 MG/ML
0.4 INJECTION, SOLUTION INTRAMUSCULAR; INTRAVENOUS; SUBCUTANEOUS EVERY 5 MIN PRN
Status: DISCONTINUED | OUTPATIENT
Start: 2023-03-28 | End: 2023-03-28 | Stop reason: HOSPADM

## 2023-03-28 RX ORDER — DEXAMETHASONE SODIUM PHOSPHATE 4 MG/ML
4 VIAL (ML) INJECTION EVERY 6 HOURS
Status: COMPLETED | OUTPATIENT
Start: 2023-03-28 | End: 2023-03-29

## 2023-03-28 RX ORDER — METOPROLOL TARTRATE 50 MG/1
50 TABLET, FILM COATED ORAL 2 TIMES DAILY
Status: DISCONTINUED | OUTPATIENT
Start: 2023-03-28 | End: 2023-03-30

## 2023-03-28 RX ORDER — DIPHENHYDRAMINE HYDROCHLORIDE 50 MG/ML
25 INJECTION INTRAMUSCULAR; INTRAVENOUS EVERY 4 HOURS PRN
Status: DISCONTINUED | OUTPATIENT
Start: 2023-03-28 | End: 2023-03-30

## 2023-03-28 RX ORDER — HYDROCODONE BITARTRATE AND ACETAMINOPHEN 5; 325 MG/1; MG/1
1 TABLET ORAL ONCE AS NEEDED
Status: DISCONTINUED | OUTPATIENT
Start: 2023-03-28 | End: 2023-03-28 | Stop reason: HOSPADM

## 2023-03-28 RX ORDER — LOSARTAN POTASSIUM 50 MG/1
50 TABLET ORAL DAILY
Status: DISCONTINUED | OUTPATIENT
Start: 2023-03-29 | End: 2023-03-30

## 2023-03-28 RX ORDER — PROCHLORPERAZINE EDISYLATE 5 MG/ML
5 INJECTION INTRAMUSCULAR; INTRAVENOUS EVERY 8 HOURS PRN
Status: DISCONTINUED | OUTPATIENT
Start: 2023-03-28 | End: 2023-03-28 | Stop reason: HOSPADM

## 2023-03-28 RX ORDER — MIDAZOLAM HYDROCHLORIDE 1 MG/ML
INJECTION INTRAMUSCULAR; INTRAVENOUS
Status: COMPLETED
Start: 2023-03-28 | End: 2023-03-28

## 2023-03-28 RX ORDER — MELATONIN
325
Status: DISCONTINUED | OUTPATIENT
Start: 2023-03-29 | End: 2023-03-30

## 2023-03-28 RX ORDER — DIAZEPAM 10 MG/1
10 TABLET ORAL EVERY 8 HOURS PRN
Status: DISCONTINUED | OUTPATIENT
Start: 2023-03-28 | End: 2023-03-30

## 2023-03-28 RX ORDER — ONDANSETRON 2 MG/ML
4 INJECTION INTRAMUSCULAR; INTRAVENOUS EVERY 6 HOURS PRN
Status: DISCONTINUED | OUTPATIENT
Start: 2023-03-28 | End: 2023-03-28 | Stop reason: HOSPADM

## 2023-03-28 RX ORDER — POLYETHYLENE GLYCOL 3350 17 G/17G
17 POWDER, FOR SOLUTION ORAL DAILY PRN
Status: DISCONTINUED | OUTPATIENT
Start: 2023-03-28 | End: 2023-03-30

## 2023-03-28 RX ORDER — HYDROCODONE BITARTRATE AND ACETAMINOPHEN 5; 325 MG/1; MG/1
2 TABLET ORAL ONCE AS NEEDED
Status: DISCONTINUED | OUTPATIENT
Start: 2023-03-28 | End: 2023-03-28 | Stop reason: HOSPADM

## 2023-03-28 RX ORDER — HYDROMORPHONE HYDROCHLORIDE 1 MG/ML
0.8 INJECTION, SOLUTION INTRAMUSCULAR; INTRAVENOUS; SUBCUTANEOUS EVERY 2 HOUR PRN
Status: DISCONTINUED | OUTPATIENT
Start: 2023-03-28 | End: 2023-03-30

## 2023-03-28 RX ORDER — ONDANSETRON 2 MG/ML
INJECTION INTRAMUSCULAR; INTRAVENOUS AS NEEDED
Status: DISCONTINUED | OUTPATIENT
Start: 2023-03-28 | End: 2023-03-28 | Stop reason: SURG

## 2023-03-28 RX ORDER — ACETAMINOPHEN 10 MG/ML
1000 INJECTION, SOLUTION INTRAVENOUS ONCE
Status: COMPLETED | OUTPATIENT
Start: 2023-03-28 | End: 2023-03-28

## 2023-03-28 RX ORDER — ALPRAZOLAM 0.25 MG/1
0.25 TABLET ORAL 3 TIMES DAILY PRN
Status: DISCONTINUED | OUTPATIENT
Start: 2023-03-28 | End: 2023-03-30

## 2023-03-28 RX ORDER — HYDROCODONE BITARTRATE AND ACETAMINOPHEN 5; 325 MG/1; MG/1
1 TABLET ORAL EVERY 4 HOURS PRN
Status: DISCONTINUED | OUTPATIENT
Start: 2023-03-28 | End: 2023-03-29

## 2023-03-28 RX ORDER — HYDROMORPHONE HYDROCHLORIDE 1 MG/ML
0.2 INJECTION, SOLUTION INTRAMUSCULAR; INTRAVENOUS; SUBCUTANEOUS EVERY 5 MIN PRN
Status: DISCONTINUED | OUTPATIENT
Start: 2023-03-28 | End: 2023-03-28 | Stop reason: HOSPADM

## 2023-03-28 RX ORDER — ACETAMINOPHEN 500 MG
1000 TABLET ORAL ONCE
Status: DISCONTINUED | OUTPATIENT
Start: 2023-03-28 | End: 2023-03-28 | Stop reason: HOSPADM

## 2023-03-28 RX ORDER — DEXAMETHASONE SODIUM PHOSPHATE 4 MG/ML
VIAL (ML) INJECTION AS NEEDED
Status: DISCONTINUED | OUTPATIENT
Start: 2023-03-28 | End: 2023-03-28 | Stop reason: SURG

## 2023-03-28 RX ORDER — DIPHENHYDRAMINE HCL 25 MG
25 CAPSULE ORAL EVERY 4 HOURS PRN
Status: DISCONTINUED | OUTPATIENT
Start: 2023-03-28 | End: 2023-03-30

## 2023-03-28 RX ORDER — SODIUM CHLORIDE, SODIUM LACTATE, POTASSIUM CHLORIDE, CALCIUM CHLORIDE 600; 310; 30; 20 MG/100ML; MG/100ML; MG/100ML; MG/100ML
INJECTION, SOLUTION INTRAVENOUS CONTINUOUS
Status: DISCONTINUED | OUTPATIENT
Start: 2023-03-28 | End: 2023-03-28 | Stop reason: HOSPADM

## 2023-03-28 RX ORDER — DOCUSATE SODIUM 100 MG/1
100 CAPSULE, LIQUID FILLED ORAL 2 TIMES DAILY
Status: DISCONTINUED | OUTPATIENT
Start: 2023-03-28 | End: 2023-03-30

## 2023-03-28 RX ORDER — GLYCOPYRROLATE 0.2 MG/ML
INJECTION, SOLUTION INTRAMUSCULAR; INTRAVENOUS AS NEEDED
Status: DISCONTINUED | OUTPATIENT
Start: 2023-03-28 | End: 2023-03-28 | Stop reason: SURG

## 2023-03-28 RX ORDER — SENNOSIDES 8.6 MG
17.2 TABLET ORAL NIGHTLY
Status: DISCONTINUED | OUTPATIENT
Start: 2023-03-28 | End: 2023-03-30

## 2023-03-28 RX ORDER — SODIUM CHLORIDE, SODIUM LACTATE, POTASSIUM CHLORIDE, CALCIUM CHLORIDE 600; 310; 30; 20 MG/100ML; MG/100ML; MG/100ML; MG/100ML
INJECTION, SOLUTION INTRAVENOUS CONTINUOUS
Status: DISCONTINUED | OUTPATIENT
Start: 2023-03-28 | End: 2023-03-30

## 2023-03-28 RX ORDER — ACETAMINOPHEN 500 MG
1000 TABLET ORAL ONCE AS NEEDED
Status: DISCONTINUED | OUTPATIENT
Start: 2023-03-28 | End: 2023-03-28 | Stop reason: HOSPADM

## 2023-03-28 RX ADMIN — DEXAMETHASONE SODIUM PHOSPHATE 10 MG: 4 MG/ML VIAL (ML) INJECTION at 07:53:00

## 2023-03-28 RX ADMIN — NEOSTIGMINE METHYLSULFATE 3 MG: 1 INJECTION, SOLUTION INTRAVENOUS at 09:28:00

## 2023-03-28 RX ADMIN — ONDANSETRON 4 MG: 2 INJECTION INTRAMUSCULAR; INTRAVENOUS at 09:24:00

## 2023-03-28 RX ADMIN — SODIUM CHLORIDE, SODIUM LACTATE, POTASSIUM CHLORIDE, CALCIUM CHLORIDE: 600; 310; 30; 20 INJECTION, SOLUTION INTRAVENOUS at 10:02:00

## 2023-03-28 RX ADMIN — ROCURONIUM BROMIDE 20 MG: 10 INJECTION, SOLUTION INTRAVENOUS at 08:49:00

## 2023-03-28 RX ADMIN — ROCURONIUM BROMIDE 20 MG: 10 INJECTION, SOLUTION INTRAVENOUS at 08:19:00

## 2023-03-28 RX ADMIN — GLYCOPYRROLATE 0.4 MG: 0.2 INJECTION, SOLUTION INTRAMUSCULAR; INTRAVENOUS at 09:28:00

## 2023-03-28 RX ADMIN — LIDOCAINE HYDROCHLORIDE 50 MG: 10 INJECTION, SOLUTION EPIDURAL; INFILTRATION; INTRACAUDAL; PERINEURAL at 07:40:00

## 2023-03-28 RX ADMIN — DEXAMETHASONE SODIUM PHOSPHATE 10 MG: 4 MG/ML VIAL (ML) INJECTION at 09:24:00

## 2023-03-28 NOTE — BRIEF OP NOTE
Pre-Operative Diagnosis: Degenerative disc disease, cervical [M50.30]  S/P cervical spinal fusion [Z98.1]  Cervical pain [M54.2]  Cervical myelopathy with cervical radiculopathy (HCC) [G95.9, M54.12]  Cervical spondylosis with myelopathy and radiculopathy [M47.12, M47.22]     Post-Operative Diagnosis: Degenerative disc disease, cervical [M50.30]S/P cervical spinal fusion [M85. 1]Cervical pain [M54. 2]Cervical myelopathy with cervical radiculopathy (HCC) [G95.9, M54.12]Cervical spondylosis with myelopathy and radiculopathy [M47.12, M47.22]      Procedure Performed:   CERVICAL THREE TO CERVICAL FOUR ANTERIOR CERVICAL DISCECTOMY AND FUSION. BONE GRAFT AND ALL REQUIRED INSTRUMENTATION. NEUROMONITORING.       Surgeon(s) and Role:     * Alton Pacheco DO - Primary    Assistant(s):  Surgical Assistant.: Sanaz Gallegos     Surgical Findings: hypermobile segment at C3-4 and L paracentral disc herniation     Specimen: none     Estimated Blood Loss: Blood Output: 10 mL (3/28/2023  9:33 AM)      Dictation Number:  To follow    Addie Lindo DO  3/28/2023  9:54 AM

## 2023-03-28 NOTE — OPERATIVE REPORT
Date of surgery  3/28/23     Preoperative dx  Cervical spondylosis with radiculopathy and myelopathy       Postoperative dx  Same     Procedure [please list them in numbered format]   1. Anterior right-sided approach to the cervical spine from C3-4   2. Anterior disc resections at C3-4   3. Microfoaminotomies at C3-4 bilaterally   4. Use of microscope for decompression procedure   5. Anterior interbody fusion with Atec PEEK standalone cage at C3-4   6. Use of local autograft and DBM within implant for fusion at C3-4   7. Anterior instrumentation from C3-4 with Atec plate and screws   8. Intraoperative SSEP monitoring   9. Radiographs for identification of surgical level and for confirmation of appropriate placement of grafts [use of fluoroscopy for duration of instrumentation]     Surgeon[s] [employer] [facility]     Anesthesia  GETA     IVF 1200 EBL  10  Uout  250   Specimen none     Drains none     Complications none  Condition  Stable to PACU     Indications     This is a 50 YO M/F with intractable neck and shoulder pain as well as numbness in her hands having failed [all nonoperative management]. The patient had undergone a prior C4-6 ACDF by Dr. Marianne Khan approx. 1 year ago. Imaging showed a hypermobile segment with cord compression due to a disc herniation at C3-4, the adjacent level. It was mutually decided that surgical intervention was the best option at this point. All risks and benefits of the surgery were explained to the patient, and he/she wished to proceed with the surgery. Surgical complexity was high due to revision nature status. Details of surgery     Patient was placed supine on the OR table, and general anesthesia was induced. The head halter traction device was set up and 10 lbs of weight. The neck was extended within the comfortable range as checked preoperatively. 3-inch wide cloth tape was used to tape down the shoulders. All bony prominences were well-padded.  [A radioopaque skin marker was placed at what appeared to be the cervical disc level of interest, and a lateral radiograph taken.]   A right-sided, approximately 3 inch long  [oblique] incision was made at approximately the C4 level as denoted by anatomic landmarks [the marker]. A 15-blade scalpel was used to incise the skin at the previous scar and subcutaneous tissues, down through the platysma muscle until the superficial layer of the deep cervical fascia was exposed. This layer was then carefully incised vertically along the medial border of the sternocleiodomastoid muscle using metzenbaum scissors. Significant scar tissue was noted and carefully dissected using kitners. Then finger dissection was used to bluntly separate the deeper lateral carotid sheath from the medial tracheo-esophageal bundle. Hand-held cloward retractors were used to expose the longus colli muscles anterior to the spine, and Kitners were used to bluntly dissect them away bilaterally, exposing the periosteum and disc spaces. A 18 gauge needle was used to amauri the disc space and a lateral radiograph taken to confirm the level. Once the level was confirmed radiographically, then subperiosteal dissection was performed  over the disc space[s] and vertebral bodies of interest, taking great precaution not to violate the uninvolved disc spaces. Kitners and bipolar cautery were used. Self-retaining retractors were then placed. The superior aspect of the prior construct was visualized and the previous C4 screw was noted to be in locked position in the plate despite the XR showing it to be proud. The microscope was then carefully positioned. Anterior cervical diskectomy [ies] was then commenced. [All diskectomies were performed identically]. Annulotomy was performed using an 11- blade.  Then small pituitary rongeurs and curettes were used to remove the disk material.[A high speed bur was used to remove the endplate down to bleeding bone in preparation for interbody fusion .] [The cartilaginous endplates were completely removed in preparation of implant/ prosthesis placement]. Posterior vertebral osteophytes were removed with small kerrison rongeurs. Bilateral microforaminotomies were also performed using kerrisons. The implant size was then estimated using trial implants. Then the appropriate sized implant was chosen. [The intramedullary space was packed with DBM and local autograft] It was then carefully malleted into place. The neck distraction across the disk space was then released. Utilizing specialized tools from the [facility] system screws were placed afixing the cage to the vertebral body above and below. An AP/lateral radiograph was then taken to confirm appropriate placement of graft[s] and instrumentation. After all implants [grafts] were in place, abundant irrigation of the wound was performed with antibiotic-containing irrigation. All bleeding was controlled with bipolar cautery, thrombin-soaked gelfoam, and floseal.  Closure was then commenced. The platysma muscle was closed using interrupted [2-0] vicryl. The subcutaneous layer was closed using [3-0] interrupted vicryl. The skin was then closed using mastisol and steri-strips . Xeroform and 4x4 gauze was applied and secured with microporous tape. All needle and sponge counts were correct. There were no complications during this surgery. A hard cervical collar was placed. The patient was then revived, extubated, and taken to recovery room in stable condition.

## 2023-03-28 NOTE — ANESTHESIA POSTPROCEDURE EVALUATION
[address] [facility] Patient Status:  Outpatient in a Bed   Age/Gender 49year old female MRN [id number]   Location [address] [facility] Attending Kayla Zelaya DO   Hosp Day # 0 PCP Yas Syed PA-C       Anesthesia Post-op Note    CERVICAL THREE TO CERVICAL FOUR ANTERIOR CERVICAL DISCECTOMY AND FUSION. BONE GRAFT AND ALL REQUIRED INSTRUMENTATION. NEUROMONITORING. Procedure Summary     Date: 03/28/23 Room / Location: [address] [place] [address] OR 16 / [address] [facility] OR    Anesthesia Start: 0732 Anesthesia Stop: 1003    Procedures:       CERVICAL THREE TO CERVICAL FOUR ANTERIOR CERVICAL DISCECTOMY AND FUSION. BONE GRAFT AND ALL REQUIRED INSTRUMENTATION. NEUROMONITORING. (Spine Cervical)      INTRAOPERATIVE NEURO MONITORING (Spine Cervical) Diagnosis:       Degenerative disc disease, cervical      S/P cervical spinal fusion      Cervical pain      Cervical myelopathy with cervical radiculopathy (HCC)      Cervical spondylosis with myelopathy and radiculopathy      (Degenerative disc disease, cervical [M50.30]S/P cervical spinal fusion [G41. 1]Cervical pain [M54. 2]Cervical myelopathy with cervical radiculopathy (HCC) [G95.9, M54.12]Cervical spondylosis with myelopathy and radiculopathy [H66.60, M47.22])    Surgeons: Kayla Zelaya DO Anesthesiologist: Aj Mooney DO    Anesthesia Type: general ASA Status: 2          Anesthesia Type: general    Vitals Value Taken Time   /65 03/28/23 1003   Temp 97.1 03/28/23 1003   Pulse 72 03/28/23 1003   Resp 16 03/28/23 1003   SpO2 100 03/28/23 1003       Patient Location: PACU    Anesthesia Type: general    Airway Patency: patent and extubated    Postop Pain Control: adequate    Mental Status: mildly sedated but able to meaningfully participate in the post-anesthesia evaluation    Nausea/Vomiting: none    Cardiopulmonary/Hydration status: stable euvolemic    Complications: no apparent anesthesia related complications    Postop vital signs: stable    Dental Exam: Unchanged from Preop    Patient to be discharged from PACU when criteria met.

## 2023-03-28 NOTE — PLAN OF CARE
Pt Aox4, drowsy but easily arousable. 2L O2 per nc, . Castro. [place] collar in place. DENISSE/SCD on. Dressing to neck, telfa tegaderm CDI.

## 2023-03-28 NOTE — INTERVAL H&P NOTE
Pre-op Diagnosis: Degenerative disc disease, cervical [M50.30]  S/P cervical spinal fusion [Z98.1]  Cervical pain [M54.2]  Cervical myelopathy with cervical radiculopathy (HCC) [G95.9, M54.12]  Cervical spondylosis with myelopathy and radiculopathy [M47.12, M47.22]    The above referenced H&P was reviewed by Lazarus Erickson DO on 3/28/2023, the patient was examined and no significant changes have occurred in the patient's condition since the H&P was performed. I discussed with the patient and/or legal representative the potential benefits, risks and side effects of this procedure; the likelihood of the patient achieving goals; and potential problems that might occur during recuperation. I discussed reasonable alternatives to the procedure, including risks, benefits and side effects related to the alternatives and risks related to not receiving this procedure. We will proceed with procedure as planned.

## 2023-03-28 NOTE — ANESTHESIA PROCEDURE NOTES
Airway  Date/Time: 3/28/2023 7:42 AM  Urgency: elective    Airway not difficult    General Information and Staff    Patient location during procedure: OR  Anesthesiologist: Rossy Villela DO  Performed: anesthesiologist   Performed by: Rossy Villela DO  Authorized by: Rossy Villela DO      Indications and Patient Condition  Indications for airway management: anesthesia  Sedation level: deep  Preoxygenated: yes  Patient position: sniffing  Mask difficulty assessment: 1 - vent by mask    Final Airway Details  Final airway type: endotracheal airway      Successful airway: ETT  Cuffed: yes   Successful intubation technique: direct laryngoscopy  Endotracheal tube insertion site: oral  Blade: Kyleigh  Blade size: #3  ETT size (mm): 7.0    Cormack-Lehane Classification: grade I - full view of glottis  Placement verified by: chest auscultation and capnometry   Measured from: lips  Number of attempts at approach: 1  Number of other approaches attempted: 0

## 2023-03-28 NOTE — PROGRESS NOTES
NURSING ADMISSION NOTE      Patient admitted via bed. Oriented to room. Safety precautions initiated. Bed in low position. Call light in reach. Passed dysphagia screen .

## 2023-03-29 ENCOUNTER — APPOINTMENT (OUTPATIENT)
Dept: GENERAL RADIOLOGY | Facility: HOSPITAL | Age: 48
End: 2023-03-29
Attending: NEUROLOGICAL SURGERY
Payer: COMMERCIAL

## 2023-03-29 LAB
ANION GAP SERPL CALC-SCNC: 5 MMOL/L (ref 0–18)
BUN BLD-MCNC: 12 MG/DL (ref 7–18)
CALCIUM BLD-MCNC: 9 MG/DL (ref 8.5–10.1)
CHLORIDE SERPL-SCNC: 110 MMOL/L (ref 98–112)
CO2 SERPL-SCNC: 25 MMOL/L (ref 21–32)
CREAT BLD-MCNC: 0.54 MG/DL
GFR SERPLBLD BASED ON 1.73 SQ M-ARVRAT: 113 ML/MIN/1.73M2 (ref 60–?)
GLUCOSE BLD-MCNC: 139 MG/DL (ref 70–99)
HCT VFR BLD AUTO: 40.4 %
HGB BLD-MCNC: 13.6 G/DL
OSMOLALITY SERPL CALC.SUM OF ELEC: 292 MOSM/KG (ref 275–295)
POTASSIUM SERPL-SCNC: 4.4 MMOL/L (ref 3.5–5.1)
SODIUM SERPL-SCNC: 140 MMOL/L (ref 136–145)

## 2023-03-29 PROCEDURE — 72040 X-RAY EXAM NECK SPINE 2-3 VW: CPT | Performed by: NEUROLOGICAL SURGERY

## 2023-03-29 PROCEDURE — 99214 OFFICE O/P EST MOD 30 MIN: CPT | Performed by: STUDENT IN AN ORGANIZED HEALTH CARE EDUCATION/TRAINING PROGRAM

## 2023-03-29 PROCEDURE — 99024 POSTOP FOLLOW-UP VISIT: CPT | Performed by: PHYSICIAN ASSISTANT

## 2023-03-29 RX ORDER — ACETAMINOPHEN 325 MG/1
650 TABLET ORAL EVERY 6 HOURS PRN
Status: DISCONTINUED | OUTPATIENT
Start: 2023-03-29 | End: 2023-03-30

## 2023-03-29 RX ORDER — OXYCODONE HYDROCHLORIDE 10 MG/1
10 TABLET ORAL EVERY 4 HOURS PRN
Status: DISCONTINUED | OUTPATIENT
Start: 2023-03-29 | End: 2023-03-30

## 2023-03-29 RX ORDER — OXYCODONE HYDROCHLORIDE 5 MG/1
5 TABLET ORAL EVERY 4 HOURS PRN
Status: DISCONTINUED | OUTPATIENT
Start: 2023-03-29 | End: 2023-03-30

## 2023-03-29 RX ORDER — RIZATRIPTAN BENZOATE 10 MG/1
10 TABLET, ORALLY DISINTEGRATING ORAL ONCE
Status: COMPLETED | OUTPATIENT
Start: 2023-03-29 | End: 2023-03-29

## 2023-03-29 NOTE — PLAN OF CARE
A&O x4, drowsy this evening. VSS on 2L O2 nc overnight. . Pain well controlled with IV and PO medication. Denies N/T post op. Castro catheter draining clear yellow urine without difficulty. Telfa drsg to anterior neck C/D/I. Aspen collar when OOB. Bilateral SCDs. IVF and IV abx infusing as ordered. Reviewed POC, pain management, IS use, and fall precautions with pt. Bed alarm on w/bed in lowest position. Pt reminded to use call light. Verbalized understanding. PT/OT to see.

## 2023-03-29 NOTE — PLAN OF CARE
Pt Aox4, RA, . Castro DC, pt voiding well. Aspen collar in place. Pt reports some difficulty swallowing and that solids are hard to swallow and feel like they get stuck. No concern for aspiration. Dr Mack Locke aware, SLP to galindo later today. Pain controlled with po oxy. Ambulates with standby assist. IV SL. Plan to Dc home tomorrow.

## 2023-03-30 ENCOUNTER — APPOINTMENT (OUTPATIENT)
Dept: GENERAL RADIOLOGY | Facility: HOSPITAL | Age: 48
End: 2023-03-30
Attending: NEUROLOGICAL SURGERY
Payer: COMMERCIAL

## 2023-03-30 VITALS
SYSTOLIC BLOOD PRESSURE: 113 MMHG | DIASTOLIC BLOOD PRESSURE: 61 MMHG | HEIGHT: 63 IN | WEIGHT: 150 LBS | OXYGEN SATURATION: 94 % | BODY MASS INDEX: 26.58 KG/M2 | RESPIRATION RATE: 20 BRPM | TEMPERATURE: 99 F | HEART RATE: 62 BPM

## 2023-03-30 LAB
ANION GAP SERPL CALC-SCNC: 3 MMOL/L (ref 0–18)
BUN BLD-MCNC: 15 MG/DL (ref 7–18)
CALCIUM BLD-MCNC: 8.8 MG/DL (ref 8.5–10.1)
CHLORIDE SERPL-SCNC: 110 MMOL/L (ref 98–112)
CO2 SERPL-SCNC: 29 MMOL/L (ref 21–32)
CREAT BLD-MCNC: 0.7 MG/DL
GFR SERPLBLD BASED ON 1.73 SQ M-ARVRAT: 107 ML/MIN/1.73M2 (ref 60–?)
GLUCOSE BLD-MCNC: 91 MG/DL (ref 70–99)
OSMOLALITY SERPL CALC.SUM OF ELEC: 294 MOSM/KG (ref 275–295)
POTASSIUM SERPL-SCNC: 3.9 MMOL/L (ref 3.5–5.1)
SODIUM SERPL-SCNC: 142 MMOL/L (ref 136–145)

## 2023-03-30 PROCEDURE — 74230 X-RAY XM SWLNG FUNCJ C+: CPT | Performed by: NEUROLOGICAL SURGERY

## 2023-03-30 RX ORDER — DIAZEPAM 5 MG/1
10 TABLET ORAL EVERY 12 HOURS PRN
Qty: 60 TABLET | Refills: 0 | Status: SHIPPED | OUTPATIENT
Start: 2023-03-30 | End: 2023-04-03

## 2023-03-30 RX ORDER — DEXAMETHASONE 4 MG/1
4 TABLET ORAL EVERY 8 HOURS SCHEDULED
Status: DISCONTINUED | OUTPATIENT
Start: 2023-03-30 | End: 2023-03-30

## 2023-03-30 RX ORDER — ECHINACEA PURPUREA EXTRACT 125 MG
1 TABLET ORAL
Status: DISCONTINUED | OUTPATIENT
Start: 2023-03-30 | End: 2023-03-30

## 2023-03-30 RX ORDER — DEXAMETHASONE 4 MG/1
4 TABLET ORAL EVERY 6 HOURS SCHEDULED
Status: DISCONTINUED | OUTPATIENT
Start: 2023-03-30 | End: 2023-03-30

## 2023-03-30 RX ORDER — HYDROCODONE BITARTRATE AND ACETAMINOPHEN 5; 325 MG/1; MG/1
1 TABLET ORAL EVERY 4 HOURS PRN
Qty: 45 TABLET | Refills: 0 | Status: SHIPPED | OUTPATIENT
Start: 2023-03-30 | End: 2023-04-04

## 2023-03-30 NOTE — CM/SW NOTE
03/30/23 1100   CM/SW Referral Data   Referral Source Social Work (self-referral)   Reason for Referral Discharge planning   Informant EMR;Clinical Staff Member   Discharge Needs   Anticipated D/C needs Home health care       HOME SITUATION  Type of Home: [facility]   Home Layout: Two level     Lives With:  (w/ 11y/o and 15y/o children; ex- assists prn)  Drives: Yes  Patient Regularly Uses: Glasses     Prior Level of [place] per PT eval: Pt reports ind PTA, good recovery from previous cervical surgery. Patient is a 50 y/o woman admitted for ACDF. PT recommending HHC. Referrals sent to [facility] providers via [address]. Await responses for further DC planning. / to remain available for support and/or discharge planning.      Andrey Vera LCSW  Discharge Planner  [phone]

## 2023-03-30 NOTE — TELEPHONE ENCOUNTER
Attending Physician Statement from [facility] received by MA clinical staff. Paperwork initiated and endorsed to provider for review and signature.     Placed on Dr Figueroa Nascimento desk

## 2023-03-30 NOTE — PLAN OF CARE
Pt received A/Ox4. VSS. RA/. Anterior neck dressing C/D/I. Reports some pain without swallowing, but able to swallow without choking or gasping. Pt reports pain 8/10 on pain scale, but declining pain medication at this time as she didn't like the way they made her feel. Utilizing warm packs and repositioning for comfort. Aspen collar at bedside on when OOB. TEDS and SCDS. Voiding without any difficulty. Call light within reach, will cont to monitor.

## 2023-03-30 NOTE — PLAN OF CARE
Patient is alert and oriented x4. No complaint of numbness or tingling. Good motor strength and . Some complaints of pain last rated severe, medication given. Incision is intact with telfa / tegadem. No difficulty swallowing. Patient uses apsen collar when OOB. Patient ambulates standy by assist. Vital signs in normal range. IS and ankle pumps encouraged. Belongings within reach. Encouraged to call for any needs.

## 2023-03-31 ENCOUNTER — PATIENT MESSAGE (OUTPATIENT)
Dept: SURGERY | Facility: CLINIC | Age: 48
End: 2023-03-31

## 2023-03-31 NOTE — DISCHARGE SUMMARY
[facility]  Discharge Summary    Ben Andino Patient Status:  Outpatient in a Bed    1975 MRN [id number]   [facility] 3SW-A Attending No att. providers found   Hosp Day # 0 PCP Jamal Glez PA-C     Date of Admission: 3/28/2023    Date of Discharge: 3/30/2023  6:00 PM    Admitting Diagnosis: Degenerative disc disease, cervical [M50.30]  S/P cervical spinal fusion [Z98.1]  Cervical pain [M54.2]  Cervical myelopathy with cervical radiculopathy (HCC) [G95.9, M54.12]  Cervical spondylosis with myelopathy and radiculopathy [M47.12, M47.22]    Discharge Diagnosis: Patient Active Problem List:     Carcinoma in situ of breast     History of breast cancer     Generalized anxiety disorder     Degenerative disc disease, cervical     Spinal stenosis of cervical region     Hyperthyroidism     Acquired hypothyroidism     Encounter for therapeutic drug monitoring     Malignant neoplasm of breast (female) ([facility] [address]     Mononeuritis     Migraine without aura and without status migrainosus, not intractable     Acute non-recurrent sinusitis     Pelvic pain     Ovarian cyst     Fibroids, intramural     Cyst of ovary, unspecified laterality     Essential hypertension     Intertrigo     Cervical pain     Primary hypertension     Cervical myelopathy (HCC)   Degenerative disc disease, cervical [M50.30]S/P cervical spinal fusion [U97. 1]Cervical pain [M54. 2]Cervical myelopathy with cervical radiculopathy (HCC) [G95.9, M54.12]Cervical spondylosis with myelopathy and radiculopathy [M47.12, M47.22]    Consultations: Orders Placed This Encounter      Consult to Hospitalist      Reason for Admission: C3-4 ACDF    Procedures: CERVICAL THREE TO CERVICAL FOUR ANTERIOR CERVICAL DISCECTOMY AND FUSION. BONE GRAFT AND ALL REQUIRED INSTRUMENTATION. NEUROMONITORING. History of Present Illness: Hx of C4-6 ACDF with persistent left sided pain/numbness.     Brief Summary of Hospital Course: Patient had expected post op pain but improvement of neuropathic symptoms. She had post op dysphagia and hoarseness, diet was cleared by a swallow study. Complications: None    Discharge Condition: Good    Disposition: Home Health Care Services    Discharge Medications: Discharge Medication List as of 3/30/2023  5:29 PM    CONTINUE these medications which have NOT CHANGED    losartan 50 MG Oral Tab  Take 1 tablet (50 mg total) by mouth daily. , Historical    magnesium 250 MG Oral Tab  Take 1 tablet (250 mg total) by mouth daily. , Historical    ferrous sulfate 325 (65 FE) MG Oral Tab EC  Take 1 tablet (325 mg total) by mouth daily with breakfast., Historical    cholecalciferol 25 MCG (1000 UT) Oral Tab  Take 1 tablet (1,000 Units total) by mouth daily. , Historical    METOPROLOL TARTRATE 50 MG Oral Tab  Take 1 tablet (50 mg total) by mouth 2 (two) times daily. , Normal, Disp-180 tablet, R-0    ALPRAZolam 0.25 MG Oral Tab  Take 1 tablet (0.25 mg total) by mouth 3 (three) times daily as needed for Anxiety., Normal, Disp-30 tablet, R-3    Rizatriptan Benzoate 10 MG Oral Tab  Take 1 tablet (10 mg total) by mouth as needed for Migraine., Normal, Disp-9 tablet, R-5    levothyroxine 88 MCG Oral Tab  Take 1 tablet (88 mcg total) by mouth in the morning., Normal, Disp-90 tablet, R-1      STOP taking these medications    cephalexin 500 MG Oral Cap          Your appointments     Date & Time Appointment Department [facility])    Apr 04, 2023  9:15 AM CDT Post Op Visit with FRANCY Noonan [facility], [address] [place] [address] ([address] [place] [address])        Apr 13, 2023  2:15 PM CDT Exam - Established with Claudetta Cope, MD [address] ([address])        Apr 28, 2023 10:15 AM CDT Post Op Visit with Negrita Westfall,[address], [address] [place], [place] [address]Kaleb ([address] [facility])            [address]  [phone] Wing Damian Lozoya [address] [address]           Patient Instructions: Activity: OOB, ambulate several times a day. Avoid prolonged immobility  Wound Care: Ok to shower, do not scrub incision. Call with questions or concerns regarding wound    Other Discharge Instructions:  F/u as scheduled.   Call or go to ED should you develop fever, shortness of breath, chest pain, or leg swelling    Samuel Richards  3/31/2023  8:50 AM

## 2023-03-31 NOTE — TELEPHONE ENCOUNTER
Noted that patient has messaged a follow up note regarding Sarah Conn documents. Received feedback from Dr. Lucia Gonzales stating that he will be documenting necessary dates off which include the time that he first saw and evaluated patient through her post op recovery. Called patient and informed her of above. Advised patient to seek additional off-work documentation from her PCP for the dates prior to seeing Dr. Lucia Gonzales on 1/27/23. Patient acknowledged. Informed patient that clinic staff has received documents and endorsed them to Dr. Lucia Gonzales. Patient thanked Nursing for the feedback and the information and the call was ended.

## 2023-03-31 NOTE — TELEPHONE ENCOUNTER
From: James Concepcion  To: Ida Owens DO  Sent: 3/31/2023 10:45 AM CDT  Subject: Medical Paper Work     Good morning. I received a phone call from [facility] and according to them, their saying Doctor Samm Neil hasn't filled out medical paperwork yet? Not sure if this is true, but it's worrying me a great deal because I've told [facility] many many times to send paperwork, so I can get approved for Short Term Disability while I recover from surgery. Without this paperwork being filled out, I also don't get paid and I need My income while I'm on medical leave. Is there a way to check that [facility] sent paperwork to your office? I feel so stressed over there and I'm trying to recover since my surgery was just two days ago.  Thank you so much for your time and have a nice day

## 2023-03-31 NOTE — TELEPHONE ENCOUNTER
From: Nitish Lockhart  To: Luis Fernando Amador DO  Sent: 3/26/2023 5:57 AM CDT  Subject: Medical forms     Good morning. Yudy Randall faxed over medical forms that need to be filled out. Hopefully your office received them? Thank you so much. There are dates I called in sick in the past due to illness so I will include them, so I'm not penalized at work for my attendance. November 1st and November 17Th of 2022.    January 17Th, 2023, February 25th, 2023 and March 24th, 2023

## 2023-03-31 NOTE — TELEPHONE ENCOUNTER
Hi    I saw the patient Jan 27th and I am fine with any date after that but the dates prior will need to be managed by her PCP or a pain specialist that was treating her prior to my operation.       Dr. Anabell Mckeon

## 2023-03-31 NOTE — TELEPHONE ENCOUNTER
Noted that patient has messaged, inquiring if [employer] forms were received. In addition, she is requesting the dates listed below to be added to the paperwork. Forms have been endorsed to Dr. Rosalinda Fuller by clinic staff. Noted that Dr. Figueroa Nascimento first office visit with patient was on 1/27/23, and dates from 11/1/22, 11/17/22, and 1/17/23 are prior to his evaluation. Routed to Dr. Rosalinda Fuller.

## 2023-04-03 ENCOUNTER — PATIENT MESSAGE (OUTPATIENT)
Dept: SURGERY | Facility: CLINIC | Age: 48
End: 2023-04-03

## 2023-04-03 RX ORDER — DIAZEPAM 5 MG/1
10 TABLET ORAL EVERY 12 HOURS PRN
Qty: 60 TABLET | Refills: 0 | Status: SHIPPED | OUTPATIENT
Start: 2023-04-03

## 2023-04-03 NOTE — TELEPHONE ENCOUNTER
Medication: Diazepam 5mg       Per [employer] message from patient on 4/3/2023 Diazepam was sent to wrong pharmacy [facility]).      Writer called [address] in [place] and cancelled the prescription for Diazepam.      Prescription pending for approval to correct pharmacy [facility]

## 2023-04-03 NOTE — TELEPHONE ENCOUNTER
Valium 5 mg tablet 2 tablets twice a day was changed to the [facility] of the pharmacy

## 2023-04-03 NOTE — TELEPHONE ENCOUNTER
From: Nomi Gilbert  To: Samuel Conway  Sent: 4/3/2023 3:02 PM CDT  Subject: Medication     Hi. The prescription diazepam was sent to the wrong pharmacy. I'm here now trying to get it filled. I'm in a lot of pain.  Are you able to send prescription asap please and thank you mouth guard

## 2023-04-04 ENCOUNTER — OFFICE VISIT (OUTPATIENT)
Dept: SURGERY | Facility: CLINIC | Age: 48
End: 2023-04-04
Payer: COMMERCIAL

## 2023-04-04 VITALS
DIASTOLIC BLOOD PRESSURE: 70 MMHG | HEIGHT: 63 IN | BODY MASS INDEX: 26.58 KG/M2 | SYSTOLIC BLOOD PRESSURE: 128 MMHG | HEART RATE: 81 BPM | OXYGEN SATURATION: 98 % | WEIGHT: 150 LBS

## 2023-04-04 DIAGNOSIS — M50.30 DEGENERATIVE DISC DISEASE, CERVICAL: ICD-10-CM

## 2023-04-04 DIAGNOSIS — G95.9 CERVICAL MYELOPATHY WITH CERVICAL RADICULOPATHY (HCC): Primary | ICD-10-CM

## 2023-04-04 DIAGNOSIS — Z98.1 S/P CERVICAL SPINAL FUSION: ICD-10-CM

## 2023-04-04 DIAGNOSIS — M54.12 CERVICAL MYELOPATHY WITH CERVICAL RADICULOPATHY (HCC): Primary | ICD-10-CM

## 2023-04-04 PROCEDURE — 3008F BODY MASS INDEX DOCD: CPT | Performed by: PHYSICIAN ASSISTANT

## 2023-04-04 PROCEDURE — 99024 POSTOP FOLLOW-UP VISIT: CPT | Performed by: PHYSICIAN ASSISTANT

## 2023-04-04 PROCEDURE — 3074F SYST BP LT 130 MM HG: CPT | Performed by: PHYSICIAN ASSISTANT

## 2023-04-04 PROCEDURE — 3078F DIAST BP <80 MM HG: CPT | Performed by: PHYSICIAN ASSISTANT

## 2023-04-04 RX ORDER — METHYLPREDNISOLONE 4 MG/1
TABLET ORAL
Qty: 1 EACH | Refills: 0 | Status: SHIPPED | OUTPATIENT
Start: 2023-04-04

## 2023-04-04 NOTE — PROGRESS NOTES
Established patient:  Reason for follow up: Surgery 3/28/2023 CERVICAL THREE TO CERVICAL FOUR ANTERIOR CERVICAL DISCECTOMY AND FUSION. BONE GRAFT AND ALL REQUIRED INSTRUMENTATION. NEUROMONITORING.      Numeric Rating Scale: (No Pain) 0  to  10 (Worst Pain)        Pain at Present:  8/10       Distribution of Pain:    bilateral    Most recent treatments for Current Pain Condition:   Surgery  Response to treatment: some relief    New imaging or testing since your last office visit:  Xray 3/29/2023

## 2023-04-06 ENCOUNTER — PATIENT MESSAGE (OUTPATIENT)
Dept: SURGERY | Facility: CLINIC | Age: 48
End: 2023-04-06

## 2023-04-18 ENCOUNTER — HOSPITAL ENCOUNTER (OUTPATIENT)
Dept: GENERAL RADIOLOGY | Age: 48
Discharge: HOME | End: 2023-04-18
Attending: PHYSICIAN ASSISTANT
Payer: COMMERCIAL

## 2023-04-18 ENCOUNTER — TELEPHONE (OUTPATIENT)
Dept: SURGERY | Facility: CLINIC | Age: 48
End: 2023-04-18

## 2023-04-18 DIAGNOSIS — G95.9 CERVICAL MYELOPATHY WITH CERVICAL RADICULOPATHY (HCC): ICD-10-CM

## 2023-04-18 DIAGNOSIS — M54.12 CERVICAL MYELOPATHY WITH CERVICAL RADICULOPATHY (HCC): ICD-10-CM

## 2023-04-18 DIAGNOSIS — M50.30 DEGENERATIVE DISC DISEASE, CERVICAL: ICD-10-CM

## 2023-04-18 DIAGNOSIS — Z98.1 S/P CERVICAL SPINAL FUSION: ICD-10-CM

## 2023-04-18 PROCEDURE — 72040 X-RAY EXAM NECK SPINE 2-3 VW: CPT | Performed by: PHYSICIAN ASSISTANT

## 2023-04-18 NOTE — TELEPHONE ENCOUNTER
Reached out to patient bout her imaging, patient stated she will have her XR done before her appointment.

## 2023-04-25 RX ORDER — METOPROLOL TARTRATE 50 MG/1
TABLET, FILM COATED ORAL
Qty: 180 TABLET | Refills: 3 | Status: SHIPPED | OUTPATIENT
Start: 2023-04-25

## 2023-04-25 RX ORDER — METOPROLOL TARTRATE 50 MG/1
50 TABLET, FILM COATED ORAL 2 TIMES DAILY
Qty: 180 TABLET | Refills: 0 | Status: CANCELLED | OUTPATIENT
Start: 2023-04-25

## 2023-04-25 NOTE — TELEPHONE ENCOUNTER
Refill passed per [facility], [facility] protocol    Requested Prescriptions   Pending Prescriptions Disp Refills    METOPROLOL TARTRATE 50 MG Oral Tab [Pharmacy Med Name: Metoprolol Tartrate 50 Mg Tab Auro] 180 tablet 3     Sig: TAKE ONE TABLET BY MOUTH TWICE DAILY       Hypertensive Medications Protocol Passed - 4/25/2023  7:16 AM        Passed - In person appointment in the past 12 or next 3 months     Recent Outpatient Visits              3 weeks ago Cervical myelopathy with cervical radiculopathy [facility])    [facility], [address] [place] [address], Phuong Dudley [address] [place] [address]    Office Visit    1 month ago Pre-op examination    [address], 12 [facility], [place] Kris Benton PA-C    Office Visit    1 month ago Cervical myelopathy with cervical radiculopathy [facility])    [address], 1024 [facility], Joyce Herrera [address] [place] [address],     Office Visit    1 month ago [address] [facility], [facility], [place], [place], MD    Office Visit    1 month ago Left shoulder pain, unspecified chronicity    [facility], [facility], Mariano Villalba MD    Office Visit          Future Appointments         Provider Department Appt Notes    In 3 days FRANCY Cagle [facility], [address] [place] [address] 4wk post op, [place] [phone]    #ED               Passed - Last BP reading less than 140/90     BP Readings from Last 1 Encounters:  04/04/23 : 128/70                Passed - CMP or BMP in past 6 months     Recent Results (from the past 4392 hour(s))   Basic Metabolic Panel (8)    Collection Time: 03/30/23  5:04 AM   Result Value Ref Range    Glucose 91 70 - 99 mg/dL    Sodium 142 136 - 145 mmol/L    Potassium 3.9 3.5 - 5.1 mmol/L    Chloride 110 98 - 112 mmol/L    CO2 29.0 21.0 - 32.0 mmol/L    Anion Gap 3 0 - 18 mmol/L    BUN 15 7 - 18 mg/dL    Creatinine 0.70 0.55 - 1.02 mg/dL    Calcium, Total 8.8 8.5 - 10.1 mg/dL Calculated Osmolality 294 275 - 295 mOsm/kg    eGFR-Cr 107 >=60 mL/min/1.73m2     *Note: Due to a large number of results and/or encounters for the requested time period, some results have not been displayed. A complete set of results can be found in Results Review.                  Passed - In person appointment or virtual visit in the past 6 months     Recent Outpatient Visits              3 weeks ago Cervical myelopathy with cervical radiculopathy [facility])    [facility] [address] [place] [address] Phuong Dudley [address] [place] [address]    Office Visit    1 month ago Pre-op examination    [address], [place] Elizabethann Chi, [employer]    Office Visit    1 month ago Cervical myelopathy with cervical radiculopathy [facility])    [address], 1024 [facility], Joyce Herrera 1, Tyler Hernandez DO    Office Visit    1 month ago [facility], 602 [facility]    Office Visit    1 month ago Left shoulder pain, unspecified chronicity    [address] [facility], Arelis Davis MD    Office Visit          Future Appointments         Provider Department Appt Notes    In 3 days FRANCY Felix [facility], [address] [place] [address] 4wk post op, [place] [phone]    #ED               Passed - EGFRCR or GFRNAA > 50     GFR Evaluation  EGFRCR: 107 , resulted on 3/30/2023

## 2023-04-26 RX ORDER — METOPROLOL TARTRATE 50 MG/1
50 TABLET, FILM COATED ORAL 2 TIMES DAILY
Qty: 180 TABLET | Refills: 0 | OUTPATIENT
Start: 2023-04-26

## 2023-04-28 ENCOUNTER — OFFICE VISIT (OUTPATIENT)
Dept: SURGERY | Facility: CLINIC | Age: 48
End: 2023-04-28
Payer: COMMERCIAL

## 2023-04-28 VITALS
HEART RATE: 80 BPM | HEIGHT: 63 IN | DIASTOLIC BLOOD PRESSURE: 72 MMHG | BODY MASS INDEX: 26.58 KG/M2 | WEIGHT: 150 LBS | SYSTOLIC BLOOD PRESSURE: 122 MMHG

## 2023-04-28 DIAGNOSIS — Z98.1 S/P CERVICAL SPINAL FUSION: Primary | ICD-10-CM

## 2023-04-28 DIAGNOSIS — M54.81 OCCIPITAL NEURITIS: ICD-10-CM

## 2023-04-28 PROCEDURE — 3074F SYST BP LT 130 MM HG: CPT | Performed by: PHYSICIAN ASSISTANT

## 2023-04-28 PROCEDURE — 3008F BODY MASS INDEX DOCD: CPT | Performed by: PHYSICIAN ASSISTANT

## 2023-04-28 PROCEDURE — 3078F DIAST BP <80 MM HG: CPT | Performed by: PHYSICIAN ASSISTANT

## 2023-04-28 PROCEDURE — 99024 POSTOP FOLLOW-UP VISIT: CPT | Performed by: PHYSICIAN ASSISTANT

## 2023-05-05 ENCOUNTER — PATIENT MESSAGE (OUTPATIENT)
Dept: SURGERY | Facility: CLINIC | Age: 48
End: 2023-05-05

## 2023-05-05 NOTE — TELEPHONE ENCOUNTER
From: Erika Alcaraz  To: FRANCY Wells  Sent: 5/5/2023 8:39 AM CDT  Subject: Pain     Good morning. I've been feeling very good until yesterday. The numbness is back on my whole left side of my body except for my collarbone. The worst is my left arm, hand, left side of neck and left side of the back of my head. Doctor referred me to see [facility] neurologist and I forgot what he called the procedure but he said they would put a fine needle in the back of my head with steroids to relieve pain and pressure. Why am I getting this pain now? What is that procedure called with neurologist that he suggests I get this procedure? I want to do it because this pain in the back of my head is excruciating and it gives me pain and affects my vision as well. It feels like someone baby shook me and I'm sure it's a side effect of the surgery ? Could I have occipital neuralgia? ?

## 2023-05-05 NOTE — TELEPHONE ENCOUNTER
Noted that [facility] stating that her symptoms will \"come and go until neck is healed\". Reply sent from patient and Nursing responded encouraging patient to schedule appt. With Neurology.

## 2023-05-06 DIAGNOSIS — G43.009 MIGRAINE WITHOUT AURA AND WITHOUT STATUS MIGRAINOSUS, NOT INTRACTABLE: ICD-10-CM

## 2023-05-06 RX ORDER — RIZATRIPTAN BENZOATE 10 MG/1
10 TABLET ORAL AS NEEDED
Qty: 9 TABLET | Refills: 5 | Status: SHIPPED | OUTPATIENT
Start: 2023-05-06

## 2023-05-06 NOTE — TELEPHONE ENCOUNTER
Refill passed per [facility] protocol. Requested Prescriptions   Pending Prescriptions Disp Refills    RIZATRIPTAN BENZOATE 10 MG Oral Tab [Pharmacy Med Name: Rizatriptan Benzoate 10 Mg Tab Auro] 9 tablet 0     Sig: Take 1 tablet (10 mg total) by mouth as needed for Migraine.        Neurology Medications Passed - 5/6/2023 11:26 AM        Passed - In person appointment or virtual visit in the past 6 mos or appointment in next 3 mos     Recent Outpatient Visits              1 week ago S/P cervical spinal fusion    [address] [facility], Brennan [place] , [place]    Office Visit    1 month ago Cervical myelopathy with cervical radiculopathy [facility])    [facility], [address] [place] [address] 126, [place]    Office Visit    1 month ago Pre-op examination    [address], [address], [place] Jenel Bold, [employer]    Office Visit    1 month ago Cervical myelopathy with cervical radiculopathy [facility])    [address], 1024 [facility], Joyce Herrera 1, Brittney Hartley DO    Office Visit    1 month ago 1 [facility], [facility], Alma Rosario MD    Office Visit          Future Appointments         Provider Department Appt Notes    In 2 days Rena Nurse, MD [address], [address] [facility] NP referred by [employer], Occipital neuritis    In 1 month Bradley Love DO [facility], [address] [place] [address] [address] post op, [place] 8.25.85                   Recent Outpatient Visits              1 week ago S/P cervical spinal fusion    [address], 1024 [facility] [place]    Office Visit    1 month ago Cervical myelopathy with cervical radiculopathy [facility])    [facility], [address] [place] [address]    Office Visit    1 month ago [facility]-op examination    [facility] [place], [place] Danella Halo, [employer]    Office Visit    1 month ago Cervical myelopathy with cervical radiculopathy [facility])    [facility], [address] [facility], 220 Kevynmarleen Sharma DO    Office Visit    1 month ago [address] [facility], 602 [facility], Jyoti Weir MD    Office Visit           Future Appointments         Provider Department Appt Notes    In 2 days Logan Patel MD [address] [facility] [place],[address], [address] [facility] NP referred by Fadi Carbajal, Occipital neuritis    In 1 month Keegan Mccracken DO [facility], [address] [facility] [address] post op, [place] [phone]

## 2023-05-08 ENCOUNTER — TELEPHONE (OUTPATIENT)
Dept: NEUROLOGY | Facility: CLINIC | Age: 48
End: 2023-05-08

## 2023-05-08 ENCOUNTER — OFFICE VISIT (OUTPATIENT)
Dept: NEUROLOGY | Facility: CLINIC | Age: 48
End: 2023-05-08
Payer: COMMERCIAL

## 2023-05-08 VITALS — HEART RATE: 64 BPM | DIASTOLIC BLOOD PRESSURE: 74 MMHG | RESPIRATION RATE: 16 BRPM | SYSTOLIC BLOOD PRESSURE: 124 MMHG

## 2023-05-08 DIAGNOSIS — G43.009 MIGRAINE WITHOUT AURA AND WITHOUT STATUS MIGRAINOSUS, NOT INTRACTABLE: ICD-10-CM

## 2023-05-08 DIAGNOSIS — M50.30 DEGENERATIVE DISC DISEASE, CERVICAL: Primary | Chronic | ICD-10-CM

## 2023-05-08 DIAGNOSIS — G43.009 MIGRAINE WITHOUT AURA AND WITHOUT STATUS MIGRAINOSUS, NOT INTRACTABLE: Primary | ICD-10-CM

## 2023-05-08 PROCEDURE — 3074F SYST BP LT 130 MM HG: CPT | Performed by: OTHER

## 2023-05-08 PROCEDURE — 3078F DIAST BP <80 MM HG: CPT | Performed by: OTHER

## 2023-05-08 PROCEDURE — 99204 OFFICE O/P NEW MOD 45 MIN: CPT | Performed by: OTHER

## 2023-05-08 RX ORDER — AMITRIPTYLINE HYDROCHLORIDE 10 MG/1
TABLET, FILM COATED ORAL
Qty: 60 TABLET | Refills: 3 | Status: SHIPPED | OUTPATIENT
Start: 2023-05-08

## 2023-05-08 NOTE — PROGRESS NOTES
Patient here for evaluation of left sided head pain and blurred vision (in both, left is worse) since neck surgery 3/2023. Episodes will come/go.

## 2023-05-08 NOTE — TELEPHONE ENCOUNTER
Called [facility] and was directed to call [employer] [phone] for authorization requirements. I called and spoke with Nidhi Arita. He said that no authorization is required for [id number] but a predetermination is. He started the predetermination for 336[address] DX G43.009 in the office requesting 3 visits from 5/9/23-9/9/23. This will take 48-72 hours to complete    Pending #[id number]. Faxed clinicals to [phone].  Confirmed received

## 2023-05-09 ENCOUNTER — PATIENT MESSAGE (OUTPATIENT)
Dept: SURGERY | Facility: CLINIC | Age: 48
End: 2023-05-09

## 2023-05-09 NOTE — TELEPHONE ENCOUNTER
From: Casandra Rodriguez  To: Cher Streeter DO  Sent: 5/9/2023 10:19 AM CDT  Subject: Medical Leave     Good morning. Tomorrow I will be bidding   For my new shift with my employer that will start June 5th. It's imperative if you can release me to go back to work full duty June 6th because if you don't, I will get whatever is left over and this will greatly affect my life. I have 27 years with [employer], which means I'm #1 in seniority, and I pick Sunday Monday's off to be with my children and will bs able to take them to school and if I don't get released back to work, I will have no one to take them or pick them up from school on Mondays. Is there a way to send paperwork to [facility] and my employer clearing me to go back to work June 6th please. If you can't clear me to go back to work because you don't feel that I've healed in a timely manner, are you able to clear me to go back to work October 1st If possible, since that's when the bid will end. The bid for our new shift is from June 5th till October 1st.      I just don't want to miss the opportunity to bid with what days I'm entitled too especially with my seniority of 27 years. Please let me know what my best course of action is. Thank you so much for your time.

## 2023-05-09 NOTE — TELEPHONE ENCOUNTER
From: Radha Dalton  To: FRANCY Hui  Sent: 5/9/2023 10:19 AM CDT  Subject: Medical Leave     Good morning. Tomorrow I will be bidding   For my new shift with my employer that will start June 5th. It's imperative if you can release me to go back to work full duty June 6th because if you don't, I will get whatever is left over and this will greatly affect my life. I have 27 years with [employer], which means I'm #1 in seniority, and I pick Sunday Monday's off to be with my children and will bs able to take them to school and if I don't get released back to work, I will have no one to take them or pick them up from school on Mondays. Is there a way to send paperwork to [facility] and my employer clearing me to go back to work June 6th please. If you can't clear me to go back to work because you don't feel that I've healed in a timely manner, are you able to clear me to go back to work October 1st If possible, since that's when the bid will end. The bid for our new shift is from June 5th till October 1st.      I just don't want to miss the opportunity to bid with what days I'm entitled too especially with my seniority of 27 years. Please let me know what my best course of action is. Thank you so much for your time.

## 2023-05-09 NOTE — TELEPHONE ENCOUNTER
This is being addressed in separate TE. Patient was called and patient discussed her concerns with Nursing. Please see separate [employer] message sent to SANJUANA Hays, [address] for additional information.

## 2023-05-09 NOTE — TELEPHONE ENCOUNTER
From: Sandy Muhammad  To: Lazarus Sauers, PA  Sent: 5/9/2023 10:18 AM CDT  Subject: Medical Leave     Good morning. Tomorrow I will be bidding   For my new shift with my employer that will start June 5th. It's imperative if you can release me to go back to work full duty June 6th because if you don't, I will get whatever is left over and this will greatly affect my life. I have 27 years with [employer], which means I'm #1 in seniority, and I pick Sunday Monday's off to be with my children and will bs able to take them to school and if I don't get released back to work, I will have no one to take them or pick them up from school on Mondays. Is there a way to send paperwork to [facility] and my employer clearing me to go back to work June 6th please. If you can't clear me to go back to work because you don't feel that I've healed in a timely manner, are you able to clear me to go back to work October 1st If possible, since that's when the bid will end. The bid for our new shift is from June 5th till October 1st.     I just don't want to miss the opportunity to bid with what days I'm entitled too especially with my seniority of 27 years. Please let me know what my best course of action is. Thank you so much for your time.

## 2023-05-09 NOTE — TELEPHONE ENCOUNTER
This is one of multiple [employer] messages that have been sent to [facility] today. Please see separate TE with provider that is being addressed in the alternate CasFRANCY Diaz TE initiated today.

## 2023-05-09 NOTE — TELEPHONE ENCOUNTER
Message below sent to patient. Her inquiry about back to work is being addressed in separate TE addressed to Kassy Duongma. We are sorry to inform you that Physician Assistant Jakub Ferro is no longer available or reachable via [employer] or here at [facility]. All messages directed to him are being handled [facility].    We know and understand that this will be a difficult transition for many of his patients, as he has been your trusted physician and adviser who has guided you through your neurosurgical experience. However, we would like to reassure you that our [address] neurosurgery team is here for your continued care. We strongly recommend you make an appointment with one of his colleagues to become established and minimize any delay in care. For those patients who desire to follow him to his new practice, unfortunately we are unable to provide the name or location at this time. Once the information is made public in the near future, you will be able to find it via the Internet. Everyone here at [facility] will miss him and wish him nothing but the best!    Patient messaged and informed Nursing that she now has appointment with [facility] tomorrow at 0800 for MRI Brain that was ordered by Dr. Alex Haynes. Informed patient that she needs to call Neurology and inform them of imaging location change to fax the orders over to [facility].

## 2023-05-09 NOTE — TELEPHONE ENCOUNTER
From: Gita Richardson  To: FRANCY Ray  Sent: 5/9/2023 11:00 AM CDT  Subject: Medical Leave     So sorry to bother you but if I can't be released to go back to work for June 6th, are you able to release me to go back to work October 1st?     I just want what's best for me and my health. Please let me know what's best for me.  Thank you so much

## 2023-05-09 NOTE — TELEPHONE ENCOUNTER
This is being addressed in separate TE. Patient was called and patient discussed her concerns with Nursing. Please see separate [facility] message sent to Samuel Bush for additional information.

## 2023-05-09 NOTE — TELEPHONE ENCOUNTER
From: James Concepcion  To: FRANCY Torres  Sent: 5/9/2023 11:05 AM CDT  Subject: Medical leave     So sorry to bother you but if I can't be released to go back to work for June 6th, are you able to release me to go back to work October 1st?      I just want what's best for me and my health. Please let me know what's best for me.  Thank you so much

## 2023-05-11 ENCOUNTER — TELEPHONE (OUTPATIENT)
Dept: SURGERY | Facility: CLINIC | Age: 48
End: 2023-05-11

## 2023-05-11 NOTE — TELEPHONE ENCOUNTER
Patient messaged back agreeing to scheduling earlier appointment with Dr. Jayden Alicea. Contact information provided in [employer] message to call MANA to schedule.

## 2023-05-11 NOTE — TELEPHONE ENCOUNTER
Received message that patient is asking for recommendations on how to approach her STEFFEN. Messaged patient back and informed her that she may schedule an earlier appointment with Dr. Jose M Jose to discuss her concerns.       6/19/2023 10:40 AM Madi Ornelas DO

## 2023-05-11 NOTE — TELEPHONE ENCOUNTER
Patient advised on results. Patient stated she was told if nothing came back on MRI/MRA an occiptal block would be helpful. See TE 05/08/2023 regarding PA for Nerve block.

## 2023-05-12 NOTE — TELEPHONE ENCOUNTER
Please advise pt re; denial, recommend pt to try medication I ordered, give about at least 2 weeks to see result.

## 2023-05-12 NOTE — TELEPHONE ENCOUNTER
Jailene Slater from [employer] called to inform me that the code 19076 for occipital NB predetermination has been denied as it is not covered under this patient's plan

## 2023-05-12 NOTE — TELEPHONE ENCOUNTER
Freddie Rogers from [facility] calling to speak to prior auth regarding Occiptal nerve blocker. Call transferred to prior auth.

## 2023-05-16 ENCOUNTER — PATIENT MESSAGE (OUTPATIENT)
Dept: SURGERY | Facility: CLINIC | Age: 48
End: 2023-05-16

## 2023-05-16 NOTE — TELEPHONE ENCOUNTER
From: Trinidad Mccoy  To: FRANCY Griffin  Sent: 5/16/2023 8:54 AM CDT  Subject: Physical Therapy     Good morning. I just called to schedule my physical therapy and the first session starts June 8th, but  said she was going to call location to see if they can get me in earlier to do physical therapy. She scheduled me for 12 sessions of physical therapy and this will hopefully help because the back of my neck and head still bother me quite a bit. I went to see doctor Oma Canada and since Brain MRI came back negative he wants me to try: AMITRIPTYLINE HCI 10 MG for 3 weeks first and If it doesn't help the migraines, neck pain and pain in the back of my head, then he said he will order the occipital block.

## 2023-05-23 ENCOUNTER — TELEPHONE (OUTPATIENT)
Dept: PHYSICAL THERAPY | Age: 48
End: 2023-05-23

## 2023-05-23 ENCOUNTER — TELEPHONE (OUTPATIENT)
Dept: PHYSICAL THERAPY | Facility: HOSPITAL | Age: 48
End: 2023-05-23

## 2023-05-24 ENCOUNTER — TELEPHONE (OUTPATIENT)
Dept: PHYSICAL THERAPY | Facility: HOSPITAL | Age: 48
End: 2023-05-24

## 2023-05-24 ENCOUNTER — APPOINTMENT (OUTPATIENT)
Dept: PHYSICAL THERAPY | Age: 48
End: 2023-05-24
Attending: PHYSICIAN ASSISTANT
Payer: COMMERCIAL

## 2023-05-25 ENCOUNTER — OFFICE VISIT (OUTPATIENT)
Dept: PHYSICAL THERAPY | Age: 48
End: 2023-05-25
Attending: PHYSICIAN ASSISTANT
Payer: COMMERCIAL

## 2023-05-25 PROCEDURE — 97162 PT EVAL MOD COMPLEX 30 MIN: CPT

## 2023-05-25 PROCEDURE — 97110 THERAPEUTIC EXERCISES: CPT

## 2023-06-01 ENCOUNTER — APPOINTMENT (OUTPATIENT)
Dept: PHYSICAL THERAPY | Age: 48
End: 2023-06-01
Attending: PHYSICIAN ASSISTANT
Payer: COMMERCIAL

## 2023-06-01 NOTE — TELEPHONE ENCOUNTER
Pt had f/u appointment with David Myers today and requested RN by name to speak with pt in exam room. Pt informed that she had a terrible experience with Dr. Anna Mi and does not want to see him again. Pt states that post operatively Dr. Anna Mi did not inform her if surgery went well. Pt state her post op  pain medication was sent to pharmacy and pharmacy would not release Morphine 30mg tab as they states this was too strong for pt. Pt requested that Dr. Anna Mi correct. Pt informed by pharmacist that Dr. Anna Mi informed them [facility] wasn't by a computer\" and hung up on them. Pharmacist verbalized concern to pt. Order fixed to be Morphine 15mg. Pt states she felt \"Crack out\" and did not like how it made her feel. Pt states she feels that she was not heard and that she was treated unprofessional by Dr. Anna Mi and states she no longer wants to follow up with him. Pt requested post op be changed to JOE [place]. Request fulfilled. Informed pt that Lead RN will come in after post op completed with FRANCY Fried. Pt accepting of this. Lead RN informed. [No Acute Distress] : no acute distress [Normal Oropharynx] : normal oropharynx [Normal Appearance] : normal appearance [Normal Rate/Rhythm] : normal rate/rhythm [No Neck Mass] : no neck mass [Normal S1, S2] : normal s1, s2 [No Murmurs] : no murmurs [No Resp Distress] : no resp distress [Clear to Auscultation Bilaterally] : clear to auscultation bilaterally [No Abnormalities] : no abnormalities [Normal Gait] : normal gait [Benign] : benign [No Clubbing] : no clubbing [No Cyanosis] : no cyanosis [No Edema] : no edema [FROM] : FROM [Normal Color/ Pigmentation] : normal color/ pigmentation [No Focal Deficits] : no focal deficits [Oriented x3] : oriented x3 [Normal Affect] : normal affect

## 2023-06-04 NOTE — TELEPHONE ENCOUNTER
Action Requested: Summary for Provider     []  Critical Lab, Recommendations Needed  [x] Need Additional Advice  []   FYI    []   Need Orders  [] Need Medications Sent to Pharmacy  []  Other     SUMMARY: pt requesting order for Xray left hand and arm.  Woul COPD with acute exacerbation Acute respiratory failure with hypoxia

## 2023-06-05 ENCOUNTER — TELEPHONE (OUTPATIENT)
Dept: NEUROLOGY | Facility: CLINIC | Age: 48
End: 2023-06-05

## 2023-06-05 DIAGNOSIS — G43.009 MIGRAINE WITHOUT AURA AND WITHOUT STATUS MIGRAINOSUS, NOT INTRACTABLE: Primary | ICD-10-CM

## 2023-06-06 ENCOUNTER — APPOINTMENT (OUTPATIENT)
Dept: PHYSICAL THERAPY | Age: 48
End: 2023-06-06
Payer: COMMERCIAL

## 2023-06-06 NOTE — TELEPHONE ENCOUNTER
First was referred to [facility]. I called there and was told now authorization goes through Meghan [phone]. Called and spoke with Mame DILL She took all information to start the PA for Botox in the office as buy and bill    Faxed clinicals to [phone].  Confirmed received    Pending #[phone]    Requesting 4 visit for 1 year every 90 days

## 2023-06-08 ENCOUNTER — APPOINTMENT (OUTPATIENT)
Dept: PHYSICAL THERAPY | Age: 48
End: 2023-06-08
Attending: PHYSICIAN ASSISTANT
Payer: COMMERCIAL

## 2023-06-09 ENCOUNTER — PATIENT MESSAGE (OUTPATIENT)
Dept: SURGERY | Facility: CLINIC | Age: 48
End: 2023-06-09

## 2023-06-12 ENCOUNTER — OFFICE VISIT (OUTPATIENT)
Dept: SURGERY | Facility: CLINIC | Age: 48
End: 2023-06-12
Payer: COMMERCIAL

## 2023-06-12 ENCOUNTER — TELEPHONE (OUTPATIENT)
Dept: SURGERY | Facility: CLINIC | Age: 48
End: 2023-06-12

## 2023-06-12 VITALS
DIASTOLIC BLOOD PRESSURE: 80 MMHG | BODY MASS INDEX: 26.58 KG/M2 | SYSTOLIC BLOOD PRESSURE: 110 MMHG | HEART RATE: 70 BPM | WEIGHT: 150 LBS | HEIGHT: 63 IN

## 2023-06-12 DIAGNOSIS — Z98.1 S/P CERVICAL SPINAL FUSION: Primary | ICD-10-CM

## 2023-06-12 NOTE — TELEPHONE ENCOUNTER
From: Maite Sosa  To: Jaclyn Hernandez,   Sent: 6/9/2023 9:34 PM CDT  Subject: Recovery     My whole left side of my body is no longer numb from the collarbone down. But My left hand is numb all the time. The worst pain is my neck, the back of head which affects my vision which I've had very bad migraines everyday ever since surgery. I was referred to a neurosurgeon specialist by Doctor Rosanna Craig and when i went to see Doctor Rolan Aguila he did a brain MRI and results were negative. He tried getting insurance to get the occipital block approved but insurance denied it. So Doctor Adolfo Back next step is trying to get Botox approved so it can possibly help with my severe migraines and neck pain. I am also seeing the [place] [id number] Physical Therapist. Jordan Rosales gone only once because my migraines and neck pain have kept me in bed and don't want to drive, but I do want to continue seeing the therapist. I see her next June 13th and it's all the way towards July. I know you have me approved to return back to work June 19th but there's no way I can return. It's very hard to turn my neck left and right. When I drive I have to have my kids come with me, because they have to tell me if a car is coming to my left or right. It's very hard to put my head down because my throat is still swollen looking, my voice box isn't in its place still and it's hard to lift anything heavy. There's no way I can return when I'm still feeling this much discomfort, neck and migraine pains.  Thank you for your time

## 2023-06-12 NOTE — TELEPHONE ENCOUNTER
Noted that patient has messaged Dr. Yaw Yo with a condition update. Patient underwent surgery on 3/28/23:    CERVICAL THREE TO CERVICAL FOUR ANTERIOR CERVICAL DISCECTOMY AND FUSION    Patient reports:    -improvement in \"body numbness\". -left hand numbness. -neck pain.   -continued migraines, affecting vision.   -negative MRI Brain which was ordered by Dr. Mitch Nails.   -she is in the process of having botox approved for migraines, as nerve block was denied by insurance.   -she has only been able to go to one PT session, but will be attending more in the coming weeks. [facility] has conducted office visit with patient this morning. Above has been addressed. EMETERIO provided and signed and updated off-work letters and today's office visit note to be faxed to patient's Mona Gustafson.

## 2023-06-13 ENCOUNTER — OFFICE VISIT (OUTPATIENT)
Dept: PHYSICAL THERAPY | Age: 48
End: 2023-06-13
Attending: PHYSICIAN ASSISTANT
Payer: COMMERCIAL

## 2023-06-13 PROCEDURE — 97110 THERAPEUTIC EXERCISES: CPT

## 2023-06-15 ENCOUNTER — OFFICE VISIT (OUTPATIENT)
Dept: PHYSICAL THERAPY | Age: 48
End: 2023-06-15
Attending: PHYSICIAN ASSISTANT
Payer: COMMERCIAL

## 2023-06-15 PROCEDURE — 97530 THERAPEUTIC ACTIVITIES: CPT

## 2023-06-15 PROCEDURE — 97140 MANUAL THERAPY 1/> REGIONS: CPT

## 2023-06-16 ENCOUNTER — TELEPHONE (OUTPATIENT)
Dept: SURGERY | Facility: CLINIC | Age: 48
End: 2023-06-16

## 2023-06-16 ENCOUNTER — PATIENT MESSAGE (OUTPATIENT)
Dept: SURGERY | Facility: CLINIC | Age: 48
End: 2023-06-16

## 2023-06-16 NOTE — TELEPHONE ENCOUNTER
From: Álvaro Borja  To: Nadineanali Yani   Sent: 6/16/2023 12:41 PM CDT  Subject: Medical Paperwork     Good afternoon. This message is for Paige Garrett. [employer] just called me and advised me they haven't received medical paperwork stating my medical leave was extended till July 24th. It's still showing me to return to work June 19th according to them. I advised [employer] that your office sent paperwork to them, but their saying they haven't received any paperwork. Is there anyway to verify if paperwork was sent to [employer]? Please and thank you so much. Have a nice day.

## 2023-06-16 NOTE — TELEPHONE ENCOUNTER
See TE from 6/13/2023       - already faxed to # [phone] OV notes from 6/12/2023, MRI/MRA imaging report notes and letter from provider on 6/16/2023 (confirmation received)    Only need to complete Disability form    Placed on [facility] desk for review/completion    Will be sent to scanning once received back from provider.

## 2023-06-19 NOTE — TELEPHONE ENCOUNTER
Received blank paperwork from KATE Rodriguez    Placed at [facility] desk to complete and return to the providers for review and signature     MA accepted paperwork

## 2023-06-19 NOTE — TELEPHONE ENCOUNTER
Attending Physician Statement from [facility] received by MA clinical staff. Paperwork initiated and endorsed to provider for review and signature. Placed on Sally's desk.     EMETERIO Verified Y

## 2023-06-20 ENCOUNTER — PATIENT MESSAGE (OUTPATIENT)
Dept: NEUROLOGY | Facility: CLINIC | Age: 48
End: 2023-06-20

## 2023-06-20 ENCOUNTER — OFFICE VISIT (OUTPATIENT)
Dept: PHYSICAL THERAPY | Age: 48
End: 2023-06-20
Attending: PHYSICIAN ASSISTANT
Payer: COMMERCIAL

## 2023-06-20 PROCEDURE — 97110 THERAPEUTIC EXERCISES: CPT

## 2023-06-20 PROCEDURE — 97032 APPL MODALITY 1+ESTIM EA 15: CPT

## 2023-06-21 NOTE — TELEPHONE ENCOUNTER
Received fax today for approval of Botox as buy and bill    Approval #[id number] 4 visits from 6/6/23-6/6/24    Once Botox is available call patient for visit. Please notify the PA team of the date.

## 2023-06-21 NOTE — TELEPHONE ENCOUNTER
Called patient to offer 6/27/2023 at 10:40am in [place] for botox. Checked with RN and should have stock. Held appointment for return call. My chart message also sent.

## 2023-06-21 NOTE — TELEPHONE ENCOUNTER
From: Francesco Guerra  To: Lorena Lara MD  Sent: 6/20/2023 11:19 PM CDT  Subject: Pain     Good evening. I've been on that medication you last prescribed me and it's not helping me at all. I am currently doing physical therapy for my neck and back of my head pain and this is also. It improving my symptoms. I still get migraines everyday all day. I understand that insurance companies may take quite some time approving procedures or treatments, but I honestly need relief. I don't feel any better and it's giving me so much anxiety because I want to feel no pain in the back of head, neck and shoulders. I really hope the Botox can be approved so I can try this to remedy my pain especially with my migraines triggering pain to mu vision on my left eye and left temple as well. I'm sorry to keep emailing but it's been weeks since your office submitted paperwork to see if insurance can approve treatment.      I appreciate your efforts and thank you

## 2023-06-22 ENCOUNTER — APPOINTMENT (OUTPATIENT)
Dept: PHYSICAL THERAPY | Age: 48
End: 2023-06-22
Attending: PHYSICIAN ASSISTANT
Payer: COMMERCIAL

## 2023-06-22 NOTE — TELEPHONE ENCOUNTER
Prior Auth aware of Botox appointment scheduled for 6/27 at 10:40 with Dr. Chelle Pride in [facility].

## 2023-06-25 ENCOUNTER — PATIENT MESSAGE (OUTPATIENT)
Dept: SURGERY | Facility: CLINIC | Age: 48
End: 2023-06-25

## 2023-06-26 NOTE — TELEPHONE ENCOUNTER
Called pt regarding mychart message. Pt still reports pain to back of head, neck & reports migraines everyday. Explained to pt why she saw Edel Henry. Instead of Dr. Rio Orlando. Informed her for future appts if she'd rather see Dr. Rio Orlando to make sure to let the  know that she wants to be scheduled with him. Informed pt about system in place regarding surgeon's having a PA/APRN who works with them. Pt not appreciative of seeing another provider who wasn't Dr. Rio Orlando. Per pt, pt was never told that her appt would be with [facility] instead of Dr. Rio Orlando. Explained returning back to work on July 24th. Pt stating that she will not be ready to go back to work at that time. Informed pt that she would need to ask her PCP for further time off work. Pt requested to speak to Marsha Ariaz RN. Advised pt that she is not in office today. Pt wanted to leave a message for Marsha Ariza to call her back. Advised pt that we don't take calls in that manner due to all nurses in office being able to attend to calls/messages. Pt requesting to speak to manager/lead in office. Advised pt that lead was currently in a meeting. Pt requesting a call back from lead.

## 2023-06-26 NOTE — TELEPHONE ENCOUNTER
Patient is frustrated. Patient is concerned about returning to work July 24th. She does not feel ready to go back to work. Patient is still having neck pain after surgery. Pain starts in the back of neck where the spine starts. Physical therapy is not helping her neck pain. Her first physical therapy was Jun 12th. Patient has also tried a message for her neck at the recommendation of physical therapy. Patient states she has left hand numbness. Patient states she cannot make quick movements with her neck. Patient is having botox for Migraines that she says stem from her neck pain. The botox injections are scheduled for 6/27/23 with Dr. Marianne Kim. Advised patient that she still has a month to improve and see what the botox injections and physical therapy can do. Advised she may still improve and be ready to return to work. Patient is still anxious about returning to work July 24th. Patient is wondering what next steps are for her if she cannot go back to work on July 24th. Patient states she cannot go back to work with limitation. Would you like patient to have an appointment for evaluation prior to her return to work date.

## 2023-06-27 ENCOUNTER — OFFICE VISIT (OUTPATIENT)
Dept: NEUROLOGY | Facility: CLINIC | Age: 48
End: 2023-06-27
Payer: COMMERCIAL

## 2023-06-27 ENCOUNTER — APPOINTMENT (OUTPATIENT)
Dept: PHYSICAL THERAPY | Age: 48
End: 2023-06-27
Attending: PHYSICIAN ASSISTANT
Payer: COMMERCIAL

## 2023-06-27 VITALS
BODY MASS INDEX: 27 KG/M2 | HEART RATE: 48 BPM | RESPIRATION RATE: 16 BRPM | SYSTOLIC BLOOD PRESSURE: 126 MMHG | WEIGHT: 150 LBS | DIASTOLIC BLOOD PRESSURE: 70 MMHG

## 2023-06-27 DIAGNOSIS — G43.009 MIGRAINE WITHOUT AURA AND WITHOUT STATUS MIGRAINOSUS, NOT INTRACTABLE: Primary | ICD-10-CM

## 2023-06-27 PROCEDURE — 99213 OFFICE O/P EST LOW 20 MIN: CPT | Performed by: OTHER

## 2023-06-27 PROCEDURE — 3074F SYST BP LT 130 MM HG: CPT | Performed by: OTHER

## 2023-06-27 PROCEDURE — 3078F DIAST BP <80 MM HG: CPT | Performed by: OTHER

## 2023-06-27 PROCEDURE — 64615 CHEMODENERV MUSC MIGRAINE: CPT | Performed by: OTHER

## 2023-06-29 ENCOUNTER — OFFICE VISIT (OUTPATIENT)
Dept: PHYSICAL THERAPY | Age: 48
End: 2023-06-29
Attending: PHYSICIAN ASSISTANT
Payer: COMMERCIAL

## 2023-06-29 PROCEDURE — 97110 THERAPEUTIC EXERCISES: CPT

## 2023-07-02 ENCOUNTER — PATIENT MESSAGE (OUTPATIENT)
Dept: SURGERY | Facility: CLINIC | Age: 48
End: 2023-07-02

## 2023-07-05 ENCOUNTER — APPOINTMENT (OUTPATIENT)
Dept: PHYSICAL THERAPY | Age: 48
End: 2023-07-05
Attending: PHYSICIAN ASSISTANT
Payer: COMMERCIAL

## 2023-07-05 ENCOUNTER — PATIENT MESSAGE (OUTPATIENT)
Dept: PHYSICAL THERAPY | Age: 48
End: 2023-07-05

## 2023-07-05 ENCOUNTER — LAB ENCOUNTER (OUTPATIENT)
Dept: LAB | Age: 48
End: 2023-07-05
Attending: PHYSICIAN ASSISTANT
Payer: COMMERCIAL

## 2023-07-05 PROCEDURE — 84443 ASSAY THYROID STIM HORMONE: CPT | Performed by: PHYSICIAN ASSISTANT

## 2023-07-05 PROCEDURE — 80053 COMPREHEN METABOLIC PANEL: CPT | Performed by: PHYSICIAN ASSISTANT

## 2023-07-05 PROCEDURE — 80061 LIPID PANEL: CPT | Performed by: PHYSICIAN ASSISTANT

## 2023-07-05 PROCEDURE — 85027 COMPLETE CBC AUTOMATED: CPT | Performed by: PHYSICIAN ASSISTANT

## 2023-07-05 PROCEDURE — 84439 ASSAY OF FREE THYROXINE: CPT | Performed by: PHYSICIAN ASSISTANT

## 2023-07-05 PROCEDURE — 36415 COLL VENOUS BLD VENIPUNCTURE: CPT | Performed by: PHYSICIAN ASSISTANT

## 2023-07-05 PROCEDURE — 83036 HEMOGLOBIN GLYCOSYLATED A1C: CPT | Performed by: PHYSICIAN ASSISTANT

## 2023-07-05 PROCEDURE — 84481 FREE ASSAY (FT-3): CPT | Performed by: PHYSICIAN ASSISTANT

## 2023-07-06 ENCOUNTER — OFFICE VISIT (OUTPATIENT)
Dept: DERMATOLOGY CLINIC | Facility: CLINIC | Age: 48
End: 2023-07-06

## 2023-07-06 ENCOUNTER — PATIENT MESSAGE (OUTPATIENT)
Dept: SURGERY | Facility: CLINIC | Age: 48
End: 2023-07-06

## 2023-07-06 DIAGNOSIS — L91.8 SKIN TAG: ICD-10-CM

## 2023-07-06 DIAGNOSIS — M96.1 FAILED BACK SYNDROME OF CERVICAL SPINE: Primary | ICD-10-CM

## 2023-07-06 DIAGNOSIS — L30.9 DERMATITIS: Primary | ICD-10-CM

## 2023-07-06 PROCEDURE — 17110 DESTRUCTION B9 LES UP TO 14: CPT | Performed by: PHYSICIAN ASSISTANT

## 2023-07-06 PROCEDURE — 99213 OFFICE O/P EST LOW 20 MIN: CPT | Performed by: PHYSICIAN ASSISTANT

## 2023-07-06 RX ORDER — FLUCONAZOLE 150 MG/1
150 TABLET ORAL WEEKLY
Qty: 2 TABLET | Refills: 0 | Status: SHIPPED | OUTPATIENT
Start: 2023-07-06

## 2023-07-06 RX ORDER — KETOCONAZOLE 20 MG/G
CREAM TOPICAL
Qty: 30 G | Refills: 1 | Status: SHIPPED | OUTPATIENT
Start: 2023-07-06

## 2023-07-06 NOTE — TELEPHONE ENCOUNTER
Noted the patient condition update listed below. Pt is informing the providers of her outcome after PT and 31 Botox injections.  Pt is requesting additional recommendations for her N/T and pain     Noted the pt LOV 6/12/2023 with KATE Dunn:   \"ASSESSMENT:   s/p C3-4 ACDF 3/28/23         PLAN:   Continue PT  Provided updated work exemption note through mid-July, at which point PT should be completed   Today's visit note and most recent imaging will be faxed to pt's employer, per her request   F/u in 3 months\"    Routed to the providers for review and feedback

## 2023-07-06 NOTE — TELEPHONE ENCOUNTER
From: Beba Harper  To: Walt Krishnamurthy DO  Sent: 7/6/2023 1:03 PM CDT  Subject: Pain     Good morning. I wanted to say elva been doing physical therapy and had 31 botox injections to my neck, shoulders, scalp and forehead, and its not working because i continue to have very dull pain in my neck, numbness on my left hand, numbness on my left foot, pain at the top of spine and very bad migraines. I know it may take time for botox to work, but i just wanted to tell you how im feeling. Im not sure why my neck hurts so much. Its honestly unbearable and physical therapist has tried several ways to make me feel better and tried different exercise activities, but nothing is helping.

## 2023-07-06 NOTE — PROGRESS NOTES
HPI:    Patient ID: Sandy Muhammad is a 49year old female. Patient presents for red and foul smelling odor from left axilla. Believe she may have a possible yeast infection. Has tried OTC with no improvements. She has a lesion on chest, has been there for a while now, has grown in size and bleeding. Patient states has family hx of skin cancer in maternal aunt and maternal grandfather. No draining or tenderness noted. Review of Systems   Constitutional:  Negative for chills and fever. Musculoskeletal:  Negative for arthralgias and myalgias. Skin:  Positive for rash. Negative for color change and wound. Current Outpatient Medications   Medication Sig Dispense Refill    fluconazole (DIFLUCAN) 150 MG Oral Tab Take 1 tablet (150 mg total) by mouth once a week. 2 tablet 0    ketoconazole 2 % External Cream Apply to affected area 2 times daily. 30 g 1    Rizatriptan Benzoate 10 MG Oral Tab Take 1 tablet (10 mg total) by mouth as needed for Migraine. 9 tablet 5    METOPROLOL TARTRATE 50 MG Oral Tab TAKE ONE TABLET BY MOUTH TWICE DAILY 180 tablet 3    losartan 50 MG Oral Tab Take 1 tablet (50 mg total) by mouth daily. ALPRAZolam 0.25 MG Oral Tab Take 1 tablet (0.25 mg total) by mouth 3 (three) times daily as needed for Anxiety. 30 tablet 3    levothyroxine 88 MCG Oral Tab Take 1 tablet (88 mcg total) by mouth in the morning. 90 tablet 1     Allergies:  Latex                   HIVES, RASH    Comment:Other reaction(s): LATEX  Other                   HIVES    Comment:tegaderm  Tramadol                NAUSEA ONLY  Morphine                NAUSEA ONLY, DIZZINESS  Augmentin [Amoxicil*    DIARRHEA  Cephalexin              DIARRHEA  Codeine                 NAUSEA ONLY   LMP 06/09/2022   There is no height or weight on file to calculate BMI. PHYSICAL EXAM:   Physical Exam  Constitutional:       General: She is not in acute distress. Appearance: Normal appearance.    Skin:     General: Skin is warm and dry. Findings: Rash present. Comments: Skin tag noted on the left chest on the breast. No drianing or tenderness noted. Pedunculated lesions about 3-4 mm in size. Hyperpigmented area noted on the left underarm. No draining or tenderness noted. Neurological:      Mental Status: She is alert and oriented to person, place, and time. ASSESSMENT/PLAN:   1. Dermatitis  -After discussion with patient, advised the following:  -Started diflucan  -Educated to take 1 pill weekly for 2 weeks  -Start ketoconazole  -Educated to apply 2 times per day   -To call or follow-up with worsening symptoms or concerns.   -Pt was agreeable to plan and will comply with discussion above. 2. Skin tag  -After discussion with patient, advised the following:  - Cryosurgery of non-malignant lesion(s)  - Risks, benefits, alternatives and personnel required for cryosurgery reviewed with patient. Discussed the expected redness, as well as the risks of swelling, blistering, crusting, pigmentary changes, and permanent scarring. . Discussed that lesion may need additional treatments in future or may recur. Pt verbalizes understanding and wishes to proceed. - Cryosurgery performed with Liquid Nitrogen via cryostat spray gun to [facility]. 1 lesion(s) treated. - Patient tolerated well and wound care discussed. -Pt was agreeable to plan and will comply with discussion above. No orders of the defined types were placed in this encounter. Meds This Visit:  Requested Prescriptions     Signed Prescriptions Disp Refills    fluconazole (DIFLUCAN) 150 MG Oral Tab 2 tablet 0     Sig: Take 1 tablet (150 mg total) by mouth once a week.    ketoconazole 2 % External Cream 30 g 1     Sig: Apply to affected area 2 times daily.        Imaging & Referrals:  None         #[id number]

## 2023-07-07 ENCOUNTER — APPOINTMENT (OUTPATIENT)
Dept: PHYSICAL THERAPY | Age: 48
End: 2023-07-07
Attending: PHYSICIAN ASSISTANT
Payer: COMMERCIAL

## 2023-07-07 ENCOUNTER — OFFICE VISIT (OUTPATIENT)
Dept: FAMILY MEDICINE CLINIC | Facility: CLINIC | Age: 48
End: 2023-07-07

## 2023-07-07 ENCOUNTER — TELEPHONE (OUTPATIENT)
Dept: PHYSICAL THERAPY | Facility: HOSPITAL | Age: 48
End: 2023-07-07

## 2023-07-07 VITALS
WEIGHT: 154 LBS | BODY MASS INDEX: 28.34 KG/M2 | SYSTOLIC BLOOD PRESSURE: 128 MMHG | HEART RATE: 68 BPM | DIASTOLIC BLOOD PRESSURE: 68 MMHG | HEIGHT: 62 IN

## 2023-07-07 DIAGNOSIS — F41.8 DEPRESSION WITH ANXIETY: ICD-10-CM

## 2023-07-07 DIAGNOSIS — E78.2 MIXED HYPERLIPIDEMIA: ICD-10-CM

## 2023-07-07 DIAGNOSIS — Z00.00 ROUTINE GENERAL MEDICAL EXAMINATION AT A HEALTH CARE FACILITY: Primary | ICD-10-CM

## 2023-07-07 DIAGNOSIS — Z12.11 SCREENING FOR MALIGNANT NEOPLASM OF COLON: ICD-10-CM

## 2023-07-07 PROBLEM — J01.90 ACUTE NON-RECURRENT SINUSITIS: Status: RESOLVED | Noted: 2022-05-17 | Resolved: 2023-07-07

## 2023-07-07 PROBLEM — L30.4 INTERTRIGO: Status: RESOLVED | Noted: 2023-03-15 | Resolved: 2023-07-07

## 2023-07-07 PROCEDURE — 99396 PREV VISIT EST AGE 40-64: CPT | Performed by: PHYSICIAN ASSISTANT

## 2023-07-07 PROCEDURE — 3074F SYST BP LT 130 MM HG: CPT | Performed by: PHYSICIAN ASSISTANT

## 2023-07-07 PROCEDURE — 3078F DIAST BP <80 MM HG: CPT | Performed by: PHYSICIAN ASSISTANT

## 2023-07-07 PROCEDURE — 3008F BODY MASS INDEX DOCD: CPT | Performed by: PHYSICIAN ASSISTANT

## 2023-07-07 RX ORDER — ESCITALOPRAM OXALATE 5 MG/1
5 TABLET ORAL EVERY MORNING
Qty: 90 TABLET | Refills: 1 | Status: SHIPPED | OUTPATIENT
Start: 2023-07-07 | End: 2023-10-05

## 2023-07-07 RX ORDER — ATORVASTATIN CALCIUM 10 MG/1
10 TABLET, FILM COATED ORAL DAILY
Qty: 90 TABLET | Refills: 3 | Status: SHIPPED | OUTPATIENT
Start: 2023-07-07 | End: 2024-07-01

## 2023-07-07 NOTE — TELEPHONE ENCOUNTER
I have reviewed her imaging and it looks great. I can't make sense of her symptoms and it definitely not surgical at this time so I recommend a referral to PMR and have placed a referral for her.     MKK

## 2023-07-07 NOTE — TELEPHONE ENCOUNTER
Noted the patient message listed below.      Noted the providers notes and recommendations     Sent pt a [employer] message

## 2023-07-07 NOTE — TELEPHONE ENCOUNTER
Noted pt is scheduled for an apt with Dr. Yudy Landers on 7/14/2023 at 1000 and 9/11/2023 1020a.      Noted pt is scheduled for an appt with Dr. Leon Garcia (PMR) 7/14/2023 at 1100    Routed to the surgeon to inquire if he would like to have pt keep scheduled appt for next week

## 2023-07-07 NOTE — TELEPHONE ENCOUNTER
I would ask the patient but she should meet with PMR as that is more important than me at this time. She can see me down the road.     MKK

## 2023-07-10 NOTE — TELEPHONE ENCOUNTER
Noted that patient has messaged, stating that she does not want to cancel 7/11 appointment with Dr. Rosalinda Fuller, as she wants to discuss her disability status with him. Messaged patient and confirmed her 7/11/23 appointment remains active.

## 2023-07-11 ENCOUNTER — OFFICE VISIT (OUTPATIENT)
Dept: SURGERY | Facility: CLINIC | Age: 48
End: 2023-07-11
Payer: COMMERCIAL

## 2023-07-11 ENCOUNTER — APPOINTMENT (OUTPATIENT)
Dept: PHYSICAL THERAPY | Age: 48
End: 2023-07-11
Attending: PHYSICIAN ASSISTANT
Payer: COMMERCIAL

## 2023-07-11 VITALS
SYSTOLIC BLOOD PRESSURE: 116 MMHG | HEART RATE: 64 BPM | DIASTOLIC BLOOD PRESSURE: 70 MMHG | BODY MASS INDEX: 28.34 KG/M2 | HEIGHT: 62 IN | WEIGHT: 154 LBS

## 2023-07-11 DIAGNOSIS — Z98.1 S/P CERVICAL SPINAL FUSION: Primary | ICD-10-CM

## 2023-07-11 PROCEDURE — 3078F DIAST BP <80 MM HG: CPT | Performed by: NEUROLOGICAL SURGERY

## 2023-07-11 PROCEDURE — 3008F BODY MASS INDEX DOCD: CPT | Performed by: NEUROLOGICAL SURGERY

## 2023-07-11 PROCEDURE — 3074F SYST BP LT 130 MM HG: CPT | Performed by: NEUROLOGICAL SURGERY

## 2023-07-11 PROCEDURE — 99024 POSTOP FOLLOW-UP VISIT: CPT | Performed by: NEUROLOGICAL SURGERY

## 2023-07-11 RX ORDER — LIOTHYRONINE SODIUM 5 UG/1
5 TABLET ORAL DAILY
COMMUNITY
Start: 2023-07-10

## 2023-07-11 NOTE — PROGRESS NOTES
Established patient:  Reason for follow up:   Sx 03/28/23  Neck pain and left hand numbness     Numeric Rating Scale:         Pain at Present:  10/10       New imaging or testing since your last office visit:    No recent imaging

## 2023-07-11 NOTE — PROGRESS NOTES
S: Patient is 3 months s/p C3-4 ACDF. Her prior shoulder pain and dysphagia have resolved. She continues to have persistent neck pain which she had prior to surgery. XR shows mature fusion and good healing.     O: AVSS   Neuro: stable      A/P s/p C3-4 ACDF  -persistent neck pain despite botox injections-recommend F/U with PMR to aid in postop MSK pain and management  -patient is in agreement with plan  -F/U in 3 months

## 2023-07-13 ENCOUNTER — APPOINTMENT (OUTPATIENT)
Dept: PHYSICAL THERAPY | Age: 48
End: 2023-07-13
Attending: PHYSICIAN ASSISTANT
Payer: COMMERCIAL

## 2023-07-14 ENCOUNTER — OFFICE VISIT (OUTPATIENT)
Dept: PHYSICAL MEDICINE AND REHAB | Facility: CLINIC | Age: 48
End: 2023-07-14
Payer: COMMERCIAL

## 2023-07-14 VITALS
SYSTOLIC BLOOD PRESSURE: 133 MMHG | HEIGHT: 62 IN | WEIGHT: 155 LBS | BODY MASS INDEX: 28.52 KG/M2 | DIASTOLIC BLOOD PRESSURE: 86 MMHG

## 2023-07-14 DIAGNOSIS — M79.18 CERVICAL MYOFASCIAL PAIN SYNDROME: ICD-10-CM

## 2023-07-14 DIAGNOSIS — M54.12 RADICULITIS OF LEFT CERVICAL REGION: Primary | ICD-10-CM

## 2023-07-14 PROCEDURE — 3008F BODY MASS INDEX DOCD: CPT | Performed by: PHYSICAL MEDICINE & REHABILITATION

## 2023-07-14 PROCEDURE — 3079F DIAST BP 80-89 MM HG: CPT | Performed by: PHYSICAL MEDICINE & REHABILITATION

## 2023-07-14 PROCEDURE — 3075F SYST BP GE 130 - 139MM HG: CPT | Performed by: PHYSICAL MEDICINE & REHABILITATION

## 2023-07-14 PROCEDURE — 99205 OFFICE O/P NEW HI 60 MIN: CPT | Performed by: PHYSICAL MEDICINE & REHABILITATION

## 2023-07-14 RX ORDER — PREGABALIN 75 MG/1
CAPSULE ORAL
Qty: 60 CAPSULE | Refills: 0 | Status: SHIPPED | OUTPATIENT
Start: 2023-07-14

## 2023-07-17 ENCOUNTER — PATIENT MESSAGE (OUTPATIENT)
Dept: FAMILY MEDICINE CLINIC | Facility: CLINIC | Age: 48
End: 2023-07-17

## 2023-07-18 ENCOUNTER — APPOINTMENT (OUTPATIENT)
Dept: PHYSICAL THERAPY | Age: 48
End: 2023-07-18
Attending: PHYSICIAN ASSISTANT
Payer: COMMERCIAL

## 2023-07-18 NOTE — TELEPHONE ENCOUNTER
From: Liss Banuelos  To: Zay Yan PA-C  Sent: 7/17/2023 1:35 PM CDT  Subject: Test     Good morning. The order of heart scan u added in Mychart, i deleted in error. Can u send it to me again please?  Thank you

## 2023-07-19 ENCOUNTER — TELEPHONE (OUTPATIENT)
Dept: PHYSICAL MEDICINE AND REHAB | Facility: CLINIC | Age: 48
End: 2023-07-19

## 2023-07-19 NOTE — TELEPHONE ENCOUNTER
Initiated authorization for Left cervical paraspinal, upper trapezius, levator scapula trigger point injections CPT 61877 () with [employer]  at / [address]  Case #[id number]  Status: Approved-authorization is not required per health plan    Patient scheduled 7/20/23 at [address] [place] [address]

## 2023-07-20 ENCOUNTER — OFFICE VISIT (OUTPATIENT)
Dept: PHYSICAL MEDICINE AND REHAB | Facility: CLINIC | Age: 48
End: 2023-07-20
Payer: COMMERCIAL

## 2023-07-20 DIAGNOSIS — M79.18 CERVICAL MYOFASCIAL PAIN SYNDROME: Primary | ICD-10-CM

## 2023-07-21 ENCOUNTER — TELEPHONE (OUTPATIENT)
Dept: NEUROLOGY | Facility: CLINIC | Age: 48
End: 2023-07-21

## 2023-07-21 ENCOUNTER — PATIENT MESSAGE (OUTPATIENT)
Dept: PHYSICAL MEDICINE AND REHAB | Facility: CLINIC | Age: 48
End: 2023-07-21

## 2023-07-21 NOTE — PROCEDURES
Procedure note: Trigger point injections  Procedure Diagnosis: Myofascial pain    Summary of Procedure:   Risks and benefits of the procedure were discussed with the patient and consent was obtained. Trigger points were palpated and marked along the left upper trapezius, cervical paraspinals. Using betadine swabs, injection sites were cleaned in sterile fashion. Using a 27 gauge 1 1/2\" needle 20mg Kenolog diluted into 4.5cc of 1% Lidocaine was injected into the trigger points with 1-2 ml into each trigger point. There was negative aspiration of heme and no paresthesias were elicited. Patient tolerated the procedure well.    Marla Job DO  Physical Medicine and [facility] [address] [place] [address]

## 2023-07-24 ENCOUNTER — PATIENT MESSAGE (OUTPATIENT)
Dept: PHYSICAL MEDICINE AND REHAB | Facility: CLINIC | Age: 48
End: 2023-07-24

## 2023-07-24 ENCOUNTER — TELEPHONE (OUTPATIENT)
Dept: PHYSICAL MEDICINE AND REHAB | Facility: CLINIC | Age: 48
End: 2023-07-24

## 2023-07-24 RX ORDER — TRIAMCINOLONE ACETONIDE 40 MG/ML
20 INJECTION, SUSPENSION INTRA-ARTICULAR; INTRAMUSCULAR ONCE
Status: COMPLETED | OUTPATIENT
Start: 2023-07-24 | End: 2023-07-24

## 2023-07-24 RX ORDER — LIDOCAINE HYDROCHLORIDE 10 MG/ML
4.5 INJECTION, SOLUTION INFILTRATION; PERINEURAL ONCE
Status: COMPLETED | OUTPATIENT
Start: 2023-07-24 | End: 2023-07-24

## 2023-07-24 NOTE — TELEPHONE ENCOUNTER
pt called in regards to forms that pt states were sent over on 7/8 by Alvin, mentioned to pt that I saw no documentation of receiving said forms and provided fax number to have sent to us.

## 2023-07-24 NOTE — TELEPHONE ENCOUNTER
From: Trinidad Mccoy  To: Jason Guerra   Sent: 7/21/2023 11:59 PM CDT  Subject: Medical Paperwork     Good evening. Shyla sent two messages to Doctor Antonia's office requesting that he please fill out medical forms that [facility] faxed over twice, but no one has responded to me and im getting very nervous. My original paperwork stated my last day of medical leave was july 25th, but Doctor Mele Butt extended it until August 28th. If this medical paperwork isnt updated before the 25th, i will get a much smaller paycheck and im already getting a 50% paycheck. I cant afford to get docked even more financially since the paperwork hasnt been updated.  Attached is a form doctor filled out for my light duty for me to return to work august 28th

## 2023-07-24 NOTE — TELEPHONE ENCOUNTER
Pt called stating that Alvin needs some clarification as to why leave was extended. Fax number is [phone], case # is [id number]. See encounter 7/18 for attached documents. Pt states they need office notes from last visit, test results, and reason of condition as to why leave was extended. Please call back.

## 2023-07-24 NOTE — TELEPHONE ENCOUNTER
S/w patient, states that [employer] sent forms to be completed by Dr. Bright Barajas. Informed her that we have not received forms from [employer] Ram[employer] that required completion and signature from Dr. Bright Barajas. Patient was somewhat panicked and mentioned stated that Don[employer] Ram[employer] at the very least needs a letter sent to them by tomorrow 7/25/2023 or she will receive even less income than she is currently receiving. Patient states that the letter should include   -LOV date (7/20/2023) and notes  -Ch[place] Shankar date (8/15/2023)  -test results?  -treatment plan  -reason for extension. Per patient message- \"My original paperwork stated my last day of medical leave was july 25th, but Doctor Bright Barajas extended it until August 28th. \"

## 2023-07-24 NOTE — TELEPHONE ENCOUNTER
Forms completed by Dr. Candis Grande. Informed patient via [employer] message (please see 7/21/2023 patient message encounter) about $25 fee. Patient will call our office tomorrow 7/25/2023 to make payment. Once payment is received we can fax completed forms over to [facility].

## 2023-07-24 NOTE — TELEPHONE ENCOUNTER
From: Liss Banuelos  To: Amber Moreno   Sent: 7/24/2023 11:22 AM CDT  Subject: Tesfaye Owens Duty Form     Hello. Here is the extra form that needs to be filled out and sent to [facility]. I included original form that Mr. Amber Moreno filled out. Can you please make sure it shows me returning back to work august 28th and he wrote to give me light duty at work until November 28th.      [facility] fax number is: [phone]   Case number: [id number]

## 2023-07-30 ENCOUNTER — HOSPITAL ENCOUNTER (OUTPATIENT)
Dept: CT IMAGING | Age: 48
Discharge: HOME | End: 2023-07-30
Attending: PHYSICIAN ASSISTANT

## 2023-07-30 ENCOUNTER — PATIENT MESSAGE (OUTPATIENT)
Dept: PHYSICAL MEDICINE AND REHAB | Facility: CLINIC | Age: 48
End: 2023-07-30

## 2023-07-30 DIAGNOSIS — E78.2 MIXED HYPERLIPIDEMIA: ICD-10-CM

## 2023-07-30 DIAGNOSIS — M79.18 CERVICAL MYOFASCIAL PAIN SYNDROME: Primary | ICD-10-CM

## 2023-08-01 ENCOUNTER — PATIENT MESSAGE (OUTPATIENT)
Dept: DERMATOLOGY CLINIC | Facility: CLINIC | Age: 48
End: 2023-08-01

## 2023-08-01 NOTE — TELEPHONE ENCOUNTER
Dr. Zac Mosqueda,     Hx of breast cancer in the past. She has an odor in the left underarm. I tried diflucan x 2 with no relief. She has no obvious rash noted. Just some hyperpigmentation with no scaling noted. Any ideas of what I could try next? She is hygenic.      Thank you,   Александр Romero

## 2023-08-01 NOTE — TELEPHONE ENCOUNTER
Try clindamycin swabs bid. The old trick gold dial bar soap daily.  If not better have her f/u with both of us here

## 2023-08-01 NOTE — TELEPHONE ENCOUNTER
LOV 7/6/23 - Pt took the Diflucan but states she does not think it worked. Please advise. Thank you.

## 2023-08-01 NOTE — TELEPHONE ENCOUNTER
From: Jessie Martinez  To: Erasmo Hale PA-C  Sent: 8/1/2023 11:50 AM CDT  Subject: Follow up     Good morning. I took the 2 antibiotic pills you prescribed me but it didn't work. I still have the worst odor under my left arm. What can i do next?

## 2023-08-02 NOTE — TELEPHONE ENCOUNTER
From: [facility]  To: Shmuel Powers DO  Sent: 7/30/2023 11:35 PM CDT  Subject: Follow up     Good morning. After my last visit at your office when you injected the lidocaine needles i felt good. I actually had no pain until yesterday. Shyla had the worst neck pain where my neck feels very stiff and painful when i turn my head left or right. The pain in my neck radiates to my whole left arm and it also pains my left breast. When this pain returns after lidocaine injections, what should i do?  I felt so good with no pain, but now i feel horrible

## 2023-08-02 NOTE — TELEPHONE ENCOUNTER
Condition update after Procedure. - Patient had Trigger Point Injections (specific procedure) on 7/20/2023 (date) with . - 89% relief. (Patient says she felt 100% for first week) Worked about a week, behind shoulder blade doesn't hurt still, but back of neck still hurts (patient describes area of occipital) with movement of turning head side to side rotation and cervical side-bending. If pain is worse:  - Pain level prior to procedure:   10  - Current pain level:  8  (when moving head from side to side: both cervical rotation/cervical side-bending)    - Pain location: back of neck (occipital area described). - Pain description: aching and dull  - Current pain treatment: OTC medications and jeffrey-[place]  - Current prescription pain medications/dosing: Pregabalin 75 mg NOC    - LOV: 7/20/2023 Gelacio Cortés DO    - NOV: 8/15/2023 Gelacio Cortés DO   - Plan from [address] [place] [address]: From 7/14/2023 LOV: \"Assessment and plan:  1) left cervical radiculitis  2) cervical myofascial pain syndrome     Recommend trial of Lyrica 75 mg at bedtime x1 week, then 75mg BID afterwards if no side effects or no benefit. Left upper trapezial, cervical paraspinal, levator scapula trigger point injections also recommended. Paperwork filled out for patient to resume light duty work in 6 weeks. Remain off work until then.  \"       This encounter being postponed to route to Dr. Lisha Vargas upon his return on 8/9/2023

## 2023-08-02 NOTE — TELEPHONE ENCOUNTER
Please review pended RX, kindly verify qty/refills, pended as per Dr. Jana Shelley' below instructions

## 2023-08-03 RX ORDER — CLINDAMYCIN PHOSPHATE 10 MG/ML
1 SOLUTION TOPICAL 2 TIMES DAILY
Qty: 60 EACH | Refills: 3 | Status: SHIPPED | OUTPATIENT
Start: 2023-08-03

## 2023-08-07 ENCOUNTER — PATIENT MESSAGE (OUTPATIENT)
Dept: FAMILY MEDICINE CLINIC | Facility: CLINIC | Age: 48
End: 2023-08-07

## 2023-08-07 DIAGNOSIS — E78.2 MIXED HYPERLIPIDEMIA: Primary | ICD-10-CM

## 2023-08-08 NOTE — TELEPHONE ENCOUNTER
MyChart sent. See MyChart encounter 23;  2023  Frannie Boc[place], [place]         23  1:56 PM  Note  Spoke with patient ( & Name verified). Discussed with patient that patient can discontinue Lipitor but needs to eat healthy diet and recheck Lipid panel in 3 months. Patient verbalized understanding. From: Raquel Mcfarland  To: Aung Sheikh PA-C  Sent: 2023 11:23 AM CDT  Subject: Question     Good morning. Shyla been taking Fish Oil pills since our last visit and i noticed I've been extremely constipated. Do you think its been from pills? Also when do i do another set of labs?  Thank you

## 2023-08-10 NOTE — TELEPHONE ENCOUNTER
From: Jasbir Box  To: Alonzo Ulrich,   Sent: 3/7/2019 6:46 AM CST  Subject: Prescription Question    Hello. Not sure if I still have refills for rizatriptan for migraines. My insurance recently changed so I no longer have [facility]. i have to t [facility] Pregnancy And Lactation Text: The risk during pregnancy and breastfeeding is uncertain with this medication.

## 2023-08-12 ENCOUNTER — PATIENT MESSAGE (OUTPATIENT)
Dept: FAMILY MEDICINE CLINIC | Facility: CLINIC | Age: 48
End: 2023-08-12

## 2023-08-14 ENCOUNTER — PATIENT MESSAGE (OUTPATIENT)
Dept: DERMATOLOGY CLINIC | Facility: CLINIC | Age: 48
End: 2023-08-14

## 2023-08-14 NOTE — TELEPHONE ENCOUNTER
From: Rickey Shane  To: Marguerite Dixon PA-C  Sent: 8/14/2023 8:55 AM CDT  Subject: Underarm    Good morning. I continue to having this persistent smell on my left underarm. I used the prescription pads you sent to pharmacy, but it made my underarm smell worse and it burned the area. I used the DIAL soap your nurse told me to try and that did nothing as well. What could it mean if the things you prescribed me aren't working? Could it mean that yeast infection is worse than what we think? The smell really embarrasses me because I'm a very clean person and i feel embarrassed if someone gets close to me. Could it be from all the times elva done breast reconstruction because they had to remove dead fat?

## 2023-08-14 NOTE — TELEPHONE ENCOUNTER
Discussed with Maximiliano Torrez She recommend reevaluation in the office with her. Please schedule with KMT.

## 2023-08-14 NOTE — TELEPHONE ENCOUNTER
From: Kobe Mi  To: Charlene Ramirez PA-C  Sent: 8/12/2023 11:36 AM CDT  Subject: Question     Good morning. Do you know if i am able to eat scrambled eggs cooked with olive oil spray? Or do eggs raise my cholesterol?  Thank you for your time

## 2023-08-15 ENCOUNTER — TELEPHONE (OUTPATIENT)
Dept: PHYSICAL MEDICINE AND REHAB | Facility: CLINIC | Age: 48
End: 2023-08-15

## 2023-08-15 ENCOUNTER — OFFICE VISIT (OUTPATIENT)
Dept: PHYSICAL MEDICINE AND REHAB | Facility: CLINIC | Age: 48
End: 2023-08-15
Payer: COMMERCIAL

## 2023-08-15 VITALS
SYSTOLIC BLOOD PRESSURE: 140 MMHG | HEIGHT: 62 IN | DIASTOLIC BLOOD PRESSURE: 90 MMHG | BODY MASS INDEX: 26.13 KG/M2 | WEIGHT: 142 LBS

## 2023-08-15 DIAGNOSIS — M79.18 MYOFASCIAL PAIN: Primary | ICD-10-CM

## 2023-08-15 NOTE — PROCEDURES
Procedure note: Trigger point injections  Procedure Diagnosis: Myofascial pain    Summary of Procedure:   Risks and benefits of the procedure were discussed with the patient and consent was obtained. Trigger points were palpated and marked along the right cervical paraspinals, bilateral upper trapezius. Using betadine swabs, injection sites were cleaned in sterile fashion. Using a 27 gauge 1 1/2\" needle 20mg Kenolog diluted into 4.5cc of 1% Lidocaine was injected into the trigger points with 1-2 ml into each trigger point. There was negative aspiration of heme and no paresthesias were elicited. Patient tolerated the procedure well. She will contact the clinic in 1 week for a status update.    Mitchell Muñoz DO  Physical Medicine and [facility] [address] [place] [address]

## 2023-08-15 NOTE — TELEPHONE ENCOUNTER
Initiated authorization for Left cervical paraspinal, upper trapezius, levator scapula trigger point injections CPT 16937 () with Raul Willett at / [address]  Case #[id number]  Status: Approved-authorization is not required per health plan    Inj will be performed today

## 2023-08-16 ENCOUNTER — PATIENT MESSAGE (OUTPATIENT)
Dept: PHYSICAL MEDICINE AND REHAB | Facility: CLINIC | Age: 48
End: 2023-08-16

## 2023-08-17 NOTE — TELEPHONE ENCOUNTER
From: Abraham Gregorio  Sent: 8/16/2023 4:51 PM CDT  To: Gloria Chung  Subject: Return to Work     So sorry for bothering you, but Ifrah Kerr just emailed me and said all that is needed from Doctor Marcela Sharma is a letter stating i can return to work august 21st, with the same restrictions he wrote from medical paperwork he sent for the 28th of august. I know im asking for this paperwork late, but i was just made aware of his new position im applying for today.

## 2023-08-18 ENCOUNTER — PATIENT MESSAGE (OUTPATIENT)
Dept: PHYSICAL MEDICINE AND REHAB | Facility: CLINIC | Age: 48
End: 2023-08-18

## 2023-08-18 NOTE — TELEPHONE ENCOUNTER
From: Beba Harper  To: Nina Lozoya DO  Sent: 8/18/2023 8:27 AM CDT  Subject: Letter    Good morning. Thank you so much for sending me a copy of letter, but i also wanted to make sure you sent letter to [facility] and if you did thank you so much.  Have a great day

## 2023-08-21 ENCOUNTER — PATIENT MESSAGE (OUTPATIENT)
Dept: PHYSICAL MEDICINE AND REHAB | Facility: CLINIC | Age: 48
End: 2023-08-21

## 2023-08-21 NOTE — TELEPHONE ENCOUNTER
From: Mario Mckeon  To: Tiki Herndon, DO  Sent: 8/21/2023 4:30 AM CDT  Subject: Pain     Good morning. The lidocaine injections i just received did nothing for my neck pain. The excruciating dull pain bothers me all day and night. Why do you think i have this pain? Its very depressing to feel this way. Everyday! All day. If the X-rays shoe no issue, why am I experiencing so much pain? Its a pain thats giving me so much anxiety and depression.  There has to be a reason for this severe neck pain

## 2023-08-22 ENCOUNTER — TELEPHONE (OUTPATIENT)
Dept: PHYSICAL MEDICINE AND REHAB | Facility: CLINIC | Age: 48
End: 2023-08-22

## 2023-08-22 NOTE — TELEPHONE ENCOUNTER
Elvis Mario @ [facility]   Disability    Needs an estimated full duty date (back without restrictions)    Employer needs an estimated full duty date. She stated she understands the date provided can change and is only an estimate, but she is unable to return to work with restrictions without an estimated full duty date on file. Phone:  [phone] ext [phone]    Advised unable to discuss anything with her due to HIPAA and need to verify if ok. She advised she has faxed something over. This RN requested the PSR who gets the fax to add a note to this encounter to see the new encounter with the fax information.

## 2023-08-22 NOTE — TELEPHONE ENCOUNTER
Eliseo Mediate PA-C,    See pts' mychart message below and advise further.     Please reply to pool: CAMERON Saucedo

## 2023-08-23 NOTE — TELEPHONE ENCOUNTER
Patient can discontinue Fish Oil due to side effects. Recheck fasting Lipid panel ordered. Patient needs to schedule with Dermatologist for odor. No x-ray or MRI needed.

## 2023-08-25 DIAGNOSIS — I10 ESSENTIAL HYPERTENSION: ICD-10-CM

## 2023-08-25 RX ORDER — LOSARTAN POTASSIUM 25 MG/1
25 TABLET ORAL DAILY
Qty: 90 TABLET | Refills: 3 | OUTPATIENT
Start: 2023-08-25

## 2023-08-25 NOTE — TELEPHONE ENCOUNTER
Spoke with pt, TERESA verified. Pt stated she is not at home, she will need to check it and let us know. Pt not sure if she is taking losartan 50 mg? She will send [employer] message to us.

## 2023-08-25 NOTE — TELEPHONE ENCOUNTER
Per med list pt on losartam 50 mg, we'll verify dose with pt.        Please review refill protocol failed/ no protocol  Requested Prescriptions   Pending Prescriptions Disp Refills    LOSARTAN 25 MG Oral Tab [Pharmacy Med Name: Losartan Potassium 25 Mg Tab [facility]] 90 tablet 0     Sig: TAKE ONE TABLET BY MOUTH ONE TIME DAILY       Hypertensive Medications Protocol Failed - 8/25/2023 10:16 AM        Failed - Last BP reading less than 140/90     BP Readings from Last 1 Encounters:  08/15/23 : 140/90              Passed - In person appointment in the past 12 or next 3 months     Recent Outpatient Visits              1 week ago Myofascial pain [M79.18 (ICD-10-CM)]    [id number] [facility], [place], [place]    Office Visit    1 month ago Cervical myofascial pain syndrome    [facility], [address], [facility],     Office Visit    1 month ago Radiculitis of left cervical region    [facility], [address], [place]    Office Visit    1 month ago S/P cervical spinal fusion    [facility], [address] [place] [address], DO    Office Visit    1 month ago Routine general medical examination at a health care facility    [facility], [address] [place] [address]    Office Visit          Future Appointments         Provider Department Appt Notes    In 4 days Ana M Alves PA-C [facility], [facility]-    In 1 month Natasha Richmond MD [facility], [address] [place] [address] #[id number] 4 visits from 6/6/23-6/6/24               Passed - CMP or BMP in past 6 months     Recent Results (from the past 4392 hour(s))   COMP METABOLIC PANEL (14)    Collection Time: 07/05/23 10:52 AM   Result Value Ref Range    Glucose 86 70 - 99 mg/dL    Sodium 142 136 - 145 mmol/L    Potassium 4.2 3.5 - 5.1 mmol/L    Chloride 108 98 - 112 mmol/L    CO2 30.0 21.0 - 32.0 mmol/L    Anion Gap 4 0 - 18 mmol/L    BUN 15 7 - 18 mg/dL    Creatinine 0.69 0.55 - 1.02 mg/dL    BUN/CREA Ratio 21.7 (H) 10.0 - 20.0    Calcium, Total 9.5 8.5 - 10.1 mg/dL    Calculated Osmolality 294 275 - 295 mOsm/kg    eGFR-Cr 107 >=60 mL/min/1.73m2    ALT 33 13 - 56 U/L    AST 22 15 - 37 U/L    Alkaline Phosphatase 122 (H) 39 - 100 U/L    Bilirubin, Total 0.7 0.1 - 2.0 mg/dL    Total Protein 7.1 6.4 - 8.2 g/dL    Albumin 3.5 3.4 - 5.0 g/dL    Globulin  3.6 2.8 - 4.4 g/dL    A/G Ratio 1.0 1.0 - 2.0    Patient Fasting for CMP? Yes      *Note: Due to a large number of results and/or encounters for the requested time period, some results have not been displayed. A complete set of results can be found in Results Review.                Passed - In person appointment or virtual visit in the past 6 months     Recent Outpatient Visits              1 week ago Myofascial pain [M79.18 (ICD-10-CM)]    [facility] [place], [place]    Office Visit    1 month ago Cervical myofascial pain syndrome    [facility], [address], [facility], [place]    Office Visit    1 month ago Radiculitis of left cervical region    [address], [facility], [address], [place], [place]    Office Visit    1 month ago S/P cervical spinal fusion    [address], [address], [place], [place]    Office Visit    1 month ago Routine general medical examination at a health care facility    [address], [address], [place], [employer]    Office Visit          Future Appointments         Provider Department Appt Notes    In 4 days Ana M Alves PA-C [address], [facility]-    In 1 month Natasha Richmond MD [address], [address] [facility], [address] [place] [address] Approval #[id number] 4 visits from 6/6/23-6/6/24               Passed [facility] or GFRNAA > 50     GFR Evaluation  EGFRCR: 107 , resulted on 7/5/2023

## 2023-08-25 NOTE — TELEPHONE ENCOUNTER
Please review. Protocol failed. Medication erroneously was marked discontinue when patient was Inpatient for surgery. Requested Prescriptions   Pending Prescriptions Disp Refills    losartan 50 MG Oral Tab 90 tablet 3     Sig: Take 1 tablet (50 mg total) by mouth daily.        Hypertensive Medications Protocol Failed - 8/25/2023  4:06 PM        Failed - Last BP reading less than 140/90     BP Readings from Last 1 Encounters:  08/15/23 : 140/90              Passed - In person appointment in the past 12 or next 3 months     Recent Outpatient Visits              1 week ago Myofascial pain [M79.18 (ICD-10-CM)]    [address] [facility], [place], [place]    Office Visit    1 month ago Cervical myofascial pain syndrome    [facility], [address] [facility],[address], [facility],     Office Visit    1 month ago Radiculitis of left cervical region    [address], [place] [address], [place], DO    Office Visit    1 month ago S/P cervical spinal fusion    [address], [address] [place] [address], DO    Office Visit    1 month ago Routine general medical examination at a health care facility    [address], 12 [facility], [address] [place] [address] NASEEM NEFF    Office Visit          Future Appointments         Provider Department Appt Notes    In 4 days Magan Bruce PA-C [address], [facility]-    In 1 month Elaine Lake MD [address], [address] [facility], [address] [place] [address] [phone] 4 visits from 6/6/23-6/6/24               Passed - CMP or BMP in past 6 months     Recent Results (from the past 4392 hour(s))   COMP METABOLIC PANEL (14)    Collection Time: 07/05/23 10:52 AM   Result Value Ref Range    Glucose 86 70 - 99 mg/dL    Sodium 142 136 - 145 mmol/L    Potassium 4.2 3.5 - 5.1 mmol/L    Chloride 108 98 - 112 mmol/L    CO2 30.0 21.0 - 32.0 mmol/L    Anion Gap 4 0 - 18 mmol/L    BUN 15 7 - 18 mg/dL    Creatinine 0.69 0.55 - 1.02 mg/dL    BUN/CREA Ratio 21.7 (H) 10.0 - 20.0    Calcium, Total 9.5 8.5 - 10.1 mg/dL    Calculated Osmolality 294 275 - 295 mOsm/kg    eGFR-Cr 107 >=60 mL/min/1.73m2    ALT 33 13 - 56 U/L    AST 22 15 - 37 U/L    Alkaline Phosphatase 122 (H) 39 - 100 U/L    Bilirubin, Total 0.7 0.1 - 2.0 mg/dL    Total Protein 7.1 6.4 - 8.2 g/dL    Albumin 3.5 3.4 - 5.0 g/dL    Globulin  3.6 2.8 - 4.4 g/dL    A/G Ratio 1.0 1.0 - 2.0    Patient Fasting for CMP? Yes      *Note: Due to a large number of results and/or encounters for the requested time period, some results have not been displayed. A complete set of results can be found in Results Review.                Passed - In person appointment or virtual visit in the past 6 months     Recent Outpatient Visits              1 week ago Myofascial pain [M79.18 (ICD-10-CM)]    [address] [place], [place]    Office Visit    1 month ago Cervical myofascial pain syndrome    [facility], [address], [place] Jermaine Shepherds, DO    Office Visit    1 month ago Radiculitis of left cervical region    [address], [facility], [place], DO    Office Visit    1 month ago S/P cervical spinal fusion    [address], [address] [place] [address] Joyce York 1, Darshan Davis, DO    Office Visit    1 month ago Routine general medical examination at a health care facility    [address], [address], [place] Rosemary Lay, [employer]    Office Visit          Future Appointments         Provider Department Appt Notes    In 4 days Kye Brandt PA-C [address], [facility]-    In 1 month Yovana Dumont MD [address], [address] [address] and [place] [phone] 4 visits from 6/6/23-6/6/24               Passed - EGFRCR or GFRNAA > 50     GFR Evaluation  EGFRCR: 107 , resulted on 7/5/2023           Refused Prescriptions Disp Refills    LOSARTAN 25 MG Oral Tab [Pharmacy Med Name: Losartan Potassium 25 Mg Tab [facility]] 90 tablet 3     Sig: TAKE ONE TABLET BY MOUTH ONE TIME DAILY       Hypertensive Medications Protocol Failed - 8/25/2023  4:06 PM        Failed - Last BP reading less than 140/90     BP Readings from Last 1 Encounters:  08/15/23 : 140/90              Passed - In person appointment in the past 12 or next 3 months     Recent Outpatient Visits              1 week ago Myofascial pain [M79.18 (ICD-10-CM)]    [facility], [place], DO    Office Visit    1 month ago Cervical myofascial pain syndrome    [facility], [address], [facility]    Office Visit    1 month ago Radiculitis of left cervical region    [address], [place] [address], [place], DO    Office Visit    1 month ago S/P cervical spinal fusion    [address], [address] [place] [address], DO    Office Visit    1 month ago Routine general medical examination at a health care facility    [address], [address] [facility], [address] [place], [place] [address]FANNY NASEEM    Office Visit          Future Appointments         Provider Department Appt Notes    In 4 days Bonny Vogt PA-C [address], [facility]-    In 1 month Lainey Feliciano MD [address], [address] [address] and [place] [address] [phone] 3 visits from 6/6/23-6/6/24               Passed - CMP or BMP in past 6 months     Recent Results (from the past 4392 hour(s))   COMP METABOLIC PANEL (14)    Collection Time: 07/05/23 10:52 AM   Result Value Ref Range    Glucose 86 70 - 99 mg/dL    Sodium 142 136 - 145 mmol/L    Potassium 4.2 3.5 - 5.1 mmol/L Chloride 108 98 - 112 mmol/L    CO2 30.0 21.0 - 32.0 mmol/L    Anion Gap 4 0 - 18 mmol/L    BUN 15 7 - 18 mg/dL    Creatinine 0.69 0.55 - 1.02 mg/dL    BUN/CREA Ratio 21.7 (H) 10.0 - 20.0    Calcium, Total 9.5 8.5 - 10.1 mg/dL    Calculated Osmolality 294 275 - 295 mOsm/kg    eGFR-Cr 107 >=60 mL/min/1.73m2    ALT 33 13 - 56 U/L    AST 22 15 - 37 U/L    Alkaline Phosphatase 122 (H) 39 - 100 U/L    Bilirubin, Total 0.7 0.1 - 2.0 mg/dL    Total Protein 7.1 6.4 - 8.2 g/dL    Albumin 3.5 3.4 - 5.0 g/dL    Globulin  3.6 2.8 - 4.4 g/dL    A/G Ratio 1.0 1.0 - 2.0    Patient Fasting for CMP? Yes      *Note: Due to a large number of results and/or encounters for the requested time period, some results have not been displayed. A complete set of results can be found in Results Review.                Passed - In person appointment or virtual visit in the past 6 months     Recent Outpatient Visits              1 week ago Myofascial pain [M79.18 (ICD-10-CM)]    [facility]anna [place], [facility], [place]    Office Visit    1 month ago Cervical myofascial pain syndrome    [facility], [address], [place],     Office Visit    1 month ago Radiculitis of left cervical region    [place] [place], [address], [place], DO    Office Visit    1 month ago S/P cervical spinal fusion    [facility] [employer] [facility], [address] [place] [address], Joyce Herrera 1, Feliberto Gill, DO    Office Visit    1 month ago Routine general medical examination at a health care facility    [facility], [address], [place] Cel[place] Rud[place], [place]    Office Visit          Future Appointments         Provider Department Appt Notes    In 4 days Aylin Carmona PA-C [place]-    In 1 month Eric Mentone, MD [facility] [employer] [facility], [address] [facility], [address] [place] [address] and [place] #[phone] 4 visits from 6/6/23-6/6/24               Passed - EGFRCR or GFRNAA > 50     GFR Evaluation  EGFRCR: 107 , resulted on 7/5/2023             Future Appointments         Provider Department Appt Notes    In 4 days NASEEM [facility], [facility]-    In 1 month Juan Carlos Oquendo MD [address], [address] [address] and [place] [address] #[phone] 4 visits from 6/6/23-6/6/24           Recent Outpatient Visits              1 week ago Myofascial pain [M79.18 (ICD-10-CM)]    [address] [facility], [place], [place]    Office Visit    1 month ago Cervical myofascial pain syndrome    [facility], [address], [place] Estelle Bettencourt, DO    Office Visit    1 month ago Radiculitis of left cervical region    [address], [place], [address], e[place], DO    Office Visit    1 month ago S/P cervical spinal fusion    [address], [address] [place] [address], [address] [place] [address], DO    Office Visit    1 month ago Routine general medical examination at a health care facility    [address] [facility], [place] Whitney Akins, [employer]    Office Visit

## 2023-08-26 RX ORDER — LOSARTAN POTASSIUM 50 MG/1
50 TABLET ORAL DAILY
Qty: 90 TABLET | Refills: 3 | Status: SHIPPED | OUTPATIENT
Start: 2023-08-26

## 2023-08-26 NOTE — TELEPHONE ENCOUNTER
Spoke to patient and she stated \"whichever dose she gave me\". Patient stated she also sent picture of medication (see other TE). Per picture that patient sent via [employer], she is taking Losartan 50mg. Please advise.

## 2023-09-05 DIAGNOSIS — Z85.3 HISTORY OF BREAST CANCER: Primary | ICD-10-CM

## 2023-09-05 DIAGNOSIS — R92.8 ABNORMAL MRI, BREAST: ICD-10-CM

## 2023-09-08 ENCOUNTER — PATIENT MESSAGE (OUTPATIENT)
Facility: CLINIC | Age: 48
End: 2023-09-08

## 2023-09-12 ENCOUNTER — LAB ENCOUNTER (OUTPATIENT)
Dept: LAB | Age: 48
End: 2023-09-12
Attending: PHYSICIAN ASSISTANT
Payer: COMMERCIAL

## 2023-09-12 ENCOUNTER — TELEPHONE (OUTPATIENT)
Facility: CLINIC | Age: 48
End: 2023-09-12

## 2023-09-12 DIAGNOSIS — Z12.11 COLON CANCER SCREENING: ICD-10-CM

## 2023-09-12 DIAGNOSIS — R10.9 ABDOMINAL PAIN, UNSPECIFIED ABDOMINAL LOCATION: Primary | ICD-10-CM

## 2023-09-12 PROCEDURE — 36415 COLL VENOUS BLD VENIPUNCTURE: CPT | Performed by: PHYSICIAN ASSISTANT

## 2023-09-12 PROCEDURE — 80061 LIPID PANEL: CPT | Performed by: PHYSICIAN ASSISTANT

## 2023-09-12 RX ORDER — SODIUM, POTASSIUM,MAG SULFATES 17.5-3.13G
SOLUTION, RECONSTITUTED, ORAL ORAL
Qty: 1 EACH | Refills: 0 | Status: SHIPPED | OUTPATIENT
Start: 2023-09-12

## 2023-09-16 ENCOUNTER — PATIENT MESSAGE (OUTPATIENT)
Dept: PHYSICAL MEDICINE AND REHAB | Facility: CLINIC | Age: 48
End: 2023-09-16

## 2023-09-16 DIAGNOSIS — F41.1 GENERALIZED ANXIETY DISORDER: ICD-10-CM

## 2023-09-17 RX ORDER — ALPRAZOLAM 0.25 MG/1
0.25 TABLET ORAL 3 TIMES DAILY PRN
Qty: 30 TABLET | Refills: 2 | Status: SHIPPED | OUTPATIENT
Start: 2023-09-17

## 2023-09-18 NOTE — TELEPHONE ENCOUNTER
From: Nomi Gilbert  To: Lupe Music  Sent: 9/16/2023 6:16 PM CDT  Subject: Question    Good evening. Vanessa  been back to work since September 7th and im working an area where I stand for 7 hours and I've been fine but today my neck, foot and ankle hurt so much that i want to cry from the pain, especially my foot and ankle. Do you think its from my neck that im experiencing pain on my left side of my body? I know while I was on medical leave, i was sitting down and not really walking a lot. I am waiting to do my MRI, but in the mean time why am I getting so much pain?

## 2023-09-19 ENCOUNTER — HOSPITAL ENCOUNTER (OUTPATIENT)
Dept: MRI IMAGING | Facility: HOSPITAL | Age: 48
Discharge: HOME | End: 2023-09-19
Attending: SURGERY
Payer: COMMERCIAL

## 2023-09-19 DIAGNOSIS — Z85.3 HISTORY OF BREAST CANCER: ICD-10-CM

## 2023-09-19 DIAGNOSIS — R92.8 ABNORMAL MRI, BREAST: ICD-10-CM

## 2023-09-19 PROCEDURE — A9575 INJ GADOTERATE MEGLUMI 0.1ML: HCPCS | Performed by: SURGERY

## 2023-09-19 PROCEDURE — 77049 MRI BREAST C-+ W/CAD BI: CPT | Performed by: SURGERY

## 2023-09-19 RX ORDER — GADOTERATE MEGLUMINE 376.9 MG/ML
15 INJECTION INTRAVENOUS
Status: COMPLETED | OUTPATIENT
Start: 2023-09-19 | End: 2023-09-19

## 2023-09-19 RX ADMIN — GADOTERATE MEGLUMINE 14 ML: 376.9 INJECTION INTRAVENOUS at 13:33:00

## 2023-09-20 NOTE — TELEPHONE ENCOUNTER
Unfortunately I do not have an answer for her; the only thing I can say is to have the MRI done, and we can discuss the results on follow up and if the findings explain her symptoms.

## 2023-09-28 ENCOUNTER — OFFICE VISIT (OUTPATIENT)
Dept: NEUROLOGY | Facility: CLINIC | Age: 48
End: 2023-09-28
Payer: COMMERCIAL

## 2023-09-28 VITALS
DIASTOLIC BLOOD PRESSURE: 74 MMHG | BODY MASS INDEX: 25 KG/M2 | RESPIRATION RATE: 16 BRPM | HEART RATE: 64 BPM | WEIGHT: 136 LBS | SYSTOLIC BLOOD PRESSURE: 122 MMHG

## 2023-09-28 DIAGNOSIS — G43.009 MIGRAINE WITHOUT AURA AND WITHOUT STATUS MIGRAINOSUS, NOT INTRACTABLE: Primary | ICD-10-CM

## 2023-09-28 PROCEDURE — 99213 OFFICE O/P EST LOW 20 MIN: CPT | Performed by: OTHER

## 2023-09-28 PROCEDURE — 3078F DIAST BP <80 MM HG: CPT | Performed by: OTHER

## 2023-09-28 PROCEDURE — 3074F SYST BP LT 130 MM HG: CPT | Performed by: OTHER

## 2023-09-28 PROCEDURE — 64615 CHEMODENERV MUSC MIGRAINE: CPT | Performed by: OTHER

## 2023-09-28 NOTE — PROCEDURES
Date of service: 9/28/2023  Procedure: Botox injection -chemodenervation  Consent: obtained after explanation of procedure detail, benefits and risks    Indication:   -chronic migraine patient who suffers 15 or more days with headache lasting 4 hours a day or longer. Procedure description:  -Chronic Migraine  Dilution is 100 units/2 ml with a final concentration of 5 units per 0.1 ml. A sterile 30-gauge, 0.5 inch needle as 0.1 ml (5 units) injection per each site. Injections were divided across 7 specific head/neck muscle areas as specified in the table below. All muscles were injected bilaterally with half the number of injection sites administered to the left, and half to the right side of the head and neck. 45 units wasted. Head/Neck Area Dose (number of sites)   Frontalis bilaterally 20 units divided in 4 sites    bilaterally 10 units divided in 2 sites   Procerus 5 unit in 1 site   Occipitalis bilaterally 30 units divided in 6 sites   Temporalis bilaterally 40 units divided in 8 sites   Trapezius bilaterally 30 units divided in 6 sites   Cervical Paraspinal bilaterally 20 units divided in 4 sites   Total Dose 155 units divided in 31 sites     The procedure was completed successfully without complication during and after the injections, and the patient tolerated well throughout the procedure. I have explained the distant spread of toxin effect including asthenia, generalized muscle weakness, diplopia, blurred vision, ptosis, dysphonia, dysarthria, urinary incontinence, and breathing difficulties to the patient, in case of swallowing and breathing difficulties, patient is advised to go emergency room immediately for assistance before my office is called. Patient verbalized understanding. The recommended re-treatment schedule should be ~12 weeks from today. RN was present throughout today's office visit.      MD JAXSON [facility]    Diplomate of Neurology and Headache Medicine     CC: Layla Kaur PA-C

## 2023-09-28 NOTE — PROGRESS NOTES
Paperwork noting that patient may bear financial responsibility for procedure(s) performed in clinic today signed prior to proceeding with procedure(s). Furthermore, patient notified that they should contact their insurer to verify that your procedure/test has been approved and is a COVERED benefit. Although the [facility] staff does its due diligence, the insurance carrier gives the disclaimer that \"Although the procedure is authorized, this does not guarantee payment. \"    Ultimately the patient is responsible for payment. Botox is:  [x] Buy and Bill  [] Patient Supplied        Botox Reauthorization Questions:  Has the patient experienced a reduction in frequency of migraines since starting Botox? yes  If yes, by what percentage? 80%  Has the intensity of migraines decreased since starting Botox? yes  If yes, by what percentage?  80%

## 2023-10-09 ENCOUNTER — PATIENT MESSAGE (OUTPATIENT)
Dept: PHYSICAL MEDICINE AND REHAB | Facility: CLINIC | Age: 48
End: 2023-10-09

## 2023-10-09 NOTE — TELEPHONE ENCOUNTER
From: Yessenia Zazueta  To: Prakash Santos  Sent: 10/9/2023 8:30 AM CDT  Subject: Pain     Good morning. Today i do my xray but i wanted to tell you, i continue to have severe pain in the base of my neck all day and night. Shyla gone to Doctor Na Lennon twice for 62 units of botox that he administers all over my head and neck, and it only relieves my migraines and head pain, but when he administers injections on my neck, it doesn't help at all. Its a dull, pinching pain that pains me all day and night. Shyla gone to get massages and acupuncture, but pain is still there. Why is this pain not getting better but worse? The pain is my whole neck, top of my spine and right under the base of my head.  I need relief because i cant sleep cause the pain is unbearable

## 2023-10-10 ENCOUNTER — HOSPITAL ENCOUNTER (OUTPATIENT)
Dept: GENERAL RADIOLOGY | Age: 48
Discharge: HOME | End: 2023-10-10
Attending: NURSE PRACTITIONER
Payer: COMMERCIAL

## 2023-10-10 DIAGNOSIS — Z98.1 S/P CERVICAL SPINAL FUSION: ICD-10-CM

## 2023-10-10 PROCEDURE — 72040 X-RAY EXAM NECK SPINE 2-3 VW: CPT | Performed by: NURSE PRACTITIONER

## 2023-10-28 DIAGNOSIS — G43.009 MIGRAINE WITHOUT AURA AND WITHOUT STATUS MIGRAINOSUS, NOT INTRACTABLE: ICD-10-CM

## 2023-10-30 ENCOUNTER — PATIENT MESSAGE (OUTPATIENT)
Dept: FAMILY MEDICINE CLINIC | Facility: CLINIC | Age: 48
End: 2023-10-30

## 2023-10-30 RX ORDER — RIZATRIPTAN BENZOATE 10 MG/1
10 TABLET ORAL AS NEEDED
Qty: 9 TABLET | Refills: 0 | Status: SHIPPED | OUTPATIENT
Start: 2023-10-30

## 2023-10-30 NOTE — TELEPHONE ENCOUNTER
Refill passed per [facility], [facility] protocol. Requested Prescriptions   Pending Prescriptions Disp Refills    RIZATRIPTAN BENZOATE 10 MG Oral Tab [Pharmacy Med Name: Rizatriptan Benzoate 10 Mg Tab [facility]] 9 tablet 0     Sig: Take 1 tablet (10 mg total) by mouth as needed for Migraine.        Neurology Medications Passed - 10/28/2023  9:40 AM        Passed - In person appointment or virtual visit in the past 6 mos or appointment in next 3 mos     Recent Outpatient Visits              1 month ago Migraine without aura and without status migrainosus, not intractable    [facility] [place], [address] [facility], [facility], Ramez Middleton MD    Office Visit    2 months ago Myofascial pain [V73.26 (ICD-10-CM)]    [facility], [place], [place]    Office Visit    3 months ago Cervical myofascial pain syndrome    [facility] [place], [address], [place] Kavita Cruz, DO    Office Visit    3 months ago Radiculitis of left cervical region    [address], [facility], [address], [place], DO    Office Visit    3 months ago S/P cervical spinal fusion    [address], [address] [facility], Feliberto Gill, DO    Office Visit          Future Appointments         Provider Department Appt Notes    In 2 weeks [facility] MRI RM1 (1.5T WIDE) [facility]     In 1 month [facility], [address], [address] [facility] Colon/ EGD w/ mac @ [facility]    In 2 months Eric Brooks MD [address], [address] [facility], [address] [place] [address] [phone] 4 visits from 6/6/23-6/6/24                         Future Appointments         Provider Department Appt Notes    In 2 weeks [facility] MRI RM1 (1.5T WIDE) [facility]     In 1 month [address] [facility], PROCEDURE [facility] [place], [address] [facility] Colon/ EGD w/ mac @ [place]    In 2 months Eric Brooks MD [facility], [address] [facility], [address] [place] [address] and [place] [address] [phone] 4 visits from 6/6/23-6/6/24          Recent Outpatient Visits              1 month ago Migraine without aura and without status migrainosus, not intractable    [facility], [address] [facility], Fabienne Brewer, Zaida Painting MD    Office Visit    2 months ago Myofascial pain [O15.22 (ICD-10-CM)]    [facility], [place], [place]    Office Visit    3 months ago Cervical myofascial pain syndrome    [facility], [address], [place], DO    Office Visit    3 months ago Radiculitis of left cervical region    [address], [place] [facility], [place], DO    Office Visit    3 months ago S/P cervical spinal fusion    [address], [address] [place] [address] Joyce York 1, Frandy Watts, DO    Office Visit

## 2023-10-30 NOTE — TELEPHONE ENCOUNTER
From: Raquel Mcfarland  To: Aung Sheikh  Sent: 10/30/2023 9:49 AM CDT  Subject: Migraine/Neck Pain     Good morning. Is there a way to write a letter to my employer stating that I need to wear my hair down or my hair pinned up on sides instead of up in pony tail or bun, because when I went back to work in September 2023 I was wearing my hair down and my migraines and neck pain were not affecting me after my 2 Spinal/neck Surgeries. My pain was actually going away, but since I applied for a new position at my job, the new area I am working requires me to wear my hair up at all times, and for the last four week's of being in this new position, Shyla experienced the worst migraines, neck pain and also severe pain the back of my head. The botox i am continuing to receive will not help me if i continue to wear my hair up because I will keep experiencing severe migraines. Is there a way to send medical documents, botox procedures that Doctor Frederick Mcdonald is treating me with so my employer can approve me to wear my hair down. This would greatly help me to not continue getting severe neck and   migraine pain. Wearing my hair up in a pony or a bun brings my neck and migraine pain intensify and i want to experience no pain. The neck and migraine pain is honestly been unbearable and this position i just got, i really want to stay because I waited over 2 years to apply for an opening and to interview. My employer just needs a letter stating how the botox is positively working for my severe migraines and neck pain. I would greatly appreciate it. It would help a lot. Thank you so much.  Thank you

## 2023-11-08 ENCOUNTER — PATIENT MESSAGE (OUTPATIENT)
Dept: SURGERY | Facility: CLINIC | Age: 48
End: 2023-11-08

## 2023-11-08 NOTE — TELEPHONE ENCOUNTER
From: Darius Done  To: Maribeth Keith  Sent: 11/8/2023 1:29 AM CST  Subject: Pain     Good evening. Ever since my surgery in March of this year, Sarah Jasonn been experiencing very painful pinching pain in the back of my neck. The pain radiates from my neck, left breast, arm, hand and foot. I thought this horrible pain would go away with seeing a acupuncturist but it didn't help. I am receiving botox in my head and neck because I've developed everyday migraines from neck pain and the botox has only relieved my head and not my neck. Is it normal to still such pain. Its a dull pinching pain in the back of my neck on left side all day and night. Im doing a spinal MRI on November 13th. So once I complete this test, am I able to see you because I cant continue my life with this severe neck pain. Thank you so much for your time.

## 2023-11-08 NOTE — TELEPHONE ENCOUNTER
Discussed with Kailash Mojica has not been in to the office since July, at last office visit she was supposed to make a 3 seth follow up appointment. Imaging scheduled for 11.13.23, pt to make appt after imaging. Mailsuitehart sent to patient.

## 2023-11-13 ENCOUNTER — HOSPITAL ENCOUNTER (OUTPATIENT)
Dept: MRI IMAGING | Age: 48
Discharge: HOME | End: 2023-11-13
Attending: PHYSICAL MEDICINE & REHABILITATION
Payer: COMMERCIAL

## 2023-11-13 DIAGNOSIS — M79.18 CERVICAL MYOFASCIAL PAIN SYNDROME: ICD-10-CM

## 2023-11-13 DIAGNOSIS — M79.18 MYOFASCIAL PAIN: ICD-10-CM

## 2023-11-13 DIAGNOSIS — M54.12 RADICULITIS OF LEFT CERVICAL REGION: ICD-10-CM

## 2023-11-13 PROCEDURE — 72156 MRI NECK SPINE W/O & W/DYE: CPT | Performed by: PHYSICAL MEDICINE & REHABILITATION

## 2023-11-13 PROCEDURE — A9575 INJ GADOTERATE MEGLUMI 0.1ML: HCPCS | Performed by: PHYSICAL MEDICINE & REHABILITATION

## 2023-11-13 RX ORDER — GADOTERATE MEGLUMINE 376.9 MG/ML
15 INJECTION INTRAVENOUS
Status: COMPLETED | OUTPATIENT
Start: 2023-11-13 | End: 2023-11-13

## 2023-11-13 RX ADMIN — GADOTERATE MEGLUMINE 12 ML: 376.9 INJECTION INTRAVENOUS at 11:59:00

## 2023-11-17 ENCOUNTER — PATIENT MESSAGE (OUTPATIENT)
Dept: PHYSICAL MEDICINE AND REHAB | Facility: CLINIC | Age: 48
End: 2023-11-17

## 2023-11-20 DIAGNOSIS — G43.009 MIGRAINE WITHOUT AURA AND WITHOUT STATUS MIGRAINOSUS, NOT INTRACTABLE: ICD-10-CM

## 2023-11-21 RX ORDER — RIZATRIPTAN BENZOATE 10 MG/1
10 TABLET ORAL AS NEEDED
Qty: 9 TABLET | Refills: 5 | Status: SHIPPED | OUTPATIENT
Start: 2023-11-21

## 2023-11-21 NOTE — TELEPHONE ENCOUNTER
From: Erika Alcaraz  To: Arachno  Sent: 11/17/2023 2:37 PM CST  Subject: Severe Neck Pain     Good evening. Is there a way to write a letter to my employer stating that I need to wear my hair down because when I went back to work in September 2023 and I was wearing my hair down and my migraines and neck pain were not affecting me at all. My pain was actually going away, but since I applied for a new position at my job, the new area I am working requires me to wear my hair up and for the last four week's of being in this new position, Shyla experienced the worst migraines, neck pain and also severe pain the back of my head. The botox will not help me if i continue to wear my hair up because I will keep experiencing severe migraines. Is there a way to send all the spinal/neck medical documents, procedures you are treating me with so my employer can approve me to wear my hair down. This would greatly help me to not continue getting severe neck and migraine pain. Wearing my hair up in a pony or a bun brings my neck and migraine pain intensify and i want to experience no pain. The   neck and migraine pain is honestly been unbearable and this position i just got, i really want to stay because I waited over 2 years to apply for an opening and to interview. My employer just needs a letter stating how the botox is positively working for my severe migraines and neck pain. I would greatly appreciate it. It would help a lot.  Thank you so much

## 2023-11-21 NOTE — TELEPHONE ENCOUNTER
Refill passed per [facility], [facility] protocol. Requested Prescriptions   Pending Prescriptions Disp Refills    RIZATRIPTAN BENZOATE 10 MG Oral Tab [Pharmacy Med Name: Rizatriptan Benzoate 10 Mg Tab Auro] 9 tablet 0     Sig: Take 1 tablet (10 mg total) by mouth as needed for Migraine.        Neurology Medications Passed - 11/20/2023  9:42 AM        Passed - In person appointment or virtual visit in the past 6 mos or appointment in next 3 mos     Recent Outpatient Visits              1 month ago Migraine without aura and without status migrainosus, not intractable    [facility], [address] [facility] [place]man, Hunter Wilkes MD    Office Visit    3 months ago Myofascial pain [H51.29 (ICD-10-CM)]    Derotha Au, Talon Cruz, [place], [place]    Office Visit    4 months ago Cervical myofascial pain syndrome    [facility], [address], Lily Dale Brandin Arizmendi, DO    Office Visit    4 months ago Radiculitis of left cervical region    [address], [facility], Cooper, DO    Office Visit    4 months ago S/P cervical spinal fusion    [address], [address] [place], [place] [address]Joyce 1, Kellie Moralez, DO    Office Visit          Future Appointments         Provider Department Appt Notes    [facility] [address] Follow up- MRI results    MRI spine cervical in chart--11/13/23  #AD    In 2 weeks [address] [facility], [address] [facility],[address], Lily Dale Colon/ EGD w/ mac @ [facility]    In 1 month Janey Koyanagi, MD [address], [address] [facility], [address] [place] [address] and [place] [address] [phone] visits from 6/6/23-6/6/24                  Future Appointments         Provider Department Appt Notes    In 6 days Krystinayue Kline DO [facility], [address] [place] [address] Follow up- MRI results    MRI spine cervical in chart--11/13/23  #AD    In 2 weeks ONOFRE, PROCEDURE [facility], [address], Julian Fuentes/ FILEMON w/ mac @ [facility]    In 1 month Keysha Knox MD [address], [address] [facility], [address] [place] [address] and [place] [address] [phone] 4 visits from 6/6/23-6/6/24          Recent Outpatient Visits              1 month ago Migraine without aura and without status migrainosus, not intractable    [facility], [address] [facility], Shayla Marino MD    Office Visit    3 months ago Myofascial pain [L14.77 (ICD-10-CM)]    Perry Chicas, [place], [place]    Office Visit    4 months ago Cervical myofascial pain syndrome    [facility], [address], Shanna [place], DO    Office Visit    4 months ago Radiculitis of left cervical region    [address], [facility] [place]    Office Visit    4 months ago S/P cervical spinal fusion    [address], [address] [place] [address] Joyce York 1, Marian Moreira, DO    Office Visit

## 2023-11-27 ENCOUNTER — PATIENT MESSAGE (OUTPATIENT)
Dept: SURGERY | Facility: CLINIC | Age: 48
End: 2023-11-27

## 2023-11-27 ENCOUNTER — OFFICE VISIT (OUTPATIENT)
Dept: SURGERY | Facility: CLINIC | Age: 48
End: 2023-11-27
Payer: COMMERCIAL

## 2023-11-27 VITALS
HEIGHT: 62 IN | BODY MASS INDEX: 25.03 KG/M2 | DIASTOLIC BLOOD PRESSURE: 80 MMHG | WEIGHT: 136 LBS | SYSTOLIC BLOOD PRESSURE: 126 MMHG | HEART RATE: 55 BPM

## 2023-11-27 DIAGNOSIS — Z98.1 S/P CERVICAL SPINAL FUSION: Primary | ICD-10-CM

## 2023-11-27 PROCEDURE — 99213 OFFICE O/P EST LOW 20 MIN: CPT | Performed by: NEUROLOGICAL SURGERY

## 2023-11-27 PROCEDURE — 3008F BODY MASS INDEX DOCD: CPT | Performed by: NEUROLOGICAL SURGERY

## 2023-11-27 PROCEDURE — 3074F SYST BP LT 130 MM HG: CPT | Performed by: NEUROLOGICAL SURGERY

## 2023-11-27 PROCEDURE — 3079F DIAST BP 80-89 MM HG: CPT | Performed by: NEUROLOGICAL SURGERY

## 2023-11-27 NOTE — TELEPHONE ENCOUNTER
Noted that patient has messaged with an update about future needs for documentation her employer may request.  Patient also mentioned that she may need a letter stating that she cannot wear her hair up at work due to up-hairstyles leading to migraines. Per Dr. Yaw Yo in office visit today:    \"ASSESSMENT:  S/p C3-4 ACDF     Plan:  Contineu PT and HEP  F/U in 6 months\"    Patient underwent surgery with Dr. Yaw Yo on 3.28.23:    [facility] 919. Location and contact information to Medical Records given to patient via ecomom message. Awaiting any new requests for work letters.

## 2023-11-27 NOTE — TELEPHONE ENCOUNTER
From: Yodit Poster  To: Maribelivette Esquivel  Sent: 11/27/2023 10:36 AM CST  Subject: Office Visit     Good morning. Thank you for writing the letter so I can give to my employer. They may contact your office for my medical file such as surgeries I have had, MRI's, X-ray's, and paperwork stating im under your care and they may ask for documentation stating why i need to wear my hair down. When I have my hair up, it gives me severe migraines especially in the back of my head and neck. It's hard to lift my arms up to put my hair up especially since my spine and neck are still healing. I am under treatment for botox to alleviate severe pain to my neck and head, so leaving my hair down lessons the pain. Thank you so much for your time.

## 2023-11-27 NOTE — PROGRESS NOTES
[facility]  Neurological Surgery Clinic Note    Gerald Shaw  1975  [id number]  PCP: Nuha Garcia PA-C    REASON FOR VISIT:  L anterior neck pain and L hand numbness    HISTORY OF PRESENT ILLNESS:  Gerald Shaw is a(n) 51year old female who has had a C3-4 ACDF above a prior fusion. She has persistent L anterior neck pain and L hand numbness. MRI completed recently shows good hardware position and excellent decompression. She has no weakness. Location: neck pain  Quality: dull/ L hand numbness  Severity: intermittent  Duration: since prior to surgery  Timing: any  Context: s/p ACDF  Modifying Factors: PT and accupuncture helps   Associated signs/symptoms: pain      PAST MEDICAL HISTORY:  Past Medical History:   Diagnosis Date    Anxiety     My whole life    Anxiety state     Back problem     Chronic pain     COVID-2022    severe headache,nausea and stomache lasting 5 days    DCIS (ductal carcinoma in situ) of breast 2013    Right breast. radiotherapy and mastectomy     Depression 2004    medication and therapy    Disorder of thyroid     Exposure to medical diagnostic radiation     completed 295    Helicobacter pylori infection 2016    High blood pressure     Migraines     Muscle weakness     Neuropathic pain due to radiation     Osteoarthritis     Ovarian cyst 2005    Visual impairment     glasses       PAST SURGICAL HISTORY:  Past Surgical History:   Procedure Laterality Date    APPENDECTOMY  1979    BREAST SURGERY  2015    Revision of bilateral reconstructed breasts    BREAST SURGERY  2015    Fat grafting to left reconstructed breast    BREAST SURGERY Bilateral 2016    Bilateral nipple reconstruction      2008      2010    APGAR: 9 (1min) 9 (5 min)  DR: Romy Gomez    CERVICAL SPINE SURGERY  2023    CERVICAL THREE TO CERVICAL FOUR ANTERIOR CERVICAL DISCECTOMY AND FUSION.   BONE GRAFT AND ALL REQUIRED INSTRUMENTATION. NEUROMONITORING. CERVICAL SPINE SURGERY      C3-4 fusion extension 3/28/23 Dr. Rio Orlando 3/2023    COLONOSCOPY  11/08/2012    COLONOSCOPY      CORE BIOPSY Right 06/17/2013    Right breast:  MRI core biopsies    EGD  2016    EXPLOR FRONT SINUS,TRANSORBIT,UNI  09/19/2018    FOREARM/WRIST SURGERY UNLISTED Left 2009    DeQuervain's release    HYSTERECTOMY      LAPAROSCOPY,DIAGNOSTIC      LUMPECTOMY RIGHT Right 06/28/2013    Right segmental mastectomy with wire localization, advancement    OTHER  01/20/2022    Cervical four-five, Cervical five-six  anterior cervical discectomy and fusionRightGeneral    RADIATION RIGHT  2013    REDUCTION LEFT      REDUCTION RIGHT      REMOVAL OF OVARIAN CYST(S) Left 2005    laparoscopic    REMOVAL OF OVARIAN CYST(S) Bilateral 01/2014    laparotomy    SKIN SURGERY  07/22/2016    bilateral areaola tattoing     TONSILLECTOMY  1979    UPPER GI ENDOSCOPY,EXAM         FAMILY HISTORY:  family history includes Asthma in her mother; Breast Cancer (age of onset: 28) in her self; Cancer in her paternal aunt and paternal uncle; Cancer (age of onset: 27) in her cousin; Cancer (age of onset: 48) in her maternal aunt and another family member; Dementia in an other family member; Diabetes in her paternal grandmother; Hypertension in her father and mother; Lipids in her father and mother; Migraines in her father; No Known Problems in her daughter, sister, sister, sister, and son; Prostate Cancer in her father; Skin cancer in her maternal grandfather. SOCIAL HISTORY:   reports that she has never smoked. She has never used smokeless tobacco. She reports that she does not drink alcohol and does not use drugs.     ALLERGIES:  Allergies   Allergen Reactions    Latex HIVES and RASH     Other reaction(s): LATEX    Other HIVES     tegaderm     Tramadol NAUSEA ONLY    Morphine NAUSEA ONLY and DIZZINESS    Augmentin [Amoxicillin-Pot Clavulanate] DIARRHEA    Cephalexin DIARRHEA    Codeine NAUSEA ONLY       MEDICATIONS:  Current Outpatient Medications on File Prior to Visit   Medication Sig Dispense Refill    Rizatriptan Benzoate 10 MG Oral Tab Take 1 tablet (10 mg total) by mouth as needed for Migraine. 9 tablet 5    ALPRAZolam 0.25 MG Oral Tab Take 1 tablet (0.25 mg total) by mouth 3 (three) times daily as needed for Anxiety. 30 tablet 2    Clindamycin Phosphate 1 % External Swab Apply 1 Application topically 2 (two) times daily. 60 each 3    pregabalin 75 MG Oral Cap 1 cap PO at bedtime x1 week, then 1 cap PO BID 60 capsule 0    liothyronine 5 MCG Oral Tab Take 1 tablet (5 mcg total) by mouth daily. METOPROLOL TARTRATE 50 MG Oral Tab TAKE ONE TABLET BY MOUTH TWICE DAILY 180 tablet 3    losartan 50 MG Oral Tab Take 1 tablet (50 mg total) by mouth daily. levothyroxine 88 MCG Oral Tab Take 1 tablet (88 mcg total) by mouth in the morning. 90 tablet 1    Na Sulfate-K Sulfate-Mg Sulf (SUPREP BOWEL PREP KIT) 17.5-3.13-1.6 GM/177ML Oral Solution Take as discussed in clinic (Patient not taking: Reported on 11/27/2023) 1 each 0     No current facility-administered medications on file prior to visit. REVIEW OF SYSTEMS:  Review of Systems   All other systems reviewed and are negative. PHYSICAL EXAMINATION:  Neurologic Exam     Mental Status   Oriented to person, place, and time. Cranial Nerves   Cranial nerves II through XII intact. Motor Exam   Muscle bulk: normal  Overall muscle tone: normal  Right arm tone: normal  Left arm tone: normal  Right arm pronator drift: absent  Left arm pronator drift: absent  Right leg tone: normal  Left leg tone: normal    Strength   Strength 5/5 throughout.      Sensory Exam   Light touch normal.   Vibration normal.   Proprioception normal.   Pinprick normal.     Gait, Coordination, and Reflexes     Gait  Gait: normal    Reflexes   Right brachioradialis: 2+  Left brachioradialis: 2+  Right biceps: 2+  Left biceps: 2+  Right triceps: 2+  Left triceps: 2+  Right patellar: 2+  Left patellar: 2+  Right achilles: 2+  Left achilles: 2+  Right : 2+  Left : 2+       IMAGING:  As above    ASSESSMENT:  S/p C3-4 ACDF    Plan:  Contineu PT and HEP  F/U in 6 months    [address] DO  Neurological Surgery  [facility]

## 2023-11-27 NOTE — PROGRESS NOTES
Established patient:  Reason for follow up:   Neck pain radiating down left hand  Sx 03/28/23-C3-4 ACDF    Numeric Rating Scale:        Pain at Present:  10/10       New imaging or testing since your last office visit:    MRI spine cervical 11/13/23

## 2023-12-06 ENCOUNTER — TELEPHONE (OUTPATIENT)
Facility: CLINIC | Age: 48
End: 2023-12-06

## 2024-01-02 NOTE — TELEPHONE ENCOUNTER
From: Nomi Gilbert  To: Priya Kasper [place]  Sent: 9/23/2022 10:48 AM CDT  Subject: Back to Work     Good morning Linsey Isabella. I've been back to work for 2 weeks and not sure if my symptoms are from the metal barbara in my spine not completely fused, but my whole left side of my body has pained me so much, especially left hip, neck and foot. I've been standing the majority of time at work and I feel the pain all day and I thought it would go away but hasn't. Should I be worried or maybe it's my body not used to actually functioning now that I'm back to work?      The hip pain is unbearable Patient is advised to quit smoking  Ventolin inhaler 2 puffs every 6 hours.  Patient claims when she gets otitis media she usually needs Zithromax for 10 days.

## 2024-01-08 ENCOUNTER — TELEPHONE (OUTPATIENT)
Dept: NEUROLOGY | Facility: CLINIC | Age: 49
End: 2024-01-08

## 2024-01-08 NOTE — TELEPHONE ENCOUNTER
Pt tested positive for COVID on Sat 1/6. Based on her limited availability, scheduled first available appt 3/18/24. She can come in any day before 11:00 or any time on Mondays. Endorsed to RN, please add her to your list. Pt's best call back number is [phone].

## 2024-01-19 ENCOUNTER — OFFICE VISIT (OUTPATIENT)
Dept: FAMILY MEDICINE CLINIC | Facility: CLINIC | Age: 49
End: 2024-01-19
Payer: COMMERCIAL

## 2024-01-19 VITALS
BODY MASS INDEX: 25.03 KG/M2 | HEIGHT: 62 IN | SYSTOLIC BLOOD PRESSURE: 129 MMHG | DIASTOLIC BLOOD PRESSURE: 80 MMHG | WEIGHT: 136 LBS | HEART RATE: 64 BPM

## 2024-01-19 DIAGNOSIS — J02.0 STREP PHARYNGITIS: Primary | ICD-10-CM

## 2024-01-19 DIAGNOSIS — J02.9 SORE THROAT: ICD-10-CM

## 2024-01-19 LAB
CONTROL LINE PRESENT WITH A CLEAR BACKGROUND (YES/NO): YES YES/NO
KIT LOT #: 7282 NUMERIC
STREP GRP A CUL-SCR: POSITIVE

## 2024-01-19 PROCEDURE — 3008F BODY MASS INDEX DOCD: CPT | Performed by: PHYSICIAN ASSISTANT

## 2024-01-19 PROCEDURE — 3079F DIAST BP 80-89 MM HG: CPT | Performed by: PHYSICIAN ASSISTANT

## 2024-01-19 PROCEDURE — 99213 OFFICE O/P EST LOW 20 MIN: CPT | Performed by: PHYSICIAN ASSISTANT

## 2024-01-19 PROCEDURE — 87880 STREP A ASSAY W/OPTIC: CPT | Performed by: PHYSICIAN ASSISTANT

## 2024-01-19 PROCEDURE — 3074F SYST BP LT 130 MM HG: CPT | Performed by: PHYSICIAN ASSISTANT

## 2024-01-19 RX ORDER — AZITHROMYCIN 250 MG/1
TABLET, FILM COATED ORAL
Qty: 6 TABLET | Refills: 0 | Status: SHIPPED | OUTPATIENT
Start: 2024-01-19 | End: 2024-01-23

## 2024-01-19 NOTE — PROGRESS NOTES
HPI:     Sore Throat   This is a new problem. The current episode started yesterday. The problem has been gradually worsening. There has been no fever. Associated symptoms include headaches and swollen glands. Pertinent negatives include no abdominal pain, congestion, coughing, diarrhea, ear discharge, ear pain, hoarse voice, neck pain, shortness of breath, trouble swallowing or vomiting. She has tried nothing for the symptoms.       Medications:     Current Outpatient Medications   Medication Sig Dispense Refill    azithromycin 250 MG Oral Tab Take 2 tablets (500 mg total) by mouth daily for 1 day, THEN 1 tablet (250 mg total) daily for 4 days. 6 tablet 0    Rizatriptan Benzoate 10 MG Oral Tab Take 1 tablet (10 mg total) by mouth as needed for Migraine. 9 tablet 5    ALPRAZolam 0.25 MG Oral Tab Take 1 tablet (0.25 mg total) by mouth 3 (three) times daily as needed for Anxiety. 30 tablet 2    Na Sulfate-K Sulfate-Mg Sulf (SUPREP BOWEL PREP KIT) 17.5-3.13-1.6 GM/177ML Oral Solution Take as discussed in clinic 1 each 0    Clindamycin Phosphate 1 % External Swab Apply 1 Application topically 2 (two) times daily. 60 each 3    pregabalin 75 MG Oral Cap 1 cap PO at bedtime x1 week, then 1 cap PO BID 60 capsule 0    liothyronine 5 MCG Oral Tab Take 1 tablet (5 mcg total) by mouth daily.      METOPROLOL TARTRATE 50 MG Oral Tab TAKE ONE TABLET BY MOUTH TWICE DAILY 180 tablet 3    losartan 50 MG Oral Tab Take 1 tablet (50 mg total) by mouth daily.      levothyroxine 88 MCG Oral Tab Take 1 tablet (88 mcg total) by mouth in the morning. 90 tablet 1       Allergies:     Allergies   Allergen Reactions    Latex HIVES and RASH     Other reaction(s): LATEX    Other HIVES     tegaderm     Tramadol NAUSEA ONLY    Morphine NAUSEA ONLY and DIZZINESS    Augmentin [Amoxicillin-Pot Clavulanate] DIARRHEA    Cephalexin DIARRHEA    Codeine NAUSEA ONLY       History:     Health Maintenance   Topic Date Due    Pneumococcal Vaccine: Birth to  63yrs (1 of 2 - PCV) Never done    DTaP,Tdap,and Td Vaccines (1 - Tdap) Never done    Zoster Vaccines (1 of 2) Never done    Colorectal Cancer Screening  2022    COVID-19 Vaccine (3 - - season) 2023    Influenza Vaccine (1) Never done    Annual Depression Screening  2024    Annual Physical  2024    Mammogram  Discontinued       Patient's last menstrual period was 2022.   Past Medical History:     Past Medical History:   Diagnosis Date    Anxiety     My whole life    Anxiety state     Back problem     Chronic pain     COVID-19 2022    severe headache,nausea and stomache lasting 5 days    DCIS (ductal carcinoma in situ) of breast     Right breast. radiotherapy and mastectomy     Depression     medication and therapy    Disorder of thyroid     Exposure to medical diagnostic radiation     completed     Helicobacter pylori infection     High blood pressure     Migraines     Muscle weakness     Neuropathic pain due to radiation     Osteoarthritis     Ovarian cyst 2005    Visual impairment     glasses       Past Surgical History:     Past Surgical History:   Procedure Laterality Date    Appendectomy  1979    Breast surgery  2015    Revision of bilateral reconstructed breasts    Breast surgery  2015    Fat grafting to left reconstructed breast    Breast surgery Bilateral 2016    Bilateral nipple reconstruction      2008      2010    APGAR: 9 (1min) 9 (5 min)  DR: Dameon Augustine    Cervical spine surgery  2023    CERVICAL THREE TO CERVICAL FOUR ANTERIOR CERVICAL DISCECTOMY AND FUSION.  BONE GRAFT AND ALL REQUIRED INSTRUMENTATION. NEUROMONITORING.    Cervical spine surgery      C3-4 fusion extension 3/28/23 Dr. Vicente 3/2023    Colonoscopy  2012    Colonoscopy      Core biopsy Right 2013    Right breast:  MRI core biopsies    Egd  2016    Explor front sinus,transorbit,uni  2018    Forearm/wrist surgery  unlisted Left 2009    DeQuervain's release    Hysterectomy      Laparoscopy,diagnostic      Lumpectomy right Right 06/28/2013    Right segmental mastectomy with wire localization, advancement    Other  01/20/2022    Cervical four-five, Cervical five-six  anterior cervical discectomy and fusionRightGeneral    Radiation right  2013    Reduction left      Reduction right      Removal of ovarian cyst(s) Left 2005    laparoscopic    Removal of ovarian cyst(s) Bilateral 01/2014    laparotomy    Skin surgery  07/22/2016    bilateral areaola tattoing     Tonsillectomy  1979    Upper gi endoscopy,exam         Family History:     Family History   Problem Relation Age of Onset    Lipids Father         hyperlipidemia    Hypertension Father     Migraines Father     Prostate Cancer Father     Asthma Mother     Lipids Mother         hyperlipidemia    Hypertension Mother     Cancer Maternal Aunt 50        skin and breast cancer    Skin cancer Maternal Grandfather     Cancer Other 50        breast cancer and leukemia    Dementia Other         Alzheimers, Grandmother [SIDE NOT STATED]    Cancer Cousin 30        ovarian cancer / maternal cousin    Diabetes Paternal Grandmother     Cancer Paternal Aunt         leukemia    Cancer Paternal Uncle         prostate    Breast Cancer Self 35    No Known Problems Daughter     No Known Problems Son     No Known Problems Sister     No Known Problems Sister     No Known Problems Sister        Social History:     Social History     Socioeconomic History    Marital status:      Spouse name: Not on file    Number of children: 2    Years of education: Not on file    Highest education level: Not on file   Occupational History    Occupation:      Employer: [employer]   Tobacco Use    Smoking status: Never    Smokeless tobacco: Never   Vaping Use    Vaping Use: Never used   Substance and Sexual Activity    Alcohol use: No    Drug use: No    Sexual activity: Not on  file   Other Topics Concern     Service Not Asked    Blood Transfusions Not Asked    Caffeine Concern No     Comment: 1 cup coffee daily    Occupational Exposure Not Asked    Hobby Hazards Not Asked    Sleep Concern Not Asked    Stress Concern Not Asked    Weight Concern Not Asked    Special Diet Not Asked    Back Care Not Asked    Exercise No    Bike Helmet Not Asked    Seat Belt Not Asked    Self-Exams Not Asked    Grew up on a farm Not Asked    History of tanning Not Asked    Outdoor occupation Not Asked    Breast feeding Not Asked    Reaction to local anesthetic No    Pt has a pacemaker No    Pt has a defibrillator No   Social History Narrative    Camila is . Mother of two children. Patient works for [employer] in customer service. Patient lives with her two children in [place], [place].     Social Determinants of Health     Financial Resource Strain: Not on file   Food Insecurity: Not on file   Transportation Needs: Not on file   Physical Activity: Not on file   Stress: Not on file   Social Connections: Not on file   Housing Stability: Not on file       Review of Systems:   Review of Systems   Constitutional:  Positive for chills and fatigue. Negative for activity change and fever.   HENT:  Positive for sore throat. Negative for congestion, ear discharge, ear pain, hoarse voice, postnasal drip, rhinorrhea, sinus pressure, sinus pain and trouble swallowing.    Respiratory:  Negative for cough, chest tightness, shortness of breath and wheezing.    Cardiovascular:  Negative for chest pain and palpitations.   Gastrointestinal:  Negative for abdominal distention, abdominal pain, blood in stool, constipation, diarrhea, nausea and vomiting.   Musculoskeletal:  Negative for neck pain.   Skin:  Negative for rash.   Neurological:  Positive for headaches.   All other systems reviewed and are negative.       Vitals:    01/19/24 0949   BP: 129/80   Pulse: 64   Weight: 136 lb (61.7 kg)   Height: 5' 2\"  (1.575 m)     Body mass index is 24.87 kg/m².    Physical Exam:   Physical Exam  Vitals reviewed.   Constitutional:       General: She is not in acute distress.     Appearance: She is well-developed.   HENT:      Head: Normocephalic and atraumatic.      Right Ear: Tympanic membrane, ear canal and external ear normal. There is no impacted cerumen.      Left Ear: Tympanic membrane, ear canal and external ear normal. There is no impacted cerumen.      Nose: Nose normal.      Mouth/Throat:      Mouth: Mucous membranes are moist.      Pharynx: Oropharynx is clear. Posterior oropharyngeal erythema present. No oropharyngeal exudate.   Eyes:      General:         Right eye: No discharge.         Left eye: No discharge.      Conjunctiva/sclera: Conjunctivae normal.   Cardiovascular:      Rate and Rhythm: Normal rate and regular rhythm.      Heart sounds: Normal heart sounds, S1 normal and S2 normal. No murmur heard.  Pulmonary:      Effort: Pulmonary effort is normal.      Breath sounds: Normal breath sounds. No wheezing or rales.   Chest:      Chest wall: No tenderness.   Lymphadenopathy:      Cervical: Cervical adenopathy present.   Skin:     Findings: No rash.   Neurological:      Mental Status: She is alert and oriented to person, place, and time.   Psychiatric:         Behavior: Behavior is cooperative.          Assessment and Plan::     Problem List Items Addressed This Visit    None  Visit Diagnoses       Strep pharyngitis    -  Primary    Relevant Medications    azithromycin 250 MG Oral Tab    Sore throat        Relevant Orders    POC Rapid Strep [19673] (Completed)        Supportive care includes:    encourage fluid intake   Advise patient to take Tylenol/Ibuprofen as needed for pain.  warm salt water gargles   humidifier     Discussed plan of care with pt and pt is in agreement.All questions answered. Pt to call with questions or concerns.

## 2024-02-21 ENCOUNTER — TELEPHONE (OUTPATIENT)
Dept: PHYSICAL MEDICINE AND REHAB | Facility: CLINIC | Age: 49
End: 2024-02-21

## 2024-02-21 NOTE — TELEPHONE ENCOUNTER
Pt phoned office 8:15 AM 2/21/24 wishing to cancel appointment due to her \"not being able to make it on time\" . EOD w/charge, office policy states 24 hour notice for cancellation

## 2024-03-18 ENCOUNTER — OFFICE VISIT (OUTPATIENT)
Dept: NEUROLOGY | Facility: CLINIC | Age: 49
End: 2024-03-18
Payer: COMMERCIAL

## 2024-03-18 VITALS — HEART RATE: 62 BPM | SYSTOLIC BLOOD PRESSURE: 126 MMHG | DIASTOLIC BLOOD PRESSURE: 82 MMHG | RESPIRATION RATE: 16 BRPM

## 2024-03-18 DIAGNOSIS — G43.009 MIGRAINE WITHOUT AURA AND WITHOUT STATUS MIGRAINOSUS, NOT INTRACTABLE: Primary | ICD-10-CM

## 2024-03-18 NOTE — PROGRESS NOTES
[facility] Neurology Outpatient Progress Note  Date of service: 3/18/2024    Assessment:     ICD-10-CM    1. Migraine without aura and without status migrainosus, not intractable  G43.009 onabotulinumtoxinA (Botox) injection 200 Units      Degenerative disc disease, cervical  (primary encounter diagnosis) s/p cervical surgery x2  Migraine without aura and without status migrainosus  Left occipital neuralgia     Plan:  Botox is done in office, benefits, side effects, expectation reviewed  Rizatriptan prn  Migraine headache education given  See orders and medications filed with this encounter. The patient indicates understanding of these issues and agrees with the plan.  Discussed with patient regarding assessment,  care plan   RTC 3 months for botox and follow up  Pt should go ER for any new or worsening symptoms and contact office   During today's visit for procedure, I spent additional 20 minutes in neuro exam, counseling and review of care plan.  The above stated time was for the E/M portion of the patient's conditions, and exclusive of the pre-, post- and intraservice-time associated with the procedures.   RN was present throughout today's office visit.     Subjective:   History:  Patient here for a follow-up visit for migraine and botox injection. She is overdue for botox, headache is worse, but last botox helped migraine, we are hoping botox will help migraine control better after more injections   Per initial visit note:  Camila Bell is a 47 year old female with past medical history as listed below presents here for initial evaluation of headache.Patient here for evaluation of left sided head sharp, zap likepain and blurred vision (in both, left is worse) since neck surgery 3/2023. Episodes will come/go. she admits migraine history but feels this headache is different from her previous migraine. Prior to surgery, she has left side sensory symptoms. She has tried Neurontin, steroids for headache but no  improvement, migraine abortive medication helps but not enough. She is  in [employer].    History/Other:   REVIEW OF SYSTEMS:  A 10-point system was reviewed. Pertinent positives and negatives are noted as above       Current Outpatient Medications:     Rizatriptan Benzoate 10 MG Oral Tab, Take 1 tablet (10 mg total) by mouth as needed for Migraine., Disp: 9 tablet, Rfl: 5    ALPRAZolam 0.25 MG Oral Tab, Take 1 tablet (0.25 mg total) by mouth 3 (three) times daily as needed for Anxiety., Disp: 30 tablet, Rfl: 2    METOPROLOL TARTRATE 50 MG Oral Tab, TAKE ONE TABLET BY MOUTH TWICE DAILY, Disp: 180 tablet, Rfl: 3    losartan 50 MG Oral Tab, Take 1 tablet (50 mg total) by mouth daily., Disp: , Rfl:     levothyroxine 88 MCG Oral Tab, Take 1 tablet (88 mcg total) by mouth in the morning., Disp: 90 tablet, Rfl: 1    Na Sulfate-K Sulfate-Mg Sulf (SUPREP BOWEL PREP KIT) 17.5-3.13-1.6 GM/177ML Oral Solution, Take as discussed in clinic (Patient not taking: Reported on 3/18/2024), Disp: 1 each, Rfl: 0    liothyronine 5 MCG Oral Tab, Take 1 tablet (5 mcg total) by mouth daily. (Patient not taking: Reported on 3/18/2024), Disp: , Rfl:   Allergies:  Allergies   Allergen Reactions    Latex HIVES and RASH     Other reaction(s): LATEX    Other HIVES     tegaderm     Tramadol NAUSEA ONLY    Morphine NAUSEA ONLY and DIZZINESS    Augmentin [Amoxicillin-Pot Clavulanate] DIARRHEA    Cephalexin DIARRHEA    Codeine NAUSEA ONLY     Past Medical History:   Diagnosis Date    Anxiety     My whole life    Anxiety state     Back problem     Chronic pain     COVID-19 09/2022    severe headache,nausea and stomache lasting 5 days    DCIS (ductal carcinoma in situ) of breast 2013    Right breast. radiotherapy and mastectomy     Depression 2004    medication and therapy    Disorder of thyroid     Essential hypertension 2008    Exposure to medical diagnostic radiation     completed 2015    Helicobacter pylori infection 2016    High  blood pressure     Migraines     Muscle weakness     Neuropathic pain due to radiation     Osteoarthritis     Ovarian cyst 2005    Visual impairment     glasses     Past Surgical History:   Procedure Laterality Date    APPENDECTOMY  1979    BREAST SURGERY  2015    Revision of bilateral reconstructed breasts    BREAST SURGERY  2015    Fat grafting to left reconstructed breast    BREAST SURGERY Bilateral 2016    Bilateral nipple reconstruction      2008      2010    APGAR: 9 (1min) 9 (5 min)  DR: Dameon Augustine    CERVICAL SPINE SURGERY  2023    CERVICAL THREE TO CERVICAL FOUR ANTERIOR CERVICAL DISCECTOMY AND FUSION.  BONE GRAFT AND ALL REQUIRED INSTRUMENTATION. NEUROMONITORING.    CERVICAL SPINE SURGERY      C3-4 fusion extension 3/28/23 Dr. Vicente 3/2023    COLONOSCOPY  2012    COLONOSCOPY      CORE BIOPSY Right 2013    Right breast:  MRI core biopsies    EGD  2016    EXPLOR FRONT SINUS,TRANSORBIT,UNI  2018    FOREARM/WRIST SURGERY UNLISTED Left     DeQuervain's release    HYSTERECTOMY      LAPAROSCOPY,DIAGNOSTIC      LUMPECTOMY RIGHT Right 2013    Right segmental mastectomy with wire localization, advancement    OTHER  2022    Cervical four-five, Cervical five-six  anterior cervical discectomy and fusionRightGeneral    RADIATION RIGHT  2013    REDUCTION LEFT      REDUCTION RIGHT      REMOVAL OF OVARIAN CYST(S) Left     laparoscopic    REMOVAL OF OVARIAN CYST(S) Bilateral 2014    laparotomy    SKIN SURGERY  2016    bilateral areaola tattoing     TONSILLECTOMY  1979    UPPER GI ENDOSCOPY,EXAM       Social History:  Social History     Tobacco Use    Smoking status: Never    Smokeless tobacco: Never   Substance Use Topics    Alcohol use: No     Family History   Problem Relation Age of Onset    Lipids Father         hyperlipidemia    Hypertension Father     Migraines Father     Prostate Cancer Father     Asthma Mother     Lipids  Mother         hyperlipidemia    Hypertension Mother     Cancer Maternal Aunt 50        skin and breast cancer    Skin cancer Maternal Grandfather     Cancer Other 50        breast cancer and leukemia    Dementia Other         Alzheimers, Grandmother [SIDE NOT STATED]    Cancer Cousin 30        ovarian cancer / maternal cousin    Diabetes Paternal Grandmother     Cancer Paternal Aunt         leukemia    Cancer Paternal Uncle         prostate    Breast Cancer Self 35    No Known Problems Daughter     No Known Problems Son     No Known Problems Sister     No Known Problems Sister     No Known Problems Sister       Objective:   Neurological Examination:  /82   Pulse 62   Resp 16   LMP 06/09/2022   Mental status: A & O X 3  Language: no aphasia  Speech: no dysarthria  CN II-XII: intact   Motor strength: 5/5 all extremities  Tone: normal  DTRs: 2+ symmetric  Coordination: normal  Sensory: symmetric  Gait: normal    Test reviewed on 3/18/2024      Latesha Gomez MD (Michael)  Neurology  [facility]  3/18/2024, 2:41 PM  CC: Joe Nichols PA-C

## 2024-03-18 NOTE — PROCEDURES
Date of service: 3/18/2024  Procedure: Botox injection -chemodenervation  Consent: obtained after explanation of procedure detail, benefits and risks    Indication:   -chronic migraine patient who suffers 15 or more days with headache lasting 4 hours a day or longer.    Procedure description:  -Chronic Migraine  Dilution is 100 units/2 ml with a final concentration of 5 units per 0.1 ml.  A sterile 30-gauge, 0.5 inch needle as 0.1 ml (5 units) injection per each site. Injections were divided across 7 specific head/neck muscle areas as specified in the table below. All muscles were injected bilaterally with half the number of injection sites administered to the left, and half to the right side of the head and neck. 45 units wasted.  Head/Neck Area Dose (number of sites)   Frontalis bilaterally 20 units divided in 4 sites    bilaterally 10 units divided in 2 sites   Procerus 5 unit in 1 site   Occipitalis bilaterally 30 units divided in 6 sites   Temporalis bilaterally 40 units divided in 8 sites   Trapezius bilaterally 30 units divided in 6 sites   Cervical Paraspinal bilaterally 20 units divided in 4 sites   Total Dose 155 units divided in 31 sites     The procedure was completed successfully without complication during and after the injections, and the patient tolerated well throughout the procedure.    I have explained the distant spread of toxin effect including asthenia, generalized muscle weakness, diplopia, blurred vision, ptosis, dysphonia, dysarthria, urinary incontinence, and breathing difficulties to the patient, in case of swallowing and breathing difficulties, patient is advised to go emergency room immediately for assistance before my office is called. Patient verbalized understanding.     The recommended re-treatment schedule should be ~12 weeks from today.  RN was present throughout today's office visit.     Latesha Gomez MD (Michael)  [facility]    Diplomate of  Neurology and Headache Medicine     CC: Joe Nichols PA-C

## 2024-03-18 NOTE — PATIENT INSTRUCTIONS
Refill policies:    Allow 2-3 business days for refills; controlled substances may take longer.  Contact your pharmacy at least 5 days prior to running out of medication and have them send an electronic request or submit request through the “request refill” option in your [employer] account.  Refills are not addressed on weekends; covering physicians do not authorize routine medications on weekends.  No narcotics or controlled substances are refilled after noon on Fridays or by on call physicians.  By law, narcotics must be electronically prescribed.  A 30 day supply with no refills is the maximum allowed.  If your prescription is due for a refill, you may be due for a follow up appointment.  To best provide you care, patients receiving routine medications need to be seen at least once a year.  Patients receiving narcotic/controlled substance medications need to be seen at least once every 3 months.  In the event that your preferred pharmacy does not have the requested medication in stock (e.g. Backordered), it is your responsibility to find another pharmacy that has the requested medication available.  We will gladly send a new prescription to that pharmacy at your request.    Scheduling Tests:    If your physician has ordered radiology tests such as MRI or CT scans, please contact Central Scheduling at [phone] right away to schedule the test.  Once scheduled, the [facility] Centralized Referral Team will work with your insurance carrier to obtain pre-certification or prior authorization.  Depending on your insurance carrier, approval may take 3-10 days.  It is highly recommended patients assure they have received an authorization before having a test performed.  If test is done without insurance authorization, patient may be responsible for the entire amount billed.      Precertification and Prior Authorizations:  If your physician has recommended that you have a procedure or additional testing performed the [facility]  Centralized Referral Team will contact your insurance carrier to obtain pre-certification or prior authorization.    You are strongly encouraged to contact your insurance carrier to verify that your procedure/test has been approved and is a COVERED benefit.  Although the [facility] Centralized Referral Team does its due diligence, the insurance carrier gives the disclaimer that \"Although the procedure is authorized, this does not guarantee payment.\"    Ultimately the patient is responsible for payment.   Thank you for your understanding in this matter.  Paperwork Completion:  If you require FMLA or disability paperwork for your recovery, please make sure to either drop it off or have it faxed to our office at [phone]. Be sure the form has your name and date of birth on it.  The form will be faxed to our Forms Department and they will complete it for you.  There is a 25$ fee for all forms that need to be filled out.  Please be aware there is a 10-14 day turnaround time.  You will need to sign a release of information (EMETERIO) form if your paperwork does not come with one.  You may call the Forms Department with any questions at [phone].  Their fax number is [phone].      Paperwork noting that patient may bear financial responsibility for procedure(s) performed in clinic today signed prior to proceeding with procedure(s).    Furthermore, patient notified that they should contact their insurer to verify that your procedure/test has been approved and is a COVERED benefit.  Although the [facility] staff does its due diligence, the insurance carrier gives the disclaimer that \"Although the procedure is authorized, this does not guarantee payment.\"    Ultimately the patient is responsible for payment.    Botox is:  [x] Buy and Bill  [] Patient Supplied      Botox Reauthorization Questions:  Has the patient experienced a reduction in frequency of migraines since starting Botox? yes  If yes, by what percentage? 80%  Has the  intensity of migraines decreased since starting Botox? yes  If yes, by what percentage? 80%

## 2024-03-22 ENCOUNTER — APPOINTMENT (OUTPATIENT)
Dept: GENERAL RADIOLOGY | Age: 49
End: 2024-03-22
Attending: EMERGENCY MEDICINE
Payer: COMMERCIAL

## 2024-03-22 ENCOUNTER — HOSPITAL ENCOUNTER (OUTPATIENT)
Age: 49
Discharge: HOME | End: 2024-03-22
Attending: EMERGENCY MEDICINE
Payer: COMMERCIAL

## 2024-03-22 ENCOUNTER — NURSE TRIAGE (OUTPATIENT)
Dept: FAMILY MEDICINE CLINIC | Facility: CLINIC | Age: 49
End: 2024-03-22

## 2024-03-22 VITALS
HEART RATE: 68 BPM | DIASTOLIC BLOOD PRESSURE: 81 MMHG | OXYGEN SATURATION: 99 % | RESPIRATION RATE: 16 BRPM | SYSTOLIC BLOOD PRESSURE: 162 MMHG | TEMPERATURE: 98 F

## 2024-03-22 DIAGNOSIS — S76.211A INGUINAL STRAIN, RIGHT, INITIAL ENCOUNTER: Primary | ICD-10-CM

## 2024-03-22 LAB
#MXD IC: 0.5 X10ˆ3/UL (ref 0.1–1)
BUN BLD-MCNC: 14 MG/DL (ref 7–18)
CHLORIDE BLD-SCNC: 103 MMOL/L (ref 98–112)
CO2 BLD-SCNC: 30 MMOL/L (ref 21–32)
CREAT BLD-MCNC: 0.8 MG/DL
DDIMER WHOLE BLOOD: <200 NG/ML DDU (ref ?–400)
EGFRCR SERPLBLD CKD-EPI 2021: 90 ML/MIN/1.73M2 (ref 60–?)
GLUCOSE BLD-MCNC: 102 MG/DL (ref 70–99)
HCT VFR BLD AUTO: 42.8 %
HCT VFR BLD CALC: 44 %
HGB BLD-MCNC: 14.4 G/DL
ISTAT IONIZED CALCIUM FOR CHEM 8: 1.23 MMOL/L (ref 1.12–1.32)
LYMPHOCYTES # BLD AUTO: 3.3 X10ˆ3/UL (ref 1–4)
LYMPHOCYTES NFR BLD AUTO: 60.8 %
MCH RBC QN AUTO: 30.7 PG (ref 26–34)
MCHC RBC AUTO-ENTMCNC: 33.6 G/DL (ref 31–37)
MCV RBC AUTO: 91.3 FL (ref 80–100)
MIXED CELL %: 8.8 %
NEUTROPHILS # BLD AUTO: 1.7 X10ˆ3/UL (ref 1.5–7.7)
NEUTROPHILS NFR BLD AUTO: 30.4 %
PLATELET # BLD AUTO: 258 X10ˆ3/UL (ref 150–450)
POTASSIUM BLD-SCNC: 3.8 MMOL/L (ref 3.6–5.1)
RBC # BLD AUTO: 4.69 X10ˆ6/UL
SODIUM BLD-SCNC: 144 MMOL/L (ref 136–145)
WBC # BLD AUTO: 5.5 X10ˆ3/UL (ref 4–11)

## 2024-03-22 PROCEDURE — 80047 BASIC METABLC PNL IONIZED CA: CPT

## 2024-03-22 PROCEDURE — 99213 OFFICE O/P EST LOW 20 MIN: CPT

## 2024-03-22 PROCEDURE — 85378 FIBRIN DEGRADE SEMIQUANT: CPT | Performed by: EMERGENCY MEDICINE

## 2024-03-22 PROCEDURE — 36415 COLL VENOUS BLD VENIPUNCTURE: CPT

## 2024-03-22 PROCEDURE — 99214 OFFICE O/P EST MOD 30 MIN: CPT

## 2024-03-22 PROCEDURE — 73502 X-RAY EXAM HIP UNI 2-3 VIEWS: CPT | Performed by: EMERGENCY MEDICINE

## 2024-03-22 PROCEDURE — 85025 COMPLETE CBC W/AUTO DIFF WBC: CPT | Performed by: EMERGENCY MEDICINE

## 2024-03-22 NOTE — ED INITIAL ASSESSMENT (HPI)
Patient arrived ambulatory to room c/o right sided groin pain that started this morning. Pain constant but worsens when walking. Patient ambulating with steady gait.

## 2024-03-22 NOTE — TELEPHONE ENCOUNTER
Action Requested: Summary for Provider     []  Critical Lab, Recommendations Needed  [] Need Additional Advice  []   FYI    []   Need Orders  [] Need Medications Sent to Pharmacy  []  Other     SUMMARY: Patient reports she had a lymphatic drainage and since then has had groin pain  Patient states the pain is radiating down to her right leg. Patient states groin area is tender to the touch.  Denies: swelling, warm to the touch, redness  No available appts. Patient advised to go to IC for an evaluation. Patient verbalized understanding and will go to IC in [place].     Reason for call: Groin Pain  Onset: 2 days ago          Reason for Disposition   MILD to MODERATE pain that comes and goes (cramps) and present > 48 hours    Protocols used: Pelvic Pain - Female-A-[place]

## 2024-04-02 ENCOUNTER — PATIENT MESSAGE (OUTPATIENT)
Dept: SURGERY | Facility: CLINIC | Age: 49
End: 2024-04-02

## 2024-04-03 NOTE — TELEPHONE ENCOUNTER
Sent patient [employer] message in response to inquiry.     In the past, patient has been upset with receiving a response from an SRI instead of from Dr. Vicente. I did let her know that he is out of office which is why I wanted to address her concerns myself.

## 2024-04-03 NOTE — TELEPHONE ENCOUNTER
Noted that patient has messaged a reply stating:    -she attempted \"several rounds\" of PT without improvement.   -she underwent \"several painful lidocaine injections with Dr. Knight\" which did not alleviate pain.   -can she see another neurologist?  -she saw an acupuncturist with no improvement.   -she is frustrated about \"going backward with intense pain.\"  -Botox injections have improved migraines.   -she is mostly concerned about her neck pain.     Routed to KATE Jauregui.

## 2024-04-03 NOTE — TELEPHONE ENCOUNTER
From: Camila Bell  To: Jane Vicente  Sent: 4/2/2024 7:01 PM CDT  Subject: Pain     Hello. Ever since my last visit I’ve had extreme pain to the left side of my body and I’ve tried to tolerate the pain, but I can’t do it anymore. The numbness to the left hand is all day and night, it throbs and feels like someone is laying on my hand. My neck feels like a pinching pain that’s dull and never goes away. My left foot throbs all day and night and I feel a pulsating pain. The pain has become so intense that it’s left me feeling very depressed and anxious. The worst pain is my left hand and neck. Is this horrible pain from my spine still healing? It worries me cause my hand hurts so much, and it honestly feels like I feel no circulation moving

## 2024-04-03 NOTE — TELEPHONE ENCOUNTER
Patient was last seen in November 2023, per plan of care patient was to do physical therapy and follow-up in 6 months.  Due for follow-up next month.     New MyChart message received from patient indicating she has been in a lot of pain since last office visit.  Patient reporting that pain has been causing her to feel depressed and anxious.    Patient has been diagnosed with occipital neuralgia, she did have Botox for this reason with neurology on March 18, 2024.    Patient is also established with Dr. Knight, he did have appointment scheduled with him for these concerns in December and in February,however patient canceled his appointments.    Routed to provider for input

## 2024-04-04 NOTE — TELEPHONE ENCOUNTER
Patient replied to KATE Jauregui asking if she may see another neurologist for a second opinion by calling [facility] to schedule.      Reply sent requesting patient ask her Neurology Provider's team about an appointment with another neurologist.

## 2024-04-05 NOTE — TELEPHONE ENCOUNTER
Noted that patient has replied stating that she would like to see another Neurosurgeon for a 2nd opinion.     Routed to KATE Jauregui.

## 2024-04-16 NOTE — TELEPHONE ENCOUNTER
Please review. Protocol Failed; No Protocol    Requested Prescriptions   Pending Prescriptions Disp Refills    METOPROLOL TARTRATE 50 MG Oral Tab [Pharmacy Med Name: Metoprolol Tartrate 50 Mg Tab Auro] 180 tablet 0     Sig: TAKE ONE TABLET BY MOUTH TWICE DAILY       Hypertension Medications Protocol Failed - 4/15/2024  9:22 AM        Failed - Last BP reading less than 140/90     BP Readings from Last 1 Encounters:   03/22/24 (!) 162/81               Passed - CMP or BMP in past 12 months        Passed - In person appointment or virtual visit in the past 12 mos or appointment in next 3 mos     Recent Outpatient Visits              4 weeks ago Migraine without aura and without status migrainosus, not intractable    [address] Latesha Gomez MD    Office Visit    2 months ago Strep pharyngitis    [facility] [place] Nguyen, Minhxuyen, PA-C    Office Visit    4 months ago S/P cervical spinal fusion    [facility] DEMETRIS Vicente DO    Office Visit    6 months ago Migraine without aura and without status migrainosus, not intractable    [address] Latesha Gomez MD    Office Visit    8 months ago Myofascial pain [M79.18 (ICD-10-CM)]    [facility] Everett Knight DO    Office Visit          Future Appointments         Provider Department Appt Notes    In 2 weeks IVETTE ONOFRE [facility] Colon/ EGD w/ mac @ [facility]    In 1 month DEMETRIS Vicente DO [facility] 6 month follow up    In 2 months Latesha Gomez MD [address] botox                    Passed - EGFRCR or GFRNAA > 50     GFR Evaluation  EGFRCR: 90 , resulted on 3/22/2024                 Future Appointments         Provider  Department Appt Notes    In 2 weeks ONOFRE, PROCEDURE [facility] [place] Colon/ EGD w/ mac @ [facility]    In 1 month DEMETRIS Vicente,  [facility] 6 month follow up    In 2 months Latesha Gomez MD [facility], [address] [place] [address] botox          Recent Outpatient Visits              4 weeks ago Migraine without aura and without status migrainosus, not intractable    [facility], [address] [place] [address] Latesha Gomez MD    Office Visit    2 months ago Strep pharyngitis    [facility], [facility] [place] Joe Nichols PA-C    Office Visit    4 months ago S/P cervical spinal fusion    [facility] DEMETRIS Vicente,     Office Visit    6 months ago Migraine without aura and without status migrainosus, not intractable    [address] Latesha Gomez MD    Office Visit    8 months ago Myofascial pain [M79.18 (ICD-10-CM)]    [facility] [place] Everett Knight DO    Office Visit

## 2024-04-17 RX ORDER — METOPROLOL TARTRATE 50 MG/1
50 TABLET, FILM COATED ORAL 2 TIMES DAILY
Qty: 180 TABLET | Refills: 3 | Status: SHIPPED | OUTPATIENT
Start: 2024-04-17

## 2024-05-02 ENCOUNTER — LAB ENCOUNTER (OUTPATIENT)
Dept: LAB | Age: 49
End: 2024-05-02
Attending: OBSTETRICS & GYNECOLOGY
Payer: COMMERCIAL

## 2024-05-02 ENCOUNTER — OFFICE VISIT (OUTPATIENT)
Dept: OBGYN CLINIC | Facility: CLINIC | Age: 49
End: 2024-05-02
Payer: COMMERCIAL

## 2024-05-02 ENCOUNTER — TELEPHONE (OUTPATIENT)
Dept: OBGYN CLINIC | Facility: CLINIC | Age: 49
End: 2024-05-02

## 2024-05-02 VITALS
SYSTOLIC BLOOD PRESSURE: 130 MMHG | WEIGHT: 138 LBS | BODY MASS INDEX: 25.4 KG/M2 | HEIGHT: 62 IN | DIASTOLIC BLOOD PRESSURE: 80 MMHG

## 2024-05-02 DIAGNOSIS — N95.1 SYMPTOMATIC MENOPAUSAL OR FEMALE CLIMACTERIC STATES: Primary | ICD-10-CM

## 2024-05-02 DIAGNOSIS — Z13.220 SCREENING, LIPID: ICD-10-CM

## 2024-05-02 DIAGNOSIS — Z13.0 SCREENING, IRON DEFICIENCY ANEMIA: ICD-10-CM

## 2024-05-02 DIAGNOSIS — Z13.1 SCREENING FOR DIABETES MELLITUS (DM): ICD-10-CM

## 2024-05-02 DIAGNOSIS — Z13.29 THYROID DISORDER SCREENING: ICD-10-CM

## 2024-05-02 LAB
ALBUMIN SERPL-MCNC: 4 G/DL (ref 3.4–5)
ALBUMIN/GLOB SERPL: 1 {RATIO} (ref 1–2)
ALP LIVER SERPL-CCNC: 118 U/L
ALT SERPL-CCNC: 29 U/L
ANION GAP SERPL CALC-SCNC: 3 MMOL/L (ref 0–18)
AST SERPL-CCNC: 21 U/L (ref 15–37)
BASOPHILS # BLD AUTO: 0.03 X10(3) UL (ref 0–0.2)
BASOPHILS NFR BLD AUTO: 0.5 %
BILIRUB SERPL-MCNC: 1.1 MG/DL (ref 0.1–2)
BUN BLD-MCNC: 14 MG/DL (ref 9–23)
CALCIUM BLD-MCNC: 10.2 MG/DL (ref 8.5–10.1)
CHLORIDE SERPL-SCNC: 106 MMOL/L (ref 98–112)
CHOLEST SERPL-MCNC: 208 MG/DL (ref ?–200)
CO2 SERPL-SCNC: 29 MMOL/L (ref 21–32)
CREAT BLD-MCNC: 0.84 MG/DL
DEPRECATED HBV CORE AB SER IA-ACNC: 59 NG/ML
EGFRCR SERPLBLD CKD-EPI 2021: 85 ML/MIN/1.73M2 (ref 60–?)
EOSINOPHIL # BLD AUTO: 0.11 X10(3) UL (ref 0–0.7)
EOSINOPHIL NFR BLD AUTO: 1.9 %
ERYTHROCYTE [DISTWIDTH] IN BLOOD BY AUTOMATED COUNT: 12.5 %
FASTING PATIENT LIPID ANSWER: YES
FASTING STATUS PATIENT QL REPORTED: YES
GLOBULIN PLAS-MCNC: 4 G/DL (ref 2.8–4.4)
GLUCOSE BLD-MCNC: 97 MG/DL (ref 70–99)
HCT VFR BLD AUTO: 46 %
HDLC SERPL-MCNC: 46 MG/DL (ref 40–59)
HGB BLD-MCNC: 15.3 G/DL
IMM GRANULOCYTES # BLD AUTO: 0.01 X10(3) UL (ref 0–1)
IMM GRANULOCYTES NFR BLD: 0.2 %
LDLC SERPL CALC-MCNC: 125 MG/DL (ref ?–100)
LYMPHOCYTES # BLD AUTO: 2.58 X10(3) UL (ref 1–4)
LYMPHOCYTES NFR BLD AUTO: 45.3 %
MCH RBC QN AUTO: 30.5 PG (ref 26–34)
MCHC RBC AUTO-ENTMCNC: 33.3 G/DL (ref 31–37)
MCV RBC AUTO: 91.6 FL
MONOCYTES # BLD AUTO: 0.46 X10(3) UL (ref 0.1–1)
MONOCYTES NFR BLD AUTO: 8.1 %
NEUTROPHILS # BLD AUTO: 2.51 X10 (3) UL (ref 1.5–7.7)
NEUTROPHILS # BLD AUTO: 2.51 X10(3) UL (ref 1.5–7.7)
NEUTROPHILS NFR BLD AUTO: 44 %
NONHDLC SERPL-MCNC: 162 MG/DL (ref ?–130)
OSMOLALITY SERPL CALC.SUM OF ELEC: 286 MOSM/KG (ref 275–295)
PLATELET # BLD AUTO: 265 10(3)UL (ref 150–450)
POTASSIUM SERPL-SCNC: 4.6 MMOL/L (ref 3.5–5.1)
PROT SERPL-MCNC: 8 G/DL (ref 6.4–8.2)
RBC # BLD AUTO: 5.02 X10(6)UL
SODIUM SERPL-SCNC: 138 MMOL/L (ref 136–145)
TRIGL SERPL-MCNC: 209 MG/DL (ref 30–149)
TSI SER-ACNC: 3.71 MIU/ML (ref 0.36–3.74)
VLDLC SERPL CALC-MCNC: 38 MG/DL (ref 0–30)
WBC # BLD AUTO: 5.7 X10(3) UL (ref 4–11)

## 2024-05-02 PROCEDURE — 80061 LIPID PANEL: CPT | Performed by: OBSTETRICS & GYNECOLOGY

## 2024-05-02 PROCEDURE — 85025 COMPLETE CBC W/AUTO DIFF WBC: CPT | Performed by: OBSTETRICS & GYNECOLOGY

## 2024-05-02 PROCEDURE — 99214 OFFICE O/P EST MOD 30 MIN: CPT | Performed by: OBSTETRICS & GYNECOLOGY

## 2024-05-02 PROCEDURE — 82728 ASSAY OF FERRITIN: CPT | Performed by: OBSTETRICS & GYNECOLOGY

## 2024-05-02 PROCEDURE — 80053 COMPREHEN METABOLIC PANEL: CPT | Performed by: OBSTETRICS & GYNECOLOGY

## 2024-05-02 PROCEDURE — 84443 ASSAY THYROID STIM HORMONE: CPT | Performed by: OBSTETRICS & GYNECOLOGY

## 2024-05-02 RX ORDER — FEZOLINETANT 45 MG/1
1 TABLET, FILM COATED ORAL DAILY
Qty: 56 TABLET | Refills: 0 | COMMUNITY
Start: 2024-05-02

## 2024-05-02 RX ORDER — ESTRADIOL 0.1 MG/G
CREAM VAGINAL
Qty: 42.5 G | Refills: 3 | Status: SHIPPED | OUTPATIENT
Start: 2024-05-02

## 2024-05-02 NOTE — TELEPHONE ENCOUNTER
Patient to try VEOZAH and return to the office in 3 months to review effectiveness of plan. Patient will need baseline LFTs and repeating Q3, 6 and 9 months.    Kristi Moe MD

## 2024-05-02 NOTE — PROGRESS NOTES
Chief Complaint   Patient presents with    Follow - Up         Camila Bell is a 48 year old female who presents for follow up after WESTON/BSO 2022  Birth control or HRT: 0.   Refill 0  Last Pap Smear: n/a . Any history of abnormal paps: n/a   Gardasil:(age [age] y/o) n/a.   Any medication refills needed today?: no    Problems/concerns: Patient complains of extreme fatigue, emotional, no libido, Constipated. Unable to take HRT due to hx of breast cancer    Next Appt: n/a        Immunization History   Administered Date(s) Administered    Covid-19 Vaccine Pfizer 30 mcg/0.3 ml 2021, 2021         Current Outpatient Medications:     metoprolol tartrate 50 MG Oral Tab, Take 1 tablet (50 mg total) by mouth 2 (two) times daily., Disp: 180 tablet, Rfl: 3    losartan 50 MG Oral Tab, Take 1 tablet (50 mg total) by mouth daily., Disp: , Rfl:     levothyroxine 88 MCG Oral Tab, Take 1 tablet (88 mcg total) by mouth in the morning., Disp: 90 tablet, Rfl: 1    Rizatriptan Benzoate 10 MG Oral Tab, Take 1 tablet (10 mg total) by mouth as needed for Migraine., Disp: 9 tablet, Rfl: 5    ALPRAZolam 0.25 MG Oral Tab, Take 1 tablet (0.25 mg total) by mouth 3 (three) times daily as needed for Anxiety., Disp: 30 tablet, Rfl: 2    liothyronine 5 MCG Oral Tab, Take 1 tablet (5 mcg total) by mouth daily. (Patient not taking: Reported on 3/18/2024), Disp: , Rfl:     Allergies   Allergen Reactions    Latex HIVES and RASH     Other reaction(s): LATEX    Other HIVES     tegaderm     Tramadol NAUSEA ONLY    Morphine NAUSEA ONLY and DIZZINESS    Augmentin [Amoxicillin-Pot Clavulanate] DIARRHEA    Cephalexin DIARRHEA    Codeine NAUSEA ONLY       OB History    Para Term  AB Living   3 2 0 0 1 2   SAB IAB Ectopic Multiple Live Births   1 0 0 0 2      # Outcome Date GA Lbr George/2nd Weight Sex Type Anes PTL Lv   3 SAB 16 5w0d          2 Para 04/26/10    F Caesarean   RADHA   1 Para 08    M Caesarean   RADHA        Past Medical History:    Anxiety    My whole life    Anxiety state    Back problem    Chronic pain    COVID-19    severe headache,nausea and stomache lasting 5 days    DCIS (ductal carcinoma in situ) of breast    Right breast. radiotherapy and mastectomy     Depression    medication and therapy    Disorder of thyroid    Essential hypertension    Exposure to medical diagnostic radiation    completed     Helicobacter pylori infection    High blood pressure    Migraines    Muscle weakness    Neuropathic pain due to radiation    Osteoarthritis    Ovarian cyst    Visual impairment    glasses       Past Surgical History:   Procedure Laterality Date    Appendectomy  1979    Breast surgery  2015    Revision of bilateral reconstructed breasts    Breast surgery  2015    Fat grafting to left reconstructed breast    Breast surgery Bilateral 2016    Bilateral nipple reconstruction      2008      2010    APGAR: 9 (1min) 9 (5 min)  DR: Dameon Augustine    Cervical spine surgery  2023    CERVICAL THREE TO CERVICAL FOUR ANTERIOR CERVICAL DISCECTOMY AND FUSION.  BONE GRAFT AND ALL REQUIRED INSTRUMENTATION. NEUROMONITORING.    Cervical spine surgery      C3-4 fusion extension 3/28/23 Dr. Vicente 3/2023    Colonoscopy  2012    Colonoscopy      Core biopsy Right 2013    Right breast:  MRI core biopsies    Egd  2016    Explor front sinus,transorbit,uni  2018    Forearm/wrist surgery unlisted Left     DeQuervain's release    Hysterectomy      Laparoscopy,diagnostic      Lumpectomy right Right 2013    Right segmental mastectomy with wire localization, advancement    Other  2022    Cervical four-five, Cervical five-six  anterior cervical discectomy and fusionRightGeneral    Radiation right      Reduction left      Reduction right      Removal of ovarian cyst(s) Left     laparoscopic    Removal of ovarian cyst(s) Bilateral 2014    laparotomy     Skin surgery  07/22/2016    bilateral areaola tattoing     Tonsillectomy  1979    Upper gi endoscopy,exam         Family History   Problem Relation Age of Onset    Lipids Father         hyperlipidemia    Hypertension Father     Migraines Father     Prostate Cancer Father     Asthma Mother     Lipids Mother         hyperlipidemia    Hypertension Mother     Cancer Maternal Aunt 50        skin and breast cancer    Skin cancer Maternal Grandfather     Cancer Other 50        breast cancer and leukemia    Dementia Other         Alzheimers, Grandmother [SIDE NOT STATED]    Cancer Cousin 30        ovarian cancer / maternal cousin    Diabetes Paternal Grandmother     Cancer Paternal Aunt         leukemia    Cancer Paternal Uncle         prostate    Breast Cancer Self 35    No Known Problems Daughter     No Known Problems Son     No Known Problems Sister     No Known Problems Sister     No Known Problems Sister         Tobacco  Allergies  Meds         Social History     Socioeconomic History    Marital status:      Spouse name: Not on file    Number of children: 2    Years of education: Not on file    Highest education level: Not on file   Occupational History    Occupation:      Employer: [employer]   Tobacco Use    Smoking status: Never    Smokeless tobacco: Never   Vaping Use    Vaping status: Never Used   Substance and Sexual Activity    Alcohol use: No    Drug use: No    Sexual activity: Not on file   Other Topics Concern     Service Not Asked    Blood Transfusions Not Asked    Caffeine Concern No    Occupational Exposure Not Asked    Hobby Hazards Not Asked    Sleep Concern Not Asked    Stress Concern Not Asked    Weight Concern Not Asked    Special Diet Not Asked    Back Care Not Asked    Exercise No    Bike Helmet Not Asked    Seat Belt Not Asked    Self-Exams Not Asked    Grew up on a farm Not Asked    History of tanning Not Asked    Outdoor occupation Not Asked     Breast feeding Not Asked    Reaction to local anesthetic No    Pt has a pacemaker No    Pt has a defibrillator No   Social History Narrative    Camila is . Mother of two children. Patient works for [employer] in customer service. Patient lives with her two children in [place], [place].     Social Determinants of Health     Financial Resource Strain: Not on file   Food Insecurity: Not on file   Transportation Needs: Not on file   Physical Activity: Not on file   Stress: Not on file   Social Connections: Not on file   Housing Stability: Not on file     /80   Ht 5' 2\" (1.575 m)   Wt 138 lb 0.1 oz (62.6 kg)   LMP 06/09/2022   Wt Readings from Last 3 Encounters:   05/02/24 138 lb 0.1 oz (62.6 kg)   01/19/24 136 lb (61.7 kg)   11/27/23 136 lb (61.7 kg)     Health Maintenance   Topic Date Due    Pneumococcal Vaccine: Birth to 64yrs (1 of 2 - PCV) Never done    DTaP,Tdap,and Td Vaccines (1 - Tdap) Never done    Zoster Vaccines (1 of 2) Never done    Colorectal Cancer Screening  11/08/2022    COVID-19 Vaccine (3 - 2023-24 season) 09/01/2023    Annual Physical  07/07/2024    Influenza Vaccine (Season Ended) 10/01/2024    Annual Depression Screening  Completed    Mammogram  Discontinued         Review of Systems   General: Present- Feeling well.  Cardiovascular: Not Present- Chest Pain, Elevated Blood Pressure, Leg Pain and/or Swelling and Shortness of Breath.  Gastrointestinal: Not Present- Nausea and Vomiting.  Female Genitourinary: Not Present- Discharge, Dysmenorrhea, Dysuria, Excessive Menstrual Bleeding and Pelvic Pain.  Musculoskeletal: Not Present- Leg Cramps and Swelling of Extremities.  Neurological: Not Present- Headaches.  Psychiatric: Not Present- Anxiety and Depression.  All other systems negative       Physical Exam   The physical exam findings are as follows:       General   Mental Status - Alert. General Appearance - Well Developed/Well Nourished/No acute distress/ NC/AT.      Note: More  than 50% of this visit was spent in counseling or coordinating care for the following reason menopausal symptoms flashes sleep disturbances mood disturbances vaginal dryness patient cannot take HRT due to breast cancer discussed alternative treatments including SSRIs, Veozah, topical vaginal estrogen, diet and exercise changes sleep hygiene..       Dietary changes, exercise and weight loss are cornerstones of management of Menopause      Check EPA list of 'Clean 15' and 'dirty dozen' while deciding to buy organic   Organic foods have less pesticides and chemical contaminants      Diet changes :   Best evidence is for whole food plant based/mediterraean diet for health.  Small portion sized meals.  Metabolism changes in menopause require 200 Kcal/day less to maintain weight    Low carb - 30-40 gm with each meal.   High Fiber 25-30 gm each day (not included in supplements)    NO Sugar, Soda, Juices and Sweets.  --- Fill half of your plate with low carb veggies and greens as discussed.  -- eat low sugar fruits apple , pears and berries : blueberries, strawberries, raspberries etc    -- avoid tropical fruits like pineapple,moustapha , grapes , watermelon, papaya   -- about 25% fruits and 75% veggies  to reduce sugar intake    More freshly prepared meals than frozen or take-out  More plant based foods than processed meats or foods  (try to keep ingredients <5)   Incorporate good fats - Eg :Extra virgin olive oil, Nuts, Avocado.     Consider ''Time restricted eating / feeding '' also called ''intermittent fasting'': (Resource: \"The Complete Guide to Fasting\" by Dr Jamie Mitchell)  Eating all your small healthy meals in a narrow window 6-8 hour and only drinking water or non sugar or non carb liquids ( black tea, black coffee or green tea) rest of the time with a minimum overnight fasting periods of 12 hours       Consider starting the following supplements:  Vit B12  (chewable or drops version as it requires salivia for  activation)    Vitamin D with K2 supplementation   Magnesium L threonate or glycinate   DHA & EPA   Probiotics         Exercise :   Brisk walk 30-45 min everyday or a total of 150 min per week - Goal 10,000 steps/daily  Lift weights 3-4 times a week - Heavy - 3 Sets of 7 Heavy reps  - whole body dumbbell or machines   Core exercises (bird dog, plank with mountain climbers, dead bug) 3 sets of 10 (three - four times a week )  Balance - yoga or balance exercises     Weight loss target :   10 % with 1/2-1 lb per week over 6 to 12 months to a BMI <25    Environment/Stress:  Sleep in room with temperature <67 degrees  Cotton/natural fiber sheets and blankets  Fan   Meditation (try balance sugar) start with 1 min/daily   Avoid alcohol as can trigger hot flashes and be a source of empty calories        Follow up 3 months      Reviewed options for treatment for hot flashes/night sweats including diet, exercise, weight loss, stress reduction, environmental and medication.  Patient to try VEOZAH and return to the office in 3 months to review effectiveness of plan. Patient will need baseline LFTs and repeating Q3, 6 and 9 months. Patient was provided with informed consent for medication including a review of the proposed medication and all effective possibilities.  A discussion of the risks of the medicatio, benefits, side effects, and success were addressed.  Alternative treatments were discussed as well.  All questions were answered.  Patient is to proceed with Medication.     1. Symptomatic menopausal or female climacteric states    2. Screening for diabetes mellitus (DM)    3. Thyroid disorder screening    4. Screening, lipid    5. Screening, iron deficiency anemia

## 2024-05-03 ENCOUNTER — PATIENT MESSAGE (OUTPATIENT)
Dept: OBGYN CLINIC | Facility: CLINIC | Age: 49
End: 2024-05-03

## 2024-05-07 NOTE — TELEPHONE ENCOUNTER
From: Camila Bell  To: Kristi Moe  Sent: 5/3/2024 7:04 AM CDT  Subject: Lab results    Good morning. I noticed my calcium is extremely high, glucose is high, my cholesterol is high , triglycerides are high and so is my TSH. Should I be concerned with my calcium levels being high?

## 2024-05-09 ENCOUNTER — TELEPHONE (OUTPATIENT)
Dept: NEUROLOGY | Facility: CLINIC | Age: 49
End: 2024-05-09

## 2024-05-09 NOTE — TELEPHONE ENCOUNTER
Spoke to patient about her questions. She states she has been feeling dizzy for the last 3 days. She was advised by Filemon at [facility] that she will need an order for vestibular therapy. Advised patient we have not seen her for that and she will need to be assessed in office. She states she also is under a lot of stress and feels like something is in her ear. Advised  patient to call her PCP to see if they can help with the new onset dizziness and stress. Also if she feels like something is in her ear to have that checked by her PCP or an ENT.    Patient verbalized understanding and will call back if needed.

## 2024-05-24 ENCOUNTER — OFFICE VISIT (OUTPATIENT)
Dept: FAMILY MEDICINE CLINIC | Facility: CLINIC | Age: 49
End: 2024-05-24

## 2024-05-24 VITALS
HEIGHT: 62 IN | HEART RATE: 52 BPM | SYSTOLIC BLOOD PRESSURE: 135 MMHG | WEIGHT: 135 LBS | BODY MASS INDEX: 24.84 KG/M2 | DIASTOLIC BLOOD PRESSURE: 78 MMHG

## 2024-05-24 DIAGNOSIS — E78.2 MIXED HYPERLIPIDEMIA: Primary | ICD-10-CM

## 2024-05-24 DIAGNOSIS — S16.1XXA STRAIN OF NECK MUSCLE, INITIAL ENCOUNTER: ICD-10-CM

## 2024-05-24 DIAGNOSIS — E83.52 HYPERCALCEMIA: ICD-10-CM

## 2024-05-24 PROCEDURE — 99213 OFFICE O/P EST LOW 20 MIN: CPT | Performed by: PHYSICIAN ASSISTANT

## 2024-05-24 RX ORDER — LEVOTHYROXINE SODIUM 0.1 MG/1
100 TABLET ORAL
COMMUNITY

## 2024-05-24 RX ORDER — CYCLOBENZAPRINE HCL 10 MG
10 TABLET ORAL NIGHTLY
Qty: 30 TABLET | Refills: 0 | Status: SHIPPED | OUTPATIENT
Start: 2024-05-24 | End: 2024-06-23

## 2024-05-24 RX ORDER — ATORVASTATIN CALCIUM 10 MG/1
10 TABLET, FILM COATED ORAL DAILY
Qty: 90 TABLET | Refills: 3 | Status: SHIPPED | OUTPATIENT
Start: 2024-05-24 | End: 2025-05-19

## 2024-05-24 NOTE — TELEPHONE ENCOUNTER
Spoke with patient - to follow up with PCP about Lipids and glucose. Repeat cmp in 3 months to ensure resolution of elevated calcium. Kristi Moe MD

## 2024-05-24 NOTE — PROGRESS NOTES
HPI:     HPI  A 48-year-old female is here in the office for a follow-up. The patient has a lot of stress at work and started to eat more. The patient requests to discuss lab results.  The patient denies chest pain, SOB, N/V/C/D, fever, dizziness, syncope, and abdominal pain. There are no other concerns today.    Medications:     Current Outpatient Medications   Medication Sig Dispense Refill    levothyroxine 100 MCG Oral Tab Take 1 tablet (100 mcg total) by mouth before breakfast.      atorvastatin (LIPITOR) 10 MG Oral Tab Take 1 tablet (10 mg total) by mouth daily. 90 tablet 3    cyclobenzaprine 10 MG Oral Tab Take 1 tablet (10 mg total) by mouth nightly. 30 tablet 0    estradiol (ESTRACE) 0.1 MG/GM Vaginal Cream Place 1 g vaginally every evening for 14 days, THEN 1 g twice a week. 42.5 g 3    Fezolinetant (VEOZAH) 45 MG Oral Tab Take 1 tablet by mouth daily. 56 tablet 0    metoprolol tartrate 50 MG Oral Tab Take 1 tablet (50 mg total) by mouth 2 (two) times daily. 180 tablet 3    Rizatriptan Benzoate 10 MG Oral Tab Take 1 tablet (10 mg total) by mouth as needed for Migraine. 9 tablet 5    ALPRAZolam 0.25 MG Oral Tab Take 1 tablet (0.25 mg total) by mouth 3 (three) times daily as needed for Anxiety. 30 tablet 2    liothyronine 5 MCG Oral Tab Take 1 tablet (5 mcg total) by mouth daily.      losartan 50 MG Oral Tab Take 1 tablet (50 mg total) by mouth daily.         Allergies:     Allergies   Allergen Reactions    Latex HIVES and RASH     Other reaction(s): LATEX    Other HIVES     tegaderm     Tramadol NAUSEA ONLY    Morphine NAUSEA ONLY and DIZZINESS    Augmentin [Amoxicillin-Pot Clavulanate] DIARRHEA    Cephalexin DIARRHEA    Codeine NAUSEA ONLY       History:     Health Maintenance   Topic Date Due    Pneumococcal Vaccine: Birth to 64yrs (1 of 2 - PCV) Never done    DTaP,Tdap,and Td Vaccines (1 - Tdap) Never done    Zoster Vaccines (1 of 2) Never done    Colorectal Cancer Screening  11/08/2022    COVID-19  Vaccine (3 - 2023-24 season) 2023    Annual Physical  2024    Influenza Vaccine (Season Ended) 10/01/2024    Annual Depression Screening  Completed    Mammogram  Discontinued       Patient's last menstrual period was 2022.   Past Medical History:     Past Medical History:    Anxiety    My whole life    Anxiety state    Back problem    Chronic pain    COVID-19    severe headache,nausea and stomache lasting 5 days    DCIS (ductal carcinoma in situ) of breast    Right breast. radiotherapy and mastectomy     Depression    medication and therapy    Disorder of thyroid    Essential hypertension    Exposure to medical diagnostic radiation    completed     Helicobacter pylori infection    High blood pressure    Migraines    Muscle weakness    Neuropathic pain due to radiation    Osteoarthritis    Ovarian cyst    Visual impairment    glasses       Past Surgical History:     Past Surgical History:   Procedure Laterality Date    Appendectomy  1979    Breast surgery  2015    Revision of bilateral reconstructed breasts    Breast surgery  2015    Fat grafting to left reconstructed breast    Breast surgery Bilateral 2016    Bilateral nipple reconstruction      2008      2010    APGAR: 9 (1min) 9 (5 min)  DR: Dameon Augustine    Cervical spine surgery  2023    CERVICAL THREE TO CERVICAL FOUR ANTERIOR CERVICAL DISCECTOMY AND FUSION.  BONE GRAFT AND ALL REQUIRED INSTRUMENTATION. NEUROMONITORING.    Cervical spine surgery      C3-4 fusion extension 3/28/23 Dr. Vicente 3/2023    Colonoscopy  2012    Colonoscopy      Core biopsy Right 2013    Right breast:  MRI core biopsies    Egd  2016    Explor front sinus,transorbit,uni  2018    Forearm/wrist surgery unlisted Left     DeQuervain's release    Hysterectomy      Laparoscopy,diagnostic      Lumpectomy right Right 2013    Right segmental mastectomy with wire localization, advancement    Other   01/20/2022    Cervical four-five, Cervical five-six  anterior cervical discectomy and fusionRightGeneral    Radiation right  2013    Reduction left      Reduction right      Removal of ovarian cyst(s) Left 2005    laparoscopic    Removal of ovarian cyst(s) Bilateral 01/2014    laparotomy    Skin surgery  07/22/2016    bilateral areaola tattoing     Tonsillectomy  1979    Upper gi endoscopy,exam         Family History:     Family History   Problem Relation Age of Onset    Lipids Father         hyperlipidemia    Hypertension Father     Migraines Father     Prostate Cancer Father     Asthma Mother     Lipids Mother         hyperlipidemia    Hypertension Mother     Cancer Maternal Aunt 50        skin and breast cancer    Skin cancer Maternal Grandfather     Cancer Other 50        breast cancer and leukemia    Dementia Other         Alzheimers, Grandmother [SIDE NOT STATED]    Cancer Cousin 30        ovarian cancer / maternal cousin    Diabetes Paternal Grandmother     Cancer Paternal Aunt         leukemia    Cancer Paternal Uncle         prostate    Breast Cancer Self 35    No Known Problems Daughter     No Known Problems Son     No Known Problems Sister     No Known Problems Sister     No Known Problems Sister        Social History:     Social History     Socioeconomic History    Marital status:      Spouse name: Not on file    Number of children: 2    Years of education: Not on file    Highest education level: Not on file   Occupational History    Occupation:      Employer: [employer]   Tobacco Use    Smoking status: Never    Smokeless tobacco: Never   Vaping Use    Vaping status: Never Used   Substance and Sexual Activity    Alcohol use: No    Drug use: No    Sexual activity: Not on file   Other Topics Concern     Service Not Asked    Blood Transfusions Not Asked    Caffeine Concern No    Occupational Exposure Not Asked    Hobby Hazards Not Asked    Sleep Concern Not  Asked    Stress Concern Not Asked    Weight Concern Not Asked    Special Diet Not Asked    Back Care Not Asked    Exercise No    Bike Helmet Not Asked    Seat Belt Not Asked    Self-Exams Not Asked    Grew up on a farm Not Asked    History of tanning Not Asked    Outdoor occupation Not Asked    Breast feeding Not Asked    Reaction to local anesthetic No    Pt has a pacemaker No    Pt has a defibrillator No   Social History Narrative    Camila is . Mother of two children. Patient works for [employer] in customer service. Patient lives with her two children in [place], [place].     Social Determinants of Health     Financial Resource Strain: Not on file   Food Insecurity: Not on file   Transportation Needs: Not on file   Physical Activity: Not on file   Stress: Not on file   Social Connections: Not on file   Housing Stability: Not on file       Review of Systems:   Review of Systems   Constitutional:  Negative for activity change, chills, fatigue and fever.   HENT:  Negative for congestion, ear discharge, ear pain, postnasal drip, rhinorrhea, sinus pressure, sinus pain and sore throat.    Respiratory:  Negative for cough, chest tightness, shortness of breath and wheezing.    Cardiovascular:  Negative for chest pain and palpitations.   Gastrointestinal:  Negative for abdominal distention, abdominal pain, blood in stool, constipation, diarrhea, nausea and vomiting.   Skin:  Negative for rash.        Vitals:    05/24/24 0947   BP: 135/78   Pulse: 52   Weight: 135 lb (61.2 kg)   Height: 5' 2\" (1.575 m)     Body mass index is 24.69 kg/m².    Physical Exam:   Physical Exam  Vitals reviewed.      Patient alert and orient x3, able to carry on conversation easily, without shortness of breath, coughing, can speak in full sentences.      Assessment and Plan::     Problem List Items Addressed This Visit    None  Visit Diagnoses       Mixed hyperlipidemia    -  Primary    Relevant Medications    atorvastatin (LIPITOR) 10  MG Oral Tab    Other Relevant Orders    Lipid Panel    Hypercalcemia        Relevant Orders    Basic Metabolic Panel (8)    Strain of neck muscle, initial encounter        Relevant Medications    cyclobenzaprine 10 MG Oral Tab        Reviewed and discussed lab results with patient.  Recheck Ca in 1 month, and fasting Lipid panel in 3 months. Encourage healthy diets.    Discussed plan of care with pt and pt is in agreement.All questions answered. Pt to call with questions or concerns.

## 2024-06-04 DIAGNOSIS — G43.009 MIGRAINE WITHOUT AURA AND WITHOUT STATUS MIGRAINOSUS, NOT INTRACTABLE: ICD-10-CM

## 2024-06-06 ENCOUNTER — TELEPHONE (OUTPATIENT)
Dept: NEUROLOGY | Facility: CLINIC | Age: 49
End: 2024-06-06

## 2024-06-06 NOTE — TELEPHONE ENCOUNTER
Called [employer] at [phone] spoke to Annabel DONOVAN.  She will see if she can extend the authorization for another year/4 visits.  They will send the authorization if approved or if not able to extend this they will request additional information.

## 2024-06-07 RX ORDER — RIZATRIPTAN BENZOATE 10 MG/1
10 TABLET ORAL
Qty: 9 TABLET | Refills: 5 | Status: SHIPPED
Start: 2024-06-07

## 2024-06-07 NOTE — TELEPHONE ENCOUNTER
Received faxed re-authorization for Botox from [employer].  Approval#[id number] from 06/06/24-07/22/25-5 visits for  and [id number].    This is buy and bill.

## 2024-06-07 NOTE — TELEPHONE ENCOUNTER
Refill passed per [facility] protocols.    Requested Prescriptions   Pending Prescriptions Disp Refills    RIZATRIPTAN BENZOATE 10 MG Oral Tab [Pharmacy Med Name: Rizatriptan Benzoate 10 Mg Tab Auro] 9 tablet 0     Sig: TAKE ONE TABLET BY MOUTH DAILY AS NEEDED FOR MIGRAINE       Neurology Medications Passed - 6/4/2024  3:26 PM        Passed - In person appointment or virtual visit in the past 6 mos or appointment in next 3 mos     Recent Outpatient Visits              [facility]    Office Visit    1 month ago Symptomatic menopausal or female climacteric states    [address] Kristi Moe MD    Office Visit    2 months ago Migraine without aura and without status migrainosus, not intractable    [facility], [address] [place] [address] Latesha Gomez MD    Office Visit    4 months ago Strep pharyngitis    [facility], [address], [place] Joe Nichols PA-C    Office Visit    6 months ago S/P cervical spinal fusion    [facility], [facility] DEMETRIS Vicente,     Office Visit          Future Appointments         Provider Department Appt Notes    In 1 month Latesha Gomez MD [facility], [address] [place] [address] botox, buy and bill Approval #[id number] from 06/06/24-07/22/25-5 visits 1/5    Called patient on 6/7/24 unable to connect. Please try to collect on current self-pay balance or discuss payment options.    In 1 month Kristi Moe MD [address] Medication Follow up

## 2024-08-14 ENCOUNTER — OFFICE VISIT (OUTPATIENT)
Dept: FAMILY MEDICINE CLINIC | Facility: CLINIC | Age: 49
End: 2024-08-14
Payer: COMMERCIAL

## 2024-08-14 VITALS
WEIGHT: 137 LBS | SYSTOLIC BLOOD PRESSURE: 146 MMHG | HEART RATE: 60 BPM | HEIGHT: 62 IN | DIASTOLIC BLOOD PRESSURE: 92 MMHG | BODY MASS INDEX: 25.21 KG/M2

## 2024-08-14 DIAGNOSIS — R03.0 ELEVATED BLOOD PRESSURE READING IN OFFICE WITHOUT DIAGNOSIS OF HYPERTENSION: ICD-10-CM

## 2024-08-14 DIAGNOSIS — H65.01 NON-RECURRENT ACUTE SEROUS OTITIS MEDIA OF RIGHT EAR: Primary | ICD-10-CM

## 2024-08-14 PROCEDURE — 99213 OFFICE O/P EST LOW 20 MIN: CPT | Performed by: PHYSICIAN ASSISTANT

## 2024-08-14 RX ORDER — MELOXICAM 15 MG/1
15 TABLET ORAL DAILY
Qty: 30 TABLET | Refills: 0 | Status: SHIPPED | OUTPATIENT
Start: 2024-08-14 | End: 2024-09-13

## 2024-08-14 RX ORDER — DOXYCYCLINE HYCLATE 100 MG
100 TABLET ORAL 2 TIMES DAILY
Qty: 20 TABLET | Refills: 0 | Status: SHIPPED | OUTPATIENT
Start: 2024-08-14 | End: 2024-08-24

## 2024-08-14 NOTE — PROGRESS NOTES
HPI:     Ear Pain   There is pain in the right ear. This is a new problem. Episode onset: 3 days. The problem has been gradually worsening. There has been no fever. Pertinent negatives include no abdominal pain, coughing, diarrhea, ear discharge, headaches, rhinorrhea, sore throat or vomiting. She has tried NSAIDs for the symptoms. The treatment provided no relief.         Medications:     Current Outpatient Medications   Medication Sig Dispense Refill    Meloxicam (MOBIC) 15 MG Oral Tab Take 1 tablet (15 mg total) by mouth daily. 30 tablet 0    Doxycycline Hyclate 100 MG Oral Tab Take 1 tablet (100 mg total) by mouth 2 (two) times daily for 10 days. 20 tablet 0    Rizatriptan Benzoate 10 MG Oral Tab Take 1 tablet (10 mg total) by mouth daily as needed for Migraine. 9 tablet 5    levothyroxine 100 MCG Oral Tab Take 1 tablet (100 mcg total) by mouth before breakfast.      atorvastatin (LIPITOR) 10 MG Oral Tab Take 1 tablet (10 mg total) by mouth daily. 90 tablet 3    estradiol (ESTRACE) 0.1 MG/GM Vaginal Cream Place 1 g vaginally every evening for 14 days, THEN 1 g twice a week. 42.5 g 3    Fezolinetant (VEOZAH) 45 MG Oral Tab Take 1 tablet by mouth daily. 56 tablet 0    metoprolol tartrate 50 MG Oral Tab Take 1 tablet (50 mg total) by mouth 2 (two) times daily. 180 tablet 3    ALPRAZolam 0.25 MG Oral Tab Take 1 tablet (0.25 mg total) by mouth 3 (three) times daily as needed for Anxiety. 30 tablet 2    liothyronine 5 MCG Oral Tab Take 1 tablet (5 mcg total) by mouth daily.      losartan 50 MG Oral Tab Take 1 tablet (50 mg total) by mouth daily.         Allergies:     Allergies   Allergen Reactions    Latex HIVES and RASH     Other reaction(s): LATEX    Other HIVES     tegaderm     Tramadol NAUSEA ONLY    Morphine NAUSEA ONLY and DIZZINESS    Augmentin [Amoxicillin-Pot Clavulanate] DIARRHEA    Cephalexin DIARRHEA    Codeine NAUSEA ONLY       History:     Health Maintenance   Topic Date Due    Pneumococcal Vaccine:  Birth to 64yrs (1 of 2 - PCV) Never done    DTaP,Tdap,and Td Vaccines (1 - Tdap) Never done    Zoster Vaccines (1 of 2) Never done    Colorectal Cancer Screening  2022    COVID-19 Vaccine (3 -  season) 2023    Annual Physical  2024    Influenza Vaccine (1) 10/01/2024    Annual Depression Screening  Completed    Mammogram  Discontinued       Patient's last menstrual period was 2022.   Past Medical History:     Past Medical History:    Anxiety    My whole life    Anxiety state    Back problem    Chronic pain    COVID-19    severe headache,nausea and stomache lasting 5 days    DCIS (ductal carcinoma in situ) of breast    Right breast. radiotherapy and mastectomy     Depression    medication and therapy    Disorder of thyroid    Essential hypertension    Exposure to medical diagnostic radiation    completed     Helicobacter pylori infection    High blood pressure    Migraines    Muscle weakness    Neuropathic pain due to radiation    Osteoarthritis    Ovarian cyst    Visual impairment    glasses       Past Surgical History:     Past Surgical History:   Procedure Laterality Date    Appendectomy  1979    Breast surgery  2015    Revision of bilateral reconstructed breasts    Breast surgery  2015    Fat grafting to left reconstructed breast    Breast surgery Bilateral 2016    Bilateral nipple reconstruction      2008      2010    APGAR: 9 (1min) 9 (5 min)  DR: Dameon Augustine    Cervical spine surgery  2023    CERVICAL THREE TO CERVICAL FOUR ANTERIOR CERVICAL DISCECTOMY AND FUSION.  BONE GRAFT AND ALL REQUIRED INSTRUMENTATION. NEUROMONITORING.    Cervical spine surgery      C3-4 fusion extension 3/28/23 Dr. Vicente 3/2023    Colonoscopy  2012    Colonoscopy      Core biopsy Right 2013    Right breast:  MRI core biopsies    Egd  2016    Explor front sinus,transorbit,uni  2018    Forearm/wrist surgery unlisted Left      DeQuervain's release    Hysterectomy      Laparoscopy,diagnostic      Lumpectomy right Right 06/28/2013    Right segmental mastectomy with wire localization, advancement    Other  01/20/2022    Cervical four-five, Cervical five-six  anterior cervical discectomy and fusionRightGeneral    Radiation right  2013    Reduction left      Reduction right      Removal of ovarian cyst(s) Left 2005    laparoscopic    Removal of ovarian cyst(s) Bilateral 01/2014    laparotomy    Skin surgery  07/22/2016    bilateral areaola tattoing     Tonsillectomy  1979    Upper gi endoscopy,exam         Family History:     Family History   Problem Relation Age of Onset    Lipids Father         hyperlipidemia    Hypertension Father     Migraines Father     Prostate Cancer Father     Asthma Mother     Lipids Mother         hyperlipidemia    Hypertension Mother     Cancer Maternal Aunt 50        skin and breast cancer    Skin cancer Maternal Grandfather     Cancer Other 50        breast cancer and leukemia    Dementia Other         Alzheimers, Grandmother [SIDE NOT STATED]    Cancer Cousin 30        ovarian cancer / maternal cousin    Diabetes Paternal Grandmother     Cancer Paternal Aunt         leukemia    Cancer Paternal Uncle         prostate    Breast Cancer Self 34    No Known Problems Daughter     No Known Problems Son     No Known Problems Sister     No Known Problems Sister     No Known Problems Sister        Social History:     Social History     Socioeconomic History    Marital status:      Spouse name: Not on file    Number of children: 2    Years of education: Not on file    Highest education level: Not on file   Occupational History    Occupation:      Employer: [employer]   Tobacco Use    Smoking status: Never    Smokeless tobacco: Never   Vaping Use    Vaping status: Never Used   Substance and Sexual Activity    Alcohol use: No    Drug use: No    Sexual activity: Not on file   Other Topics  Concern     Service Not Asked    Blood Transfusions Not Asked    Caffeine Concern No    Occupational Exposure Not Asked    Hobby Hazards Not Asked    Sleep Concern Not Asked    Stress Concern Not Asked    Weight Concern Not Asked    Special Diet Not Asked    Back Care Not Asked    Exercise No    Bike Helmet Not Asked    Seat Belt Not Asked    Self-Exams Not Asked    Grew up on a farm Not Asked    History of tanning Not Asked    Outdoor occupation Not Asked    Breast feeding Not Asked    Reaction to local anesthetic No    Pt has a pacemaker No    Pt has a defibrillator No   Social History Narrative    Camila is . Mother of two children. Patient works for [employer] in customer service. Patient lives with her two children in [place], [place].     Social Determinants of Health     Financial Resource Strain: Not on file   Food Insecurity: Not on file   Transportation Needs: Not on file   Physical Activity: Not on file   Stress: Not on file   Social Connections: Not on file   Housing Stability: Not on file       Review of Systems:   Review of Systems   HENT:  Positive for ear pain. Negative for ear discharge, rhinorrhea and sore throat.    Respiratory:  Negative for cough.    Gastrointestinal:  Negative for abdominal pain, diarrhea and vomiting.   Neurological:  Negative for headaches.        Vitals:    08/14/24 1253 08/14/24 1303   BP: 145/89 (!) 146/92   Pulse: 60    Weight: 137 lb (62.1 kg)    Height: 5' 2\" (1.575 m)      Body mass index is 25.06 kg/m².    Physical Exam:   Physical Exam  Constitutional:       General: She is not in acute distress.     Appearance: She is well-developed.   HENT:      Head: Normocephalic and atraumatic.      Right Ear: Ear canal and external ear normal. There is no impacted cerumen. Tympanic membrane is erythematous and bulging.      Left Ear: Tympanic membrane, ear canal and external ear normal. There is no impacted cerumen.      Nose: Nose normal.      Mouth/Throat:       Mouth: Mucous membranes are moist.      Pharynx: Oropharynx is clear. No oropharyngeal exudate or posterior oropharyngeal erythema.   Eyes:      General:         Right eye: No discharge.         Left eye: No discharge.      Conjunctiva/sclera: Conjunctivae normal.   Cardiovascular:      Rate and Rhythm: Normal rate and regular rhythm.      Heart sounds: Normal heart sounds, S1 normal and S2 normal. No murmur heard.  Pulmonary:      Effort: Pulmonary effort is normal.      Breath sounds: Normal breath sounds. No wheezing or rales.   Chest:      Chest wall: No tenderness.   Lymphadenopathy:      Cervical: No cervical adenopathy.   Skin:     Findings: No rash.   Neurological:      Mental Status: She is alert and oriented to person, place, and time.   Psychiatric:         Behavior: Behavior is cooperative.          Assessment and Plan::     Problem List Items Addressed This Visit    None  Visit Diagnoses       Non-recurrent acute serous otitis media of right ear    -  Primary    Relevant Medications    Meloxicam (MOBIC) 15 MG Oral Tab    Doxycycline Hyclate 100 MG Oral Tab    Elevated blood pressure reading in office without diagnosis of hypertension            Advise the patient to monitor blood pressure at home.    Discussed plan of care with pt and pt is in agreement.All questions answered. Pt to call with questions or concerns.

## 2024-08-20 ENCOUNTER — HOSPITAL ENCOUNTER (OUTPATIENT)
Age: 49
Discharge: HOME | End: 2024-08-20
Payer: COMMERCIAL

## 2024-08-20 ENCOUNTER — NURSE TRIAGE (OUTPATIENT)
Dept: FAMILY MEDICINE CLINIC | Facility: CLINIC | Age: 49
End: 2024-08-20

## 2024-08-20 VITALS
SYSTOLIC BLOOD PRESSURE: 144 MMHG | HEART RATE: 59 BPM | TEMPERATURE: 99 F | RESPIRATION RATE: 17 BRPM | DIASTOLIC BLOOD PRESSURE: 81 MMHG | OXYGEN SATURATION: 99 %
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
## 2024-08-20 PROCEDURE — 99213 OFFICE O/P EST LOW 20 MIN: CPT

## 2024-08-20 RX ORDER — TRIAMCINOLONE ACETONIDE 1 MG/G
OINTMENT TOPICAL
Qty: 80 G | Refills: 0 | Status: SHIPPED | OUTPATIENT
Start: 2024-08-20

## 2024-08-20 NOTE — DISCHARGE INSTRUCTIONS
I sent you a prescription for triamcinolone ointment to be used for the itching.  You should also take a Claritin or Zyrtec every morning which will help with the itching.  You can take Benadryl at nighttime for severe itching.  Please remember Benadryl will make you drowsy.  If you develop any increased redness, warmth, pain or worsening symptoms you should return or go to the emergency department.  Otherwise follow-up with your primary care doctor.

## 2024-08-20 NOTE — TELEPHONE ENCOUNTER
Please reply to pool: EM RN TRIAGE  Action Requested: Summary for Provider     []  Critical Lab, Recommendations Needed  [] Need Additional Advice  [x]   FYI    []   Need Orders  [] Need Medications Sent to Pharmacy  []  Other     SUMMARY: Patient contacts clinic reporting red, pimple like rash to her right breast, abdomen and a larger, painful pimple like lesion to her left arm.  Recently treated for otitis which has resolved.  Rash was present at that time but is now worse and spreading.  Recent travel to [place].  Rash began while she was traveling.  States she feels hot and her neck is tender and stiff.  Denies respiratory involvement.  Nurse recommended immediate care evaluation today, she agrees and will go.  Nurse advised mask and to report to staff that she is there for rash evaluation.  Immediate care in [place] contacted and symptoms reported to Dr. Rhodes.      Reason for call: Rash  Onset: Data Unavailable                       Reason for Disposition   Pimples (localized) and no improvement after using CARE ADVICE    Protocols used: Rash or Redness - [id number]

## 2024-08-20 NOTE — ED PROVIDER NOTES
Patient Seen in: [facility] [place]      History     Chief Complaint   Patient presents with    Rash Skin Problem     Stated Complaint: rash  Subjective:   Camila is a 48-year-old female presenting to the immediate care complaining of a rash.  Patient states that she recently traveled to [place] and was there for a few days.  She noticed the rash today after she returned.  Patient states that she has noticed a couple of rash areas to her abdomen, upper chest, buttocks and arms.  States that the areas of rash are itchy.  Denies any pain.  Patient has not had a fever, nausea, vomiting, diarrhea, chest pain, shortness of breath, dizziness or weakness.  She states that she is otherwise been feeling well.  Patient says that she has not had any drainage from any of the areas of rash.  She denies any other concerns or complaints.        Objective:   Past Medical History:    Anxiety    My whole life    Anxiety state    Back problem    Chronic pain    COVID-19    severe headache,nausea and stomache lasting 5 days    DCIS (ductal carcinoma in situ) of breast    Right breast. radiotherapy and mastectomy     Depression    medication and therapy    Disorder of thyroid    Essential hypertension    Exposure to medical diagnostic radiation    completed     Helicobacter pylori infection    High blood pressure    Migraines    Muscle weakness    Neuropathic pain due to radiation    Osteoarthritis    Ovarian cyst    Visual impairment    glasses            Past Surgical History:   Procedure Laterality Date    Appendectomy  1979    Breast surgery  2015    Revision of bilateral reconstructed breasts    Breast surgery  2015    Fat grafting to left reconstructed breast    Breast surgery Bilateral 2016    Bilateral nipple reconstruction      2008      2010    APGAR: 9 (1min) 9 (5 min)  DR: Dameon Augustine    Cervical spine surgery  2023    CERVICAL THREE TO CERVICAL FOUR ANTERIOR  CERVICAL DISCECTOMY AND FUSION.  BONE GRAFT AND ALL REQUIRED INSTRUMENTATION. NEUROMONITORING.    Cervical spine surgery      C3-4 fusion extension 3/28/23 Dr. Vicente 3/2023    Colonoscopy  11/08/2012    Colonoscopy      Core biopsy Right 06/17/2013    Right breast:  MRI core biopsies    Egd  2016    Explor front sinus,transorbit,uni  09/19/2018    Forearm/wrist surgery unlisted Left 2009    DeQuervain's release    Hysterectomy      Laparoscopy,diagnostic      Lumpectomy right Right 06/28/2013    Right segmental mastectomy with wire localization, advancement    Other  01/20/2022    Cervical four-five, Cervical five-six  anterior cervical discectomy and fusionRightGeneral    Radiation right  2013    Reduction left      Reduction right      Removal of ovarian cyst(s) Left 2005    laparoscopic    Removal of ovarian cyst(s) Bilateral 01/2014    laparotomy    Skin surgery  07/22/2016    bilateral areaola tattoing     Tonsillectomy  1979    Upper gi endoscopy,exam                Social History     Socioeconomic History    Marital status:     Number of children: 2   Occupational History    Occupation:      Employer: [employer]   Tobacco Use    Smoking status: Never    Smokeless tobacco: Never   Vaping Use    Vaping status: Never Used   Substance and Sexual Activity    Alcohol use: No    Drug use: No   Other Topics Concern    Caffeine Concern No    Exercise No    Reaction to local anesthetic No    Pt has a pacemaker No    Pt has a defibrillator No   Social History Narrative    Camila is . Mother of two children. Patient works for [employer] in customer service. Patient lives with her two children in [place].            Review of Systems    Positive for stated complaint: Rash Skin Problem    Other systems are as noted in HPI.  Constitutional and vital signs reviewed.      All other systems reviewed and negative except as noted above.    Physical Exam     ED Triage Vitals  [08/20/24 1345]   /81   Pulse 59   Resp 17   Temp 99 °F (37.2 °C)   Temp src Temporal   SpO2 99 %   O2 Device None (Room air)     Current:/81   Pulse 59   Temp 99 °F (37.2 °C) (Temporal)   Resp 17   LMP 06/09/2022   SpO2 99%     Physical Exam  Vitals and nursing note reviewed.   Constitutional:       General: She is not in acute distress.     Appearance: Normal appearance. She is not ill-appearing, toxic-appearing or diaphoretic.   HENT:      Head: Normocephalic.      Right Ear: Tympanic membrane, ear canal and external ear normal.      Left Ear: Tympanic membrane, ear canal and external ear normal.   Cardiovascular:      Rate and Rhythm: Normal rate.   Pulmonary:      Effort: Pulmonary effort is normal.   Musculoskeletal:         General: Normal range of motion.      Cervical back: Normal range of motion.   Skin:     General: Skin is warm and dry.      Capillary Refill: Capillary refill takes less than 2 seconds.      Findings: Erythema and rash present.      Comments: Patient has 1 bite amauri on the middle of her left arm with mild surrounding erythema.   There is a bite amauri with surrounding erythema to her right upper chest.  There is also a amauri to her abdomen and buttocks.  There are no vesicles.  There are no pustules.  There is no drainage.  There is no crusting, scaling, peeling.  No warmth or tenderness.   Neurological:      General: No focal deficit present.      Mental Status: She is alert and oriented to person, place, and time.   Psychiatric:         Mood and Affect: Mood normal.         Behavior: Behavior normal.         Thought Content: Thought content normal.         Judgment: Judgment normal.         ED Course   Radiology:  No results found.  Labs Reviewed - No data to display    MDM     Medical Decision Making  Differential diagnoses reflecting the complexity of care include but are not limited to mite bites, cellulitis, vasculitis, less likely systemic etiology.    Comorbidities  that add complexity to management include: None  History obtained by an independent source was from: Patient  Patient is well appearing, non-toxic and in no acute distress.  Vital signs are stable.   Patient's history and physical exam are consistent with mite bites from recent cicada outbreak.  Rash is itchy.  No pain or warmth to the rash.  I am not concerned for cellulitis.    Patient called triage nurse and sent here for evaluation due to the travel.  I do not believe that this rash is related to the patient's travel.  Patient also told the triage nurse that she was having some neck pain.  When I asked the patient about this she said that she had the neck pain a few days ago, saw primary care provider who evaluated her for ear pain and the neck pain was related to that.  Patient has been taking antibiotics from PCP since then and states that her ear pain and neck pain are both significantly improving.  Ear exam is normal today.  No rash to the neck.    I sent a prescription for triamcinolone ointment to be used for the itching.  Also recommended taking a Claritin or Zyrtec every morning for itching.  Recommended Benadryl at nighttime for severe itching.  Recommended that if the patient develops any increased redness, warmth, pain or worsening symptoms you should return or go to the emergency department.  Otherwise follow-up with your primary care doctor.  ED precautions discussed.  Patient (guardian) advised to follow up with PCP in 2-3 days.  Patient (guardian) agrees with this plan of care.  Patient (guardian) verbalizes understanding of discharge instructions and plan of care.      Risk  OTC drugs.  Prescription drug management.        Disposition and Plan     Clinical Impression:  1. Insect bite, unspecified site, initial encounter         Disposition:  Discharge  8/20/2024  2:01 pm    Follow-up:  Jose F Tillman, DO  [address] [place] [address]  [phone]                Medications  Prescribed:  Discharge Medication List as of 8/20/2024  2:04 PM        START taking these medications    Details   triamcinolone 0.1 % External Ointment Apply to affected area twice a day until rash resolves, Normal, Disp-80 g, R-0

## 2024-08-20 NOTE — ED INITIAL ASSESSMENT (HPI)
Presents with scattered rash to arms, leg, and trunk that started 8/12. States she was in [place] 8/10 when she felt overheated, headache, dizzy, and \"out of it\". Took some migraine meds and felt better. Arrived home 8/12 and noticed the rash. Some itching and tenderness. No fever. Recently on abx for otitis but rash started prior to abx.

## 2024-08-22 ENCOUNTER — TELEPHONE (OUTPATIENT)
Dept: SURGERY | Facility: CLINIC | Age: 49
End: 2024-08-22

## 2024-09-17 ENCOUNTER — PATIENT MESSAGE (OUTPATIENT)
Dept: FAMILY MEDICINE CLINIC | Facility: CLINIC | Age: 49
End: 2024-09-17

## 2024-09-18 NOTE — TELEPHONE ENCOUNTER
Patient is currently scheduled this afternoon.     Future Appointments   Date Time Provider Department Center   9/18/2024  3:00 PM [facility] [place]           From: Camila Bell  To: Joe Nichols  Sent: 9/17/2024  2:03 PM CDT  Subject: Medical Question     Good afternoon. Is there a test to determine if my cortisol levels are high? I attached a pic of symptoms of high cortisol and I experience all of these symptoms. Is there a blood test that can determine ?

## 2024-09-22 ENCOUNTER — APPOINTMENT (OUTPATIENT)
Dept: GENERAL RADIOLOGY | Facility: HOSPITAL | Age: 49
End: 2024-09-22
Payer: COMMERCIAL

## 2024-09-22 ENCOUNTER — HOSPITAL ENCOUNTER (EMERGENCY)
Facility: HOSPITAL | Age: 49
Discharge: HOME | End: 2024-09-22
Attending: EMERGENCY MEDICINE
Payer: COMMERCIAL

## 2024-09-22 VITALS
RESPIRATION RATE: 20 BRPM | HEIGHT: 64 IN | SYSTOLIC BLOOD PRESSURE: 142 MMHG | HEART RATE: 70 BPM | TEMPERATURE: 98 F | OXYGEN SATURATION: 97 % | DIASTOLIC BLOOD PRESSURE: 78 MMHG | WEIGHT: 140 LBS | BODY MASS INDEX: 23.9 KG/M2

## 2024-09-22 DIAGNOSIS — S62.102A CLOSED FRACTURE OF LEFT WRIST, INITIAL ENCOUNTER: Primary | ICD-10-CM

## 2024-09-22 PROCEDURE — 99284 EMERGENCY DEPT VISIT MOD MDM: CPT

## 2024-09-22 PROCEDURE — 73110 X-RAY EXAM OF WRIST: CPT | Performed by: EMERGENCY MEDICINE

## 2024-09-22 PROCEDURE — 29125 APPL SHORT ARM SPLINT STATIC: CPT

## 2024-09-22 NOTE — ED PROVIDER NOTES
Patient Seen in: [facility] Emergency Department      History     Chief Complaint   Patient presents with    Arm or Hand Injury            Stated Complaint: left wrist pain    Subjective:   HPI    Patient is a 48-year-old female she was rollerskating and fell injuring her wrist.  She complains of pain.  No other injuries    Objective:   Past Medical History:    Anxiety    My whole life    Anxiety state    Back problem    Chronic pain    COVID-19    severe headache,nausea and stomache lasting 5 days    DCIS (ductal carcinoma in situ) of breast    Right breast. radiotherapy and mastectomy     Depression    medication and therapy    Disorder of thyroid    Essential hypertension    Exposure to medical diagnostic radiation    completed     Helicobacter pylori infection    High blood pressure    Migraines    Muscle weakness    Neuropathic pain due to radiation    Osteoarthritis    Ovarian cyst    Visual impairment    glasses              Past Surgical History:   Procedure Laterality Date    Appendectomy  1979    Breast surgery  2015    Revision of bilateral reconstructed breasts    Breast surgery  2015    Fat grafting to left reconstructed breast    Breast surgery Bilateral 2016    Bilateral nipple reconstruction      2008      2010    APGAR: 9 (1min) 9 (5 min)  DR: Dameon Augustine    Cervical spine surgery  2023    CERVICAL THREE TO CERVICAL FOUR ANTERIOR CERVICAL DISCECTOMY AND FUSION.  BONE GRAFT AND ALL REQUIRED INSTRUMENTATION. NEUROMONITORING.    Cervical spine surgery      C3-4 fusion extension 3/28/23 Dr. Vicente 3/2023    Colonoscopy  2012    Colonoscopy      Core biopsy Right 2013    Right breast:  MRI core biopsies    Egd      Explor front sinus,transorbit,uni  2018    Forearm/wrist surgery unlisted Left     DeQuervain's release    Hysterectomy      Laparoscopy,diagnostic      Lumpectomy right Right 2013    Right segmental  mastectomy with wire localization, advancement    Other  01/20/2022    Cervical four-five, Cervical five-six  anterior cervical discectomy and fusionRightGeneral    Radiation right  2013    Reduction left      Reduction right      Removal of ovarian cyst(s) Left 2005    laparoscopic    Removal of ovarian cyst(s) Bilateral 01/2014    laparotomy    Skin surgery  07/22/2016    bilateral areaola tattoing     Tonsillectomy  1979    Upper gi endoscopy,exam                  Social History     Socioeconomic History    Marital status:     Number of children: 2   Occupational History    Occupation:      Employer: [employer]   Tobacco Use    Smoking status: Never    Smokeless tobacco: Never   Vaping Use    Vaping status: Never Used   Substance and Sexual Activity    Alcohol use: No    Drug use: No   Other Topics Concern    Caffeine Concern No    Exercise No    Reaction to local anesthetic No    Pt has a pacemaker No    Pt has a defibrillator No   Social History Narrative    Camila is . Mother of two children. Patient works for [employer] in customer service. Patient lives with her two children in [place], [place].              Review of Systems    Positive for stated Chief Complaint: Arm or Hand Injury (/)    Other systems are as noted in HPI.  Constitutional and vital signs reviewed.      All other systems reviewed and negative except as noted above.    Physical Exam     ED Triage Vitals [09/22/24 1633]   /78   Pulse 70   Resp 20   Temp 98.4 °F (36.9 °C)   Temp src Temporal   SpO2 97 %   O2 Device None (Room air)       Current Vitals:   Vital Signs  BP: 142/78  Pulse: 70  Resp: 20  Temp: 98.4 °F (36.9 °C)  Temp src: Temporal    Oxygen Therapy  SpO2: 97 %  O2 Device: None (Room air)            Physical Exam    Patient awake alert no distress full exam in her left wrist    There is no proximal swelling tenderness or redness or warmth  There is swelling and tenderness about  the wrist there is pain with range of motion there is no snuffbox tenderness circulation sensorimotor function to her hand is intact.    ED Course   Labs Reviewed - No data to display     XR WRIST COMPLETE (MIN 3 VIEWS), LEFT (CPT=73110)    Result Date: 9/22/2024  CONCLUSION:   Acute, nondisplaced, transversely oriented fracture involving the distal radius.  Cortical irregularity involving the ulnar styloid, which is concerning for an additional nondisplaced fracture.     Dictated by (CST): Abraham Rudd MD on 9/22/2024 at 5:51 PM     Finalized by (CST): Abraham Rudd MD on 9/22/2024 at 5:52 PM              Will check x-ray         MDM      Use of independent historian:     I personally reviewed and interpreted the images : Distal radius fracture noted    XR WRIST COMPLETE (MIN 3 VIEWS), LEFT (CPT=73110)    Result Date: 9/22/2024  CONCLUSION:   Acute, nondisplaced, transversely oriented fracture involving the distal radius.  Cortical irregularity involving the ulnar styloid, which is concerning for an additional nondisplaced fracture.     Dictated by (CST): Abraham Rudd MD on 9/22/2024 at 5:51 PM     Finalized by (CST): Abraham Rudd MD on 9/22/2024 at 5:52 PM           Vitals:    09/22/24 1633   BP: 142/78   Pulse: 70   Resp: 20   Temp: 98.4 °F (36.9 °C)   TempSrc: Temporal   SpO2: 97%   Weight: 63.5 kg   Height: 162.6 cm (5' 4\")     *I personally reviewed and interpreted all ED vitals.    Pulse Ox: 97%, Room air, Normal         Differential Diagnosis/ Diagnostic Considerations: Left wrist injury consider fracture consider dislocation consider sprain    Medical Record Review: I personally reviewed available prior medical records for any recent pertinent discharge summaries, testing, and procedures and reviewed those reports and found patient seen last week by primary care 9/18/2020 for generalized anxiety disorder notes reviewed..    Complicating Factors: The patient already has breast cancer in the past  which contribute to the complexity of this ED evaluation.    Social determinants of health:    Prescription drug management:      Shared Decision Making:    ED Course: X-ray shared with patient will splint and refer to Ortho for follow-up.    Discussion of management with other healthcare providers:    Condition upon leaving the department: Stable                  A short arm splint was applied to the left wrist.  After application of the splint I returned and re-examined the patient.  The splint was adequately immobilizing the joint and distal to the splint the patient's circulation and sensation was intact.                     Medical Decision Making      Disposition and Plan     Clinical Impression:  1. Closed fracture of left wrist, initial encounter         Disposition:  Discharge  9/22/2024  6:01 pm    Follow-up:  Mike Herron MD  [address] [address]  [phone]    Follow up in 2 day(s)            Medications Prescribed:  Current Discharge Medication List

## 2024-09-22 NOTE — ED INITIAL ASSESSMENT (HPI)
Pt reports she fell while skating and braced fall with left hand. Wrist splinted, able to move fingers, reports worsening swelling. Declined sling and redressing of splint.

## 2024-09-23 ENCOUNTER — TELEPHONE (OUTPATIENT)
Dept: ORTHOPEDICS CLINIC | Facility: CLINIC | Age: 49
End: 2024-09-23

## 2024-09-23 DIAGNOSIS — M25.532 LEFT WRIST PAIN: Primary | ICD-10-CM

## 2024-09-23 NOTE — TELEPHONE ENCOUNTER
Can you see this patient?  If so, when?  Please advise.  Thank you!    DOI 9/22/24    XR 9/22/24    Impression   CONCLUSION:     Acute, nondisplaced, transversely oriented fracture involving the distal radius.     Cortical irregularity involving the ulnar styloid, which is concerning for an additional nondisplaced fracture.

## 2024-09-23 NOTE — TELEPHONE ENCOUNTER
Per Cassidy, please schedule patient with her Wednesday at 2pm SDA with new XR.  Thank you!

## 2024-09-23 NOTE — TELEPHONE ENCOUNTER
Cassidy Lr PA   to [facility] Clinical [facility]  Santos Stubbs MD   EK    9/23/24  8:59 AM  I can see Wednesday in 2 pm SDA with new xr

## 2024-10-14 NOTE — TELEPHONE ENCOUNTER
From: Brian Diaz  To: Jordana Rivera MD  Sent: 5/13/2020 5:58 PM CDT  Subject: Prescription Question    Hello.  I got your email am I'm going tomorrow to get blood drawn but is there anyway to change orders so I can also get tested for the following
See TE 5/15 - enc addressed by RN.
She did blood work  I can only do thyroid and vit D  Other labs I typically do not order or assess  Please talk to PCP about the same  There is another encounter about her labs, please call her and let her know  Thanks
Patent

## 2024-10-22 ENCOUNTER — HOSPITAL ENCOUNTER (OUTPATIENT)
Dept: GENERAL RADIOLOGY | Facility: HOSPITAL | Age: 49
Discharge: HOME | End: 2024-10-22
Attending: ORTHOPAEDIC SURGERY
Payer: COMMERCIAL

## 2024-10-22 DIAGNOSIS — S52.502A CLOSED FRACTURE OF LEFT DISTAL RADIUS: ICD-10-CM

## 2024-10-22 PROCEDURE — 73110 X-RAY EXAM OF WRIST: CPT | Performed by: ORTHOPAEDIC SURGERY

## 2024-11-14 ENCOUNTER — PATIENT MESSAGE (OUTPATIENT)
Dept: FAMILY MEDICINE CLINIC | Facility: CLINIC | Age: 49
End: 2024-11-14

## 2024-11-15 ENCOUNTER — OFFICE VISIT (OUTPATIENT)
Dept: FAMILY MEDICINE CLINIC | Facility: CLINIC | Age: 49
End: 2024-11-15
Payer: COMMERCIAL

## 2024-11-15 ENCOUNTER — HOSPITAL ENCOUNTER (OUTPATIENT)
Dept: GENERAL RADIOLOGY | Age: 49
Discharge: HOME | End: 2024-11-15
Attending: FAMILY MEDICINE
Payer: COMMERCIAL

## 2024-11-15 VITALS
HEART RATE: 67 BPM | SYSTOLIC BLOOD PRESSURE: 135 MMHG | WEIGHT: 141 LBS | BODY MASS INDEX: 24.07 KG/M2 | HEIGHT: 64 IN | DIASTOLIC BLOOD PRESSURE: 76 MMHG

## 2024-11-15 DIAGNOSIS — M54.2 NECK PAIN: ICD-10-CM

## 2024-11-15 DIAGNOSIS — Z12.11 ENCOUNTER FOR SCREENING COLONOSCOPY: ICD-10-CM

## 2024-11-15 DIAGNOSIS — H81.10 BENIGN PAROXYSMAL POSITIONAL VERTIGO, UNSPECIFIED LATERALITY: Primary | ICD-10-CM

## 2024-11-15 PROCEDURE — 72050 X-RAY EXAM NECK SPINE 4/5VWS: CPT | Performed by: FAMILY MEDICINE

## 2024-11-15 PROCEDURE — 99213 OFFICE O/P EST LOW 20 MIN: CPT | Performed by: FAMILY MEDICINE

## 2024-11-15 RX ORDER — MECLIZINE HYDROCHLORIDE 25 MG/1
25 TABLET ORAL 3 TIMES DAILY PRN
Qty: 45 TABLET | Refills: 0 | Status: SHIPPED | OUTPATIENT
Start: 2024-11-15

## 2024-11-15 RX ORDER — METHYLPREDNISOLONE 4 MG/1
TABLET ORAL
Qty: 1 EACH | Refills: 1 | Status: SHIPPED | OUTPATIENT
Start: 2024-11-15

## 2024-11-15 RX ORDER — LOSARTAN POTASSIUM 25 MG/1
25 TABLET ORAL DAILY
Qty: 90 TABLET | Refills: 1 | Status: SHIPPED | OUTPATIENT
Start: 2024-11-15 | End: 2025-05-14

## 2024-11-15 NOTE — PROGRESS NOTES
Blood pressure 135/76, pulse 67, height 5' 4\" (1.626 m), weight 141 lb (64 kg), last menstrual period 06/09/2022, not currently breastfeeding.      Complaining of dizziness for the past 3 days slightly improved.  She denies any hearing loss or ringing in the ears.    She did fall and injure her wrist about a month ago.  She also reports she has some neck pain radiates down the left arm.    Objective      CN II-XII GROSSLY INTACT MUSCLE STRENGTH +5/5 UPPER AND LOWER EXTREMITIES B/L DTR'S UPPER AND LOWER +2/4 UPPER AND LOWER EXTREMITIES B/L NEG PRONATOR DRIFT NEG BABINSKI NO CEREBELLAR SIGNS VISUAL FIELDS INTACT    Stereognosis intact    Dysdiadochokinesis intact    Negative Spurling test bilaterally    Median and ulnar nerve motor intact  Wren-Hallpike negative  Assessment #1 positional vertigo #2 cervical radiculopathy    Plan #1 meclizine prescription Barany maneuvers #2 Medrol Dosepak C-spine x-ray ordered    Patient to have colonoscopy will reminded send referral

## 2024-11-19 RX ORDER — LOSARTAN POTASSIUM 50 MG/1
50 TABLET ORAL DAILY
Qty: 90 TABLET | Refills: 0 | OUTPATIENT
Start: 2024-11-19

## 2024-11-30 RX ORDER — MECLIZINE HYDROCHLORIDE 25 MG/1
25 TABLET ORAL 3 TIMES DAILY PRN
Qty: 45 TABLET | Refills: 0 | Status: SHIPPED | OUTPATIENT
Start: 2024-11-30

## 2024-11-30 NOTE — TELEPHONE ENCOUNTER
Refill passed per [facility] protocol.     Requested Prescriptions   Pending Prescriptions Disp Refills    MECLIZINE 25 MG Oral Tab [Pharmacy Med Name: Meclizine Hydrochloride 25 Mg Tab Zydu] 45 tablet 0     Sig: TAKE ONE TABLET BY MOUTH THREE TIMES DAILY AS NEEDED       Gastrointestional Medication Protocol Passed - 11/30/2024  9:22 AM        Passed - In person appointment or virtual visit in the past 12 mos or appointment in next 3 mos     Recent Outpatient Visits              2 weeks ago Benign paroxysmal positional vertigo, unspecified laterality    [facility] [place]    Office Visit    2 months ago [facility]    Office Visit    3 months ago Non-recurrent acute serous otitis media of right ear    [facility], [place]    Office Visit    6 months ago Mixed hyperlipidemia    [facility]    Office Visit    7 months ago Symptomatic menopausal or female climacteric states    [address] Kristi Moe MD    Office Visit

## 2024-12-10 DIAGNOSIS — G43.009 MIGRAINE WITHOUT AURA AND WITHOUT STATUS MIGRAINOSUS, NOT INTRACTABLE: ICD-10-CM

## 2024-12-13 RX ORDER — RIZATRIPTAN BENZOATE 10 MG/1
10 TABLET ORAL
Qty: 9 TABLET | Refills: 0 | Status: SHIPPED | OUTPATIENT
Start: 2024-12-13

## 2024-12-13 NOTE — TELEPHONE ENCOUNTER
Refill passed per [facility] protocol.    Requested Prescriptions   Pending Prescriptions Disp Refills    RIZATRIPTAN BENZOATE 10 MG Oral Tab [Pharmacy Med Name: Rizatriptan Benzoate 10 Mg Tab Auro] 9 tablet 0     Sig: TAKE ONE TABLET BY MOUTH DAILY AS NEEDED FOR MIGRAINE       Neurology Medications Passed - 12/13/2024  5:13 PM        Passed - In person appointment or virtual visit in the past 6 mos or appointment in next 3 mos     Recent Outpatient Visits              4 weeks ago Benign paroxysmal positional vertigo, unspecified laterality    [facility], [facility], [place]    Office Visit    2 months ago Generalized anxiety disorder    [facility], [place]    Office Visit    4 months ago Non-recurrent acute serous otitis media of right ear    [facility] [place]    Office Visit    6 months ago Mixed hyperlipidemia    [facility] [place]    Office Visit    7 months ago Symptomatic menopausal or female climacteric states    [address], [place] Kristi Moe MD    Office Visit                           Recent Outpatient Visits              4 weeks ago Benign paroxysmal positional vertigo, unspecified laterality    [facility]    Office Visit    2 months ago Generalized anxiety disorder    [facility] [place]    Office Visit    4 months ago Non-recurrent acute serous otitis media of right ear    [facility], [place] Nguyen, Minhxuyen, PA-C    Office Visit    6 months ago Mixed hyperlipidemia    [facility], [place] Nguyen, Minhxuyen, PA-C    Office Visit    7 months ago  Symptomatic menopausal or female climacteric states    [facility], [address] [place], [place] Kristi Moe MD    Office Visit

## 2024-12-18 ENCOUNTER — TELEPHONE (OUTPATIENT)
Dept: SURGERY | Facility: CLINIC | Age: 49
End: 2024-12-18

## 2024-12-18 NOTE — TELEPHONE ENCOUNTER
Patient was scheduled with Dr. Vicente with numbness in fingers but rescheduled with Dr. Gomez patient wanted to make sure the numbness in her fingers had nothing to do with surgery she had with Dr. Vicente. Please advise

## 2024-12-18 NOTE — TELEPHONE ENCOUNTER
Noted that patient has inquired if she should see Dr. Vicente in regard to finger numbness symptom and that she is concerned it may be related to past surgery.  Patient underwent surgery with Dr. Vicente on 3.28.23:    CERVICAL THREE TO CERVICAL FOUR ANTERIOR CERVICAL DISCECTOMY AND FUSION.     Noted that patient had previously been scheduled to see Dr. Curtis Vicente and is now scheduled to see Dr. Gomez in Neurology.     Routed to provider.

## 2024-12-18 NOTE — TELEPHONE ENCOUNTER
Patient scheduled an appointment via [employer] for numbess. Left voicemail to inform patient she would need to be seen by neurology.    Will send [employer] message

## 2024-12-19 NOTE — TELEPHONE ENCOUNTER
Patient has had residual numbness in the left hand that is documented during her last visit with Dr. Vicente. If she has different or new symptoms, she can see Dr. Vicente as it has been over 1 year since her last visit.

## 2024-12-30 ENCOUNTER — TELEPHONE (OUTPATIENT)
Dept: NEUROLOGY | Facility: CLINIC | Age: 49
End: 2024-12-30

## 2024-12-30 NOTE — TELEPHONE ENCOUNTER
Pt scheduled f/u appt online. Left vm to cb, is this a reg f/u or botox. If botox, it needs to be a 40 min appt.

## 2025-01-14 DIAGNOSIS — G43.009 MIGRAINE WITHOUT AURA AND WITHOUT STATUS MIGRAINOSUS, NOT INTRACTABLE: ICD-10-CM

## 2025-01-14 DIAGNOSIS — F41.1 GENERALIZED ANXIETY DISORDER: ICD-10-CM

## 2025-01-17 RX ORDER — RIZATRIPTAN BENZOATE 10 MG/1
10 TABLET ORAL
Qty: 9 TABLET | Refills: 0 | Status: SHIPPED | OUTPATIENT
Start: 2025-01-17

## 2025-01-17 NOTE — TELEPHONE ENCOUNTER
Refill passed per [facility] protocol.    Requested Prescriptions   Pending Prescriptions Disp Refills    Rizatriptan Benzoate 10 MG Oral Tab 9 tablet 0     Sig: Take 1 tablet (10 mg total) by mouth daily as needed for Migraine.       Neurology Medications Passed - 1/17/2025  9:49 AM        Passed - In person appointment or virtual visit in the past 6 mos or appointment in next 3 mos     Recent Outpatient Visits              2 months ago Benign paroxysmal positional vertigo, unspecified laterality    [facility]    [facility]    4 months ago [facility]ized anxiety disorder    [facility]    Office Visit    5 months ago Non-recurrent acute serous otitis media of right ear    [facility]    Office Visit    7 months ago Mixed hyperlipidemia    [facility] [address], [place] Joe Nichols PA-C    Office Visit    8 months ago Symptomatic menopausal or female climacteric states    [facility], [address] [place] [address] Kristi Moe MD    Office Visit                      Passed - Medication is active on med list             Recent Outpatient Visits              2 months ago Benign paroxysmal positional vertigo, unspecified laterality    [facility]    Office Visit    4 months ago Generalized anxiety disorder    [facility]    Office Visit    5 months ago Non-recurrent acute serous otitis media of right ear    [facility] [place] [facility]    Office Visit    7 months ago Mixed hyperlipidemia    [facility] [place] [facility]    Office Visit    8 months ago  Symptomatic menopausal or female climacteric states    [facility], [address] [place], [place] Kristi Moe MD    Office Visit

## 2025-01-20 RX ORDER — ALPRAZOLAM 0.25 MG/1
0.25 TABLET ORAL 3 TIMES DAILY PRN
Qty: 30 TABLET | Refills: 0 | OUTPATIENT
Start: 2025-01-20

## 2025-01-20 RX ORDER — RIZATRIPTAN BENZOATE 10 MG/1
10 TABLET ORAL
Qty: 9 TABLET | Refills: 0 | OUTPATIENT
Start: 2025-01-20

## 2025-03-08 DIAGNOSIS — G43.009 MIGRAINE WITHOUT AURA AND WITHOUT STATUS MIGRAINOSUS, NOT INTRACTABLE: ICD-10-CM

## 2025-03-10 DIAGNOSIS — G43.009 MIGRAINE WITHOUT AURA AND WITHOUT STATUS MIGRAINOSUS, NOT INTRACTABLE: ICD-10-CM

## 2025-03-11 RX ORDER — RIZATRIPTAN BENZOATE 10 MG/1
10 TABLET ORAL
Qty: 9 TABLET | Refills: 0 | OUTPATIENT
Start: 2025-03-11

## 2025-03-11 RX ORDER — RIZATRIPTAN BENZOATE 10 MG/1
10 TABLET ORAL
Qty: 9 TABLET | Refills: 0 | Status: SHIPPED | OUTPATIENT
Start: 2025-03-11

## 2025-03-21 ENCOUNTER — OFFICE VISIT (OUTPATIENT)
Dept: FAMILY MEDICINE CLINIC | Facility: CLINIC | Age: 50
End: 2025-03-21
Payer: COMMERCIAL

## 2025-03-21 VITALS
WEIGHT: 149 LBS | SYSTOLIC BLOOD PRESSURE: 134 MMHG | HEIGHT: 64 IN | DIASTOLIC BLOOD PRESSURE: 82 MMHG | HEART RATE: 74 BPM | BODY MASS INDEX: 25.44 KG/M2

## 2025-03-21 DIAGNOSIS — F41.1 GENERALIZED ANXIETY DISORDER: ICD-10-CM

## 2025-03-21 DIAGNOSIS — J02.0 STREP PHARYNGITIS: Primary | ICD-10-CM

## 2025-03-21 DIAGNOSIS — G43.009 MIGRAINE WITHOUT AURA AND WITHOUT STATUS MIGRAINOSUS, NOT INTRACTABLE: ICD-10-CM

## 2025-03-21 LAB
CONTROL LINE PRESENT WITH A CLEAR BACKGROUND (YES/NO): YES YES/NO
KIT LOT #: NORMAL NUMERIC
STREP GRP A CUL-SCR: POSITIVE

## 2025-03-21 PROCEDURE — 99213 OFFICE O/P EST LOW 20 MIN: CPT | Performed by: PHYSICIAN ASSISTANT

## 2025-03-21 PROCEDURE — 87880 STREP A ASSAY W/OPTIC: CPT | Performed by: PHYSICIAN ASSISTANT

## 2025-03-21 RX ORDER — AZITHROMYCIN 250 MG/1
TABLET, FILM COATED ORAL
Qty: 6 TABLET | Refills: 0 | Status: SHIPPED | OUTPATIENT
Start: 2025-03-21 | End: 2025-03-26

## 2025-03-21 RX ORDER — RIZATRIPTAN BENZOATE 10 MG/1
10 TABLET ORAL
Qty: 9 TABLET | Refills: 5 | Status: SHIPPED | OUTPATIENT
Start: 2025-03-21

## 2025-03-21 RX ORDER — ALPRAZOLAM 0.25 MG
0.25 TABLET ORAL 3 TIMES DAILY PRN
Qty: 30 TABLET | Refills: 2 | Status: SHIPPED | OUTPATIENT
Start: 2025-03-21

## 2025-03-21 NOTE — PROGRESS NOTES
HPI:     Sore Throat   This is a new problem. Episode onset: 2 months. The problem has been gradually worsening. There has been no fever. Associated symptoms include headaches. Pertinent negatives include no congestion, coughing, diarrhea, ear discharge, ear pain, hoarse voice, shortness of breath or vomiting.     Medications:     Current Outpatient Medications   Medication Sig Dispense Refill    azithromycin 250 MG Oral Tab Take 2 tablets (500 mg total) by mouth daily for 1 day, THEN 1 tablet (250 mg total) daily for 4 days. 6 tablet 0    Rizatriptan Benzoate 10 MG Oral Tab Take 1 tablet (10 mg total) by mouth daily as needed for Migraine. 9 tablet 5    ALPRAZolam (XANAX) 0.25 MG Oral Tab Take 1 tablet (0.25 mg total) by mouth 3 (three) times daily as needed. 30 tablet 2    meclizine 25 MG Oral Tab Take 1 tablet (25 mg total) by mouth 3 (three) times daily as needed. 45 tablet 0    losartan 25 MG Oral Tab Take 1 tablet (25 mg total) by mouth daily. 90 tablet 1    levothyroxine 100 MCG Oral Tab Take 1 tablet (100 mcg total) by mouth before breakfast.      atorvastatin (LIPITOR) 10 MG Oral Tab Take 1 tablet (10 mg total) by mouth daily. 90 tablet 3    metoprolol tartrate 50 MG Oral Tab Take 1 tablet (50 mg total) by mouth 2 (two) times daily. 180 tablet 3       Allergies:   Allergies[1]    History:     Health Maintenance   Topic Date Due    Pneumococcal Vaccine: 50+ Years (1 of 2 - PCV) Never done    DTaP,Tdap,and Td Vaccines (1 - Tdap) Never done    Zoster Vaccines (1 of 2) Never done    Colorectal Cancer Screening  11/08/2022    Annual Physical  07/07/2024    COVID-19 Vaccine (3 - 2024-25 season) 09/01/2024    Influenza Vaccine (1) Never done    Annual Depression Screening  01/01/2025    HTN: BP Follow-Up  04/21/2025    Meningococcal B Vaccine  Aged Out    Mammogram  Discontinued       Patient's last menstrual period was 06/09/2022.   Past Medical History:     Past Medical History:    Anxiety    My whole life     Anxiety state    Back problem    Chronic pain    COVID-19    severe headache,nausea and stomache lasting 5 days    DCIS (ductal carcinoma in situ) of breast    Right breast. radiotherapy and mastectomy     Depression    medication and therapy    Disorder of thyroid    Essential hypertension    Exposure to medical diagnostic radiation    completed     Helicobacter pylori infection    High blood pressure    Migraines    Muscle weakness    Neuropathic pain due to radiation    Osteoarthritis    Ovarian cyst    Visual impairment    glasses       Past Surgical History:     Past Surgical History:   Procedure Laterality Date    Appendectomy  1979    Breast surgery  2015    Revision of bilateral reconstructed breasts    Breast surgery  2015    Fat grafting to left reconstructed breast    Breast surgery Bilateral 2016    Bilateral nipple reconstruction      2008      2010    APGAR: 9 (1min) 9 (5 min)  DR: Dameon Augustine    Cervical spine surgery  2023    CERVICAL THREE TO CERVICAL FOUR ANTERIOR CERVICAL DISCECTOMY AND FUSION.  BONE GRAFT AND ALL REQUIRED INSTRUMENTATION. NEUROMONITORING.    Cervical spine surgery      C3-4 fusion extension 3/28/23 Dr. Vicente 3/2023    Colonoscopy  2012    Colonoscopy      Core biopsy Right 2013    Right breast:  MRI core biopsies    Egd  2016    Explor front sinus,transorbit,uni  2018    Forearm/wrist surgery unlisted Left     DeQuervain's release    Hysterectomy      Laparoscopy,diagnostic      Lumpectomy right Right 2013    Right segmental mastectomy with wire localization, advancement    Other  2022    Cervical four-five, Cervical five-six  anterior cervical discectomy and fusionRightGeneral    Radiation right      Reduction left      Reduction right      Removal of ovarian cyst(s) Left     laparoscopic    Removal of ovarian cyst(s) Bilateral 2014    laparotomy    Skin surgery  2016     bilateral areaola tattoing     Tonsillectomy  1979    Upper gi endoscopy,exam         Family History:     Family History   Problem Relation Age of Onset    Lipids Father         hyperlipidemia    Hypertension Father     Migraines Father     Prostate Cancer Father     Asthma Mother     Lipids Mother         hyperlipidemia    Hypertension Mother     Cancer Maternal Aunt 50        skin and breast cancer    Skin cancer Maternal Grandfather     Cancer Other 50        breast cancer and leukemia    Dementia Other         Alzheimers, Grandmother [SIDE NOT STATED]    Cancer Cousin 30        ovarian cancer / maternal cousin    Diabetes Paternal Grandmother     Cancer Paternal Aunt         leukemia    Cancer Paternal Uncle         prostate    Breast Cancer Self 34    No Known Problems Daughter     No Known Problems Son     No Known Problems Sister     No Known Problems Sister     No Known Problems Sister        Social History:     Social History     Socioeconomic History    Marital status:      Spouse name: Not on file    Number of children: 2    Years of education: Not on file    Highest education level: Not on file   Occupational History    Occupation:      Employer: [employer]   Tobacco Use    Smoking status: Never    Smokeless tobacco: Never   Vaping Use    Vaping status: Never Used   Substance and Sexual Activity    Alcohol use: No    Drug use: No    Sexual activity: Not on file   Other Topics Concern     Service Not Asked    Blood Transfusions Not Asked    Caffeine Concern No    Occupational Exposure Not Asked    Hobby Hazards Not Asked    Sleep Concern Not Asked    Stress Concern Not Asked    Weight Concern Not Asked    Special Diet Not Asked    Back Care Not Asked    Exercise No    Bike Helmet Not Asked    Seat Belt Not Asked    Self-Exams Not Asked    Grew up on a farm Not Asked    History of tanning Not Asked    Outdoor occupation Not Asked    Breast feeding Not Asked     Reaction to local anesthetic No    Pt has a pacemaker No    Pt has a defibrillator No   Social History Narrative    Camila is . Mother of two children. Patient works for [employer] in customer service. Patient lives with her two children in [place], [place].     Social Drivers of Health     Food Insecurity: Not on file   Transportation Needs: Not on file   Stress: Not on file   Housing Stability: Not on file       Review of Systems:   Review of Systems   HENT:  Positive for sore throat. Negative for congestion, ear discharge, ear pain and hoarse voice.    Respiratory:  Negative for cough and shortness of breath.    Gastrointestinal:  Negative for diarrhea and vomiting.   Neurological:  Positive for headaches.        Vitals:    03/21/25 0849 03/21/25 0921   BP: 155/81 134/82   Pulse: 74    Weight: 149 lb (67.6 kg)    Height: 5' 4\" (1.626 m)      Body mass index is 25.58 kg/m².    Physical Exam:   Physical Exam  Vitals reviewed.   Constitutional:       General: She is not in acute distress.     Appearance: She is well-developed.   HENT:      Right Ear: Tympanic membrane, ear canal and external ear normal. There is no impacted cerumen.      Left Ear: Tympanic membrane, ear canal and external ear normal. There is no impacted cerumen.      Nose: Nose normal.      Mouth/Throat:      Mouth: Mucous membranes are moist.      Pharynx: Oropharynx is clear. Posterior oropharyngeal erythema present. No oropharyngeal exudate.   Eyes:      General:         Right eye: No discharge.         Left eye: No discharge.      Conjunctiva/sclera: Conjunctivae normal.   Cardiovascular:      Rate and Rhythm: Normal rate and regular rhythm.      Heart sounds: Normal heart sounds, S1 normal and S2 normal. No murmur heard.  Pulmonary:      Effort: Pulmonary effort is normal.      Breath sounds: Normal breath sounds. No wheezing or rales.   Chest:      Chest wall: No tenderness.   Lymphadenopathy:      Cervical: No cervical adenopathy.    Skin:     Findings: No rash.   Neurological:      Mental Status: She is alert and oriented to person, place, and time.   Psychiatric:         Behavior: Behavior is cooperative.          Assessment and Plan::     Assessment & Plan  Migraine without aura and without status migrainosus, not intractable    Orders:    Rizatriptan Benzoate 10 MG Oral Tab; Take 1 tablet (10 mg total) by mouth daily as needed for Migraine.    Generalized anxiety disorder    Orders:    ALPRAZolam (XANAX) 0.25 MG Oral Tab; Take 1 tablet (0.25 mg total) by mouth 3 (three) times daily as needed.    Strep pharyngitis    Orders:    POC Rapid Strep [59455]    azithromycin 250 MG Oral Tab; Take 2 tablets (500 mg total) by mouth daily for 1 day, THEN 1 tablet (250 mg total) daily for 4 days.  Supportive care includes:    encourage fluid intake   Advise patient to take Tylenol/Ibuprofen as needed for pain.  warm salt water gargles   humidifier        Discussed plan of care with pt and pt is in agreement.All questions answered. Pt to call with questions or concerns.         [1]   Allergies  Allergen Reactions    Latex HIVES and RASH     Other reaction(s): LATEX    Other HIVES     tegaderm     Tramadol NAUSEA ONLY    Morphine NAUSEA ONLY and DIZZINESS    Augmentin [Amoxicillin-Pot Clavulanate] DIARRHEA    Cephalexin DIARRHEA    Codeine NAUSEA ONLY

## 2025-03-22 NOTE — ASSESSMENT & PLAN NOTE
Orders:    Rizatriptan Benzoate 10 MG Oral Tab; Take 1 tablet (10 mg total) by mouth daily as needed for Migraine.

## 2025-03-22 NOTE — ASSESSMENT & PLAN NOTE
Orders:    ALPRAZolam (XANAX) 0.25 MG Oral Tab; Take 1 tablet (0.25 mg total) by mouth 3 (three) times daily as needed.

## 2025-03-31 ENCOUNTER — PATIENT MESSAGE (OUTPATIENT)
Dept: FAMILY MEDICINE CLINIC | Facility: CLINIC | Age: 50
End: 2025-03-31

## 2025-03-31 NOTE — TELEPHONE ENCOUNTER
Pt saw Ari (out of office until 4/4/) on 3/21/25  Please advise if pt needs to be seen again?       \"  POC Rapid Strep [95580]    azithromycin 250 MG Oral Tab; Take 2 tablets (500 mg total) by mouth daily for 1 day, THEN 1 tablet (250 mg total) daily for 4 days.  Supportive care includes:                       encourage fluid intake   Advise patient to take Tylenol/Ibuprofen as needed for pain.  warm salt water gargles   humidifier   Discussed plan of care with pt and pt is in agreement.All questions answered. Pt to call with questions or concerns.\"

## 2025-04-16 ENCOUNTER — OFFICE VISIT (OUTPATIENT)
Dept: FAMILY MEDICINE CLINIC | Facility: CLINIC | Age: 50
End: 2025-04-16
Payer: COMMERCIAL

## 2025-04-16 VITALS
DIASTOLIC BLOOD PRESSURE: 84 MMHG | BODY MASS INDEX: 25.27 KG/M2 | HEART RATE: 62 BPM | WEIGHT: 148 LBS | SYSTOLIC BLOOD PRESSURE: 132 MMHG | HEIGHT: 64 IN

## 2025-04-16 DIAGNOSIS — Z12.11 SCREENING FOR MALIGNANT NEOPLASM OF COLON: ICD-10-CM

## 2025-04-16 DIAGNOSIS — R07.0 THROAT DISCOMFORT: Primary | ICD-10-CM

## 2025-04-16 DIAGNOSIS — L98.9 LESION OF NECK: ICD-10-CM

## 2025-04-16 PROCEDURE — 99213 OFFICE O/P EST LOW 20 MIN: CPT | Performed by: PHYSICIAN ASSISTANT

## 2025-04-16 NOTE — PROGRESS NOTES
HPI:     HPI  A 49-year-old woman presents with throat discomfort for the past few weeks. She chokes sometimes. She feels \" something inside the throat\". The patient can eat and drink without pain.    Medications:   Current Medications[1]    Allergies:   Allergies[2]    History:     Health Maintenance   Topic Date Due    Pneumococcal Vaccine: 50+ Years (1 of 2 - PCV) Never done    DTaP,Tdap,and Td Vaccines (1 - Tdap) Never done    Zoster Vaccines (1 of 2) Never done    Colorectal Cancer Screening  11/08/2022    Annual Physical  07/07/2024    COVID-19 Vaccine (3 - 2024-25 season) 09/01/2024    Annual Depression Screening  01/01/2025    Influenza Vaccine (Season Ended) 10/01/2025    Meningococcal B Vaccine  Aged Out    Mammogram  Discontinued       Patient's last menstrual period was 06/09/2022.   Past Medical History:   Past Medical History[3]    Past Surgical History:   Past Surgical History[4]    Family History:   Family History[5]    Social History:     Social History     Socioeconomic History    Marital status:      Spouse name: Not on file    Number of children: 2    Years of education: Not on file    Highest education level: Not on file   Occupational History    Occupation:      Employer: [employer]   Tobacco Use    Smoking status: Never    Smokeless tobacco: Never   Vaping Use    Vaping status: Never Used   Substance and Sexual Activity    Alcohol use: No    Drug use: No    Sexual activity: Not on file   Other Topics Concern     Service Not Asked    Blood Transfusions Not Asked    Caffeine Concern No    Occupational Exposure Not Asked    Hobby Hazards Not Asked    Sleep Concern Not Asked    Stress Concern Not Asked    Weight Concern Not Asked    Special Diet Not Asked    Back Care Not Asked    Exercise No    Bike Helmet Not Asked    Seat Belt Not Asked    Self-Exams Not Asked    Grew up on a farm Not Asked    History of tanning Not Asked    Outdoor occupation Not  Asked    Breast feeding Not Asked    Reaction to local anesthetic No    Pt has a pacemaker No    Pt has a defibrillator No   Social History Narrative    Camila is . Mother of two children. Patient works for [employer] in customer service. Patient lives with her two children in [place], [place].     Social Drivers of Health     Food Insecurity: Not on file   Transportation Needs: Not on file   Stress: Not on file   Housing Stability: Not on file       Review of Systems:   Review of Systems   + throat discomfort    Vitals:    04/16/25 1339 04/16/25 1355   BP: 149/89 132/84   Pulse: 62    Weight: 148 lb (67.1 kg)    Height: 5' 4\" (1.626 m)      Body mass index is 25.4 kg/m².    Physical Exam:   Physical Exam  Vitals reviewed.   HENT:      Mouth/Throat:      Mouth: Mucous membranes are moist.      Pharynx: Oropharynx is clear. No oropharyngeal exudate or posterior oropharyngeal erythema.      There is a questionable lesion on the right side of the neck.    Assessment and Plan::     Assessment & Plan  Screening for malignant neoplasm of colon    Orders:    Gastro GI Telephone Colon Screen; Future    Lesion of neck    Orders:     HEAD/NECK (CPT=76536); Future    Throat discomfort    Orders:    ENT Referral - [place] ([facility])       Discussed plan of care with pt and pt is in agreement.All questions answered. Pt to call with questions or concerns.         [1]   Current Outpatient Medications   Medication Sig Dispense Refill    Rizatriptan Benzoate 10 MG Oral Tab Take 1 tablet (10 mg total) by mouth daily as needed for Migraine. 9 tablet 5    ALPRAZolam (XANAX) 0.25 MG Oral Tab Take 1 tablet (0.25 mg total) by mouth 3 (three) times daily as needed. 30 tablet 2    meclizine 25 MG Oral Tab Take 1 tablet (25 mg total) by mouth 3 (three) times daily as needed. 45 tablet 0    losartan 25 MG Oral Tab Take 1 tablet (25 mg total) by mouth daily. 90 tablet 1    levothyroxine 100 MCG Oral Tab Take 1 tablet (100  mcg total) by mouth before breakfast.      atorvastatin (LIPITOR) 10 MG Oral Tab Take 1 tablet (10 mg total) by mouth daily. 90 tablet 3    metoprolol tartrate 50 MG Oral Tab Take 1 tablet (50 mg total) by mouth 2 (two) times daily. 180 tablet 3   [2]   Allergies  Allergen Reactions    Latex HIVES and RASH     Other reaction(s): LATEX    Other HIVES     tegaderm     Tramadol NAUSEA ONLY    Morphine NAUSEA ONLY and DIZZINESS    Augmentin [Amoxicillin-Pot Clavulanate] DIARRHEA    Cephalexin DIARRHEA    Codeine NAUSEA ONLY   [3]   Past Medical History:   Anxiety    My whole life    Anxiety state    Back problem    Chronic pain    COVID-19    severe headache,nausea and stomache lasting 5 days    DCIS (ductal carcinoma in situ) of breast    Right breast. radiotherapy and mastectomy     Depression    medication and therapy    Disorder of thyroid    Essential hypertension    Exposure to medical diagnostic radiation    completed     Helicobacter pylori infection    High blood pressure    Migraines    Muscle weakness    Neuropathic pain due to radiation    Osteoarthritis    Ovarian cyst    Visual impairment    glasses   [4]   Past Surgical History:  Procedure Laterality Date    Appendectomy  1979    Breast surgery  2015    Revision of bilateral reconstructed breasts    Breast surgery  2015    Fat grafting to left reconstructed breast    Breast surgery Bilateral 2016    Bilateral nipple reconstruction      2008      2010    APGAR: 9 (1min) 9 (5 min)  DR: Dameon Augustine    Cervical spine surgery  2023    CERVICAL THREE TO CERVICAL FOUR ANTERIOR CERVICAL DISCECTOMY AND FUSION.  BONE GRAFT AND ALL REQUIRED INSTRUMENTATION. NEUROMONITORING.    Cervical spine surgery      C3-4 fusion extension 3/28/23 Dr. Vicente 3/2023    Colonoscopy  2012    Colonoscopy      Core biopsy Right 2013    Right breast:  MRI core biopsies    Egd  2016    Explor front sinus,transorbit,uni   09/19/2018    Forearm/wrist surgery unlisted Left 2009    DeQuervain's release    Hysterectomy      Laparoscopy,diagnostic      Lumpectomy right Right 06/28/2013    Right segmental mastectomy with wire localization, advancement    Other  01/20/2022    Cervical four-five, Cervical five-six  anterior cervical discectomy and fusionRightGeneral    Radiation right  2013    Reduction left      Reduction right      Removal of ovarian cyst(s) Left 2005    laparoscopic    Removal of ovarian cyst(s) Bilateral 01/2014    laparotomy    Skin surgery  07/22/2016    bilateral areaola tattoing     Tonsillectomy  1979    Upper gi endoscopy,exam     [5]   Family History  Problem Relation Age of Onset    Lipids Father         hyperlipidemia    Hypertension Father     Migraines Father     Prostate Cancer Father     Asthma Mother     Lipids Mother         hyperlipidemia    Hypertension Mother     Cancer Maternal Aunt 50        skin and breast cancer    Skin cancer Maternal Grandfather     Cancer Other 50        breast cancer and leukemia    Dementia Other         Alzheimers, Grandmother [SIDE NOT STATED]    Cancer Cousin 30        ovarian cancer / maternal cousin    Diabetes Paternal Grandmother     Cancer Paternal Aunt         leukemia    Cancer Paternal Uncle         prostate    Breast Cancer Self 34    No Known Problems Daughter     No Known Problems Son     No Known Problems Sister     No Known Problems Sister     No Known Problems Sister

## 2025-04-21 RX ORDER — METOPROLOL TARTRATE 50 MG
50 TABLET ORAL 2 TIMES DAILY
Qty: 180 TABLET | Refills: 3 | Status: SHIPPED | OUTPATIENT
Start: 2025-04-21

## 2025-04-21 NOTE — TELEPHONE ENCOUNTER
Refill Per Protocol     Requested Prescriptions   Pending Prescriptions Disp Refills    METOPROLOL TARTRATE 50 MG Oral Tab [Pharmacy Med Name: Metoprolol Tartrate 50 Mg Tab Auro] 180 tablet 0     Sig: Take 1 tablet (50 mg total) by mouth 2 (two) times daily.       Hypertension Medications Protocol Passed - 4/21/2025  9:55 AM        Passed - CMP or BMP in past 12 months        Passed - Last BP reading less than 140/90     BP Readings from Last 1 Encounters:   04/16/25 132/84               Passed - In person appointment or virtual visit in the past 12 mos or appointment in next 3 mos     Recent Outpatient Visits              [facility] [place]    [facility]    1 month ago [facility] ph[facility]itis    [facility] [place] [facility]    Office Visit    5 months ago Benign paroxysmal positional vertigo, unspecified laterality    [facility] [place] Jose F Tillman DO    Office Visit    7 months ago Generalized anxiety disorder    [facility]    Office Visit    8 months ago Non-recurrent acute serous otitis media of right ear    [facility] [place]    [facility] Visit          Future Appointments         Provider Department Appt Notes    Tomorrow [facility] US [facility] [place]                     Passed - EGFRCR or GFRNAA > 50     GFR Evaluation  EGFRCR: 85 , resulted on 5/2/2024          Passed - Medication is active on med list

## 2025-04-22 ENCOUNTER — HOSPITAL ENCOUNTER (OUTPATIENT)
Dept: ULTRASOUND IMAGING | Age: 50
Discharge: HOME | End: 2025-04-22
Attending: PHYSICIAN ASSISTANT
Payer: COMMERCIAL

## 2025-04-22 DIAGNOSIS — L98.9 LESION OF NECK: ICD-10-CM

## 2025-04-22 PROCEDURE — 76536 US EXAM OF HEAD AND NECK: CPT | Performed by: PHYSICIAN ASSISTANT

## 2025-04-28 ENCOUNTER — OFFICE VISIT (OUTPATIENT)
Dept: OTOLARYNGOLOGY | Facility: CLINIC | Age: 50
End: 2025-04-28
Payer: COMMERCIAL

## 2025-04-28 DIAGNOSIS — R09.A2 GLOBUS SENSATION: ICD-10-CM

## 2025-04-28 DIAGNOSIS — H61.21 EXCESSIVE CERUMEN IN EAR CANAL, RIGHT: ICD-10-CM

## 2025-04-28 DIAGNOSIS — H60.501 ACUTE OTITIS EXTERNA OF RIGHT EAR, UNSPECIFIED TYPE: ICD-10-CM

## 2025-04-28 DIAGNOSIS — R07.0 THROAT DISCOMFORT: Primary | ICD-10-CM

## 2025-04-28 RX ORDER — OMEPRAZOLE 40 MG/1
40 CAPSULE, DELAYED RELEASE ORAL
Qty: 60 CAPSULE | Refills: 0 | Status: SHIPPED | OUTPATIENT
Start: 2025-04-28 | End: 2025-05-28

## 2025-04-28 RX ORDER — ACETIC ACID 20.65 MG/ML
4 SOLUTION AURICULAR (OTIC) 2 TIMES DAILY
Qty: 1 EACH | Refills: 0 | Status: SHIPPED | OUTPATIENT
Start: 2025-04-28 | End: 2025-05-05

## 2025-04-28 NOTE — PROGRESS NOTES
Camila Bell is a 49 year old female.   Chief Complaint   Patient presents with    Throat Problem     Patient is here due to lump in throat, reports discomfort when yawning     Ear Problem     Reports drainage in right ear. X 1 day ago     HPI:   49-year-old who is complaining of a popping sensation on the right side of her neck when she yawns.  Had cervical spine surgery x 2 in the past last of which was 2 years ago.  Also with right ear pain and clicking sound    Current Medications[1]   Past Medical History[2]   Social History:  Short Social Hx on File[3]   Past Surgical History[4]      EXAM:   LMP 06/09/2022     System Details   Skin Inspection - Normal.   Constitutional Overall appearance - Normal.   Head/Face Symmetric, TMJ tenderness not present    Eyes EOMI, PERRL   Right ear:  Canal with cerumen and fungal elements, TM intact, no JOSE M   Left ear:  Canal clear, TM intact, no JOSE M   Nose: Septum midline, inferior turbinates not enlarged, nasal valves without collapse      Oral cavity/Oropharynx: No lesions or masses on inspection or palpation, tonsils symmetric    Neck: Soft without LAD, thyroid not enlarged  Voice clear/ no stridor  Healed neck incision.  No palpable lump   Other:      SCOPES AND PROCEDURES:     Canals:  Right: Canal with cerumen preventing adequate view of TM, debrided with instrumentation    Tympanic Membranes:  Right: Normal tympanic membrane.     TM Visualized Method:   Right TM examined via otomicroscopy.      PROCEDURE:   Removal of cerumen impaction   The cerumen impaction was completely removed on the right side using microscopy as necessary.   Removal was completed by using a curette and suction.       Flexible Laryngoscopy Procedure Note (38301)    Due to inability for adequate examination of the larynx and need for magnification to perform the examination, endoscopy was performed.  Risks and benefits were discussed with patient/family and they have given verbal consent to  proceed.    Pre-operative Diagnosis:   1. Throat discomfort    2. Acute otitis externa of right ear, unspecified type    3. Excessive cerumen in ear canal, right    4. Globus sensation        Post-operative Diagnosis: Same    Procedure: Diagnostic flexible fiberoptic laryngoscopy    Anesthesia: Topical anesthetic [place]     Surgeon Malachi Jj MD    EBL: 0cc    Procedure Detail & Findings:     After placement of topical anesthetic intranasally the flexible laryngoscope was inserted into the nare and driven through the nasal cavity with no significant abnormal findings noted in the nasal cavities or nasopharynx. The oropharynx, hypopharynx and larynx were subsequently examined and the following findings were noted:        Base of Tongue: Normal        Valeculla: Normal        Arytenoids: Normal/Erythemous: Erythmous        Introitus of the esophagus: Normal        Epiglottis: Normal        False vocal cords: Normal        True vocal cords: Normal        Subglottic space: Normal        Mobility of True vocal cords: Normal    Condition: Stable    Complications: Patient tolerated the procedure well with no immediate complication.    Malachi Jj MD    AUDIOGRAM AND IMAGING:         IMPRESSION:   1. Throat discomfort    2. Acute otitis externa of right ear, unspecified type    3. Excessive cerumen in ear canal, right    4. Globus sensation       Recommendations:  -Has a fungal otitis externa on the right.  This area is debrided and we will start on acetic acid otic drops  - Vocal cord motion was normal on laryngoscopy.  Discussed the differential for this lump including a thyroglossal duct cyst or dermoid cyst  as possibilities.  Will follow-up on the ultrasound results could consider a CT scan if still not knowing what this represents after the ultrasound  - Her laryngeal symptoms possibly represent globus sensation from a silent laryngeal pharyngeal reflux process.  Will trial on an empiric dose of omeprazole twice  daily for about 3 to 4 weeks to see if she receives any improvement from this.  Discussed the use of the medication with her    The patient indicates understanding of these issues and agrees to the plan.      Malachi Jj MD  4/28/2025  2:07 PM       [1]   Current Outpatient Medications   Medication Sig Dispense Refill    acetic acid 2 % Otic Solution Place 4 drops into the right ear in the morning and 4 drops before bedtime. Do all this for 7 days. 1 each 0    Omeprazole 40 MG Oral Capsule Delayed Release Take 1 capsule (40 mg total) by mouth 2 (two) times daily before meals. Before a meal 60 capsule 0    metoprolol tartrate 50 MG Oral Tab Take 1 tablet (50 mg total) by mouth 2 (two) times daily. 180 tablet 3    Rizatriptan Benzoate 10 MG Oral Tab Take 1 tablet (10 mg total) by mouth daily as needed for Migraine. 9 tablet 5    ALPRAZolam (XANAX) 0.25 MG Oral Tab Take 1 tablet (0.25 mg total) by mouth 3 (three) times daily as needed. 30 tablet 2    meclizine 25 MG Oral Tab Take 1 tablet (25 mg total) by mouth 3 (three) times daily as needed. 45 tablet 0    losartan 25 MG Oral Tab Take 1 tablet (25 mg total) by mouth daily. 90 tablet 1    levothyroxine 100 MCG Oral Tab Take 1 tablet (100 mcg total) by mouth before breakfast.      atorvastatin (LIPITOR) 10 MG Oral Tab Take 1 tablet (10 mg total) by mouth daily. 90 tablet 3   [2]   Past Medical History:   Anxiety    My whole life    Anxiety state    Back problem    Chronic pain    COVID-19    severe headache,nausea and stomache lasting 5 days    DCIS (ductal carcinoma in situ) of breast    Right breast. radiotherapy and mastectomy     Depression    medication and therapy    Disorder of thyroid    Essential hypertension    Exposure to medical diagnostic radiation    completed 2015    Helicobacter pylori infection    High blood pressure    Migraines    Muscle weakness    Neuropathic pain due to radiation    Osteoarthritis    Ovarian cyst    Visual impairment    glasses    [3]   Social History  Socioeconomic History    Marital status:     Number of children: 2   Occupational History    Occupation:      Employer: [employer]   Tobacco Use    Smoking status: Never    Smokeless tobacco: Never   Vaping Use    Vaping status: Never Used   Substance and Sexual Activity    Alcohol use: No    Drug use: No   Other Topics Concern    Caffeine Concern No    Exercise No    Reaction to local anesthetic No    Pt has a pacemaker No    Pt has a defibrillator No   Social History Narrative    Camila is . Mother of two children. Patient works for [employer] in customer service. Patient lives with her two children in [place], [place].   [4]   Past Surgical History:  Procedure Laterality Date    Appendectomy  1979    Breast surgery  2015    Revision of bilateral reconstructed breasts    Breast surgery  2015    Fat grafting to left reconstructed breast    Breast surgery Bilateral 2016    Bilateral nipple reconstruction      2008      2010    APGAR: 9 (1min) 9 (5 min)  DR: Dameon Augustine    Cervical spine surgery  2023    CERVICAL THREE TO CERVICAL FOUR ANTERIOR CERVICAL DISCECTOMY AND FUSION.  BONE GRAFT AND ALL REQUIRED INSTRUMENTATION. NEUROMONITORING.    Cervical spine surgery      C3-4 fusion extension 3/28/23 Dr. Vicente 3/2023    Colonoscopy  2012    Colonoscopy      Core biopsy Right 2013    Right breast:  MRI core biopsies    Egd  2016    Explor front sinus,transorbit,uni  2018    Forearm/wrist surgery unlisted Left     DeQuervain's release    Hysterectomy      Laparoscopy,diagnostic      Lumpectomy right Right 2013    Right segmental mastectomy with wire localization, advancement    Other  2022    Cervical four-five, Cervical five-six  anterior cervical discectomy and fusionRightGeneral    Radiation right  2013    Reduction left      Reduction right      Removal of ovarian  cyst(s) Left 2005    laparoscopic    Removal of ovarian cyst(s) Bilateral 01/2014    laparotomy    Skin surgery  07/22/2016    bilateral areaola tattoing     Tonsillectomy  1979    Upper gi endoscopy,exam

## 2025-04-29 ENCOUNTER — PATIENT MESSAGE (OUTPATIENT)
Dept: OTOLARYNGOLOGY | Facility: CLINIC | Age: 50
End: 2025-04-29

## 2025-04-29 DIAGNOSIS — R07.0 THROAT PAIN: Primary | ICD-10-CM

## 2025-04-30 NOTE — TELEPHONE ENCOUNTER
Camila Mcpherson would like to move forward with the CT scan, I pended the order for you, please add the appropriate diagnosis.

## 2025-05-02 ENCOUNTER — TELEPHONE (OUTPATIENT)
Dept: OBGYN CLINIC | Facility: CLINIC | Age: 50
End: 2025-05-02

## 2025-05-08 ENCOUNTER — HOSPITAL ENCOUNTER (OUTPATIENT)
Dept: CT IMAGING | Facility: HOSPITAL | Age: 50
Discharge: HOME | End: 2025-05-08
Attending: PHYSICIAN ASSISTANT
Payer: COMMERCIAL

## 2025-05-08 DIAGNOSIS — L98.9 LESION OF NECK: ICD-10-CM

## 2025-05-08 LAB
CREAT BLD-MCNC: 0.8 MG/DL (ref 0.55–1.02)
EGFRCR SERPLBLD CKD-EPI 2021: 90 ML/MIN/1.73M2 (ref 60–?)

## 2025-05-08 PROCEDURE — 82565 ASSAY OF CREATININE: CPT

## 2025-05-08 PROCEDURE — 70492 CT SFT TSUE NCK W/O & W/DYE: CPT | Performed by: PHYSICIAN ASSISTANT

## 2025-05-15 RX ORDER — LOSARTAN POTASSIUM 25 MG/1
25 TABLET ORAL DAILY
Qty: 90 TABLET | Refills: 0 | Status: SHIPPED | OUTPATIENT
Start: 2025-05-15

## 2025-05-19 DIAGNOSIS — N95.1 SYMPTOMATIC MENOPAUSAL OR FEMALE CLIMACTERIC STATES: Primary | ICD-10-CM

## 2025-05-21 ENCOUNTER — OFFICE VISIT (OUTPATIENT)
Dept: OBGYN CLINIC | Facility: CLINIC | Age: 50
End: 2025-05-21
Payer: COMMERCIAL

## 2025-05-21 VITALS
BODY MASS INDEX: 25.59 KG/M2 | WEIGHT: 149.88 LBS | HEIGHT: 64 IN | DIASTOLIC BLOOD PRESSURE: 86 MMHG | SYSTOLIC BLOOD PRESSURE: 148 MMHG

## 2025-05-21 DIAGNOSIS — Z01.419 ENCOUNTER FOR WELL WOMAN EXAM WITH ROUTINE GYNECOLOGICAL EXAM: Primary | ICD-10-CM

## 2025-05-21 DIAGNOSIS — N95.2 POSTMENOPAUSAL ATROPHIC VAGINITIS: ICD-10-CM

## 2025-05-21 DIAGNOSIS — R68.82 LIBIDO, DECREASED: ICD-10-CM

## 2025-05-21 PROCEDURE — 99396 PREV VISIT EST AGE 40-64: CPT | Performed by: OBSTETRICS & GYNECOLOGY

## 2025-05-21 RX ORDER — ESTRADIOL 0.1 MG/G
CREAM VAGINAL
Qty: 42.5 G | Refills: 3 | Status: SHIPPED | OUTPATIENT
Start: 2025-05-21

## 2025-05-21 RX ORDER — TESTOSTERONE GEL, 1% 10 MG/G
1 GEL TRANSDERMAL DAILY
Qty: 50 EACH | Refills: 4 | Status: SHIPPED | OUTPATIENT
Start: 2025-05-21

## 2025-05-21 NOTE — PROGRESS NOTES
[facility]  Obstetrics and Gynecology  Gynecology Visit    Chief Complaint   Patient presents with    Gyn Exam     Annual         Camila Bell is a 49 year old female who presents for Annual.    LMP: hysterectomy.    Menses regular: n/a.    Menstrual flow normal: n/a.    Birth control or HRT:  n/a.   Refill none  Last Pap Smear:  (normal).  Any history of abnormal paps: none   Last MM  Any Medication Refills needed today?: none  Sleep: 6hrs.    Diet: none.    Exercise: none.   Screening labs/Blood work today: none.     Colonoscopy (if over 46 y/o): n/a.   Gardasil:(age 9-46 y/o) n/a.   Genetic Cancer screen (if indicated): normal.   Flu (Aug-April): n/a.TDAP (every 10 years) n/a.      Additional Problems/concerns: discuss hormone imbalance.      Next Appt: pt states she will schedule via .    Immunization History   Administered Date(s) Administered    Covid-19 Vaccine Pfizer 30 mcg/0.3 ml 2021, 2021       Medications - Current[1]    Allergies[2]    OB History    Para Term  AB Living   3 2 0 0 1 2   SAB IAB Ectopic Multiple Live Births   1 0 0 0 2      # Outcome Date GA Lbr George/2nd Weight Sex Type Anes PTL Lv   3 SAB 16 5w0d          2 Para 04/26/10    F CS-Unspec   RADHA   1 Para 08    M CS-Unspec   RADHA       Gyn History   Hx Prior Abnormal Pap: No   Menarche: 13y/o (2025  1:51 PM)  Period Cycle (Days): Varies (2025  1:51 PM)  Period Duration (Days): 7 or more (2025  1:51 PM)  Period Flow: Super Heavy (2025  1:51 PM)  Use of Birth Control (if yes, specify type): Hysterectomy (2025  1:51 PM)  Hx Prior Abnormal Pap: No (2025  1:51 PM)        Latest Ref Rng & Units 2021     5:57 PM 2017     4:46 PM   RECENT PAP RESULTS   INTERPRETATION/RESULT: Negative for intraepithelial lesion or malignancy Negative for intraepithelial lesion or malignancy  Negative for intraepithelial lesion or malignancy    HPV Negative Negative   Positive            Past Medical History[3]    Past Surgical History[4]    Family History[5]     Tobacco  Allergies  Meds  Problems  Med Hx  Surg Hx  Fam Hx  Soc   Hx        Social History     Socioeconomic History    Marital status:      Spouse name: Not on file    Number of children: 2    Years of education: Not on file    Highest education level: Not on file   Occupational History    Occupation:      Employer: [employer]   Tobacco Use    Smoking status: Never     Passive exposure: Never    Smokeless tobacco: Never   Vaping Use    Vaping status: Never Used   Substance and Sexual Activity    Alcohol use: No    Drug use: No    Sexual activity: Not on file   Other Topics Concern     Service Not Asked    Blood Transfusions Not Asked    Caffeine Concern No    Occupational Exposure Not Asked    Hobby Hazards Not Asked    Sleep Concern Not Asked    Stress Concern Not Asked    Weight Concern Not Asked    Special Diet Not Asked    Back Care Not Asked    Exercise No    Bike Helmet Not Asked    Seat Belt Not Asked    Self-Exams Not Asked    Grew up on a farm Not Asked    History of tanning Not Asked    Outdoor occupation Not Asked    Breast feeding Not Asked    Reaction to local anesthetic No    Pt has a pacemaker No    Pt has a defibrillator No   Social History Narrative    Camila is . Mother of two children. Patient works for [employer] in customer service. Patient lives with her two children in [place], [place].     Social Drivers of Health     Food Insecurity: Not on file   Transportation Needs: Not on file   Stress: Not on file   Housing Stability: Not on file     /86   Ht 5' 4\" (1.626 m)   Wt 149 lb 14.4 oz (68 kg)   LMP 06/09/2022   BMI 25.73 kg/m²   Wt Readings from Last 3 Encounters:   05/21/25 149 lb 14.4 oz (68 kg)   04/16/25 148 lb (67.1 kg)   03/21/25 149 lb (67.6 kg)     Health Maintenance   Topic Date Due    Pneumococcal Vaccine: 50+  Years (1 of 2 - PCV) Never done    DTaP,Tdap,and Td Vaccines (1 - Tdap) Never done    Zoster Vaccines (1 of 2) Never done    Colorectal Cancer Screening  11/08/2022    Annual Physical  07/07/2024    COVID-19 Vaccine (3 - 2024-25 season) 09/01/2024    HTN: BP Follow-Up  06/21/2025    Influenza Vaccine (Season Ended) 10/01/2025    Annual Depression Screening  Completed    Meningococcal B Vaccine  Aged Out    Mammogram  Discontinued       Review of Systems   General: Present- Feeling well. Not Present- Chills, Fever, Weight Gain and Weight Loss.  HEENT: Not Present- Headache and Sore Throat.  Respiratory: Not Present- Cough, Difficulty Breathing, Hemoptysis and Sputum Production.  Breast: Not Present- Breast Mass, Breast Pain and Nipple Discharge.  Cardiovascular: Not Present- Chest Pain, Elevated Blood Pressure, Fainting / Blacking Out and Shortness of Breath.  Gastrointestinal: Not Present- Bloody Stool, Constipation, Diarrhea, Heartburn, Nausea and Vomiting.  Female Genitourinary: Not Present- Discharge, Dysuria, Frequency, Incontinence, Pelvic Pain and Urgency.  Musculoskeletal: Not Present- Leg Cramps and Swelling of Extremities.  Neurological: Not Present- Dizziness and Headaches.  Psychiatric: Not Present- Anxiety and Depression.  Endocrine: Not Present- Appetite Changes, Hair Changes and Thyroid Problems.  Hematology: Not Present- Blood Clots, Easy Bruising and Excessive bleeding.  All other systems negative     Physical Exam   The physical exam findings are as follows:     General   Mental Status - Alert. General Appearance - Cooperative. Orientation - Oriented X4. Build & Nutrition - Well nourished.    Head and Neck  Thyroid   Gland Characteristics - normal size and consistency.    Chest and Lung Exam   Inspection:   Chest Wall: - Normal.  Palpation: - Palpation normal.  Auscultation:   Breath sounds: - Normal.  Adventitious sounds: - No Adventitious sounds.      Breast   Deferred       Cardiovascular    Auscultation: Rhythm - Regular. Heart Sounds - Normal heart sounds.  Murmurs & Other Heart Sounds: Auscultation of the heart reveals - No Murmurs.    Abdomen   Inspection: Inspection of the abdomen reveals - No Hernias. Incisional scars - abdominoplasty incisional scars.  Palpation/Percussion: Palpation and Percussion of the abdomen reveal - Non Tender and No Palpable abdominal masses.  Liver: - Normal.    Female Genitourinary     External Genitalia   Perineum - Normal. Bartholin's Gland - Bilateral - Normal. Clitoris - Normal.  Introitus: Characteristics - No Cystocele, Enterocele or Rectocele. Discharge - None.  Labia Majora: Lesions - Bilateral - None. Characteristics - Bilateral - Normal.  Urethra: Characteristics - Normal. Discharge - None.  Lone Jack Gland - Bilateral - Normal.  Vulva: Lesions - None.    Speculum & Bimanual   Vagina:   Vaginal Wall: - Normal.  Vaginal Lesions - None. Vaginal Mucosa - Normal.  Cervix: Characteristics - Surgically absent.  Uterus: Characteristics - Surgically absent.  Adnexa: Characteristics - Bilateral - Normal. Masses - No Adnexal Masses.  Wet [place]: Main patient complaints - None.          Rectal   Anorectal Exam: External - normal external exam.    Peripheral Vascular   Upper Extremity: Inspection - Bilateral - Normal - No Clubbing, No Cyanosis, No Edema, Pulses Intact.  Palpation: - Pulses bilaterally normal.  Lower Extremity: Inspection - Bilateral - Inspection Normal.  Palpation: Edema - Bilateral - No edema.    Neurologic   Mental Status: - Normal.     Lymphatic  General Lymphatics   Description - Normal .    Genitourinary syndrome of menopause (GSM)      Symptoms may include vulvovaginal dryness, burning, and irritation; sexual symptoms of lack of lubrication, discomfort or pain, and impaired function; and urinary symptoms of urgency, dysuria, and recurrent urinary tract infections. Approximately 50 percent of menopausal patients report bothersome vulvovaginal discomfort.      The primary indication for treatment of GSM is to relieve symptoms that cause distress in a patient who has diminished ovarian estrogen production due to menopause or other etiologies. Vulvovaginal symptoms include vaginal dryness, burning, pruritus, dyspareunia, vaginal discharge, bleeding, or spotting. Urinary tract symptoms include dysuria, urinary frequency, urethral discomfort, or, infrequently, hematuria.      For symptoms of vaginal dryness, discomfort, or dyspareunia associated with vaginal atrophy, first-line treatments are often nonhormonal vaginal moisturizers and lubricants.      If first-line therapies do not provide adequate symptom relief, vaginal estrogen therapy or other hormonal medications may be prescribed for patients without contraindications to their use. Estrogen therapy may also help to ameliorate some urinary tract symptoms of GSM, including frequent urinary tract infections or stress or urgency incontinence in the absence of infection     First-line therapy for GSM includes nonhormonal vaginal moisturizers and lubricants. Symptoms of vaginal dryness can be managed by the regular use of vaginal moisturizing agents with supplemental use of vaginal lubricants for sexual intercourse. These agents may improve coital comfort and increase vaginal moisture, but they do not reverse most atrophic vaginal changes. Thus, while vaginal moisturizers and lubricants are useful for patients with mild symptoms, many patients will have persistent systems and require hormonal medications or other treatments.     First-line therapy may also include pelvic floor muscle (Kegel) exercises.       Testosterone for libido:  A commonly-used treatment is 1% testosterone ointment or cream, approximately 0.5gm daily applies to the skin if the arms, legs, or abdomen.  Libido HO given - with shared decision making patient desires a trial of testosterone - understands use for women is off label and risks and benefits  reviewed. Precautions given and HO with dosing and risks reviewed.     1. Encounter for well woman exam with routine gynecological exam    2. Libido, decreased    3. Postmenopausal atrophic vaginitis                       [1]   Current Outpatient Medications:     Testosterone (TESTIM) 50 MG/5GM (1%) Transdermal Gel, Place 1 Tube onto the skin daily., Disp: 50 each, Rfl: 4    estradiol (ESTRACE) 0.1 MG/GM Vaginal Cream, Place 1 g vaginally every evening for 7 days, THEN 1 g twice a week., Disp: 42.5 g, Rfl: 3    Doxycycline Hyclate 100 MG Oral Tab, Take 1 tablet (100 mg total) by mouth 2 (two) times daily for 10 days., Disp: 20 tablet, Rfl: 0    losartan 25 MG Oral Tab, Take 1 tablet (25 mg total) by mouth daily. PATIENT NEEDS FASTING BLOOD TEST FOLLOWED BY APPOINTMENT., Disp: 90 tablet, Rfl: 0    Omeprazole 40 MG Oral Capsule Delayed Release, Take 1 capsule (40 mg total) by mouth 2 (two) times daily before meals. Before a meal, Disp: 60 capsule, Rfl: 0    metoprolol tartrate 50 MG Oral Tab, Take 1 tablet (50 mg total) by mouth 2 (two) times daily., Disp: 180 tablet, Rfl: 3    Rizatriptan Benzoate 10 MG Oral Tab, Take 1 tablet (10 mg total) by mouth daily as needed for Migraine., Disp: 9 tablet, Rfl: 5    ALPRAZolam (XANAX) 0.25 MG Oral Tab, Take 1 tablet (0.25 mg total) by mouth 3 (three) times daily as needed., Disp: 30 tablet, Rfl: 2    meclizine 25 MG Oral Tab, Take 1 tablet (25 mg total) by mouth 3 (three) times daily as needed., Disp: 45 tablet, Rfl: 0    levothyroxine 100 MCG Oral Tab, Take 1 tablet (100 mcg total) by mouth before breakfast., Disp: , Rfl:   [2]   Allergies  Allergen Reactions    Latex HIVES and RASH     Other reaction(s): LATEX    Other HIVES     tegaderm     Tramadol NAUSEA ONLY    Morphine NAUSEA ONLY and DIZZINESS    Augmentin [Amoxicillin-Pot Clavulanate] DIARRHEA    Cephalexin DIARRHEA    Codeine NAUSEA ONLY   [3]   Past Medical History:   Anxiety    My whole life    Anxiety state    Back  problem    Chronic pain    COVID-19    severe headache,nausea and stomache lasting 5 days    DCIS (ductal carcinoma in situ) of breast    Right breast. radiotherapy and mastectomy     Depression    medication and therapy    Disorder of thyroid    Essential hypertension    Exposure to medical diagnostic radiation    completed     Helicobacter pylori infection    High blood pressure    Migraines    Muscle weakness    Neuropathic pain due to radiation    Osteoarthritis    Ovarian cyst    Visual impairment    glasses   [4]   Past Surgical History:  Procedure Laterality Date    Appendectomy  1979    Breast surgery  2015    Revision of bilateral reconstructed breasts    Breast surgery  2015    Fat grafting to left reconstructed breast    Breast surgery Bilateral 2016    Bilateral nipple reconstruction      2008      2010    APGAR: 9 (1min) 9 (5 min)  DR: Dameon Augustine    Cervical spine surgery  2023    CERVICAL THREE TO CERVICAL FOUR ANTERIOR CERVICAL DISCECTOMY AND FUSION.  BONE GRAFT AND ALL REQUIRED INSTRUMENTATION. NEUROMONITORING.    Cervical spine surgery      C3-4 fusion extension 3/28/23 Dr. Vicente 3/2023    Colonoscopy  2012    Colonoscopy      Core biopsy Right 2013    Right breast:  MRI core biopsies    Egd  2016    Explor front sinus,transorbit,uni  2018    Forearm/wrist surgery unlisted Left     DeQuervain's release    Hysterectomy      Laparoscopy,diagnostic      Lumpectomy right Right 2013    Right segmental mastectomy with wire localization, advancement    Other  2022    Cervical four-five, Cervical five-six  anterior cervical discectomy and fusionRightGeneral    Radiation right  2013    Reduction left      Reduction right      Removal of ovarian cyst(s) Left     laparoscopic    Removal of ovarian cyst(s) Bilateral 2014    laparotomy    Skin surgery  2016    bilateral areaola tattoing     Tonsillectomy  1979     Upper gi endoscopy,exam     [5]   Family History  Problem Relation Age of Onset    Lipids Father         hyperlipidemia    Hypertension Father     Migraines Father     Prostate Cancer Father     Asthma Mother     Lipids Mother         hyperlipidemia    Hypertension Mother     Cancer Maternal Aunt 50        skin and breast cancer    Skin cancer Maternal Grandfather     Cancer Other 50        breast cancer and leukemia    Dementia Other         Alzheimers, Grandmother [SIDE NOT STATED]    Cancer Cousin 30        ovarian cancer / maternal cousin    Diabetes Paternal Grandmother     Cancer Paternal Aunt         leukemia    Cancer Paternal Uncle         prostate    Breast Cancer Self 34    No Known Problems Daughter     No Known Problems Son     No Known Problems Sister     No Known Problems Sister     No Known Problems Sister

## 2025-05-23 ENCOUNTER — LAB ENCOUNTER (OUTPATIENT)
Dept: LAB | Age: 50
End: 2025-05-23
Attending: OBSTETRICS & GYNECOLOGY
Payer: COMMERCIAL

## 2025-05-23 DIAGNOSIS — E83.52 HYPERCALCEMIA: ICD-10-CM

## 2025-05-23 DIAGNOSIS — N95.1 SYMPTOMATIC MENOPAUSAL OR FEMALE CLIMACTERIC STATES: Primary | ICD-10-CM

## 2025-05-23 DIAGNOSIS — E78.2 MIXED HYPERLIPIDEMIA: ICD-10-CM

## 2025-05-23 LAB
ALBUMIN SERPL-MCNC: 4.8 G/DL (ref 3.2–4.8)
ALBUMIN/GLOB SERPL: 1.7 {RATIO} (ref 1–2)
ALP LIVER SERPL-CCNC: 124 U/L (ref 39–100)
ALT SERPL-CCNC: 32 U/L (ref 10–49)
ANION GAP SERPL CALC-SCNC: 9 MMOL/L (ref 0–18)
AST SERPL-CCNC: 25 U/L (ref ?–34)
BASOPHILS # BLD AUTO: 0.04 X10(3) UL (ref 0–0.2)
BASOPHILS NFR BLD AUTO: 0.9 %
BILIRUB SERPL-MCNC: 1 MG/DL (ref 0.3–1.2)
BUN BLD-MCNC: 13 MG/DL (ref 9–23)
BUN/CREAT SERPL: 17.3 (ref 10–20)
CALCIUM BLD-MCNC: 9.7 MG/DL (ref 8.7–10.4)
CHLORIDE SERPL-SCNC: 106 MMOL/L (ref 98–112)
CHOLEST SERPL-MCNC: 248 MG/DL (ref ?–200)
CO2 SERPL-SCNC: 27 MMOL/L (ref 21–32)
CREAT BLD-MCNC: 0.75 MG/DL (ref 0.55–1.02)
CRP SERPL-MCNC: 0.7 MG/DL (ref ?–1)
DEPRECATED HBV CORE AB SER IA-ACNC: 108 NG/ML (ref 50–306)
DEPRECATED RDW RBC AUTO: 42.1 FL (ref 35.1–46.3)
EGFRCR SERPLBLD CKD-EPI 2021: 97 ML/MIN/1.73M2 (ref 60–?)
EOSINOPHIL # BLD AUTO: 0.24 X10(3) UL (ref 0–0.7)
EOSINOPHIL NFR BLD AUTO: 5.3 %
ERYTHROCYTE [DISTWIDTH] IN BLOOD BY AUTOMATED COUNT: 12.7 % (ref 11–15)
ERYTHROCYTE [SEDIMENTATION RATE] IN BLOOD: 23 MM/HR (ref 0–30)
EST. AVERAGE GLUCOSE BLD GHB EST-MCNC: 123 MG/DL (ref 68–126)
FASTING PATIENT LIPID ANSWER: YES
FASTING STATUS PATIENT QL REPORTED: YES
GLOBULIN PLAS-MCNC: 2.9 G/DL (ref 2–3.5)
GLUCOSE BLD-MCNC: 93 MG/DL (ref 70–99)
HBA1C MFR BLD: 5.9 % (ref ?–5.7)
HCT VFR BLD AUTO: 46.1 % (ref 35–48)
HCYS SERPL-SCNC: 9.4 UMOL/L (ref 3.7–13.9)
HDLC SERPL-MCNC: 47 MG/DL (ref 40–59)
HGB BLD-MCNC: 15.3 G/DL (ref 12–16)
IMM GRANULOCYTES # BLD AUTO: 0.01 X10(3) UL (ref 0–1)
IMM GRANULOCYTES NFR BLD: 0.2 %
LDLC SERPL CALC-MCNC: 175 MG/DL (ref ?–100)
LYMPHOCYTES # BLD AUTO: 2.01 X10(3) UL (ref 1–4)
LYMPHOCYTES NFR BLD AUTO: 44.8 %
MCH RBC QN AUTO: 29.9 PG (ref 26–34)
MCHC RBC AUTO-ENTMCNC: 33.2 G/DL (ref 31–37)
MCV RBC AUTO: 90.2 FL (ref 80–100)
MONOCYTES # BLD AUTO: 0.36 X10(3) UL (ref 0.1–1)
MONOCYTES NFR BLD AUTO: 8 %
NEUTROPHILS # BLD AUTO: 1.83 X10 (3) UL (ref 1.5–7.7)
NEUTROPHILS # BLD AUTO: 1.83 X10(3) UL (ref 1.5–7.7)
NEUTROPHILS NFR BLD AUTO: 40.8 %
NONHDLC SERPL-MCNC: 201 MG/DL (ref ?–130)
OSMOLALITY SERPL CALC.SUM OF ELEC: 294 MOSM/KG (ref 275–295)
PLATELET # BLD AUTO: 309 10(3)UL (ref 150–450)
POTASSIUM SERPL-SCNC: 3.7 MMOL/L (ref 3.5–5.1)
PROT SERPL-MCNC: 7.7 G/DL (ref 5.7–8.2)
RBC # BLD AUTO: 5.11 X10(6)UL (ref 3.8–5.3)
SODIUM SERPL-SCNC: 142 MMOL/L (ref 136–145)
T4 FREE SERPL-MCNC: 1.5 NG/DL (ref 0.8–1.7)
TRIGL SERPL-MCNC: 145 MG/DL (ref 30–149)
TSI SER-ACNC: 0.7 UIU/ML (ref 0.55–4.78)
VIT B12 SERPL-MCNC: 543 PG/ML (ref 211–911)
VIT D+METAB SERPL-MCNC: 40 NG/ML (ref 30–100)
VLDLC SERPL CALC-MCNC: 29 MG/DL (ref 0–30)
WBC # BLD AUTO: 4.5 X10(3) UL (ref 4–11)

## 2025-05-23 PROCEDURE — 83090 ASSAY OF HOMOCYSTEINE: CPT | Performed by: OBSTETRICS & GYNECOLOGY

## 2025-05-23 PROCEDURE — 83036 HEMOGLOBIN GLYCOSYLATED A1C: CPT | Performed by: OBSTETRICS & GYNECOLOGY

## 2025-05-23 PROCEDURE — 85025 COMPLETE CBC W/AUTO DIFF WBC: CPT

## 2025-05-23 PROCEDURE — 82607 VITAMIN B-12: CPT

## 2025-05-23 PROCEDURE — 85652 RBC SED RATE AUTOMATED: CPT | Performed by: OBSTETRICS & GYNECOLOGY

## 2025-05-23 PROCEDURE — 82306 VITAMIN D 25 HYDROXY: CPT

## 2025-05-23 PROCEDURE — 36415 COLL VENOUS BLD VENIPUNCTURE: CPT | Performed by: OBSTETRICS & GYNECOLOGY

## 2025-05-23 PROCEDURE — 83520 IMMUNOASSAY QUANT NOS NONAB: CPT

## 2025-05-23 PROCEDURE — 82610 CYSTATIN C: CPT

## 2025-05-23 PROCEDURE — 82728 ASSAY OF FERRITIN: CPT | Performed by: OBSTETRICS & GYNECOLOGY

## 2025-05-23 PROCEDURE — 84439 ASSAY OF FREE THYROXINE: CPT | Performed by: OBSTETRICS & GYNECOLOGY

## 2025-05-23 PROCEDURE — 86140 C-REACTIVE PROTEIN: CPT | Performed by: OBSTETRICS & GYNECOLOGY

## 2025-05-23 PROCEDURE — 84443 ASSAY THYROID STIM HORMONE: CPT | Performed by: OBSTETRICS & GYNECOLOGY

## 2025-05-23 PROCEDURE — 80061 LIPID PANEL: CPT

## 2025-05-23 PROCEDURE — 80053 COMPREHEN METABOLIC PANEL: CPT | Performed by: OBSTETRICS & GYNECOLOGY

## 2025-05-23 PROCEDURE — 83695 ASSAY OF LIPOPROTEIN(A): CPT | Performed by: OBSTETRICS & GYNECOLOGY

## 2025-05-24 LAB
CYSTATIN C: 0.84 MG/L
EGFR: 95 ML/MIN/1.73

## 2025-05-27 LAB
INTERLEUKIN-6, SERUM: <2.5 PG/ML
LIPOPROTEIN (A): 255 NMOL/L

## 2025-05-28 DIAGNOSIS — Z13.220 SCREENING, LIPID: Primary | ICD-10-CM

## 2025-05-28 DIAGNOSIS — Z13.1 SCREENING FOR DIABETES MELLITUS (DM): ICD-10-CM

## 2025-05-28 DIAGNOSIS — Z01.419 ENCOUNTER FOR WELL WOMAN EXAM WITH ROUTINE GYNECOLOGICAL EXAM: ICD-10-CM
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
## 2025-07-04 ENCOUNTER — LAB ENCOUNTER (OUTPATIENT)
Dept: LAB | Facility: HOSPITAL | Age: 50
End: 2025-07-04
Attending: PHYSICIAN ASSISTANT
Payer: COMMERCIAL

## 2025-07-04 DIAGNOSIS — Z13.220 SCREENING, LIPID: ICD-10-CM

## 2025-07-04 DIAGNOSIS — Z13.1 SCREENING FOR DIABETES MELLITUS (DM): ICD-10-CM

## 2025-07-04 DIAGNOSIS — E78.5 HYPERLIPIDEMIA, UNSPECIFIED HYPERLIPIDEMIA TYPE: ICD-10-CM

## 2025-07-04 DIAGNOSIS — Z01.419 ENCOUNTER FOR WELL WOMAN EXAM WITH ROUTINE GYNECOLOGICAL EXAM: ICD-10-CM

## 2025-07-04 LAB
CHOLEST SERPL-MCNC: 122 MG/DL (ref ?–200)
FASTING PATIENT LIPID ANSWER: YES
HDLC SERPL-MCNC: 37 MG/DL (ref 40–59)
LDLC SERPL CALC-MCNC: 61 MG/DL (ref ?–100)
NONHDLC SERPL-MCNC: 85 MG/DL (ref ?–130)
TRIGL SERPL-MCNC: 133 MG/DL (ref 30–149)
VLDLC SERPL CALC-MCNC: 20 MG/DL (ref 0–30)

## 2025-07-04 PROCEDURE — 80061 LIPID PANEL: CPT

## 2025-07-04 PROCEDURE — 36415 COLL VENOUS BLD VENIPUNCTURE: CPT

## 2025-07-04 PROCEDURE — 82172 ASSAY OF APOLIPOPROTEIN: CPT

## 2025-07-08 LAB — APOLIPOPROTEIN B: 72 MG/DL

## 2025-07-14 ENCOUNTER — OFFICE VISIT (OUTPATIENT)
Dept: DERMATOLOGY CLINIC | Facility: CLINIC | Age: 50
End: 2025-07-14
Payer: COMMERCIAL

## 2025-07-14 DIAGNOSIS — R61 HYPERHIDROSIS: ICD-10-CM

## 2025-07-14 DIAGNOSIS — L65.9 HAIR LOSS: Primary | ICD-10-CM

## 2025-07-14 PROCEDURE — 99213 OFFICE O/P EST LOW 20 MIN: CPT | Performed by: PHYSICIAN ASSISTANT

## 2025-07-14 RX ORDER — KETOCONAZOLE 20 MG/G
CREAM TOPICAL
Qty: 30 G | Refills: 1 | Status: SHIPPED | OUTPATIENT
Start: 2025-07-14

## 2025-07-14 RX ORDER — CLOBETASOL PROPIONATE 0.5 MG/ML
1 SOLUTION TOPICAL
Qty: 60 ML | Refills: 3 | Status: SHIPPED | OUTPATIENT
Start: 2025-07-14

## 2025-07-14 NOTE — PROGRESS NOTES
HPI:    Patient ID: Camila Bell is a 49 year old female.    Patient presents for follow-up on odor in left underarm. States it has been occurring for years. Tried clindamycin with no relief. States she did have breast cancer years ago and surgeons used her stomach skin to make new breasts. States they will have to liposuction to the breasts when tissues becomes necrotic. She does not sweat. No draining ro tenderness noted. Has noted hair loss on the frontal scalp. No draining or tenderness noted. Some scdaling ntoed. No erythema noted. No sugeries, hospitalizations or illnesses noted. Did have a lot of stress in her life from work recently. Started shortly after that. Hx of allergies to medications noted. Using rogaine for the past 2-3 months.         Review of Systems   Constitutional:  Negative for chills and fever.   Musculoskeletal:  Negative for arthralgias and myalgias.   Skin:  Positive for rash. Negative for color change and wound.          Current Medications[1]  Allergies:Allergies[2]   [facility] 06/09/2022   There is no height or weight on file to calculate BMI.  PHYSICAL EXAM:   Physical Exam  Constitutional:       General: She is not in acute distress.     Appearance: Normal appearance.   Skin:     General: Skin is warm and dry.      Findings: Rash present.      Comments: Scaling noted throughout the scalp. Thinning hair noted on the scalp as well. No draining or tenderness noted. Follicles present. No erythema noted.     No rashes noted under the arm. No sweating.    Neurological:      Mental Status: She is alert and oriented to person, place, and time.                ASSESSMENT/PLAN:   1. Hair loss  -After discussion with patient, advised the following:  -Continue with rogaine  -Reviewed recent labs which show normal ferritin and Vitamin D and TSH  -Start clobetasol solution  -Educated to apply nightly   -Return in 3 months  -Most likely stress induced.   -To call or follow-up with worsening symptoms  or concerns  -Patient was agreeable to plan and will comply with discussion above.     2. Hyperhidrosis  -After discussion with patient, advised the following:  -Start ketoconazole cream  -Educated to apply 2 times per day   -To call or follow-up with worsening symptoms or concerns  -Patient was agreeable to plan and will comply with discussion above.       No orders of the defined types were placed in this encounter.      Meds This Visit:  Requested Prescriptions     Signed Prescriptions Disp Refills    ketoconazole 2 % External Cream 30 g 1     Sig: Apply to affected area 2 times daily.    clobetasol 0.05 % External Solution 60 mL 3     Sig: Apply 1 mL topically daily as needed.       Imaging & Referrals:  None         ID#[id number]       [1]   Current Outpatient Medications   Medication Sig Dispense Refill    ketoconazole 2 % External Cream Apply to affected area 2 times daily. 30 g 1    clobetasol 0.05 % External Solution Apply 1 mL topically daily as needed. 60 mL 3    rosuvastatin 10 MG Oral Tab Take 1 tablet (10 mg total) by mouth nightly. 90 tablet 3    Testosterone (TESTIM) 50 MG/5GM (1%) Transdermal Gel Place 1 Tube onto the skin daily. 50 each 4    estradiol (ESTRACE) 0.1 MG/GM Vaginal Cream Place 1 g vaginally every evening for 7 days, THEN 1 g twice a week. 42.5 g 3    losartan 25 MG Oral Tab Take 1 tablet (25 mg total) by mouth daily. PATIENT NEEDS FASTING BLOOD TEST FOLLOWED BY APPOINTMENT. 90 tablet 0    metoprolol tartrate 50 MG Oral Tab Take 1 tablet (50 mg total) by mouth 2 (two) times daily. 180 tablet 3    Rizatriptan Benzoate 10 MG Oral Tab Take 1 tablet (10 mg total) by mouth daily as needed for Migraine. 9 tablet 5    ALPRAZolam (XANAX) 0.25 MG Oral Tab Take 1 tablet (0.25 mg total) by mouth 3 (three) times daily as needed. 30 tablet 2    meclizine 25 MG Oral Tab Take 1 tablet (25 mg total) by mouth 3 (three) times daily as needed. 45 tablet 0    levothyroxine 100 MCG Oral Tab Take 1 tablet (100  mcg total) by mouth before breakfast.     [2]   Allergies  Allergen Reactions    Latex HIVES and RASH     Other reaction(s): LATEX    Other HIVES     tegaderm     Tramadol NAUSEA ONLY    Morphine NAUSEA ONLY and DIZZINESS    Augmentin [Amoxicillin-Pot Clavulanate] DIARRHEA    Cephalexin DIARRHEA    Codeine NAUSEA ONLY

## 2025-07-17 ENCOUNTER — HOSPITAL ENCOUNTER (EMERGENCY)
Facility: HOSPITAL | Age: 50
Discharge: HOME | End: 2025-07-17
Attending: EMERGENCY MEDICINE
Payer: COMMERCIAL

## 2025-07-17 VITALS
HEART RATE: 75 BPM | TEMPERATURE: 98 F | WEIGHT: 140 LBS | HEIGHT: 64 IN | BODY MASS INDEX: 23.9 KG/M2 | RESPIRATION RATE: 18 BRPM | DIASTOLIC BLOOD PRESSURE: 89 MMHG | SYSTOLIC BLOOD PRESSURE: 154 MMHG | OXYGEN SATURATION: 97 %

## 2025-07-17 DIAGNOSIS — L03.011 CELLULITIS OF RIGHT MIDDLE FINGER: Primary | ICD-10-CM

## 2025-07-17 PROCEDURE — 99283 EMERGENCY DEPT VISIT LOW MDM: CPT

## 2025-07-17 PROCEDURE — 99284 EMERGENCY DEPT VISIT MOD MDM: CPT

## 2025-07-17 RX ORDER — DOXYCYCLINE 100 MG/1
100 CAPSULE ORAL ONCE
Status: COMPLETED | OUTPATIENT
Start: 2025-07-17 | End: 2025-07-17

## 2025-07-17 RX ORDER — DOXYCYCLINE 100 MG/1
100 CAPSULE ORAL 2 TIMES DAILY
Qty: 20 CAPSULE | Refills: 0 | Status: SHIPPED | OUTPATIENT
Start: 2025-07-17 | End: 2025-07-27

## 2025-07-18 NOTE — ED INITIAL ASSESSMENT (HPI)
Patient to the ED from home d/t spider bite. States this morning it looked like a scratch but as the day went on her right middle finger became red and swollen, swelling is spreading to her left upper arm. Denies any other symptoms.

## 2025-07-18 NOTE — ED PROVIDER NOTES
Patient Seen in: [facility] Emergency Department    History     Chief Complaint   Patient presents with    Bite       HPI    Patient presents to the ED complaining of an infection to her right middle finger.  She states that she might of been bitten by something.  Noted slight swelling and redness to the finger over the dorsal PIP joint several days ago.  This is worsened today and is now more painful with a trace amount of blood streaking up her finger into her hand.  No fevers or chills    History reviewed. Past Medical History[1]    History reviewed. Past Surgical History[2]      Medications :  Prescriptions Prior to Admission[3]     Family History[4]    Smoking Status: Social Hx on file[5]    Constitutional and vital signs reviewed.      Social History and Family History elements reviewed from today, pertinent positives to the presenting problem noted.    Physical Exam     ED Triage Vitals [07/17/25 1915]   /89   Pulse 75   Resp 18   Temp 97.9 °F (36.6 °C)   Temp src Temporal   SpO2 97 %   O2 Device None (Room air)       All measures to prevent infection transmission during my interaction with the patient were taken. Handwashing was performed prior to and after the exam.  Stethoscope and any equipment used during my examination was cleaned with super sani-cloth germicidal wipes following the exam.     Physical Exam  Constitutional:       Appearance: Normal appearance.   Pulmonary:      Effort: Pulmonary effort is normal. No respiratory distress.   Skin:     General: Skin is warm and dry.      Findings: Erythema present.      Comments: There is a 1 x 1 cm area of deeply erythematous skin to the dorsal right third finger over the PIP joint.  No fluctuance the patient still able to bend her finger fully.  There is mild lymphangitis spreading up the dorsal hand into the wrist.   Neurological:      Mental Status: She is alert. Mental status is at baseline.   Psychiatric:         Mood and Affect: Mood  normal.         Behavior: Behavior normal.         ED Course      Labs Reviewed - No data to display    As Interpreted by me    Imaging Results Available and Reviewed while in ED: No results found.  ED Medications Administered:   Medications   doxycycline (Vibramycin) cap 100 mg (100 mg Oral Given 7/17/25 2101)         MDM     Vitals:    07/17/25 1915   BP: 154/89   Pulse: 75   Resp: 18   Temp: 97.9 °F (36.6 °C)   TempSrc: Temporal   SpO2: 97%   Weight: 63.5 kg   Height: 162.6 cm (5' 4\")     *I personally reviewed and interpreted all ED vitals.    Pulse Ox: 97%, Room air, Normal     Differential Diagnosis/ Diagnostic Considerations: Finger cellulitis, other    Complicating Factors: The patient already has does not have any pertinent problems on file. to contribute to the complexity of this ED evaluation.    Medical Decision Making  Patient presents to the ED with a small area of cellulitis to her right dorsal third finger.  No drainable fluid collection.  Patient with normal range of motion of her finger.  Discussed hot water compresses and will discharge home with oral antibiotics.  Return precautions discussed.    Problems Addressed:  Cellulitis of right middle finger: acute illness or injury    Risk  Prescription drug management.        Condition upon leaving the department: Stable    Disposition and Plan     Clinical Impression:  1. Cellulitis of right middle finger        Disposition:  Discharge    Follow-up:  Jose F Tillman, DO  [address] [place] [address]  [phone]    Schedule an appointment as soon as possible for a visit in 2 day(s)      [facility] Emergency Department  [address] [facility] [id number] [phone]  Follow up  If symptoms worsen      Medications Prescribed:  Discharge Medication List as of 7/17/2025  9:02 PM        START taking these medications    Details   doxycycline 100 MG Oral Cap Take 1 capsule (100 mg total) by mouth 2 (two) times daily for  10 days., Normal, Disp-20 capsule, R-0                              [1]   Past Medical History:   Anxiety    My whole life    Anxiety state    Back problem    Chronic pain    COVID-19    severe headache,nausea and stomache lasting 5 days    DCIS (ductal carcinoma in situ) of breast    Right breast. radiotherapy and mastectomy     Depression    medication and therapy    Disorder of thyroid    Essential hypertension    Exposure to medical diagnostic radiation    completed     Helicobacter pylori infection    High blood pressure    Migraines    Muscle weakness    Neuropathic pain due to radiation    Osteoarthritis    Ovarian cyst    Visual impairment    glasses   [2]   Past Surgical History:  Procedure Laterality Date    Appendectomy  1979    Breast surgery  2015    Revision of bilateral reconstructed breasts    Breast surgery  2015    Fat grafting to left reconstructed breast    Breast surgery Bilateral 2016    Bilateral nipple reconstruction      2008      2010    APGAR: 9 (1min) 9 (5 min)  DR: Dameon Augustine    Cervical spine surgery  2023    CERVICAL THREE TO CERVICAL FOUR ANTERIOR CERVICAL DISCECTOMY AND FUSION.  BONE GRAFT AND ALL REQUIRED INSTRUMENTATION. NEUROMONITORING.    Cervical spine surgery      C3-4 fusion extension 3/28/23 Dr. Vicente 3/2023    Colonoscopy  2012    Colonoscopy      Core biopsy Right 2013    Right breast:  MRI core biopsies    Egd  2016    Explor front sinus,transorbit,uni  2018    Forearm/wrist surgery unlisted Left     DeQuervain's release    Hysterectomy      Laparoscopy,diagnostic      Lumpectomy right Right 2013    Right segmental mastectomy with wire localization, advancement    Other  2022    Cervical four-five, Cervical five-six  anterior cervical discectomy and fusionRightGeneral    Radiation right      Reduction left      Reduction right      Removal of ovarian cyst(s) Left      laparoscopic    Removal of ovarian cyst(s) Bilateral 01/2014    laparotomy    Skin surgery  07/22/2016    bilateral areaola tattoing     Tonsillectomy  1979    Upper gi endoscopy,exam     [3] (Not in a hospital admission)  [4]   Family History  Problem Relation Age of Onset    Lipids Father         hyperlipidemia    Hypertension Father     Migraines Father     Prostate Cancer Father     Asthma Mother     Lipids Mother         hyperlipidemia    Hypertension Mother     Cancer Maternal Aunt 50        skin and breast cancer    Skin cancer Maternal Grandfather     Cancer Other 50        breast cancer and leukemia    Dementia Other         Alzheimers, Grandmother [SIDE NOT STATED]    Cancer Cousin 30        ovarian cancer / maternal cousin    Diabetes Paternal Grandmother     Cancer Paternal Aunt         leukemia    Cancer Paternal Uncle         prostate    Breast Cancer Self 34    No Known Problems Daughter     No Known Problems Son     No Known Problems Sister     No Known Problems Sister     No Known Problems Sister    [5]   Social History  Socioeconomic History    Marital status:     Number of children: 2   Occupational History    Occupation:      Employer: [employer]   Tobacco Use    Smoking status: Never     Passive exposure: Never    Smokeless tobacco: Never   Vaping Use    Vaping status: Never Used   Substance and Sexual Activity    Alcohol use: No    Drug use: No   Other Topics Concern    Caffeine Concern No    Exercise No    Reaction to local anesthetic No    Pt has a pacemaker No    Pt has a defibrillator No

## 2025-07-18 NOTE — DISCHARGE INSTRUCTIONS
Running hot water soaks as discussed, 5 to 10 minutes per episode, 3 times daily until pain resolves.

## 2025-07-20 ENCOUNTER — HOSPITAL ENCOUNTER (EMERGENCY)
Facility: HOSPITAL | Age: 50
Discharge: HOME | End: 2025-07-20
Attending: EMERGENCY MEDICINE
Payer: COMMERCIAL

## 2025-07-20 VITALS
HEART RATE: 70 BPM | DIASTOLIC BLOOD PRESSURE: 90 MMHG | TEMPERATURE: 98 F | OXYGEN SATURATION: 98 % | BODY MASS INDEX: 24.8 KG/M2 | WEIGHT: 140 LBS | SYSTOLIC BLOOD PRESSURE: 162 MMHG | HEIGHT: 63 IN | RESPIRATION RATE: 20 BRPM

## 2025-07-20 DIAGNOSIS — L03.011 CELLULITIS OF FINGER OF RIGHT HAND: Primary | ICD-10-CM

## 2025-07-20 LAB
ANION GAP SERPL CALC-SCNC: 9 MMOL/L (ref 0–18)
BASOPHILS # BLD AUTO: 0.03 X10(3) UL (ref 0–0.2)
BASOPHILS NFR BLD AUTO: 0.5 %
BUN BLD-MCNC: 13 MG/DL (ref 9–23)
BUN/CREAT SERPL: 16.3 (ref 10–20)
CALCIUM BLD-MCNC: 9.8 MG/DL (ref 8.7–10.4)
CHLORIDE SERPL-SCNC: 106 MMOL/L (ref 98–112)
CO2 SERPL-SCNC: 26 MMOL/L (ref 21–32)
CREAT BLD-MCNC: 0.8 MG/DL (ref 0.55–1.02)
CRP SERPL-MCNC: 1.2 MG/DL (ref ?–0.5)
DEPRECATED RDW RBC AUTO: 41.2 FL (ref 35.1–46.3)
EGFRCR SERPLBLD CKD-EPI 2021: 90 ML/MIN/1.73M2 (ref 60–?)
EOSINOPHIL # BLD AUTO: 0.09 X10(3) UL (ref 0–0.7)
EOSINOPHIL NFR BLD AUTO: 1.4 %
ERYTHROCYTE [DISTWIDTH] IN BLOOD BY AUTOMATED COUNT: 12.2 % (ref 11–15)
GLUCOSE BLD-MCNC: 94 MG/DL (ref 70–99)
HCT VFR BLD AUTO: 42.6 % (ref 35–48)
HGB BLD-MCNC: 14.2 G/DL (ref 12–16)
IMM GRANULOCYTES # BLD AUTO: 0.01 X10(3) UL (ref 0–1)
IMM GRANULOCYTES NFR BLD: 0.2 %
LYMPHOCYTES # BLD AUTO: 2.43 X10(3) UL (ref 1–4)
LYMPHOCYTES NFR BLD AUTO: 38.1 %
MCH RBC QN AUTO: 30.6 PG (ref 26–34)
MCHC RBC AUTO-ENTMCNC: 33.3 G/DL (ref 31–37)
MCV RBC AUTO: 91.8 FL (ref 80–100)
MONOCYTES # BLD AUTO: 0.5 X10(3) UL (ref 0.1–1)
MONOCYTES NFR BLD AUTO: 7.8 %
NEUTROPHILS # BLD AUTO: 3.32 X10 (3) UL (ref 1.5–7.7)
NEUTROPHILS # BLD AUTO: 3.32 X10(3) UL (ref 1.5–7.7)
NEUTROPHILS NFR BLD AUTO: 52 %
OSMOLALITY SERPL CALC.SUM OF ELEC: 292 MOSM/KG (ref 275–295)
PLATELET # BLD AUTO: 239 10(3)UL (ref 150–450)
POTASSIUM SERPL-SCNC: 3.5 MMOL/L (ref 3.5–5.1)
RBC # BLD AUTO: 4.64 X10(6)UL (ref 3.8–5.3)
SODIUM SERPL-SCNC: 141 MMOL/L (ref 136–145)
WBC # BLD AUTO: 6.4 X10(3) UL (ref 4–11)

## 2025-07-20 PROCEDURE — 86140 C-REACTIVE PROTEIN: CPT | Performed by: EMERGENCY MEDICINE

## 2025-07-20 PROCEDURE — 80048 BASIC METABOLIC PNL TOTAL CA: CPT | Performed by: EMERGENCY MEDICINE

## 2025-07-20 PROCEDURE — 99284 EMERGENCY DEPT VISIT MOD MDM: CPT

## 2025-07-20 PROCEDURE — 36415 COLL VENOUS BLD VENIPUNCTURE: CPT

## 2025-07-20 PROCEDURE — 85025 COMPLETE CBC W/AUTO DIFF WBC: CPT | Performed by: EMERGENCY MEDICINE

## 2025-07-20 PROCEDURE — 99283 EMERGENCY DEPT VISIT LOW MDM: CPT

## 2025-07-20 RX ORDER — CEPHALEXIN 500 MG/1
500 CAPSULE ORAL 3 TIMES DAILY
Qty: 30 CAPSULE | Refills: 0 | Status: SHIPPED | OUTPATIENT
Start: 2025-07-20 | End: 2025-07-30

## 2025-07-20 NOTE — ED INITIAL ASSESSMENT (HPI)
Pt states was seen here Thursday for spider bit on rt middle finger. Was started on antibiotics. Today redness and heat has spread.

## 2025-07-21 NOTE — ED PROVIDER NOTES
Patient Seen in: [facility] Emergency Department    History     Chief Complaint   Patient presents with    Cellulitis       HPI    Patient presents to the ED complaining of slight worsening of cellulitis symptoms in her right third finger.  She was seen 3 days ago and started on doxycycline.  She states that she no longer has streaking of her right hand and that she is no longer tender in the initial area of infection in the dorsal PIP joint area.  She now has some mild erythema and tenderness to the more distal aspect of the finger.  No other complaints.    History reviewed. Past Medical History[1]    History reviewed. Past Surgical History[2]      Medications :  Prescriptions Prior to Admission[3]     Family History[4]    Smoking Status: Social Hx on file[5]    Constitutional and vital signs reviewed.      Social History and Family History elements reviewed from today, pertinent positives to the presenting problem noted.    Physical Exam     ED Triage Vitals [07/20/25 1755]   BP (!) 162/90   Pulse 70   Resp 20   Temp 98.2 °F (36.8 °C)   Temp src Oral   SpO2 98 %   O2 Device None (Room air)       All measures to prevent infection transmission during my interaction with the patient were taken. Handwashing was performed prior to and after the exam.  Stethoscope and any equipment used during my examination was cleaned with super sani-cloth germicidal wipes following the exam.     Physical Exam  Vitals and nursing note reviewed.   Constitutional:       General: She is not in acute distress.     Appearance: She is well-developed. She is not ill-appearing or toxic-appearing.   HENT:      Head: Normocephalic and atraumatic.   Neck:      Trachea: No tracheal deviation.   Cardiovascular:      Rate and Rhythm: Normal rate.   Pulmonary:      Effort: Pulmonary effort is normal. No respiratory distress.   Abdominal:      Palpations: Abdomen is soft.   Musculoskeletal:         General: Tenderness present.      Comments:  Trace swelling of the right third distal finger.  There is resolution of erythema to the dorsal PIP joint of the third finger and now some erythematous areas to the medial and lateral side of the right third finger between the DIP and PIP joints.  These are tender to palpation.  No fluctuance or induration.   Skin:     General: Skin is warm and dry.   Neurological:      Mental Status: She is alert and oriented to person, place, and time.   Psychiatric:         Mood and Affect: Mood normal.         Behavior: Behavior normal.         ED Course        Labs Reviewed   C-REACTIVE PROTEIN - Abnormal; Notable for the following components:       Result Value    C-Reactive Protein 1.20 (*)     All other components within normal limits   BASIC METABOLIC PANEL (8) - Normal   CBC WITH DIFFERENTIAL WITH PLATELET       As Interpreted by me    Imaging Results Available and Reviewed while in ED: No results found.  ED Medications Administered: Medications - No data to display      MDM     Vitals:    07/20/25 1755   BP: (!) 162/90   Pulse: 70   Resp: 20   Temp: 98.2 °F (36.8 °C)   TempSrc: Oral   SpO2: 98%   Weight: 63.5 kg   Height: 160 cm (5' 3\")     *I personally reviewed and interpreted all ED vitals.    Pulse Ox: 98%, Room air, Normal       Differential Diagnosis/ Diagnostic Considerations: Finger cellulitis, other    Complicating Factors: The patient already has does not have any pertinent problems on file. to contribute to the complexity of this ED evaluation.    Medical Decision Making  Patient presents to the ED with ongoing cellulitis symptoms in her right third finger after starting antibiotics 3 days ago.  Original area of infection is resolved and nontender.  There is several areas of erythematous skin slightly distally on the third finger, no concern for abscess.  Patient able to move the finger normally.  Will add Keflex to her doxycycline and recommended continued hot compresses.  Return precautions  discussed.    Problems Addressed:  Cellulitis of finger of right hand: acute illness or injury    Amount and/or Complexity of Data Reviewed  Labs: ordered. Decision-making details documented in ED Course.    Risk  Prescription drug management.        Condition upon leaving the department: Stable    Disposition and Plan     Clinical Impression:  1. Cellulitis of finger of right hand        Disposition:  Discharge    Follow-up:  Jose F Tillman, DO  [address] [place] [address]  [phone]    Schedule an appointment as soon as possible for a visit  As needed      Medications Prescribed:  Current Discharge Medication List        START taking these medications    Details   cephALEXin 500 MG Oral Cap Take 1 capsule (500 mg total) by mouth 3 (three) times daily for 10 days.  Qty: 30 capsule, Refills: 0                              [1]   Past Medical History:   Anxiety    My whole life    Anxiety state    Back problem    Chronic pain    COVID-19    severe headache,nausea and stomache lasting 5 days    DCIS (ductal carcinoma in situ) of breast    Right breast. radiotherapy and mastectomy     Depression    medication and therapy    Disorder of thyroid    Essential hypertension    Exposure to medical diagnostic radiation    completed     Helicobacter pylori infection    High blood pressure    Migraines    Muscle weakness    Neuropathic pain due to radiation    Osteoarthritis    Ovarian cyst    Visual impairment    glasses   [2]   Past Surgical History:  Procedure Laterality Date    Appendectomy  1979    Breast surgery  2015    Revision of bilateral reconstructed breasts    Breast surgery  2015    Fat grafting to left reconstructed breast    Breast surgery Bilateral 2016    Bilateral nipple reconstruction      2008      2010    APGAR: 9 (1min) 9 (5 min)  DR: Dameon Augustine    Cervical spine surgery  2023    CERVICAL THREE TO CERVICAL FOUR ANTERIOR CERVICAL  DISCECTOMY AND FUSION.  BONE GRAFT AND ALL REQUIRED INSTRUMENTATION. NEUROMONITORING.    Cervical spine surgery      C3-4 fusion extension 3/28/23 Dr. Vicente 3/2023    Colonoscopy  11/08/2012    Colonoscopy      Core biopsy Right 06/17/2013    Right breast:  MRI core biopsies    Egd  2016    Explor front sinus,transorbit,uni  09/19/2018    Forearm/wrist surgery unlisted Left 2009    DeQuervain's release    Hysterectomy      Laparoscopy,diagnostic      Lumpectomy right Right 06/28/2013    Right segmental mastectomy with wire localization, advancement    Other  01/20/2022    Cervical four-five, Cervical five-six  anterior cervical discectomy and fusionRightGeneral    Radiation right  2013    Reduction left      Reduction right      Removal of ovarian cyst(s) Left 2005    laparoscopic    Removal of ovarian cyst(s) Bilateral 01/2014    laparotomy    Skin surgery  07/22/2016    bilateral areaola tattoing     Tonsillectomy  1979    Upper gi endoscopy,exam     [3] (Not in a hospital admission)  [4]   Family History  Problem Relation Age of Onset    Lipids Father         hyperlipidemia    Hypertension Father     Migraines Father     Prostate Cancer Father     Asthma Mother     Lipids Mother         hyperlipidemia    Hypertension Mother     Cancer Maternal Aunt 50        skin and breast cancer    Skin cancer Maternal Grandfather     Cancer Other 50        breast cancer and leukemia    Dementia Other         Alzheimers, Grandmother [SIDE NOT STATED]    Cancer Cousin 30        ovarian cancer / maternal cousin    Diabetes Paternal Grandmother     Cancer Paternal Aunt         leukemia    Cancer Paternal Uncle         prostate    Breast Cancer Self 35    No Known Problems Daughter     No Known Problems Son     No Known Problems Sister     No Known Problems Sister     No Known Problems Sister    [5]   Social History  Socioeconomic History    Marital status:     Number of children: 2   Occupational History    Occupation:       Employer: [employer]   Tobacco Use    Smoking status: Never     Passive exposure: Never    Smokeless tobacco: Never   Vaping Use    Vaping status: Never Used   Substance and Sexual Activity    Alcohol use: No    Drug use: No   Other Topics Concern    Caffeine Concern No    Exercise No    Reaction to local anesthetic No    Pt has a pacemaker No    Pt has a defibrillator No

## 2025-08-05 ENCOUNTER — OFFICE VISIT (OUTPATIENT)
Dept: FAMILY MEDICINE CLINIC | Facility: CLINIC | Age: 50
End: 2025-08-05

## 2025-08-05 ENCOUNTER — HOSPITAL ENCOUNTER (OUTPATIENT)
Age: 50
Discharge: HOME | End: 2025-08-05

## 2025-08-05 ENCOUNTER — APPOINTMENT (OUTPATIENT)
Dept: GENERAL RADIOLOGY | Age: 50
End: 2025-08-05
Attending: NURSE PRACTITIONER

## 2025-08-05 VITALS
RESPIRATION RATE: 18 BRPM | HEART RATE: 70 BPM | SYSTOLIC BLOOD PRESSURE: 136 MMHG | DIASTOLIC BLOOD PRESSURE: 84 MMHG | TEMPERATURE: 97 F | OXYGEN SATURATION: 98 % | WEIGHT: 140 LBS | BODY MASS INDEX: 24.8 KG/M2 | HEIGHT: 63 IN

## 2025-08-05 VITALS
RESPIRATION RATE: 18 BRPM | HEART RATE: 66 BPM | TEMPERATURE: 98 F | DIASTOLIC BLOOD PRESSURE: 80 MMHG | SYSTOLIC BLOOD PRESSURE: 136 MMHG | OXYGEN SATURATION: 100 %

## 2025-08-05 DIAGNOSIS — M25.441 FINGER JOINT SWELLING, RIGHT: Primary | ICD-10-CM

## 2025-08-05 DIAGNOSIS — M19.049 ARTHRITIS OF FINGER: ICD-10-CM

## 2025-08-05 DIAGNOSIS — M79.89 FINGER SWELLING: Primary | ICD-10-CM

## 2025-08-05 PROCEDURE — 73140 X-RAY EXAM OF FINGER(S): CPT | Performed by: NURSE PRACTITIONER

## 2025-08-05 PROCEDURE — 96365 THER/PROPH/DIAG IV INF INIT: CPT | Performed by: NURSE PRACTITIONER

## 2025-08-05 PROCEDURE — 99214 OFFICE O/P EST MOD 30 MIN: CPT | Performed by: NURSE PRACTITIONER

## 2025-08-05 RX ORDER — VITAMIN E 268 MG
1 CAPSULE ORAL
COMMUNITY

## 2025-08-05 RX ORDER — LEVOTHYROXINE SODIUM 88 UG/1
TABLET ORAL
COMMUNITY
Start: 2025-07-24

## 2025-08-05 RX ORDER — ERGOCALCIFEROL 1.25 MG/1
1 CAPSULE ORAL
COMMUNITY

## 2025-08-07 ENCOUNTER — OFFICE VISIT (OUTPATIENT)
Dept: SURGERY | Facility: CLINIC | Age: 50
End: 2025-08-07

## 2025-08-07 DIAGNOSIS — L03.011 CELLULITIS OF RIGHT MIDDLE FINGER: Primary | ICD-10-CM

## 2025-08-07 DIAGNOSIS — M19.041 PRIMARY OSTEOARTHRITIS OF RIGHT HAND: ICD-10-CM

## 2025-08-07 PROCEDURE — 99204 OFFICE O/P NEW MOD 45 MIN: CPT | Performed by: PLASTIC SURGERY

## 2025-08-08 ENCOUNTER — TELEPHONE (OUTPATIENT)
Dept: FAMILY MEDICINE CLINIC | Facility: CLINIC | Age: 50
End: 2025-08-08

## 2025-08-20 RX ORDER — LOSARTAN POTASSIUM 25 MG/1
25 TABLET ORAL DAILY
Qty: 90 TABLET | Refills: 3 | Status: SHIPPED | OUTPATIENT
Start: 2025-08-20

## 2025-08-24 RX ORDER — LOSARTAN POTASSIUM 25 MG/1
25 TABLET ORAL DAILY
Qty: 90 TABLET | Refills: 0 | OUTPATIENT
Start: 2025-08-24

## 2025-08-26 ENCOUNTER — PATIENT MESSAGE (OUTPATIENT)
Dept: FAMILY MEDICINE CLINIC | Facility: CLINIC | Age: 50
End: 2025-08-26

## 2025-08-29 RX ORDER — LOSARTAN POTASSIUM 25 MG/1
25 TABLET ORAL DAILY
Qty: 90 TABLET | Refills: 3 | OUTPATIENT
Start: 2025-08-29

## (undated) DIAGNOSIS — R07.89 LEFT-SIDED CHEST WALL PAIN: ICD-10-CM

## (undated) DIAGNOSIS — F41.1 GENERALIZED ANXIETY DISORDER: ICD-10-CM

## (undated) DIAGNOSIS — Z86.000 HISTORY OF DUCTAL CARCINOMA IN SITU (DCIS) OF BREAST: Primary | ICD-10-CM

## (undated) DIAGNOSIS — Z90.13 HISTORY OF BILATERAL MASTECTOMY: ICD-10-CM

## (undated) DIAGNOSIS — G43.009 MIGRAINE WITHOUT AURA AND WITHOUT STATUS MIGRAINOSUS, NOT INTRACTABLE: ICD-10-CM

## (undated) DEVICE — CG INFILTRATION TUBING: Brand: CG INFILTRATION TUBING

## (undated) DEVICE — 3M™ IOBAN™ 2 ANTIMICROBIAL INCISE DRAPE 6650EZ: Brand: IOBAN™ 2

## (undated) DEVICE — SYRINGE 5ML LL TIP

## (undated) DEVICE — DRESSING BORDER AQUACEL 4X10

## (undated) DEVICE — Device

## (undated) DEVICE — SUTURE MONOCRYL 4-0 PS-2

## (undated) DEVICE — PLASTIC BREAST CDS-LF: Brand: MEDLINE INDUSTRIES, INC.

## (undated) DEVICE — 1010 S-DRAPE TOWEL DRAPE 10/BX: Brand: STERI-DRAPE™

## (undated) DEVICE — MICRO COVER: Brand: UNBRANDED

## (undated) DEVICE — SOL  .9 1000ML BTL

## (undated) DEVICE — SOL NACL IRRIG 0.9% 1000ML BTL

## (undated) DEVICE — DRAPE,THYROID,SOFT,STERILE: Brand: MEDLINE

## (undated) DEVICE — MAJOR GENERAL: Brand: MEDLINE INDUSTRIES, INC.

## (undated) DEVICE — DRAPE C-ARM UNIVERSAL

## (undated) DEVICE — PLASTC TOOMEY SYRNG DISP

## (undated) DEVICE — USE ITEM #176901

## (undated) DEVICE — #15 STERILE STAINLESS BLADE: Brand: STERILE STAINLESS BLADES

## (undated) DEVICE — WATER STERILE AQUALITE 1000ML

## (undated) DEVICE — TZ MEDICAL TITANIUM DISTRACTION PINS 12MM: Brand: TZ MEDICAL TITANIUM DISTRACTION PINS

## (undated) DEVICE — 3M™ MEDITPORE™ SOFT CLOTH TAPE 6 IN X 10 YD 12 ROLLS/CASE 2966: Brand: 3M™ MEDIPORE™

## (undated) DEVICE — BRA SURG ELIZ PINK M

## (undated) DEVICE — LAMINECTOMY CDS: Brand: MEDLINE INDUSTRIES, INC.

## (undated) DEVICE — MEGADYNE E-Z CLEAN BLADE 2.75"

## (undated) DEVICE — GOWN SURG AERO BLUE PERF XLG

## (undated) DEVICE — EXOFIN TISSUE ADHESIVE 1.0ML

## (undated) DEVICE — SLEEVE KENDALL SCD EXPRESS MED

## (undated) DEVICE — DRESSING FOAM TOPIFOAM

## (undated) DEVICE — DRAPE SHEET LARGE 76X55

## (undated) DEVICE — LAMINECTOMY ARM CRADLE FOAM POSITIONER: Brand: CARDINAL HEALTH

## (undated) DEVICE — PAD SACRAL SPAN AID

## (undated) DEVICE — 3M™ STERI-STRIP™ REINFORCED ADHESIVE SKIN CLOSURES, R1547, 1/2 IN X 4 IN (12 MM X 100 MM), 6 STRIPS/ENVELOPE: Brand: 3M™ STERI-STRIP™

## (undated) DEVICE — STERILE POLYISOPRENE POWDER-FREE SURGICAL GLOVES: Brand: PROTEXIS

## (undated) DEVICE — ZEISS MD DRAPE

## (undated) DEVICE — STERILE POLYISOPRENE POWDER-FREE SURGICAL GLOVES WITH EMOLLIENT COATING: Brand: PROTEXIS

## (undated) DEVICE — 3M™ STERI-STRIP™ REINFORCED ADHESIVE SKIN CLOSURES, R1548, 1 IN X 5 IN (25 MM X 125 MM), 4 STRIPS/ENVELOPE: Brand: 3M™ STERI-STRIP™

## (undated) DEVICE — GAMMEX® PI HYBRID SIZE 6.5, STERILE POWDER-FREE SURGICAL GLOVE, POLYISOPRENE AND NEOPRENE BLEND: Brand: GAMMEX

## (undated) DEVICE — SUTURE VICRYL 3-0 SH

## (undated) DEVICE — CAUTERY BLADE 2IN INS E1455

## (undated) DEVICE — PROXIMATE SKIN STAPLERS (35 WIDE) CONTAINS 35 STAINLESS STEEL STAPLES (FIXED HEAD): Brand: PROXIMATE

## (undated) DEVICE — PEN 6\" SURGICAL MARKING PURPL

## (undated) DEVICE — DRAPE SHEET TRANSVERSE LAP

## (undated) DEVICE — MARKER SKIN PREP RESIST STRL

## (undated) DEVICE — GOWN,SIRUS,FABRIC-REINFORCED,X-LARGE: Brand: MEDLINE

## (undated) DEVICE — 3M™ BAIR HUGGER® UNDERBODY BLANKET, FULL ACCESS, 10 PER CASE 63500: Brand: BAIR HUGGER™

## (undated) DEVICE — GAUZE TRAY STERILE 4X4 12PLY

## (undated) DEVICE — Device: Brand: INTELLICART™

## (undated) DEVICE — DISPOSABLE DUAL IRRIGATING BIPOLAR FORCEPS, NON-STICK,: Brand: SPETZLER-MALIS

## (undated) DEVICE — SUT PDS II 0 TP-1 Z991G

## (undated) DEVICE — ABDOMINAL PAD: Brand: CURITY

## (undated) DEVICE — TOWEL SURG OR 17X30IN BLUE

## (undated) DEVICE — SUT VICRYL 2-0 CT-1 J839D

## (undated) DEVICE — SUT VICRYL 0 J906G

## (undated) DEVICE — SPONGE: SPECIALTY PEANUT XR 100/CS: Brand: MEDICAL ACTION INDUSTRIES

## (undated) DEVICE — SOLUTION  .9 1000ML BTL

## (undated) DEVICE — LIGHT HANDLE

## (undated) DEVICE — DRILL SRG OIL CRTDG MAESTRO

## (undated) DEVICE — DRAPE TABLE COVER 44X90 TC-10

## (undated) DEVICE — DERMABOND LIQUID ADHESIVE

## (undated) DEVICE — UNIPLATE ANTERIOR CERVICAL PLATE SYSTEM DRILL 12MM: Brand: UNIPLATE

## (undated) DEVICE — 3.0MM PRECISION NEURO (MATCH HEAD)

## (undated) DEVICE — KENDALL SCD EXPRESS SLEEVES, KNEE LENGTH, MEDIUM: Brand: KENDALL SCD

## (undated) DEVICE — NON-ADHERENT PAD PREPACK: Brand: TELFA

## (undated) DEVICE — ASPIRATION TUBING SET, DISPOSABLE: Brand: MICROAIRE®

## (undated) DEVICE — COVER,TABLE,44X90,STERILE: Brand: MEDLINE

## (undated) DEVICE — 3M™ IOBAN™ 2 ANTIMICROBIAL INCISE DRAPE 6648EZ: Brand: IOBAN™ 2

## (undated) DEVICE — Device: Brand: MICROAIRE®

## (undated) DEVICE — 3M™ TEGADERM™ TRANSPARENT FILM DRESSING, 1626W, 4 IN X 4-3/4 IN (10 CM X 12 CM), 50 EACH/CARTON, 4 CARTON/CASE: Brand: 3M™ TEGADERM™

## (undated) DEVICE — SUT VICRYL 0 CT-1 J470D

## (undated) DEVICE — BLADE ELECTROSURG 4IN INSULATE

## (undated) DEVICE — SUCTION CANISTER, 3000CC,SAFELINER: Brand: DEROYAL

## (undated) DEVICE — SUT MONOCRYL 3-0 PS-2 Y427H

## (undated) DEVICE — PAD SACRAL PREMIUM 12X12X1

## (undated) DEVICE — BANDAGE ROLL,100% COTTON, 6 PLY, LARGE: Brand: KERLIX

## (undated) DEVICE — GAMMEX® PI HYBRID SIZE 7.5, STERILE POWDER-FREE SURGICAL GLOVE, POLYISOPRENE AND NEOPRENE BLEND: Brand: GAMMEX

## (undated) DEVICE — SUTURE VICRYL 0 J340H

## (undated) DEVICE — FLEXIBLE YANKAUER,MEDIUM TIP, NO VACUUM CONTROL: Brand: ARGYLE

## (undated) DEVICE — C-ARM: Brand: UNBRANDED

## (undated) DEVICE — WOUND RETRACTOR AND PROTECTOR: Brand: ALEXIS O WOUND PROTECTOR-RETRACTOR

## (undated) DEVICE — FLOSEAL HEMOSTATIC MATRIX, 5ML: Brand: FLOSEAL HEMOSTATIC MATRIX

## (undated) DEVICE — SUT VICRYL 4-0 KS J662H

## (undated) DEVICE — CLOSURE EXOFIN 1.0ML

## (undated) DEVICE — TIP BOVIE 4" MEGADYNE

## (undated) DEVICE — SUPER SPONGES,MEDIUM: Brand: KERLIX

## (undated) DEVICE — PROVE COVER: Brand: UNBRANDED

## (undated) DEVICE — TRAY CATH SURESTEP 16F FOLEY

## (undated) DEVICE — C-ARMOR C-ARM EQUIPMENT COVERS CLEAR STERILE UNIVERSAL FIT 12 PER CASE: Brand: C-ARMOR

## (undated) DEVICE — ADHESIVE MASTISOL 2/3CC VL

## (undated) DEVICE — BRA SURG ELIZ PINK L

## (undated) DEVICE — PSI-TEC TUBING: Brand: PSI-TEC TUBING

## (undated) DEVICE — 40580 - THE PINK PAD - ADVANCED TRENDELENBURG POSITIONING KIT: Brand: 40580 - THE PINK PAD - ADVANCED TRENDELENBURG POSITIONING KIT

## (undated) DEVICE — SCD SLEEVE KNEE HI BLEND

## (undated) DEVICE — LAPAROTOMY: Brand: MEDLINE INDUSTRIES, INC.

## (undated) DEVICE — FRAZIER SUCTION INSTRUMENT 12 FR W/CONTROL VENT & OBTURATOR: Brand: FRAZIER

## (undated) DEVICE — UNDYED BRAIDED (POLYGLACTIN 910), SYNTHETIC ABSORBABLE SUTURE: Brand: COATED VICRYL

## (undated) DEVICE — 3M(TM) TEGADERM(TM) TRANSPARENT FILM DRESSING FRAME STYLE 1628: Brand: 3M™ TEGADERM™

## (undated) DEVICE — BRA SURGICAL ELIZABETH PINK L

## (undated) DEVICE — PEN SKIN MARKING REG TIP VIOLT

## (undated) DEVICE — PROXIMATE RH ROTATING HEAD SKIN STAPLERS (35 WIDE) CONTAINS 35 STAINLESS STEEL STAPLES: Brand: PROXIMATE

## (undated) DEVICE — SUTURE VICRYL 2-0 CP-2

## (undated) DEVICE — SOLUTION SURG DURA PREP HAZMAT

## (undated) DEVICE — SUT PLAIN GUT 2-0 CT 853H

## (undated) DEVICE — SUTURE VICRYL 2-0 SH

## (undated) DEVICE — CHLORAPREP 26ML APPLICATOR

## (undated) DEVICE — SYRINGE 50ML LL TIP

## (undated) DEVICE — VIOLET BRAIDED (POLYGLACTIN 910), SYNTHETIC ABSORBABLE SUTURE: Brand: COATED VICRYL

## (undated) DEVICE — ALCOHOL 70% 4 OZ

## (undated) DEVICE — FLOSEAL WITH RECOTHROM - 5ML: Brand: FLOSEAL HEMOSTATIC MATRIX

## (undated) NOTE — LETTER
6/6/2022    Sandy Muhammad   11/20/1975    To Whom it may concern: This letter has been written at the patient's request. The above patient was seen at the [facility] for treatment of a medical condition. This patient should be excused from attending work through August 17 th, 2022. If there are any additional questions, please contact our office.            Sincerely,    Vicki Levi M.S., NASEEM, [address]  [address] [place] [address] Jay Manuel, [address] [place] [address] Rd  [phone]

## (undated) NOTE — MR AVS SNAPSHOT
[facility]  [address] [facility] [place] [id number] [phone]               Thank you for choosing us for your health care visit with Durrell Kanner.  Katja Wren MD.  We are glad to serve you and happy to provide you with this summary of your visit Verapamil HCl  MG Cp24   Take 1 capsule (180 mg total) by mouth nightly.    Commonly known as:  VERELAN PM                   Today's Orders     US PELVIS (TRANSABDOMINAL AND TRANSVAGINAL) (PRY=[id number])    Complete by:  Campbell 10, 2017 (Approximate) discharge instructions in [facility] by going to Visits < Admission Summaries. If you've been to the Emergency Department or your doctor's office, you can view your past visit information in [facility] by going to Visits < Visit Summaries. Volofy questions?

## (undated) NOTE — LETTER
Date: 6/12/2023    Patient Name: Amparo Cullen          To Whom it may concern: This letter has been written at the patient's request. The above patient underwent surgical intervention for a medical condition at [facility] on 3/28/2023. This patient should be excused from work through 7/24/2023 as she continues to attend Physical Therapy sessions. She will continue to follow up with our service to monitor her recovery in the post-operative period. Sincerely,        Sarah Haq

## (undated) NOTE — LETTER
07/21/20        [address] [facility]      Dear Reta Bumpers,    [address] records indicate that you have outstanding lab work and or testing that was ordered for you and has not yet been completed:  Orders Placed This Encounter

## (undated) NOTE — LETTER
Date: 7/11/2022    Patient Name: Chris Wilkes          To Whom it may concern: This letter has been written at the patient's request. The above patient was seen at the [facility] for treatment of a medical condition. This patient should be excused from attending work until follow-up estimated 8/11/22.         Sincerely,    Jerry Sweeney M.S., NASEEM, [place] [address]  [address], [address] [place] [address] Jay Manuel, [address] [place] [address] Rd  [phone]

## (undated) NOTE — LETTER
Date: August 15, 2023      Patient Name: Mario Mckeon      : 1975        Thank you for choosing [facility] as your health care provider. Your physician has deemed the following medical service(s) necessary. However, your insurance plan may not pay for all of your health care and costs and may deny payment for this service. The fact that your insurance plan does not pay for an item or service does not mean you should not receive it. The purpose of this form is to help you make an informed decision about whether or not you want to receive this service(s) that may not be paid for by your insurance plan. CPT Code Description     Cost     TPI    _________ ______________________________ _____________      _________ ______________________________ _____________      I understand that the above mentioned service(s) or supply may not be covered by my insurance company.  I agree to be financially responsible for the cost of this service or supply in the event of my insurance denies payment as a non-covered benefit.        ______________________________________________________________________  Signature of Patient or Patient's Representative  Relationship  Date    ______________________________________________________________________  Signature of Witness to signing of form   Printed Name

## (undated) NOTE — LETTER
4/2/2018              [address]        [place] [id number]         To whom it may concern,    Please excuse Connie Diss from work 4/3/18 due to illness. She was seen in the office today.       Sincerely,        Dakotah Christiansen

## (undated) NOTE — LETTER
10/15/2020          To Whom It May Concern:    Yash Butcher is currently under my medical care and may not return to { school/work:632857335} at this time. Please excuse BODØ for {NUMBERS 0-10:8342} {days weeks:3323::\"days\"}.   {HE/SHE :8530}

## (undated) NOTE — ED AVS SNAPSHOT
Mamie Lopez   MRN: [id number]    Department:  [facility] Emergency Department   Date of Visit:  11/30/2019           Disclosure     Insurance plans vary and the physician(s) referred by the ER may not be covered by your plan.  Please contac CARE PHYSICIAN AT ONCE OR RETURN IMMEDIATELY TO THE EMERGENCY DEPARTMENT. If you have been prescribed any medication(s), please fill your prescription right away and begin taking the medication(s) as directed.   If you believe that any of the medications

## (undated) NOTE — LETTER
10/14/2019              [address]        [address] [address]         Dear LIV,    [facility] records indicate that there are lab tests ordered for you by [employer]. Sujit Sheehan MD  have not been done.   If you have, in fact, already

## (undated) NOTE — MR AVS SNAPSHOT
[facility]  [address] [place] [id number] [phone]               Thank you for choosing us for your health care visit with Rosa Elena Quintana.  Brianda Castro MD.  We are glad to serve you and happy to provide you with this summary of your vis may be held responsible for payment in full if proper authorization is not acquired. Please contact the Patient Business Office at [phone] if you have   any questions related to insurance coverage. Thank you.          Reason for Today's Visit     Gyn For medical emergencies, dial 911. Educational Information     Your blood pressure indicates you may be at-risk for Hypertension. Please consider the following Lifestyle Modifications.   Also, please return for a follow-up Blood Pressure Check in

## (undated) NOTE — LETTER
7/19/2022    Abraham Darline   11/20/1975    To Whom it may concern: This letter has been written at the patient's request. The above patient was seen at the [facility] for treatment of a medical condition. This patient should be excused from attending work through September 1st, 2022. If there are any additional questions, please contact our office.            Sincerely,    Reena Banegas M.S., NASEEM, [place] [address]  [address] [place] [address], [address] [place] [address] Jay Manuel, [address] [place] [address] Rd  [phone]

## (undated) NOTE — LETTER
12/27/2017 [address] [facility]         Dear Candelario Garrison,    This letter is to inform you that our office has made several attempts to reach you by phone without success.   We were attempting

## (undated) NOTE — LETTER
MAJOR CASE PREOPERATIVE INSTRUCTIONS    6/13/2022      Dear Jayant Erwin are having a Total abdominal hysterectomy/Bilateral salpingo-oophorectomy on Wednesday, 06/29/2022 at 07:30am. You are to arrive 2 hours prior, which would be 05:30am.     Do not eat or drink anything (including water) after midnight the night before surgery. Only take in clear liquids on Tuesday, the day before surgery. Some examples of clear liquids are water, coffee/tea without milk/cream, clear broth, apple juice, or any juice that you can see through, jello, popsicles, lemon ice, or clear soda like ginger ale or 7-up. Buy a water based Fleets enema and use it in the early evening of Tuesday. You are to call this office if you have any cold or flu symptoms 2 days before your scheduled surgery. Please avoid aspirin 7 days before surgery. Avoid Ibuprofen, Motrin, Aleve, or Naprosyn for 3 days before surgery. You will be contacted by PreAdmission Testing (PAT) usually within the week before surgery. They will take a short medical history and let you know if any preoperative testing is needed. Please feel free to contact our office at [phone] if you have any questions regarding these instructions or your procedure.       Sincerely,          Tori Stubbs MD  [address] [place] [address]  [phone]

## (undated) NOTE — LETTER
1/19/2024          To Whom It May Concern:    Camila Bell is currently under my medical care and may not return to work at this time.      She may return to work on 01/21/2024.  Activity is restricted as follows: none.    If you require additional information please contact our office.        Sincerely,    Joe Nichols PA-C          Document generated by:  Joe Nichols PA-C

## (undated) NOTE — LETTER
11/27/23        To Whom It May Concern:    Patient had neck surgery and has difficulty with lifting her arms over her head to do her hair. Please allow her to keep her hair down when at work. Sincerely,            DEMETRIS Sanchez D.O.    Neurosurgery

## (undated) NOTE — LETTER
5/20/2019 21[address] [facility]         Dear Dr. Cherrie Mayo is medically cleared for surgery. Sincerely,    Fe Cain, DO  [address]

## (undated) NOTE — LETTER
Date: 1/26/2023    Patient Name: Trinidad Mccoy          To Whom it may concern: This letter has been written at the patient's request. The above patient was seen at the [facility] for treatment of a medical condition. 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 Due to their disability they have a challenging time preforming this task. In addition, is causes increase pain and weakness. By allowing this accomodation she will maintain gainfully employed and be a productive employee. 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 They are capable of operating the remaining aircraft designed doors. They remain under our care and ongoing treatment and work up are in progress for possible surgically correcting the condition.     Sincerely,    Helio Brown M.S., NASEEM, [address]  [address] [place] [address], [address] [place] [address] Jay Manuel, [address] [place] [address] Rd  [phone]

## (undated) NOTE — LETTER
To: Amie Skaggs MD Date:  12/21/2021        Patient Name: Dallas Bolaños / Sex: 11/20/1975-A: 51 y  female      CSN: [id number]      Medical Records: [id number]    Testing over [employer]    SURGERY DATE: 1/20/2022              PROCEDURE:

## (undated) NOTE — LETTER
8/23/2021          To Whom It May Concern:    Arnie Obregon is currently under my medical care and may return to work at this time.     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

## (undated) NOTE — LETTER
Date: 1/6/2023    Patient Name: Maite Sosa  [address], [address] [facility]  [employer] account [de-identified]          To Whom it may concern at [facility]:    Emily Byrd has been under our care since September 2021. She underwent cervical fusion January 23, 2022 after failing conservative care. She had continuous leave from January through September 2022. She has had ongoing postoperative pain and adjacent segment disease. These are limiting her work hours and preventing her from performing overtime thus impacting her financially. She continues to be under our care. She was last seen December 9, 2022. Due to adjacent level cervical disease she needs ongoing treatment and possibly additional surgery. She will be under our care for another year. She is gainfully employed. Any financial assistance for her would be greatly appreciated given the financial impact she had this year secondary to her spinal condition and surgery. Any additional questions feel free to call [phone].         Sincerely,    Breanna Matthews M.S., PA-C, [address]  [address] [facility], [address], [address]  [phone]

## (undated) NOTE — LETTER
AUTHORIZATION FOR SURGICAL OPERATION OR OTHER PROCEDURE    1. I hereby authorize Dr. Suman Meredith, and [place] [facility] staff assigned to my case to perform the following operation and/or procedure [facility]: IUD removal   ______________________________________________________________________    2. My physician has explained the nature and purpose of the operation or other procedure, possible alternative methods of treatment, the risks involved, and the possibility of complication to me. I acknowledge that no guarantee has been made as to the result that may be obtained. 3.  I recognize that, during the course of this operation, or other procedure, unforseen conditions may necessitate additional or different procedure than those listed above. I, therefore, further authorize and request that the above named physician, his/her physician assistants or designees perform such procedures as are, in his/her professional opinion, necessary and desirable. 4.  Any tissue or organs removed in the operation or other procedure may be disposed of by and at the discretion of the [facility] and [facility]. 5.  I understand that in the event of a medical emergency, I will be transported by local paramedics to [facility] or other hospital emergency department. 6.  I certify that I have read and fully understand the above consent to operation and/or other procedure. 7.  I acknowledge that my physician has explained sedation/analgesia administration to me including the risks and benefits. I consent to the administration of sedation/analgesia as may be necessary or desirable in the judgement of my physician. Witness signature: ___________________________________________________ Date:  ______/______/_____                    Time:  ________ A. M.  P.M.        Patient Name:  ______________________________________________________  (please print)      Patient signature: ___________________________________________________             Relationship to Patient:           []  Parent    Responsible person                          []  Spouse  In case of minor or                    [] Other  _____________   Incompetent name:  __________________________________________________                               (please print)      _____________      Responsible person  In case of minor or  Incompetent signature:  _______________________________________________    Statement of Physician  My signature below affirms that prior to the time of the procedure, I have explained to the patient and/or his/her guardian, the risks and benefits involved in the proposed treatment and any reasonable alternative to the proposed treatment. I have also explained the risks and benefits involved in the refusal of the proposed treatment and have answered the patient's questions.                         Date:  ______/______/_______  Provider                      Signature:  __________________________________________________________       Time:  ___________ A.M    P.M.

## (undated) NOTE — LETTER
2/27/2019                                     To Whom It May Concern,        Rick Delgadillo is a patient currently under my medical care. She should wear an arthritis glove during work hours. If there are any further questions, please call my office.

## (undated) NOTE — LETTER
[facility]    Consent for Coronary CT Angiography    Your doctor has recommended that you have a Coronary CT Angiography procedure.  Coronary CT Angiography is a diagnostic procedure using computed tomography to scan the patient when taking medication. Allergic reactions to medication can range from very minor to very serious leading to a life threatening situation or even death. Please be sure to communicate any allergy you may have to your caregiver immediately.     The i

## (undated) NOTE — LETTER
09/24/20        Tiki Meléndez 52      Dear Maritza Alas,    [facility] records indicate that you have outstanding lab work and or testing that was ordered for you and has not yet been completed:  Orders Placed This Encounter

## (undated) NOTE — LETTER
23          Elba Weaver  :  1975      To Whom It May Concern: This patient was seen in our office on 08/15/23. She may return to work on light duty status starting on 2023. She may lift 5 pounds continuously, 5-10 pounds frequently, 11-20 pounds occasionally; push pull up to 20 pounds occasionally, 10 pounds or less frequently. Should avoid climbing; may occasionally reach/lift overhead, knee, drive, work above shoulder level, and may frequently work at shoulder level. For reference, these are the same restrictions per work note given on 2023. If this office may be of further assistance, please do not hesitate to contact us.       Sincerely,        Pavithra Elmore, DO

## (undated) NOTE — LETTER
5/23/2019          To Whom It May Concern:    Martin Dumas is currently under my medical care. Please excuse Meet Daley from work for the following dates 4/30/19, 5/1/19, 5/3/19/ & 5/4/19.       If you require additional information please contact our of

## (undated) NOTE — Clinical Note
Thank you for the consult. I saw Ms. Ann Gibbs in the endocrine/diabetes clinic today. Please see attached my note. Please feel free to contact me with any questions. Thanks!

## (undated) NOTE — LETTER
3/26/2020              Tisha Daniel         [address] [facility] [id number]         Dear Joaquim Patrick,    [facility] records indicate that the repeat thyroid blood tests ordered for you by Alex Kirkpatrick MD  have not been done.   If you have, in fact,

## (undated) NOTE — LETTER
Date: 2/15/2022    Patient Name: Nitish Lockhart          To Whom it may concern: This letter has been written at the patient's request. The above patient was seen at the [facility] for treatment of a medical condition. This patient should be excused from attending work until further notice.         Sincerely,    Figueroa Echeverria M.S., NASEEM, [address]  [address] [place] [address], [address] [place] [address] Jay Manuel, [address] [place] [address] Rd  [phone]

## (undated) NOTE — LETTER
[facility] [address], [facility]   Date:   8/15/2023     Name:   Radha Dalton    YOB: 1975   MRN:   [id number]       WHERE IS YOUR PAIN NOW? Amauri the areas on your body where you feel the described sensations. Use the appropriate symbol. M[place]ika [place] the areas of radiation. Include all affected areas. Just to complete the picture, please draw in the face. ACHE:  ^ ^ ^   NUMBNESS:  0000   PINS & NEEDLES:  = = = =                              ^ ^ ^                       0000              = = = =                                    ^ ^ ^                       0000            = = = =      BURNING:  XXXX   STABBING: ////                  XXXX                ////                         XXXX          ////     Please amauri the line below indicating your degree of pain right now  with 0 being no pain 10 being the worst pain possible.                                          0             1             2              3             4              5              6              7             8             9             10         Patient Signature:

## (undated) NOTE — LETTER
Date & Time: 11/30/2019, 6:29 PM  Patient: Jonathan Rosales  Encounter Provider(s):    Nallely Rodgers MD       To Whom It May Concern:    Elba Guthrie was seen and treated in our department on 11/30/2019. She should not return to work until 12/5/19.

## (undated) NOTE — LETTER
[facility]  [address], [address] [place] [id number]  [address]: [phone]  FAX: [phone], DO  [facility]    The patient l

## (undated) NOTE — LETTER
AUTHORIZATION FOR SURGICAL OPERATION OR OTHER PROCEDURE    1. I hereby authorize Dr. Meghann Beckford and the [facility] staff assigned to my case to perform the following operation and/or procedure at the [facility]:     Left cervical paraspinal, upper trapezius, levator scapula trigger point injections      2. My physician has explained the nature and purpose of the operation or other procedure, possible alternative methods of treatment, the risks involved, and the possibility of complication to me. I acknowledge that no guarantee has been made as to the result that may be obtained. 3.  I recognize that, during the course of this operation, or other procedure, unforseen conditions may necessitate additional or different procedure than those listed above. I, therefore, further authorize and request that the above named physician, his/her physician assistants or designees perform such procedures as are, in his/her professional opinion, necessary and desirable. 4.  Any tissue or organs removed in the operation or other procedure may be disposed of by and at the discretion of the [facility] staff and [facility]. 5.  I understand that in the event of a medical emergency, I will be transported by local paramedics to [facility] or other hospital emergency department. 6.  I certify that I have read and fully understand the above consent to operation and/or other procedure. 7.  I acknowledge that my physician has explained sedation/analgesia administration to me including the risks and benefits. I consent to the administration of sedation/analgesia as may be necessary or desirable in the judgement of my physician. Witness signature: ___________________________________________________ Date:  ______/______/_____                    Time:  ________ A. M.  P.M.        Patient Name:  Brenda Rudd  11/20/1975  [id number]       Patient signature: ___________________________________________________                   Statement of Physician  My signature below affirms that prior to the time of the procedure, I have explained to the patient and/or his/her guardian, the risks and benefits involved in the proposed treatment and any reasonable alternative to the proposed treatment. I have also explained the risks and benefits involved in the refusal of the proposed treatment and have answered the patient's questions.                         Date:  ______/______/_______  Provider                      Signature:  __________________________________________________________       Time:  ___________ [address]

## (undated) NOTE — LETTER
AUTHORIZATION FOR SURGICAL OPERATION OR OTHER PROCEDURE    1. I hereby authorize Dr. Lupe Harris and the [facility] staff assigned to my case to perform the following operation and/or procedure at the [facility]:    TPI    2. My physician has explained the nature and purpose of the operation or other procedure, possible alternative methods of treatment, the risks involved, and the possibility of complication to me. I acknowledge that no guarantee has been made as to the result that may be obtained. 3.  I recognize that, during the course of this operation, or other procedure, unforseen conditions may necessitate additional or different procedure than those listed above. I, therefore, further authorize and request that the above named physician, his/her physician assistants or designees perform such procedures as are, in his/her professional opinion, necessary and desirable. 4.  Any tissue or organs removed in the operation or other procedure may be disposed of by and at the discretion of the [facility] staff and [address]. 5.  I understand that in the event of a medical emergency, I will be transported by local paramedics to [facility] or other hospital emergency department. 6.  I certify that I have read and fully understand the above consent to operation and/or other procedure. 7.  I acknowledge that my physician has explained sedation/analgesia administration to me including the risks and benefits. I consent to the administration of sedation/analgesia as may be necessary or desirable in the judgement of my physician. Witness signature: ___________________________________________________ Date:  ______/______/_____                    Time:  ________ A. M.  P.M.        Patient Name:  Nomi Sanfordn  11/20/1975  [id number]       Patient signature:  ___________________________________________________                   Statement of Physician  My signature below affirms that prior to the time of the procedure, I have explained to the patient and/or his/her guardian, the risks and benefits involved in the proposed treatment and any reasonable alternative to the proposed treatment. I have also explained the risks and benefits involved in the refusal of the proposed treatment and have answered the patient's questions.                         Date:  ______/______/_______  Provider                      Signature:  __________________________________________________________       Time:  ___________ A.M    P.M.

## (undated) NOTE — LETTER
Date: 2023      Patient Name: Ben Andino      : 1975        Thank you for choosing [facility] as your health care provider. Your physician has deemed the following medical service(s) necessary. However, your insurance plan may not pay for all of your health care and costs and may deny payment for this service. The fact that your insurance plan does not pay for an item or service does not mean you should not receive it. The purpose of this form is to help you make an informed decision about whether or not you want to receive this service(s) that may not be paid for by your insurance plan. CPT Code Description     Cost      Left cervical paraspinal, upper trapezius, levator scapula trigger point injections        _________ ______________________________ _____________      _________ ______________________________ _____________      I understand that the above mentioned service(s) or supply may not be covered by my insurance company.  I agree to be financially responsible for the cost of this service or supply in the event of my insurance denies payment as a non-covered benefit.        ______________________________________________________________________  Signature of Patient or Patient's Representative  Relationship  Date    ______________________________________________________________________  Signature of Witness to signing of form   Printed Name

## (undated) NOTE — LETTER
9/22/2022          To Whom It May Concern:    Yodit Dyer is a 51year old year-old woman with h/o DCIS of R breast, GERD, anxiety who presents with complaints of abdominal bloating and constipation. She has struggled most of her life with abdominal bloating but over the last couple months she feels it is gotten worse. She has associated straining with bowel movements and is passing hard/pellet-like stools. She complains of sensation of incomplete evacuation and abdominal bloating. For her bowel habits she has tried Linzess, miralax, senokot, and dulcolax. Given ongoing symptoms despite use of the above laxatives, I believe she is a good candidate for trulance 3mg daily.         Sincerely,    Sandra Solis MD

## (undated) NOTE — LETTER
OUTSIDE TESTING RESULT REQUEST     IMPORTANT: FOR YOUR IMMEDIATE ATTENTION  Please FAX all test results listed below to: [phone]     Has appointment on -12/20/1  * * * * If testing is NOT complete, arrange with patient A.S.A.P. * * * *      Patient Na

## (undated) NOTE — LETTER
22    tK Elam/Christian,       Wanting to keep me informed that patient Cara Alcantara  1975 surgery has been rescheduled for 3/10/22.      If you have any questions or concerns please feel free to call us at [phone] #1    Thank you,

## (undated) NOTE — LETTER
7/13/2022    Liss Banuelos   11/20/1975    To Whom it may concern: This letter has been written at the patient's request. The above patient was seen at the [facility] for treatment of a medical condition. This patient should be excused from attending work through September 1st, 2022. If there are any additional questions, please contact our office.            Sincerely,    Darius Fish M.S., NASEEM, [address]  [facility], [address] [place] [address] Jay Manuel, [address] [place] [address] Rd  [phone]

## (undated) NOTE — LETTER
21  RE: Juana Ly     : 1975    Dear Dr. Chino Kumar,    This letter is to inform you that your patient is being scheduled for surgery with Dr. Neeraj Macdonald on 22 at [facility]. We have asked the patient to contact your office to sche

## (undated) NOTE — LETTER
03/25/22 2022  [address]  [id number]      Dear Barbara Hayden,    [facility] records indicate that you have outstanding lab work and or testing that was ordered for you and has not yet been completed:  Orders Placed This Encounter      HCG, Beta Subunit, Qual [E]      Urinalysis with Culture Reflex    To provide you with the best possible care, please complete these orders at your earliest convenience. If you have recently completed these orders please disregard this letter. If you have any questions please call the office at Dept: [phone].      Thank you,       Dominic Payne PA-C

## (undated) NOTE — LETTER
[facility]fred[facility] Do  [address], [address]       08/08/18        Patient: Gary Bullard   YOB: 1975   Date of Visit: 8/7/2018       Dear  Dr. Edna Mcclellan,      Thank you for referring Gary Bullard to pa[facility]

## (undated) NOTE — LETTER
Date: 9/28/2021    Patient Name: Enzo Isaac          To Whom it may concern: This letter has been written at the patient's request. The above patient was seen at the [facility] for treatment of a medical condition.     This patient sh

## (undated) NOTE — Clinical Note
Patient is having the  make copies of her reasonable accommodation documentation. She is requesting medical record to be copied and faxed over along with her letter.   Thank you